# Patient Record
Sex: MALE | Race: WHITE | NOT HISPANIC OR LATINO | Employment: OTHER | URBAN - METROPOLITAN AREA
[De-identification: names, ages, dates, MRNs, and addresses within clinical notes are randomized per-mention and may not be internally consistent; named-entity substitution may affect disease eponyms.]

---

## 2017-05-10 DIAGNOSIS — J44.9 CHRONIC OBSTRUCTIVE PULMONARY DISEASE, UNSPECIFIED COPD TYPE (HCC): ICD-10-CM

## 2017-05-11 RX ORDER — TIOTROPIUM BROMIDE AND OLODATEROL 3.124; 2.736 UG/1; UG/1
SPRAY, METERED RESPIRATORY (INHALATION)
Qty: 1 INHALER | Refills: 5 | Status: SHIPPED | OUTPATIENT
Start: 2017-05-11 | End: 2017-10-09 | Stop reason: SDUPTHER

## 2017-05-11 NOTE — TELEPHONE ENCOUNTER
Pt is requesting an rx for Stiolto, last prescribed 7/28/16    Last OV:7/28/16  Next OV: return after studies are complete  Chronic Obstructive Lung Disease  Stiolto

## 2017-10-09 DIAGNOSIS — J44.9 CHRONIC OBSTRUCTIVE PULMONARY DISEASE, UNSPECIFIED COPD TYPE (HCC): ICD-10-CM

## 2017-10-09 RX ORDER — TIOTROPIUM BROMIDE AND OLODATEROL 3.124; 2.736 UG/1; UG/1
SPRAY, METERED RESPIRATORY (INHALATION)
Qty: 1 INHALER | Refills: 0 | Status: SHIPPED | OUTPATIENT
Start: 2017-10-09 | End: 2017-11-12 | Stop reason: SDUPTHER

## 2017-10-09 NOTE — TELEPHONE ENCOUNTER
Have we ever prescribed this med? Yes.  If yes, what date? 05/11/2017    Last OV: 7/28/2016 - Dr. Ashton    Next OV: none on file; was to complete testing    DX: COPD    Medications: Stiolto

## 2017-11-12 DIAGNOSIS — J44.9 CHRONIC OBSTRUCTIVE PULMONARY DISEASE, UNSPECIFIED COPD TYPE (HCC): ICD-10-CM

## 2017-11-13 RX ORDER — TIOTROPIUM BROMIDE AND OLODATEROL 3.124; 2.736 UG/1; UG/1
SPRAY, METERED RESPIRATORY (INHALATION)
Qty: 1 INHALER | Refills: 1 | Status: SHIPPED | OUTPATIENT
Start: 2017-11-13 | End: 2018-01-02 | Stop reason: SDUPTHER

## 2017-11-13 NOTE — TELEPHONE ENCOUNTER
Caller Name: Richard Vaughn                 Call Back Number: [phone] (home)         Patient approves a detailed voicemail message: N\A    Have we ever prescribed this med? Yes.  If yes, what date? 10/9/17    Last OV: 7/28/16 MD Poli     Next OV: none on file; was to complete testing    DX: COPD     Medications:  Current Outpatient Prescriptions   Medication Sig Dispense Refill   • STIOLTO RESPIMAT 2.5-2.5 MCG/ACT Aero Soln INHALE 2 PUFFS BY MOUTH DAILY 1 Inhaler 0   • albuterol (VENTOLIN HFA) 108 (90 BASE) MCG/ACT Aero Soln inhalation aerosol Inhale 2 Puffs by mouth every four hours as needed for Shortness of Breath (Wheezing). 1 Inhaler 6   • oxycodone-aspirin (PERCODAN) 4.5-0. MG TABS Take 1 Tab by mouth every 6 hours as needed for Mild Pain. 12 Each 0     Current Facility-Administered Medications   Medication Dose Route Frequency Provider Last Rate Last Dose   • albuterol inhaler 2 Puff  2 Puff Inhalation Q4HRS (RT) Taniya Begum M.D.

## 2017-11-22 ENCOUNTER — HOSPITAL ENCOUNTER (OUTPATIENT)
Dept: LAB | Facility: MEDICAL CENTER | Age: 62
End: 2017-11-22
Attending: FAMILY MEDICINE
Payer: COMMERCIAL

## 2017-11-22 LAB
25(OH)D3 SERPL-MCNC: 8 NG/ML (ref 30–100)
ALBUMIN SERPL BCP-MCNC: 4.2 G/DL (ref 3.2–4.9)
ALBUMIN/GLOB SERPL: 1.4 G/DL
ALP SERPL-CCNC: 45 U/L (ref 30–99)
ALT SERPL-CCNC: 13 U/L (ref 2–50)
ANION GAP SERPL CALC-SCNC: 6 MMOL/L (ref 0–11.9)
AST SERPL-CCNC: 18 U/L (ref 12–45)
BILIRUB SERPL-MCNC: 0.5 MG/DL (ref 0.1–1.5)
BUN SERPL-MCNC: 15 MG/DL (ref 8–22)
CALCIUM SERPL-MCNC: 9.4 MG/DL (ref 8.5–10.5)
CHLORIDE SERPL-SCNC: 103 MMOL/L (ref 96–112)
CHOLEST SERPL-MCNC: 199 MG/DL (ref 100–199)
CO2 SERPL-SCNC: 29 MMOL/L (ref 20–33)
CREAT SERPL-MCNC: 0.96 MG/DL (ref 0.5–1.4)
GFR SERPL CREATININE-BSD FRML MDRD: >60 ML/MIN/1.73 M 2
GLOBULIN SER CALC-MCNC: 2.9 G/DL (ref 1.9–3.5)
GLUCOSE SERPL-MCNC: 86 MG/DL (ref 65–99)
HDLC SERPL-MCNC: 55 MG/DL
LDLC SERPL CALC-MCNC: 129 MG/DL
POTASSIUM SERPL-SCNC: 4.2 MMOL/L (ref 3.6–5.5)
PROT SERPL-MCNC: 7.1 G/DL (ref 6–8.2)
PSA SERPL-MCNC: 0.77 NG/ML (ref 0–4)
SODIUM SERPL-SCNC: 138 MMOL/L (ref 135–145)
TRIGL SERPL-MCNC: 74 MG/DL (ref 0–149)
TSH SERPL DL<=0.005 MIU/L-ACNC: 1.3 UIU/ML (ref 0.3–3.7)

## 2017-11-22 PROCEDURE — 82306 VITAMIN D 25 HYDROXY: CPT

## 2017-11-22 PROCEDURE — 84153 ASSAY OF PSA TOTAL: CPT

## 2017-11-22 PROCEDURE — 84443 ASSAY THYROID STIM HORMONE: CPT

## 2017-11-22 PROCEDURE — 80061 LIPID PANEL: CPT

## 2017-11-22 PROCEDURE — 85025 COMPLETE CBC W/AUTO DIFF WBC: CPT

## 2017-11-22 PROCEDURE — 36415 COLL VENOUS BLD VENIPUNCTURE: CPT

## 2017-11-22 PROCEDURE — 80053 COMPREHEN METABOLIC PANEL: CPT

## 2017-11-24 LAB
BASOPHILS # BLD AUTO: 1.2 % (ref 0–1.8)
BASOPHILS # BLD: 0.09 K/UL (ref 0–0.12)
COMMENT 1642: NORMAL
EOSINOPHIL # BLD AUTO: 0.09 K/UL (ref 0–0.51)
EOSINOPHIL NFR BLD: 1.2 % (ref 0–6.9)
ERYTHROCYTE [DISTWIDTH] IN BLOOD BY AUTOMATED COUNT: 49.4 FL (ref 35.9–50)
HCT VFR BLD AUTO: 49.4 % (ref 42–52)
HGB BLD-MCNC: 16.7 G/DL (ref 14–18)
IMM GRANULOCYTES # BLD AUTO: 0.05 K/UL (ref 0–0.11)
IMM GRANULOCYTES NFR BLD AUTO: 0.6 % (ref 0–0.9)
LYMPHOCYTES # BLD AUTO: 1.58 K/UL (ref 1–4.8)
LYMPHOCYTES NFR BLD: 20.4 % (ref 22–41)
MCH RBC QN AUTO: 29.5 PG (ref 27–33)
MCHC RBC AUTO-ENTMCNC: 33.8 G/DL (ref 33.7–35.3)
MCV RBC AUTO: 87.1 FL (ref 81.4–97.8)
MONOCYTES # BLD AUTO: 0.6 K/UL (ref 0–0.85)
MONOCYTES NFR BLD AUTO: 7.7 % (ref 0–13.4)
MORPHOLOGY BLD-IMP: NORMAL
NEUTROPHILS # BLD AUTO: 5.35 K/UL (ref 1.82–7.42)
NEUTROPHILS NFR BLD: 68.9 % (ref 44–72)
NRBC # BLD AUTO: 0.02 K/UL
NRBC BLD AUTO-RTO: 0.3 /100 WBC
PLATELET # BLD AUTO: 252 K/UL (ref 164–446)
PLATELET BLD QL SMEAR: NORMAL
PMV BLD AUTO: 11.5 FL (ref 9–12.9)
RBC # BLD AUTO: 5.67 M/UL (ref 4.7–6.1)
RBC BLD AUTO: NORMAL
WBC # BLD AUTO: 7.8 K/UL (ref 4.8–10.8)

## 2018-01-01 DIAGNOSIS — J44.9 CHRONIC OBSTRUCTIVE PULMONARY DISEASE, UNSPECIFIED COPD TYPE (HCC): ICD-10-CM

## 2018-01-02 DIAGNOSIS — J44.9 CHRONIC OBSTRUCTIVE PULMONARY DISEASE, UNSPECIFIED COPD TYPE (HCC): ICD-10-CM

## 2018-01-02 RX ORDER — TIOTROPIUM BROMIDE AND OLODATEROL 3.124; 2.736 UG/1; UG/1
SPRAY, METERED RESPIRATORY (INHALATION)
Qty: 1 INHALER | Refills: 1 | OUTPATIENT
Start: 2018-01-02

## 2018-01-02 NOTE — TELEPHONE ENCOUNTER
Have we ever prescribed this med? Yes.  If yes, what date? 11/13/17    Last OV: 7/28/16    Next OV: No pending apt scheduled    DX: COPD    Medications: STIOLTO RESPIMAT 2.5-2.5 MCG/ACT Aero Soln    PLAN:   Continue Stiolto and prn albuterol. Will check an alpha-1 antitrypsin level and phenotype, low dose CT lung scan, and perform a home sleep study to screen for nocturnal hypoxemia and jessica (only sleeps supine 2/2 ortho issues). Will refer to pulmonary rehab. Will RTC after studies are completed.  Have encouraged him to get yearly flu shots, pna shot, and shingles vaccination

## 2018-01-02 NOTE — TELEPHONE ENCOUNTER
Caller Name: Richard Vaughn                 Call Back Number: [phone] (home)         Patient approves a detailed voicemail message: yes    Have we ever prescribed this med? Yes.  If yes, what date? 11/13/17    Last OV: 7/28/16    Next OV: no scheduled visit- no appt on file    DX: Chronic obstructive lung disease    Medications: Stiolto respimat

## 2018-02-22 DIAGNOSIS — J44.9 CHRONIC OBSTRUCTIVE PULMONARY DISEASE, UNSPECIFIED COPD TYPE (HCC): ICD-10-CM

## 2018-02-22 RX ORDER — TIOTROPIUM BROMIDE AND OLODATEROL 3.124; 2.736 UG/1; UG/1
SPRAY, METERED RESPIRATORY (INHALATION)
Qty: 1 INHALER | Refills: 0 | OUTPATIENT
Start: 2018-02-22

## 2018-02-22 NOTE — TELEPHONE ENCOUNTER
Caller Name: Richard Vaughn                 Call Back Number: [phone] (home)         Patient approves a detailed voicemail message: N\A    Have we ever prescribed this med? Yes.  If yes, what date? 1/2/18    Last OV: 7/28/16 MD Poli       PLAN:   Continue Stiolto and prn albuterol. Will check an alpha-1 antitrypsin level and phenotype, low dose CT lung scan, and perform a home sleep study to screen for nocturnal hypoxemia and jessica (only sleeps supine 2/2 ortho issues). Will refer to pulmonary rehab. Will RTC after studies are completed.  Have encouraged him to get yearly flu shots, pna shot, and shingles vaccination.          Next OV: NO PENDING APPT PLEASE REVIEW PREVIOUS ENCOUNTERS PATIENT WAS INFORMED TO BE SEEN        DX: COLD (chronic obstructive lung disease) (CMS-[facility]) [J44.9]        Medications:  Current Outpatient Prescriptions   Medication Sig Dispense Refill   • Tiotropium Bromide-Olodaterol (STIOLTO RESPIMAT) 2.5-2.5 MCG/ACT Aero Soln Inhale 2.5 mcg by mouth 2 times a day. 1 Inhaler 1   • albuterol (VENTOLIN HFA) 108 (90 BASE) MCG/ACT Aero Soln inhalation aerosol Inhale 2 Puffs by mouth every four hours as needed for Shortness of Breath (Wheezing). 1 Inhaler 6   • oxycodone-aspirin (PERCODAN) 4.5-0. MG TABS Take 1 Tab by mouth every 6 hours as needed for Mild Pain. 12 Each 0     Current Facility-Administered Medications   Medication Dose Route Frequency Provider Last Rate Last Dose   • albuterol inhaler 2 Puff  2 Puff Inhalation Q4HRS (RT) Taniya Begum M.D.

## 2018-03-22 NOTE — TELEPHONE ENCOUNTER
Have we ever prescribed this med? Yes.  If yes, what date? 06/08/16    Last OV: 07/28//16    Next OV: yearly    DX: COPD    Medications: albuterol (VENTOLIN HFA)

## 2018-03-23 NOTE — TELEPHONE ENCOUNTER
Have we ever prescribed this med? Yes.  If yes, what date? 6/8/16    Last OV: 7/28/16    Next OV: No pt scheduled    DX: Chronic obstructive pulmonary disease, unspecified copd, unspecified chronic bronchitis type (J44.9)    Medications: albuterol (VENTOLIN HFA) 108 (90 BASE) MCG/ACT Aero Soln inhalation aerosol     pt needs to OV for further refills

## 2018-03-26 RX ORDER — ALBUTEROL SULFATE 90 UG/1
AEROSOL, METERED RESPIRATORY (INHALATION)
Qty: 1 INHALER | Refills: 0 | OUTPATIENT
Start: 2018-03-26

## 2018-03-27 RX ORDER — ALBUTEROL SULFATE 90 UG/1
2 AEROSOL, METERED RESPIRATORY (INHALATION) EVERY 4 HOURS PRN
Qty: 1 INHALER | Refills: 0 | Status: SHIPPED | OUTPATIENT
Start: 2018-03-27 | End: 2019-03-11

## 2018-03-27 NOTE — TELEPHONE ENCOUNTER
Tried to call patient to let him know that his prescription was ready but he did not answer and his mail box was full so I could not leav a message.

## 2018-10-07 ENCOUNTER — HOSPITAL ENCOUNTER (OUTPATIENT)
Facility: MEDICAL CENTER | Age: 63
End: 2018-10-07
Attending: PHYSICIAN ASSISTANT
Payer: COMMERCIAL

## 2018-10-07 ENCOUNTER — OFFICE VISIT (OUTPATIENT)
Dept: URGENT CARE | Facility: CLINIC | Age: 63
End: 2018-10-07
Payer: COMMERCIAL

## 2018-10-07 VITALS
BODY MASS INDEX: 21.15 KG/M2 | RESPIRATION RATE: 20 BRPM | TEMPERATURE: 98.3 F | SYSTOLIC BLOOD PRESSURE: 130 MMHG | HEIGHT: 69 IN | DIASTOLIC BLOOD PRESSURE: 84 MMHG | HEART RATE: 95 BPM | OXYGEN SATURATION: 87 % | WEIGHT: 142.8 LBS

## 2018-10-07 DIAGNOSIS — L08.9 SKIN INFECTION: ICD-10-CM

## 2018-10-07 PROCEDURE — 87077 CULTURE AEROBIC IDENTIFY: CPT | Mod: 91

## 2018-10-07 PROCEDURE — 99214 OFFICE O/P EST MOD 30 MIN: CPT | Performed by: PHYSICIAN ASSISTANT

## 2018-10-07 PROCEDURE — 87186 SC STD MICRODIL/AGAR DIL: CPT

## 2018-10-07 PROCEDURE — 87070 CULTURE OTHR SPECIMN AEROBIC: CPT

## 2018-10-07 RX ORDER — CEFUROXIME AXETIL 500 MG/1
500 TABLET ORAL 2 TIMES DAILY
Qty: 20 TAB | Refills: 0 | Status: SHIPPED | OUTPATIENT
Start: 2018-10-07 | End: 2018-10-17

## 2018-10-07 RX ORDER — CEFTRIAXONE SODIUM 250 MG/1
500 INJECTION, POWDER, FOR SOLUTION INTRAMUSCULAR; INTRAVENOUS ONCE
Status: COMPLETED | OUTPATIENT
Start: 2018-10-07 | End: 2018-10-07

## 2018-10-07 RX ADMIN — CEFTRIAXONE SODIUM 500 MG: 250 INJECTION, POWDER, FOR SOLUTION INTRAMUSCULAR; INTRAVENOUS at 16:37

## 2018-10-07 ASSESSMENT — ENCOUNTER SYMPTOMS
SWOLLEN GLANDS: 0
NEUROLOGICAL NEGATIVE: 1
NUMBNESS: 0
FEVER: 0
CONSTITUTIONAL NEGATIVE: 1
MUSCULOSKELETAL NEGATIVE: 1
JOINT SWELLING: 0
WEAKNESS: 0

## 2018-10-07 NOTE — PROGRESS NOTES
Subjective:      Richard Vaughn is a 62 y.o. male who presents with Wound Infection (x4 days)            Wound Infection   This is a new (infected wound/cut) problem. The current episode started in the past 7 days. The problem occurs constantly. The problem has been gradually worsening. Pertinent negatives include no fever, joint swelling, numbness, swollen glands or weakness. Nothing aggravates the symptoms. He has tried nothing for the symptoms. The treatment provided no relief.       Review of Systems   Constitutional: Negative.  Negative for fever.   Musculoskeletal: Negative.  Negative for joint swelling.   Skin: Negative.    Neurological: Negative.  Negative for weakness and numbness.          Objective:     There were no vitals taken for this visit.     Physical Exam   Constitutional: He is oriented to person, place, and time. He appears well-developed and well-nourished. No distress.   Musculoskeletal: Normal range of motion. He exhibits tenderness (2nd finger cut wound, some pus seen; redn/sw finger w/redn to dorsal hand to wrist but no prox red streak seen). He exhibits no edema.   Neurological: He is alert and oriented to person, place, and time. No sensory deficit. He exhibits normal muscle tone. Coordination normal.   Skin: Skin is warm and dry. Capillary refill takes less than 2 seconds.   Psychiatric: He has a normal mood and affect. His behavior is normal.   Nursing note and vitals reviewed.    Active Ambulatory Problems     Diagnosis Date Noted   • Tobacco use disorder, mild, in sustained remission, abuse 07/28/2016   • COLD (chronic obstructive lung disease) ([facility]) 07/28/2016     Resolved Ambulatory Problems     Diagnosis Date Noted   • COLD (chronic obstructive lung disease) ([facility]) 06/06/2016     Past Medical History:   Diagnosis Date   • Anesthesia    • Cold    • Dental disorder    • Diverticulitis    • Osteoporosis    • Psychiatric problem      Current Outpatient Prescriptions on File Prior  to Visit   Medication Sig Dispense Refill   • albuterol (VENTOLIN HFA) 108 (90 Base) MCG/ACT Aero Soln inhalation aerosol Inhale 2 Puffs by mouth every four hours as needed for Shortness of Breath (Wheezing). 1 Inhaler 0   • Tiotropium Bromide-Olodaterol (STIOLTO RESPIMAT) 2.5-2.5 MCG/ACT Aero Soln Inhale 2 Puffs by mouth every day. 1 Inhaler 11   • oxycodone-aspirin (PERCODAN) 4.5-0. MG TABS Take 1 Tab by mouth every 6 hours as needed for Mild Pain. 12 Each 0     Current Facility-Administered Medications on File Prior to Visit   Medication Dose Route Frequency Provider Last Rate Last Dose   • albuterol inhaler 2 Puff  2 Puff Inhalation Q4HRS (RT) Taniya Begum M.D.         Social History     Social History   • Marital status:      Spouse name: N/A   • Number of children: N/A   • Years of education: N/A     Occupational History   • Not on file.     Social History Main Topics   • Smoking status: Former Smoker     Packs/day: 0.25     Types: Cigarettes     Quit date: 6/6/2009   • Smokeless tobacco: Never Used      Comment: 1 a day for 40 yrs   • Alcohol use No   • Drug use: No      Comment: NOT AT THIS TIME     • Sexual activity: Not on file     Other Topics Concern   • Not on file     Social History Narrative   • No narrative on file     Family History   Problem Relation Age of Onset   • Alzheimer's Disease Mother      Patient has no known allergies.            Assessment/Plan:     ·  lac/skin infection      · rx abx; local wound care  · Call back if not improving; will consider 2nd abx [but pt has GI hx/surg, intol to most meds];   · Go to ER if worsens or have fever, red streak up arm

## 2018-10-08 DIAGNOSIS — L08.9 SKIN INFECTION: ICD-10-CM

## 2018-10-10 ENCOUNTER — TELEPHONE (OUTPATIENT)
Dept: MEDICAL GROUP | Facility: CLINIC | Age: 63
End: 2018-10-10

## 2018-10-10 NOTE — TELEPHONE ENCOUNTER
Lab called with preliminary results. Wound cx positive for beta-hemolytic strep A and staph aureus.

## 2018-10-11 LAB
BACTERIA WND AEROBE CULT: ABNORMAL
SIGNIFICANT IND 70042: ABNORMAL
SITE SITE: ABNORMAL
SOURCE SOURCE: ABNORMAL

## 2018-11-09 ENCOUNTER — TELEPHONE (OUTPATIENT)
Dept: PULMONOLOGY | Facility: HOSPICE | Age: 63
End: 2018-11-09

## 2018-11-09 NOTE — TELEPHONE ENCOUNTER
Called pt in regards to his appt on 11/15/18 with Korin for a follow up however per  last note on 7/28/16 he stated he wanted pt to do a low dose CT LUNG SCAN and a  Home sleep study to screen for nocturnal hypoxemia  and jessica. Left VM to advise pt to call back to schedule appropriate testing before his appt. Also asked if he had maybe done testing somewhere else so that I could request those records.        PLAN:   Continue Stiolto and prn albuterol. Will check an alpha-1 antitrypsin level and phenotype, low dose CT lung scan, and perform a home sleep study to screen for nocturnal hypoxemia and jessica (only sleeps supine 2/2 ortho issues). Will refer to pulmonary rehab. Will RTC after studies are completed.  Have encouraged him to get yearly flu shots, pna shot, and shingles vaccination.

## 2018-11-13 NOTE — TELEPHONE ENCOUNTER
The patient called and he has not had testing done anywhere else.  He wants to keep his appt with Carley, though so that he can discuss the sleep study with her.  He has questions.

## 2018-11-15 ENCOUNTER — OFFICE VISIT (OUTPATIENT)
Dept: PULMONOLOGY | Facility: HOSPICE | Age: 63
End: 2018-11-15
Payer: COMMERCIAL

## 2018-11-15 VITALS
SYSTOLIC BLOOD PRESSURE: 136 MMHG | WEIGHT: 142 LBS | DIASTOLIC BLOOD PRESSURE: 60 MMHG | HEART RATE: 79 BPM | OXYGEN SATURATION: 91 % | HEIGHT: 69 IN | RESPIRATION RATE: 16 BRPM | BODY MASS INDEX: 21.03 KG/M2

## 2018-11-15 DIAGNOSIS — F17.201 TOBACCO ABUSE, IN REMISSION: ICD-10-CM

## 2018-11-15 DIAGNOSIS — M45.3 ANKYLOSING SPONDYLITIS OF CERVICOTHORACIC REGION (HCC): ICD-10-CM

## 2018-11-15 DIAGNOSIS — J44.9 CHRONIC OBSTRUCTIVE PULMONARY DISEASE, UNSPECIFIED COPD TYPE (HCC): ICD-10-CM

## 2018-11-15 PROCEDURE — 99213 OFFICE O/P EST LOW 20 MIN: CPT | Performed by: PHYSICIAN ASSISTANT

## 2018-11-15 NOTE — PATIENT INSTRUCTIONS
1-reviewed undone testing, hold off on alpha-1 for now  2-do lung cancer screening referral and overnight oximetry rather than home sleep study  3-needs new PFT  4-follow up appointment to go over results

## 2018-11-16 NOTE — PROGRESS NOTES
CC    HPI:  Richard Vaughn is a 63 y.o. year old male here today for follow-up on obstructive lung disease.  Patient has significant smoking history was error on prior documentation corrected pack years is 42.  Sustained remission since 2009, recommended continued abstinence.  Patient seen in July 2016 by Dr. Ashton at that time he was advised to complete alpha-1 antitrypsin level and phenotype, low-dose CT lung scan and home sleep study as well as pulmonary rehab.  Patient reports he understood that he would be contacted to schedule this workup.  Patient is not heavy utilizer of medical care based on available medical record. Reviewed the purpose of each of these recommendations.  Patient does not have biologic children and declines alpha-1 antitrypsin at this time.  Given his heavy smoking history and new understanding of the purpose of lung cancer screening patient willing to pursue that at this time.  Patient reluctant to perform any kind of sleep study because he states he sleeps always on his back due to ankylosing spondylitis with significant kyphosis and sleeps very poorly due to chronic back pain.  Did not feel a test performed when he was sedated would adequately reflect his normal circumstances.  Did agree to overnight oximetry to at least screen for nocturnal hypoxia.  Patient has not had PFTs since 7/28/2016.  Patient did express concern that exposure to something in [place] during [place] which led to chronic severe nasal sinus issues and 70% disability from the  might have actually caused his lung disease and would qualify him for 100% disability from the .  Difficult to assess with limited information, patient lung disease is consistent with his smoking history.  Reviewed vital signs in office today blood pressure 136/60, BMI of 21, room air oxygen saturation of 91%.  Follow-up appointment set at 3 months to allow patient time to complete recommended workup given upcoming  holidays.    ROS:   Constitutional, does report increased fatigue, denies fever, chills, night sweats, unintentional weight loss  Skin: Denies rashes, hair or nail changes, lumps, sores  Eyes: Denies glasses or any acute changes in his vision  ENT: Patient has upper and lower dentures, history of chronic sinus infections, ongoing nasal congestion or runny nose with postnasal drip, denies allergies, hoarseness, sore throat, earaches  GI: Denies heartburn or reflux at the current time ever since abdominal surgery, has no difficulty swallowing  Respiratory: Occasional cough with clear production, episodic wheezing, shortness of breath with activity, denies any history of pneumonia or bronchitis or TB, 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 family hx malignant hyperthermia   • Cold    • Dental disorder     dentures   • Diverticulitis    • Osteoporosis    • Psychiatric problem     CONSULT FOR DRUG WITHDRAWAL AFTER LAST SURGERY       Past Surgical History:   Procedure Laterality Date   • VENTRAL HERNIA REPAIR  10/23/2009    Performed by ALEJANDRA EASTON at SURGERY [facility] ORS   • VENTRAL HERNIA REPAIR  6/11/2009    Performed by ALEJANDRA EASTON at SURGERY [facility] ORS   • COLOSTOMY CLOSURE  4/2/2009    Performed by ALEJANDRA EASTON at SURGERY [facility] ORS   • SIGMOID COLECTOMY  12/6/2008    Performed by ALEJANDRA EASTON at SURGERY [facility] ORS   • COLOSTOMY  12/6/2008    Performed by ALEJANDRA EASTON at SURGERY [facility] ORS   • EXPLORATORY LAPAROTOMY  12/6/2008    Performed by ALEJANDRA EASTON at SURGERY [facility] ORS   • BOWEL RESECTION  12/6/2008    Performed by ALEJANDRA EASTON at SURGERY [facility] ORS   • UMBILICAL HERNIA REPAIR         Family  "History   Problem Relation Age of Onset   • Alzheimer's Disease Mother        Social History     Social History   • Marital status:      Spouse name: N/A   • Number of children: N/A   • Years of education: N/A     Occupational History   • Not on file.     Social History Main Topics   • Smoking status: Former Smoker     Packs/day: 1.00     Years: 42.00     Types: Cigarettes     Start date: 1/1/1967     Quit date: 6/6/2009   • Smokeless tobacco: Never Used      Comment: continued abstinance   • Alcohol use No   • Drug use: No      Comment: NOT AT THIS TIME     • Sexual activity: Not on file     Other Topics Concern   • Not on file     Social History Narrative   • No narrative on file       Allergies as of 11/15/2018   • (No Known Allergies)        @Vital signs for this encounter:  Vitals:    11/15/18 1322 11/15/18 1329   Height: 1.753 m (5' 9\")    Weight: 64.4 kg (142 lb)    Weight % change since last entry.: 0 %    BP: 136/60    Pulse: 79    BMI (Calculated): 20.97    Resp: 16    O2 sat % room air:  (!) 91 %       Current medications as of today   Current Outpatient Prescriptions   Medication Sig Dispense Refill   • albuterol (VENTOLIN HFA) 108 (90 Base) MCG/ACT Aero Soln inhalation aerosol Inhale 2 Puffs by mouth every four hours as needed for Shortness of Breath (Wheezing). 1 Inhaler 0   • Tiotropium Bromide-Olodaterol (STIOLTO RESPIMAT) 2.5-2.5 MCG/ACT Aero Soln Inhale 2 Puffs by mouth every day. 1 Inhaler 11   • oxycodone-aspirin (PERCODAN) 4.5-0. MG TABS Take 1 Tab by mouth every 6 hours as needed for Mild Pain. 12 Each 0     Current Facility-Administered Medications   Medication Dose Route Frequency Provider Last Rate Last Dose   • albuterol inhaler 2 Puff  2 Puff Inhalation Q4HRS (RT) Taniya Begum M.D.             Physical Exam:   Gen:           Alert and oriented, No apparent distress. Mood and affect appropriate, normal interaction   Eyes:          sclere white, conjunctive moist.  Hearing:   "   Grossly intact.  Dentition:    Wears dentures  Oropharynx:   Tongue normal, posterior pharynx with mild erythema no exudate.  Mallampati Classification: II-III  Neck:        Supple, trachea midline, no masses.  M/S:   Marked kyphosis, limited range of motion back and neck  Respiratory Effort: Did have some intercostal retractions and use of accessory muscles.   Lung Auscultation:      Decreased throughout; no rales, rhonchi or wheezing.  CV:            Regular rate and rhythm. No murmurs, rubs or gallops appreciated.  2-3+ pretibial edema  Digits, Nails, Ext: No clubbing, cyanosis, petechiae, or nodes.   Skin:        No rashes, lesions or ulcers noted on back or exposed skin surfaces                    Assessment:  1. Tobacco abuse, in remission  REFERRAL TO LUNG CANCER SCREENING PROGRAM    Overnight Oximetry    PULMONARY FUNCTION TESTS -Test requested: Complete Pulmonary Function Test   2. Chronic obstructive pulmonary disease, unspecified COPD type (HCC)         Immunizations:    Flu: Deferred  Pneumovax 23: Deferred  Prevnar 13: Deferred    Plan:  1-reviewed undone testing, hold off on alpha-1 for now  2-do lung cancer screening referral and overnight oximetry rather than home sleep study  3-needs new PFT  4-follow up appointment to go over results  5-would like to perform multi ox at this visit    This dictation was created using voice recognition software. The accuracy of the dictation is limited to the abilities of the software. I expect there may be some errors of grammar and possibly content.

## 2018-11-27 ENCOUNTER — TELEPHONE (OUTPATIENT)
Dept: HEMATOLOGY ONCOLOGY | Facility: MEDICAL CENTER | Age: 63
End: 2018-11-27

## 2018-11-27 DIAGNOSIS — J44.9 CHRONIC OBSTRUCTIVE PULMONARY DISEASE, UNSPECIFIED COPD TYPE (HCC): ICD-10-CM

## 2018-11-28 RX ORDER — TIOTROPIUM BROMIDE AND OLODATEROL 3.124; 2.736 UG/1; UG/1
2 SPRAY, METERED RESPIRATORY (INHALATION) DAILY
Qty: 4 INHALER | Refills: 11 | Status: SHIPPED | OUTPATIENT
Start: 2018-11-28 | End: 2018-11-29 | Stop reason: SDUPTHER

## 2018-11-28 NOTE — TELEPHONE ENCOUNTER
Have we ever prescribed this med? Yes.  If yes, what date? 2/22/18    Last OV: 11/15/18 JOSR PATEL    Next OV: 2/20/19 JOSR PATEL    DX: Chronic obstructive pulmonary disease, unspecified COPD type (HCC) (J44.9)    Medications: STIOLTO RESPIMAT 2.5-2.5 MCG/ACT Aero Soln

## 2018-11-28 NOTE — TELEPHONE ENCOUNTER
Let pt know that his rx was sent to     [facility]/pharmacy #[id number] - [place] - [address]  [address] [address]  Phone: [phone] Fax: [phone]

## 2018-11-28 NOTE — TELEPHONE ENCOUNTER
Received referral to lung cancer screening program.  Chart review to assess for lung cancer screening program eligibility.   1. Age 55-77 yrs of age? Yes 63 y.o.  2. 30 pack year hx of smoking, or greater? Yes 1 [id number]= [id number] hx  3. Current smoker or if quit, has pt quit within last 15 yrs?Yes  Quit 2009  4. Any signs or symptoms of lung cancer? None noted  5. Previous history of lung cancer? None noted  6. Chest CT within past 12 mos.? None noted  Patient does meet eligibility criteria. [facility] scheduling notified to schedule the shared decision making visit.

## 2018-11-29 DIAGNOSIS — J44.9 CHRONIC OBSTRUCTIVE PULMONARY DISEASE, UNSPECIFIED COPD TYPE (HCC): ICD-10-CM

## 2018-11-30 NOTE — TELEPHONE ENCOUNTER
Pt is requesting a sample of the rx Stiolto, he states that his insurance company will not approve the refill until 12/3/18 and he is almost out.    Have we ever prescribed this med? Yes.  If yes, what date? 11/29/18    Last OV: 11/15/18- [facility]    Next OV: 2/20/19    DX: COPD    Medications: sample of Stiolto    Order pending!

## 2018-11-30 NOTE — TELEPHONE ENCOUNTER
Pt notified on vm that sample of Stiolto was approved and at the [facility] for . BRET

## 2018-12-05 ENCOUNTER — TELEPHONE (OUTPATIENT)
Dept: HEMATOLOGY ONCOLOGY | Facility: MEDICAL CENTER | Age: 63
End: 2018-12-05

## 2018-12-05 NOTE — TELEPHONE ENCOUNTER
1st attempt to contact the patient.  LM on voicemail for patient requesting a call back to schedule a new patient appointment with medical oncology LCSW.  Ref: Lakshmi King  Dx: Former Tobacco abuse

## 2018-12-20 ENCOUNTER — OFFICE VISIT (OUTPATIENT)
Dept: HEMATOLOGY ONCOLOGY | Facility: MEDICAL CENTER | Age: 63
End: 2018-12-20
Payer: COMMERCIAL

## 2018-12-20 VITALS
RESPIRATION RATE: 18 BRPM | BODY MASS INDEX: 21.65 KG/M2 | HEART RATE: 81 BPM | WEIGHT: 146.16 LBS | OXYGEN SATURATION: 91 % | SYSTOLIC BLOOD PRESSURE: 140 MMHG | TEMPERATURE: 98.2 F | DIASTOLIC BLOOD PRESSURE: 90 MMHG | HEIGHT: 69 IN

## 2018-12-20 DIAGNOSIS — Z87.891 PERSONAL HISTORY OF TOBACCO USE, PRESENTING HAZARDS TO HEALTH: ICD-10-CM

## 2018-12-20 PROCEDURE — G0296 VISIT TO DETERM LDCT ELIG: HCPCS | Performed by: NURSE PRACTITIONER

## 2018-12-20 ASSESSMENT — ENCOUNTER SYMPTOMS
HEMOPTYSIS: 0
COUGH: 1
SHORTNESS OF BREATH: 1
SPUTUM PRODUCTION: 1
WEIGHT LOSS: 0
WHEEZING: 0

## 2018-12-20 ASSESSMENT — PAIN SCALES - GENERAL: PAINLEVEL: 4=SLIGHT-MODERATE PAIN

## 2018-12-20 NOTE — PROGRESS NOTES
Subjective:      Richard Vaughn is a 63 y.o. male who presents for Lung Cancer Screening Program Prescreen (Former Smoker/ [facility]/ Lakshmi King) for lung cancer screening shared decision making visit.       HPI   Patient seen today for initial lung cancer screening visit. Patient referred by Pulmonology, Lakshmi Kign PA-C.     The patient meets eligibility criteria including age, smoking history (30+ pack years), if former smoker, quit in the last 15 years, and absence of signs or symptoms of lung cancer.    - Age - 63  - Smoking history - Patient has smoked for 42 years at an average of 1 ppd = 42 pack year smoking history.  - Current smoking status - former smoker, quit 2009  - No symptoms of lung cancer and no previous history of lung cancer         No Known Allergies    Current Outpatient Prescriptions on File Prior to Visit   Medication Sig Dispense Refill   • Tiotropium Bromide-Olodaterol (STIOLTO RESPIMAT) 2.5-2.5 MCG/ACT Aero Soln Inhale 2 Puffs by mouth every day. 2 Inhaler 0   • albuterol (VENTOLIN HFA) 108 (90 Base) MCG/ACT Aero Soln inhalation aerosol Inhale 2 Puffs by mouth every four hours as needed for Shortness of Breath (Wheezing). 1 Inhaler 0   • oxycodone-aspirin (PERCODAN) 4.5-0. MG TABS Take 1 Tab by mouth every 6 hours as needed for Mild Pain. (Patient not taking: Reported on 12/20/2018) 12 Each 0     Current Facility-Administered Medications on File Prior to Visit   Medication Dose Route Frequency Provider Last Rate Last Dose   • albuterol inhaler 2 Puff  2 Puff Inhalation Q4HRS (RT) Taniya Begum M.D.           Review of Systems   Constitutional: Negative for malaise/fatigue and weight loss.   Respiratory: Positive for cough (recovering URI with chronic sinusitis), sputum production (clear, green stuff this week) and shortness of breath. Negative for hemoptysis and wheezing.           Objective:     /90 (BP Location: Right arm, Patient Position: Sitting, BP Cuff Size:  "Adult)   Pulse 81   Temp 36.8 °C (98.2 °F) (Temporal)   Resp 18   Ht 1.753 m (5' 9\")   Wt 66.3 kg (146 lb 2.6 oz)   SpO2 91%   BMI 21.58 kg/m²      Physical Exam   Constitutional: He is oriented to person, place, and time. He appears well-developed and well-nourished. No distress.   Cardiovascular: Normal rate, regular rhythm and normal heart sounds.  Exam reveals no gallop and no friction rub.    No murmur heard.  Pulmonary/Chest: Effort normal. No respiratory distress. He has no wheezes.   diminshed   Musculoskeletal: Normal range of motion.   Scoliosis and kyphosis changes, hx AS   Neurological: He is alert and oriented to person, place, and time.   Skin: Skin is warm and dry. He is not diaphoretic.   Vitals reviewed.         Assessment/Plan:     1. Personal history of tobacco use, presenting hazards to health  CT-LUNG CANCER-SCREENING       We conducted a shared decision-making process using a decision aid. We reviewed benefits and harms of screening, including false positives and potential need for additional diagnostic testing, the possibility of over diagnosis, and total radiation exposure.    We discussed the importance of adhering to annual LDCT screening. We also discussed the impact of comorbities on the patient's the ability or willingness to undergo diagnostic procedure(s) and treatment.    Counseling on the importance of maintaining cigarette smoking abstinence if former smoker; or the importance of smoking cessation if current smoker and, if appropriate, furnishing of information about tobacco cessation interventions.    Based on our discussion, we have decided to begin annual lung cancer screening starting now.        "

## 2019-01-21 ENCOUNTER — HOSPITAL ENCOUNTER (OUTPATIENT)
Dept: RADIOLOGY | Facility: MEDICAL CENTER | Age: 64
End: 2019-01-21
Attending: NURSE PRACTITIONER
Payer: MEDICARE

## 2019-01-21 DIAGNOSIS — Z87.891 PERSONAL HISTORY OF TOBACCO USE, PRESENTING HAZARDS TO HEALTH: ICD-10-CM

## 2019-01-21 PROCEDURE — G0297 LDCT FOR LUNG CA SCREEN: HCPCS

## 2019-01-24 ENCOUNTER — TELEPHONE (OUTPATIENT)
Dept: HEMATOLOGY ONCOLOGY | Facility: MEDICAL CENTER | Age: 64
End: 2019-01-24

## 2019-01-24 NOTE — TELEPHONE ENCOUNTER
RN called patient to discuss LDCT results. Patient did not answer and unable to leave a vm d/t space on voicemail.

## 2019-01-30 ENCOUNTER — TELEPHONE (OUTPATIENT)
Dept: HEMATOLOGY ONCOLOGY | Facility: MEDICAL CENTER | Age: 64
End: 2019-01-30

## 2019-02-11 ENCOUNTER — TELEPHONE (OUTPATIENT)
Dept: HEMATOLOGY ONCOLOGY | Facility: MEDICAL CENTER | Age: 64
End: 2019-02-11

## 2019-02-11 NOTE — TELEPHONE ENCOUNTER
2nd attempt to contact the patient regarding LDCT. Patient did not answer and voicemail left with request to call back

## 2019-02-22 ENCOUNTER — TELEPHONE (OUTPATIENT)
Dept: HEMATOLOGY ONCOLOGY | Facility: MEDICAL CENTER | Age: 64
End: 2019-02-22

## 2019-02-22 NOTE — TELEPHONE ENCOUNTER
After failed attempts to reach the patient, a letter has been sent to  Request the patient call our office to review the results of the LDCT performed on 1/21/2019

## 2019-02-25 ENCOUNTER — APPOINTMENT (OUTPATIENT)
Dept: RADIOLOGY | Facility: IMAGING CENTER | Age: 64
End: 2019-02-25
Attending: INTERNAL MEDICINE
Payer: MEDICARE

## 2019-02-25 ENCOUNTER — APPOINTMENT (OUTPATIENT)
Dept: PULMONOLOGY | Facility: HOSPICE | Age: 64
End: 2019-02-25
Payer: MEDICARE

## 2019-02-25 ENCOUNTER — OFFICE VISIT (OUTPATIENT)
Dept: PULMONOLOGY | Facility: HOSPICE | Age: 64
End: 2019-02-25
Payer: MEDICARE

## 2019-02-25 VITALS
SYSTOLIC BLOOD PRESSURE: 140 MMHG | HEIGHT: 69 IN | RESPIRATION RATE: 18 BRPM | OXYGEN SATURATION: 98 % | BODY MASS INDEX: 21.03 KG/M2 | HEART RATE: 82 BPM | TEMPERATURE: 98.1 F | WEIGHT: 142 LBS | DIASTOLIC BLOOD PRESSURE: 80 MMHG

## 2019-02-25 DIAGNOSIS — J44.1 COPD EXACERBATION (HCC): ICD-10-CM

## 2019-02-25 DIAGNOSIS — J44.9 CHRONIC OBSTRUCTIVE PULMONARY DISEASE, UNSPECIFIED COPD TYPE (HCC): ICD-10-CM

## 2019-02-25 DIAGNOSIS — R09.02 EXERCISE HYPOXEMIA: ICD-10-CM

## 2019-02-25 DIAGNOSIS — R60.9 PERIPHERAL EDEMA: ICD-10-CM

## 2019-02-25 PROCEDURE — 99214 OFFICE O/P EST MOD 30 MIN: CPT | Performed by: INTERNAL MEDICINE

## 2019-02-25 PROCEDURE — 71046 X-RAY EXAM CHEST 2 VIEWS: CPT | Mod: TC,FY | Performed by: INTERNAL MEDICINE

## 2019-02-25 PROCEDURE — 94761 N-INVAS EAR/PLS OXIMETRY MLT: CPT | Performed by: INTERNAL MEDICINE

## 2019-02-25 RX ORDER — PREDNISONE 10 MG/1
TABLET ORAL
Qty: 30 TAB | Refills: 0 | Status: SHIPPED | OUTPATIENT
Start: 2019-02-25 | End: 2019-03-11

## 2019-02-25 RX ORDER — FUROSEMIDE 20 MG/1
20 TABLET ORAL DAILY
Qty: 30 TAB | Refills: 0 | Status: ON HOLD | OUTPATIENT
Start: 2019-02-25 | End: 2019-03-13

## 2019-02-25 ASSESSMENT — PAIN SCALES - GENERAL: PAINLEVEL: NO PAIN

## 2019-02-26 DIAGNOSIS — J44.9 CHRONIC OBSTRUCTIVE PULMONARY DISEASE, UNSPECIFIED COPD TYPE (HCC): ICD-10-CM

## 2019-02-26 NOTE — PROGRESS NOTES
CC:  Chief Complaint   Patient presents with   • Follow-Up     Legs are swelling, severe shortness of breath     Severe shortness of breath and cough    HPI:   The patient is a 63 y.o. male.  With history of smoking came today for follow-up.  The patient was recently seen in our clinic.  Pulmonary function test was ordered unfortunately was not done yet.  The patient was prescribed Stiolto and he has been taking it.  For the last 2 weeks his symptoms of shortness of breath and cough has been much worse than baseline.  He cannot walk very short distance without extreme dyspnea.  His cough was also productive of green sputum more than usual.  And darker than usual.  However the patient thinks symptoms started to turn around for the last 2 days.    The patient is also complaining of lower extremity swelling.  He does have scoliosis and he cannot lay flat.  He spent a lot of time in the recliner chair.  He thinks swelling is because he does not lay flat enough time during the night.  Denies any palpitation or chest pain.  No known cardiac history.  Chest x-ray in our clinic today did not show any pneumonia.  No pulmonary edema or cardiomegaly.  ROS:   Constitutional: Denies fevers, chills, night sweats  Eyes: Denies vision loss, pain, drainage, double vision  Ears, Nose, Throat: Denies earache, difficulty hearing, tinnitus, nasal congestion, hoarseness  Cardiovascular: Denies chest pain, tightness, palpitations, orthopnea or edema  Respiratory: Please see HPI  GI: Denies heartburn, dysphagia, nausea, abdominal pain, diarrhea or constipation  : Denies frequent urination, hematuria, discharge or painful urination  Musculoskeletal: Denies back pain, painful joints, sore muscles  Neurological: Denies weakness or headaches  Skin: No rashes  All other ROS were negative except what mentioned in the HPI     Past Medical History:  Past Medical History:   Diagnosis Date   • Anesthesia     poss. family hx malignant hyperthermia  "  • Cold    • Dental disorder     dentures   • Diverticulitis    • Osteoporosis    • Psychiatric problem     CONSULT FOR DRUG WITHDRAWAL AFTER LAST SURGERY               Social History:  Social History     Social History   • Marital status:      Spouse name: N/A   • Number of children: N/A   • Years of education: N/A     Occupational History   • Not on file.     Social History Main Topics   • Smoking status: Former Smoker     Packs/day: 1.00     Years: 42.00     Types: Cigarettes     Start date: 1/1/1967     Quit date: 6/6/2009   • Smokeless tobacco: Never Used      Comment: continued abstinance   • Alcohol use No   • Drug use: No      Comment: NOT AT THIS TIME     • Sexual activity: Not on file     Other Topics Concern   • Not on file     Social History Narrative   • No narrative on file             Family History:  Family History   Problem Relation Age of Onset   • Alzheimer's Disease Mother        Current Outpatient Prescriptions on File Prior to Visit   Medication Sig Dispense Refill   • Tiotropium Bromide-Olodaterol (STIOLTO RESPIMAT) 2.5-2.5 MCG/ACT Aero Soln Inhale 2 Puffs by mouth every day. 2 Inhaler 0   • albuterol (VENTOLIN HFA) 108 (90 Base) MCG/ACT Aero Soln inhalation aerosol Inhale 2 Puffs by mouth every four hours as needed for Shortness of Breath (Wheezing). 1 Inhaler 0   • oxycodone-aspirin (PERCODAN) 4.5-0. MG TABS Take 1 Tab by mouth every 6 hours as needed for Mild Pain. (Patient not taking: Reported on 12/20/2018) 12 Each 0     Current Facility-Administered Medications on File Prior to Visit   Medication Dose Route Frequency Provider Last Rate Last Dose   • albuterol inhaler 2 Puff  2 Puff Inhalation Q4HRS (RT) Taniya Begum M.D.           Allergies:   Patient has no known allergies.        Vitals:    02/25/19 1508 02/25/19 1510   Height: 1.753 m (5' 9\")    Weight: 64.4 kg (142 lb)    Weight % change since last entry.: 0 %    BP: 140/80    Pulse: 82    BMI (Calculated): 20.97  "   Resp: 18    Temp: 36.7 °C (98.1 °F)    TempSrc: Oral    O2 sat % room air:  (!) 82 %           Physical Exam:  Appearance:  in no acute distress  HEENT: Normocephalic, atraumatic, white sclera, PERRLA, oropharynx clear  Neck: No adenopathy or masses  Respiratory: no intercostal retractions or accessory muscle use  Lungs auscultation: Clear to auscultation bilaterally  Cardiovascular: Regular rate rhythm. No murmurs, rubs or gallops.++ LE edema  Abdomen: soft, nondistended  Gait: Normal  Digits: No clubbing, cyanosis  Motor: No focal deficits  Orientation: Oriented to time, person and place      DATA:    Labs:  Lab Results   Component Value Date/Time    WBC 7.8 11/22/2017 01:57 PM    RBC 5.67 11/22/2017 01:57 PM    HEMOGLOBIN 16.7 11/22/2017 01:57 PM    HEMATOCRIT 49.4 11/22/2017 01:57 PM    MCV 87.1 11/22/2017 01:57 PM    MCH 29.5 11/22/2017 01:57 PM    MCHC 33.8 11/22/2017 01:57 PM    MPV 11.5 11/22/2017 01:57 PM    NEUTSPOLYS 68.90 11/22/2017 01:57 PM    LYMPHOCYTES 20.40 (L) 11/22/2017 01:57 PM    MONOCYTES 7.70 11/22/2017 01:57 PM    EOSINOPHILS 1.20 11/22/2017 01:57 PM    BASOPHILS 1.20 11/22/2017 01:57 PM    HYPOCHROMIA 1+ 06/10/2009 02:37 PM      Lab Results   Component Value Date/Time    SODIUM 138 11/22/2017 01:57 PM    POTASSIUM 4.2 11/22/2017 01:57 PM    CHLORIDE 103 11/22/2017 01:57 PM    CO2 29 11/22/2017 01:57 PM    GLUCOSE 86 11/22/2017 01:57 PM    BUN 15 11/22/2017 01:57 PM    CREATININE 0.96 11/22/2017 01:57 PM    CREATININE 1.2 04/03/2009 04:00 AM                  Diagnosis:  1. Chronic obstructive pulmonary disease, unspecified COPD type (HCC)  DX-CHEST-2 VIEWS    PULMONARY FUNCTION TESTS -Test requested: Complete Pulmonary Function Test    EC-ECHOCARDIOGRAM COMPLETE W/O CONT   2. COPD exacerbation (HCC)  predniSONE (DELTASONE) 10 MG Tab    Multiple Oximetry    Multiple Oximetry   3. Peripheral edema  furosemide (LASIX) 20 MG Tab    EC-ECHOCARDIOGRAM COMPLETE W/O CONT   4. Exercise hypoxemia   DME O2 New Set Up    EC-ECHOCARDIOGRAM COMPLETE W/O CONT        Assessment and Plan   The patient probably has exacerbation of underlying severe COPD.  I offered to the patient to go to the hospital for acute care management but he declined.  He said his symptoms started to turn around for the last 2 days.    -We will start the patient on prednisone taper.  I do not think he needs antibiotics at this point.  -We will switch him from Stiolto to trelegy   -He was very hypoxemic with O2 saturation of 82% at room air today.  We ordered oxygen supplementation.    Lower extremity swelling  This is new problem as the patient states.  He thinks the swelling is related to sitting in the recliner for long and not able to lay flat because of scoliosis.  Give the patient prescription for Lasix 20 mg to use on as-needed basis and to stop when the swelling is better.  I am going to check echocardiogram to evaluate for pulmonary hypertension and right heart function.  Again I told the patient he should go to the emergency room if symptoms get worse or do not improve at this point he thinks he is getting better.  The patient will do PFTs prior to next appointment.                    Return in about 4 weeks (around 3/25/2019) for With PFT.        This note was created using voice recognition software. I apologize for any overlooked obvious grammar or  vocabulary mistake

## 2019-02-26 NOTE — PROCEDURES
Multi-Ox Readings  Multi Ox #1 Room air   O2 sat % at rest 82   O2 sat % on exertion     O2 sat average on exertion     Multi Ox #2 3 LPM   O2 sat % at rest 96   O2 sat % on exertion 92   O2 sat average on exertion 94     Oxygen Use     Oxygen Frequency     Duration of need     Is the patient mobile within the home?     CPAP Use?     BIPAP Use?     Servo Titration

## 2019-02-26 NOTE — TELEPHONE ENCOUNTER
Have we ever prescribed this med? No.  If yes, what date? New Rx    Last OV: 2/25/2019    Next OV: 3/27/2019    DX: COPD    Medications: Trelegy Ellipta          The patient called and stated that Dr. Hong was supposed to send this Rx to his pharmacy.

## 2019-03-11 ENCOUNTER — APPOINTMENT (OUTPATIENT)
Dept: RADIOLOGY | Facility: MEDICAL CENTER | Age: 64
DRG: 190 | End: 2019-03-11
Attending: EMERGENCY MEDICINE
Payer: MEDICARE

## 2019-03-11 ENCOUNTER — TELEPHONE (OUTPATIENT)
Dept: PULMONOLOGY | Facility: HOSPICE | Age: 64
End: 2019-03-11

## 2019-03-11 ENCOUNTER — APPOINTMENT (OUTPATIENT)
Dept: RADIOLOGY | Facility: MEDICAL CENTER | Age: 64
DRG: 190 | End: 2019-03-11
Attending: INTERNAL MEDICINE
Payer: MEDICARE

## 2019-03-11 ENCOUNTER — HOSPITAL ENCOUNTER (INPATIENT)
Facility: MEDICAL CENTER | Age: 64
LOS: 1 days | DRG: 190 | End: 2019-03-13
Attending: EMERGENCY MEDICINE | Admitting: INTERNAL MEDICINE
Payer: MEDICARE

## 2019-03-11 ENCOUNTER — APPOINTMENT (OUTPATIENT)
Dept: RADIOLOGY | Facility: MEDICAL CENTER | Age: 64
DRG: 190 | End: 2019-03-11
Payer: MEDICARE

## 2019-03-11 DIAGNOSIS — R09.02 HYPOXIA: ICD-10-CM

## 2019-03-11 DIAGNOSIS — I50.9 ACUTE CONGESTIVE HEART FAILURE, UNSPECIFIED HEART FAILURE TYPE (HCC): ICD-10-CM

## 2019-03-11 DIAGNOSIS — J44.1 CHRONIC OBSTRUCTIVE PULMONARY DISEASE WITH ACUTE EXACERBATION (HCC): ICD-10-CM

## 2019-03-11 LAB
ALBUMIN SERPL BCP-MCNC: 3.8 G/DL (ref 3.2–4.9)
ALBUMIN/GLOB SERPL: 1.1 G/DL
ALP SERPL-CCNC: 96 U/L (ref 30–99)
ALT SERPL-CCNC: 32 U/L (ref 2–50)
ANION GAP SERPL CALC-SCNC: 7 MMOL/L (ref 0–11.9)
APPEARANCE UR: CLEAR
AST SERPL-CCNC: 33 U/L (ref 12–45)
BASOPHILS # BLD AUTO: 1 % (ref 0–1.8)
BASOPHILS # BLD: 0.07 K/UL (ref 0–0.12)
BILIRUB SERPL-MCNC: 0.9 MG/DL (ref 0.1–1.5)
BILIRUB UR QL STRIP.AUTO: NEGATIVE
BNP SERPL-MCNC: 473 PG/ML (ref 0–100)
BUN SERPL-MCNC: 16 MG/DL (ref 8–22)
CALCIUM SERPL-MCNC: 9.3 MG/DL (ref 8.5–10.5)
CHLORIDE SERPL-SCNC: 99 MMOL/L (ref 96–112)
CO2 SERPL-SCNC: 30 MMOL/L (ref 20–33)
COLOR UR: YELLOW
CREAT SERPL-MCNC: 1.02 MG/DL (ref 0.5–1.4)
EOSINOPHIL # BLD AUTO: 0.07 K/UL (ref 0–0.51)
EOSINOPHIL NFR BLD: 1 % (ref 0–6.9)
ERYTHROCYTE [DISTWIDTH] IN BLOOD BY AUTOMATED COUNT: 54.2 FL (ref 35.9–50)
GLOBULIN SER CALC-MCNC: 3.6 G/DL (ref 1.9–3.5)
GLUCOSE SERPL-MCNC: 96 MG/DL (ref 65–99)
GLUCOSE UR STRIP.AUTO-MCNC: NEGATIVE MG/DL
HCT VFR BLD AUTO: 57.9 % (ref 42–52)
HGB BLD-MCNC: 18.8 G/DL (ref 14–18)
IMM GRANULOCYTES # BLD AUTO: 0.03 K/UL (ref 0–0.11)
IMM GRANULOCYTES NFR BLD AUTO: 0.4 % (ref 0–0.9)
KETONES UR STRIP.AUTO-MCNC: NEGATIVE MG/DL
LACTATE BLD-SCNC: 1.6 MMOL/L (ref 0.5–2)
LEUKOCYTE ESTERASE UR QL STRIP.AUTO: NEGATIVE
LYMPHOCYTES # BLD AUTO: 0.9 K/UL (ref 1–4.8)
LYMPHOCYTES NFR BLD: 12.6 % (ref 22–41)
MCH RBC QN AUTO: 29.9 PG (ref 27–33)
MCHC RBC AUTO-ENTMCNC: 32.5 G/DL (ref 33.7–35.3)
MCV RBC AUTO: 92.1 FL (ref 81.4–97.8)
MICRO URNS: NORMAL
MONOCYTES # BLD AUTO: 0.64 K/UL (ref 0–0.85)
MONOCYTES NFR BLD AUTO: 9 % (ref 0–13.4)
NEUTROPHILS # BLD AUTO: 5.41 K/UL (ref 1.82–7.42)
NEUTROPHILS NFR BLD: 76 % (ref 44–72)
NITRITE UR QL STRIP.AUTO: NEGATIVE
NRBC # BLD AUTO: 0 K/UL
NRBC BLD-RTO: 0 /100 WBC
PH UR STRIP.AUTO: 7 [PH]
PLATELET # BLD AUTO: 178 K/UL (ref 164–446)
PMV BLD AUTO: 10.5 FL (ref 9–12.9)
POTASSIUM SERPL-SCNC: 4.8 MMOL/L (ref 3.6–5.5)
PROT SERPL-MCNC: 7.4 G/DL (ref 6–8.2)
PROT UR QL STRIP: NEGATIVE MG/DL
RBC # BLD AUTO: 6.29 M/UL (ref 4.7–6.1)
RBC UR QL AUTO: NEGATIVE
SODIUM SERPL-SCNC: 136 MMOL/L (ref 135–145)
SP GR UR STRIP.AUTO: 1.01
TROPONIN I SERPL-MCNC: 0.02 NG/ML (ref 0–0.04)
UROBILINOGEN UR STRIP.AUTO-MCNC: 0.2 MG/DL
WBC # BLD AUTO: 7.1 K/UL (ref 4.8–10.8)

## 2019-03-11 PROCEDURE — 93005 ELECTROCARDIOGRAM TRACING: CPT | Performed by: EMERGENCY MEDICINE

## 2019-03-11 PROCEDURE — 85025 COMPLETE CBC W/AUTO DIFF WBC: CPT

## 2019-03-11 PROCEDURE — 87040 BLOOD CULTURE FOR BACTERIA: CPT

## 2019-03-11 PROCEDURE — G0378 HOSPITAL OBSERVATION PER HR: HCPCS

## 2019-03-11 PROCEDURE — 83605 ASSAY OF LACTIC ACID: CPT

## 2019-03-11 PROCEDURE — 71045 X-RAY EXAM CHEST 1 VIEW: CPT

## 2019-03-11 PROCEDURE — 93970 EXTREMITY STUDY: CPT

## 2019-03-11 PROCEDURE — 80053 COMPREHEN METABOLIC PANEL: CPT

## 2019-03-11 PROCEDURE — 99220 PR INITIAL OBSERVATION CARE,LEVL III: CPT | Performed by: INTERNAL MEDICINE

## 2019-03-11 PROCEDURE — 93970 EXTREMITY STUDY: CPT | Mod: 26 | Performed by: SURGERY

## 2019-03-11 PROCEDURE — 700111 HCHG RX REV CODE 636 W/ 250 OVERRIDE (IP): Performed by: EMERGENCY MEDICINE

## 2019-03-11 PROCEDURE — 96374 THER/PROPH/DIAG INJ IV PUSH: CPT

## 2019-03-11 PROCEDURE — 81003 URINALYSIS AUTO W/O SCOPE: CPT

## 2019-03-11 PROCEDURE — 87086 URINE CULTURE/COLONY COUNT: CPT

## 2019-03-11 PROCEDURE — 99285 EMERGENCY DEPT VISIT HI MDM: CPT

## 2019-03-11 PROCEDURE — 700101 HCHG RX REV CODE 250: Performed by: INTERNAL MEDICINE

## 2019-03-11 PROCEDURE — 36415 COLL VENOUS BLD VENIPUNCTURE: CPT

## 2019-03-11 PROCEDURE — 96375 TX/PRO/DX INJ NEW DRUG ADDON: CPT

## 2019-03-11 PROCEDURE — 96376 TX/PRO/DX INJ SAME DRUG ADON: CPT

## 2019-03-11 PROCEDURE — 304561 HCHG STAT O2

## 2019-03-11 PROCEDURE — 94640 AIRWAY INHALATION TREATMENT: CPT

## 2019-03-11 PROCEDURE — 93005 ELECTROCARDIOGRAM TRACING: CPT

## 2019-03-11 PROCEDURE — 94760 N-INVAS EAR/PLS OXIMETRY 1: CPT

## 2019-03-11 PROCEDURE — 83880 ASSAY OF NATRIURETIC PEPTIDE: CPT

## 2019-03-11 PROCEDURE — 84484 ASSAY OF TROPONIN QUANT: CPT

## 2019-03-11 PROCEDURE — 700111 HCHG RX REV CODE 636 W/ 250 OVERRIDE (IP)

## 2019-03-11 RX ORDER — MORPHINE SULFATE 4 MG/ML
2 INJECTION, SOLUTION INTRAMUSCULAR; INTRAVENOUS ONCE
Status: COMPLETED | OUTPATIENT
Start: 2019-03-11 | End: 2019-03-11

## 2019-03-11 RX ORDER — FUROSEMIDE 10 MG/ML
40 INJECTION INTRAMUSCULAR; INTRAVENOUS
Status: DISCONTINUED | OUTPATIENT
Start: 2019-03-12 | End: 2019-03-13 | Stop reason: HOSPADM

## 2019-03-11 RX ORDER — D-METHORPHAN/PE/ACETAMINOPHEN 10-5-325MG
1 CAPSULE ORAL DAILY
COMMUNITY
End: 2019-11-20

## 2019-03-11 RX ORDER — ACETAMINOPHEN 325 MG/1
650 TABLET ORAL EVERY 6 HOURS PRN
Status: DISCONTINUED | OUTPATIENT
Start: 2019-03-11 | End: 2019-03-13 | Stop reason: HOSPADM

## 2019-03-11 RX ORDER — BISACODYL 10 MG
10 SUPPOSITORY, RECTAL RECTAL
Status: DISCONTINUED | OUTPATIENT
Start: 2019-03-11 | End: 2019-03-13 | Stop reason: HOSPADM

## 2019-03-11 RX ORDER — IPRATROPIUM BROMIDE AND ALBUTEROL SULFATE 2.5; .5 MG/3ML; MG/3ML
3 SOLUTION RESPIRATORY (INHALATION)
Status: DISCONTINUED | OUTPATIENT
Start: 2019-03-11 | End: 2019-03-13 | Stop reason: HOSPADM

## 2019-03-11 RX ORDER — POLYETHYLENE GLYCOL 3350 17 G/17G
1 POWDER, FOR SOLUTION ORAL
Status: DISCONTINUED | OUTPATIENT
Start: 2019-03-11 | End: 2019-03-13 | Stop reason: HOSPADM

## 2019-03-11 RX ORDER — TIOTROPIUM BROMIDE AND OLODATEROL 3.124; 2.736 UG/1; UG/1
1 SPRAY, METERED RESPIRATORY (INHALATION) 2 TIMES DAILY
COMMUNITY
End: 2019-09-19

## 2019-03-11 RX ORDER — ONDANSETRON 4 MG/1
4 TABLET, ORALLY DISINTEGRATING ORAL EVERY 4 HOURS PRN
Status: DISCONTINUED | OUTPATIENT
Start: 2019-03-11 | End: 2019-03-13 | Stop reason: HOSPADM

## 2019-03-11 RX ORDER — MORPHINE SULFATE 4 MG/ML
INJECTION, SOLUTION INTRAMUSCULAR; INTRAVENOUS
Status: COMPLETED
Start: 2019-03-11 | End: 2019-03-11

## 2019-03-11 RX ORDER — AMOXICILLIN 250 MG
2 CAPSULE ORAL 2 TIMES DAILY
Status: DISCONTINUED | OUTPATIENT
Start: 2019-03-11 | End: 2019-03-13 | Stop reason: HOSPADM

## 2019-03-11 RX ORDER — MORPHINE SULFATE 4 MG/ML
2 INJECTION, SOLUTION INTRAMUSCULAR; INTRAVENOUS
Status: DISCONTINUED | OUTPATIENT
Start: 2019-03-11 | End: 2019-03-13 | Stop reason: HOSPADM

## 2019-03-11 RX ORDER — ACETAMINOPHEN 325 MG/1
650 TABLET ORAL ONCE
Status: DISCONTINUED | OUTPATIENT
Start: 2019-03-11 | End: 2019-03-11

## 2019-03-11 RX ORDER — FUROSEMIDE 10 MG/ML
40 INJECTION INTRAMUSCULAR; INTRAVENOUS ONCE
Status: COMPLETED | OUTPATIENT
Start: 2019-03-11 | End: 2019-03-11

## 2019-03-11 RX ORDER — ONDANSETRON 2 MG/ML
4 INJECTION INTRAMUSCULAR; INTRAVENOUS EVERY 4 HOURS PRN
Status: DISCONTINUED | OUTPATIENT
Start: 2019-03-11 | End: 2019-03-13 | Stop reason: HOSPADM

## 2019-03-11 RX ADMIN — MORPHINE SULFATE 2 MG: 4 INJECTION INTRAVENOUS at 21:42

## 2019-03-11 RX ADMIN — FUROSEMIDE 40 MG: 10 INJECTION, SOLUTION INTRAMUSCULAR; INTRAVENOUS at 18:24

## 2019-03-11 RX ADMIN — MORPHINE SULFATE 2 MG: 4 INJECTION INTRAVENOUS at 20:22

## 2019-03-11 RX ADMIN — IPRATROPIUM BROMIDE AND ALBUTEROL SULFATE 3 ML: .5; 3 SOLUTION RESPIRATORY (INHALATION) at 23:15

## 2019-03-11 ASSESSMENT — COPD QUESTIONNAIRES
DO YOU EVER COUGH UP ANY MUCUS OR PHLEGM?: YES, A FEW DAYS A WEEK OR MONTH
DURING THE PAST 4 WEEKS HOW MUCH DID YOU FEEL SHORT OF BREATH: SOME OF THE TIME
COPD SCREENING SCORE: 7
HAVE YOU SMOKED AT LEAST 100 CIGARETTES IN YOUR ENTIRE LIFE: YES

## 2019-03-11 ASSESSMENT — PATIENT HEALTH QUESTIONNAIRE - PHQ9
8. MOVING OR SPEAKING SO SLOWLY THAT OTHER PEOPLE COULD HAVE NOTICED. OR THE OPPOSITE, BEING SO FIGETY OR RESTLESS THAT YOU HAVE BEEN MOVING AROUND A LOT MORE THAN USUAL: NOT AT ALL
9. THOUGHTS THAT YOU WOULD BE BETTER OFF DEAD, OR OF HURTING YOURSELF: NOT AT ALL
6. FEELING BAD ABOUT YOURSELF - OR THAT YOU ARE A FAILURE OR HAVE LET YOURSELF OR YOUR FAMILY DOWN: NEARLY EVERY DAY
SUM OF ALL RESPONSES TO PHQ9 QUESTIONS 1 AND 2: 3
7. TROUBLE CONCENTRATING ON THINGS, SUCH AS READING THE NEWSPAPER OR WATCHING TELEVISION: MORE THAN HALF THE DAYS
3. TROUBLE FALLING OR STAYING ASLEEP OR SLEEPING TOO MUCH: NEARLY EVERY DAY
4. FEELING TIRED OR HAVING LITTLE ENERGY: NEARLY EVERY DAY
SUM OF ALL RESPONSES TO PHQ QUESTIONS 1-9: 14
5. POOR APPETITE OR OVEREATING: NOT AT ALL
1. LITTLE INTEREST OR PLEASURE IN DOING THINGS: NOT AT ALL
2. FEELING DOWN, DEPRESSED, IRRITABLE, OR HOPELESS: NEARLY EVERY DAY

## 2019-03-11 ASSESSMENT — LIFESTYLE VARIABLES
EVER_SMOKED: YES
DO YOU DRINK ALCOHOL: NO

## 2019-03-12 ENCOUNTER — APPOINTMENT (OUTPATIENT)
Dept: CARDIOLOGY | Facility: MEDICAL CENTER | Age: 64
DRG: 190 | End: 2019-03-12
Attending: INTERNAL MEDICINE
Payer: MEDICARE

## 2019-03-12 PROBLEM — J44.1 CHRONIC OBSTRUCTIVE PULMONARY DISEASE WITH ACUTE EXACERBATION (HCC): Status: ACTIVE | Noted: 2019-03-12

## 2019-03-12 PROBLEM — L03.90 CELLULITIS: Status: ACTIVE | Noted: 2019-03-12

## 2019-03-12 PROBLEM — R60.0 BILATERAL LOWER EXTREMITY EDEMA: Status: ACTIVE | Noted: 2019-03-12

## 2019-03-12 PROBLEM — J44.9 COPD (CHRONIC OBSTRUCTIVE PULMONARY DISEASE) (HCC): Status: ACTIVE | Noted: 2019-03-12

## 2019-03-12 PROBLEM — J96.21 ACUTE ON CHRONIC RESPIRATORY FAILURE WITH HYPOXIA (HCC): Status: ACTIVE | Noted: 2019-03-12

## 2019-03-12 LAB
ANION GAP SERPL CALC-SCNC: 3 MMOL/L (ref 0–11.9)
BASOPHILS # BLD AUTO: 0.7 % (ref 0–1.8)
BASOPHILS # BLD: 0.05 K/UL (ref 0–0.12)
BNP SERPL-MCNC: 349 PG/ML (ref 0–100)
BUN SERPL-MCNC: 16 MG/DL (ref 8–22)
CALCIUM SERPL-MCNC: 8.5 MG/DL (ref 8.5–10.5)
CHLORIDE SERPL-SCNC: 101 MMOL/L (ref 96–112)
CO2 SERPL-SCNC: 33 MMOL/L (ref 20–33)
CREAT SERPL-MCNC: 1.06 MG/DL (ref 0.5–1.4)
EOSINOPHIL # BLD AUTO: 0.11 K/UL (ref 0–0.51)
EOSINOPHIL NFR BLD: 1.6 % (ref 0–6.9)
ERYTHROCYTE [DISTWIDTH] IN BLOOD BY AUTOMATED COUNT: 50.8 FL (ref 35.9–50)
GLUCOSE SERPL-MCNC: 105 MG/DL (ref 65–99)
HCT VFR BLD AUTO: 50.9 % (ref 42–52)
HGB BLD-MCNC: 16.5 G/DL (ref 14–18)
IMM GRANULOCYTES # BLD AUTO: 0.05 K/UL (ref 0–0.11)
IMM GRANULOCYTES NFR BLD AUTO: 0.7 % (ref 0–0.9)
LV EJECT FRACT  99904: 55
LYMPHOCYTES # BLD AUTO: 1.03 K/UL (ref 1–4.8)
LYMPHOCYTES NFR BLD: 15.1 % (ref 22–41)
MCH RBC QN AUTO: 29.4 PG (ref 27–33)
MCHC RBC AUTO-ENTMCNC: 32.4 G/DL (ref 33.7–35.3)
MCV RBC AUTO: 90.7 FL (ref 81.4–97.8)
MONOCYTES # BLD AUTO: 0.77 K/UL (ref 0–0.85)
MONOCYTES NFR BLD AUTO: 11.3 % (ref 0–13.4)
NEUTROPHILS # BLD AUTO: 4.81 K/UL (ref 1.82–7.42)
NEUTROPHILS NFR BLD: 70.6 % (ref 44–72)
NRBC # BLD AUTO: 0 K/UL
NRBC BLD-RTO: 0 /100 WBC
PLATELET # BLD AUTO: 158 K/UL (ref 164–446)
PMV BLD AUTO: 9.8 FL (ref 9–12.9)
POTASSIUM SERPL-SCNC: 4.1 MMOL/L (ref 3.6–5.5)
PROCALCITONIN SERPL-MCNC: <0.05 NG/ML
RBC # BLD AUTO: 5.61 M/UL (ref 4.7–6.1)
SODIUM SERPL-SCNC: 137 MMOL/L (ref 135–145)
TROPONIN I SERPL-MCNC: 0.02 NG/ML (ref 0–0.04)
WBC # BLD AUTO: 6.8 K/UL (ref 4.8–10.8)

## 2019-03-12 PROCEDURE — 97535 SELF CARE MNGMENT TRAINING: CPT

## 2019-03-12 PROCEDURE — 770020 HCHG ROOM/CARE - TELE (206)

## 2019-03-12 PROCEDURE — 93306 TTE W/DOPPLER COMPLETE: CPT

## 2019-03-12 PROCEDURE — 700102 HCHG RX REV CODE 250 W/ 637 OVERRIDE(OP): Performed by: INTERNAL MEDICINE

## 2019-03-12 PROCEDURE — 96376 TX/PRO/DX INJ SAME DRUG ADON: CPT

## 2019-03-12 PROCEDURE — G0378 HOSPITAL OBSERVATION PER HR: HCPCS

## 2019-03-12 PROCEDURE — 85025 COMPLETE CBC W/AUTO DIFF WBC: CPT

## 2019-03-12 PROCEDURE — 84484 ASSAY OF TROPONIN QUANT: CPT

## 2019-03-12 PROCEDURE — 94760 N-INVAS EAR/PLS OXIMETRY 1: CPT

## 2019-03-12 PROCEDURE — 700101 HCHG RX REV CODE 250: Performed by: INTERNAL MEDICINE

## 2019-03-12 PROCEDURE — 99232 SBSQ HOSP IP/OBS MODERATE 35: CPT | Performed by: FAMILY MEDICINE

## 2019-03-12 PROCEDURE — 96375 TX/PRO/DX INJ NEW DRUG ADDON: CPT

## 2019-03-12 PROCEDURE — 93306 TTE W/DOPPLER COMPLETE: CPT | Mod: 26 | Performed by: INTERNAL MEDICINE

## 2019-03-12 PROCEDURE — 84145 PROCALCITONIN (PCT): CPT

## 2019-03-12 PROCEDURE — 96372 THER/PROPH/DIAG INJ SC/IM: CPT

## 2019-03-12 PROCEDURE — 36415 COLL VENOUS BLD VENIPUNCTURE: CPT

## 2019-03-12 PROCEDURE — A9270 NON-COVERED ITEM OR SERVICE: HCPCS | Performed by: INTERNAL MEDICINE

## 2019-03-12 PROCEDURE — 97161 PT EVAL LOW COMPLEX 20 MIN: CPT

## 2019-03-12 PROCEDURE — 94664 DEMO&/EVAL PT USE INHALER: CPT

## 2019-03-12 PROCEDURE — 80048 BASIC METABOLIC PNL TOTAL CA: CPT

## 2019-03-12 PROCEDURE — 94640 AIRWAY INHALATION TREATMENT: CPT

## 2019-03-12 PROCEDURE — 83880 ASSAY OF NATRIURETIC PEPTIDE: CPT

## 2019-03-12 PROCEDURE — 700111 HCHG RX REV CODE 636 W/ 250 OVERRIDE (IP): Performed by: INTERNAL MEDICINE

## 2019-03-12 PROCEDURE — 700111 HCHG RX REV CODE 636 W/ 250 OVERRIDE (IP): Performed by: HOSPITALIST

## 2019-03-12 RX ORDER — DOXYCYCLINE 100 MG/1
100 TABLET ORAL EVERY 12 HOURS
Status: DISCONTINUED | OUTPATIENT
Start: 2019-03-12 | End: 2019-03-12

## 2019-03-12 RX ORDER — PREDNISONE 20 MG/1
40 TABLET ORAL DAILY
Status: DISCONTINUED | OUTPATIENT
Start: 2019-03-12 | End: 2019-03-13 | Stop reason: HOSPADM

## 2019-03-12 RX ORDER — METHYLPREDNISOLONE SODIUM SUCCINATE 125 MG/2ML
62.5 INJECTION, POWDER, LYOPHILIZED, FOR SOLUTION INTRAMUSCULAR; INTRAVENOUS ONCE
Status: COMPLETED | OUTPATIENT
Start: 2019-03-12 | End: 2019-03-12

## 2019-03-12 RX ADMIN — FUROSEMIDE 40 MG: 10 INJECTION, SOLUTION INTRAMUSCULAR; INTRAVENOUS at 06:02

## 2019-03-12 RX ADMIN — ENOXAPARIN SODIUM 40 MG: 100 INJECTION SUBCUTANEOUS at 06:01

## 2019-03-12 RX ADMIN — MORPHINE SULFATE 2 MG: 4 INJECTION INTRAVENOUS at 07:41

## 2019-03-12 RX ADMIN — UMECLIDINIUM BROMIDE AND VILANTEROL TRIFENATATE 1 PUFF: 62.5; 25 POWDER RESPIRATORY (INHALATION) at 06:05

## 2019-03-12 RX ADMIN — MORPHINE SULFATE 2 MG: 4 INJECTION INTRAVENOUS at 00:53

## 2019-03-12 RX ADMIN — IPRATROPIUM BROMIDE AND ALBUTEROL SULFATE 3 ML: .5; 3 SOLUTION RESPIRATORY (INHALATION) at 14:22

## 2019-03-12 RX ADMIN — MORPHINE SULFATE 2 MG: 4 INJECTION INTRAVENOUS at 15:28

## 2019-03-12 RX ADMIN — IPRATROPIUM BROMIDE AND ALBUTEROL SULFATE 3 ML: .5; 3 SOLUTION RESPIRATORY (INHALATION) at 11:22

## 2019-03-12 RX ADMIN — MORPHINE SULFATE 2 MG: 4 INJECTION INTRAVENOUS at 11:05

## 2019-03-12 RX ADMIN — MORPHINE SULFATE 2 MG: 4 INJECTION INTRAVENOUS at 03:54

## 2019-03-12 RX ADMIN — FUROSEMIDE 40 MG: 10 INJECTION, SOLUTION INTRAMUSCULAR; INTRAVENOUS at 15:29

## 2019-03-12 RX ADMIN — METHYLPREDNISOLONE SODIUM SUCCINATE 62.5 MG: 125 INJECTION, POWDER, FOR SOLUTION INTRAMUSCULAR; INTRAVENOUS at 03:54

## 2019-03-12 RX ADMIN — DOXYCYCLINE 100 MG: 100 TABLET, FILM COATED ORAL at 03:55

## 2019-03-12 RX ADMIN — MORPHINE SULFATE 2 MG: 4 INJECTION INTRAVENOUS at 23:05

## 2019-03-12 RX ADMIN — IPRATROPIUM BROMIDE AND ALBUTEROL SULFATE 3 ML: .5; 3 SOLUTION RESPIRATORY (INHALATION) at 06:20

## 2019-03-12 RX ADMIN — IPRATROPIUM BROMIDE AND ALBUTEROL SULFATE 3 ML: .5; 3 SOLUTION RESPIRATORY (INHALATION) at 22:42

## 2019-03-12 ASSESSMENT — ENCOUNTER SYMPTOMS
DIARRHEA: 0
DIAPHORESIS: 0
BACK PAIN: 0
DOUBLE VISION: 0
NAUSEA: 0
TINGLING: 0
COUGH: 1
WHEEZING: 1
NECK PAIN: 0
PALPITATIONS: 0
FEVER: 0
FLANK PAIN: 0
DIZZINESS: 0
HEADACHES: 0
SORE THROAT: 0
FOCAL WEAKNESS: 0
MYALGIAS: 0
ORTHOPNEA: 0
SHORTNESS OF BREATH: 1
HEMOPTYSIS: 0
SPUTUM PRODUCTION: 0
ABDOMINAL PAIN: 0
PHOTOPHOBIA: 0
VOMITING: 0
CHILLS: 0
SEIZURES: 0
BLOOD IN STOOL: 0
BLURRED VISION: 0
BRUISES/BLEEDS EASILY: 0

## 2019-03-12 ASSESSMENT — COGNITIVE AND FUNCTIONAL STATUS - GENERAL
MOVING TO AND FROM BED TO CHAIR: A LITTLE
TURNING FROM BACK TO SIDE WHILE IN FLAT BAD: A LITTLE
MOBILITY SCORE: 22
SUGGESTED CMS G CODE MODIFIER MOBILITY: CJ

## 2019-03-12 ASSESSMENT — GAIT ASSESSMENTS
GAIT LEVEL OF ASSIST: SUPERVISED
DISTANCE (FEET): 500

## 2019-03-12 ASSESSMENT — ACTIVITIES OF DAILY LIVING (ADL): TOILETING: INDEPENDENT

## 2019-03-12 NOTE — ED NOTES
Report from Fabian MORALEZ, pt updated on poc, patient c/o hunger at this time, educated on ed process and admission order. Pt verbalizes understanding, vss,

## 2019-03-12 NOTE — PROGRESS NOTES
"Patient requesting bed to be elevated in slight reverse trendelenberg, which raises the bed about 6 inches above where it would be in the lowest position. This RN and charge, Richelle MORALEZ, educated patient on fall risk and why it is important to keep bed in lowest position. Patient understands importance and states \"I think my comfort is more important\". Patient agrees to call for assistance before getting out of bed. Both bed and strip alarm in place. Call light within reach. Will continue to monitor.  "

## 2019-03-12 NOTE — THERAPY
Occupational Therapy Contact Note    OT order received and attempted. Pt declined need for formal OT evaluation however open to education. Has been up with staff without difficulty. Discussed compensatory strategies, energy conservation techs, and appropriate AE/DME to maximize endurance for self care. Pt was interested in obtaining a shower chair; given info on where to access. He denies any further acute OT needs at this time and has excellent family support.    Flavia Amezcua, OTR/L

## 2019-03-12 NOTE — ED NOTES
"Pt refusing tylenol at this time, states \"I dont take drugs and tylenol makes me feel funny\" pt asked what he would like for pain control, asking for morphine. ERP informed,   "

## 2019-03-12 NOTE — PROGRESS NOTES
Bedside report received from ER RN. Patient transferred from ER Red 9 to T711-1. Patient awake, alert and oriented x 4. Resting in bed. On 6L oxygen using oxymask. No distress noted at this time, patient complaining of lower back pain. Fall precautions in place, bed alarm on. Call light within reach. Will continue to monitor.

## 2019-03-12 NOTE — CARE PLAN
"Problem: Communication  Goal: The ability to communicate needs accurately and effectively will improve  Outcome: PROGRESSING AS EXPECTED  Patient able to make needs known, calls appropriately. Call light within reach.    Problem: Safety  Goal: Will remain free from falls  Outcome: PROGRESSING AS EXPECTED  Proper fall precautions in place except for bed height. Patient requests bed be in slight reverse trendelenburg to alleviate pain in his neck and back. This bed positioning elevates the bed about 6 inches higher than it would be in the lowest position. Patient educated on importance of keeping bed in lowest position to help prevent falls but patient states \"I think my comfort is more important\". Bed locked, bed and strip alarm on, upper siderails raised, treaded socks on, falls band on wrist, appropriate signage outside room, call light within reach.     Problem: Pain Management  Goal: Pain level will decrease to patient's comfort goal  Outcome: PROGRESSING AS EXPECTED  Pain managed with prn pain medication      "

## 2019-03-12 NOTE — PROGRESS NOTES
Hospital Medicine Daily Progress Note    Date of Service  3/12/2019    Chief Complaint  63 y.o. male admitted 3/11/2019 with  shortness of breath and bilateral lower extremity edema that is been progressively worsening over the past 6 weeks.  Patient was evaluated by his pulmonologist 2 weeks ago and was started on Lasix and supplemental oxygen however the patient notes progressively worsening symptoms..  He reports a chronic cough.  He denies any fevers, chills, chest pain, diaphoresis, nausea or vomiting.  He reports chronic back pain.  He also reports redness of his bilateral lower extremities that has been worsening over the past week.    Hospital Course  Getting lasix iv  Interval Problem Update  Echo is pending    Consultants/Specialty  none    Code Status      Disposition  pending    Review of Systems  Review of Systems   Constitutional: Negative for chills, diaphoresis and fever.   HENT: Negative for ear discharge, ear pain and tinnitus.    Eyes: Negative for blurred vision, double vision and photophobia.   Respiratory: Positive for shortness of breath. Negative for hemoptysis.    Cardiovascular: Negative for chest pain, palpitations and orthopnea.   Gastrointestinal: Negative for abdominal pain, nausea and vomiting.   Genitourinary: Negative for dysuria, frequency and urgency.   Musculoskeletal: Negative for back pain and neck pain.   Skin: Positive for rash (both legs/chronic).   Neurological: Negative for dizziness, tingling and headaches.        Physical Exam  Temp:  [36.6 °C (97.9 °F)-37.3 °C (99.2 °F)] 36.7 °C (98.1 °F)  Pulse:  [50-91] 89  Resp:  [17-20] 17  BP: ()/(60-84) 98/62  SpO2:  [70 %-99 %] 91 %    Physical Exam   Constitutional: He is oriented to person, place, and time. No distress.   HENT:   Head: Normocephalic and atraumatic.   Eyes: Pupils are equal, round, and reactive to light. Conjunctivae are normal.   Neck: Normal range of motion. Neck supple.   Cardiovascular: Normal rate and  regular rhythm.    Pulmonary/Chest: No respiratory distress. He exhibits no tenderness.   Abdominal: Soft. Bowel sounds are normal.   Musculoskeletal: He exhibits edema.   Neurological: He is alert and oriented to person, place, and time.   Skin: Rash (in both legs) noted. He is not diaphoretic.       Fluids    Intake/Output Summary (Last 24 hours) at 03/12/19 1114  Last data filed at 03/12/19 0800   Gross per 24 hour   Intake              720 ml   Output             1225 ml   Net             -505 ml       Laboratory  Recent Labs      03/11/19   1736  03/12/19   0254   WBC  7.1  6.8   RBC  6.29*  5.61   HEMOGLOBIN  18.8*  16.5   HEMATOCRIT  57.9*  50.9   MCV  92.1  90.7   MCH  29.9  29.4   MCHC  32.5*  32.4*   RDW  54.2*  50.8*   PLATELETCT  178  158*   MPV  10.5  9.8     Recent Labs      03/11/19   1736  03/12/19   0254   SODIUM  136  137   POTASSIUM  4.8  4.1   CHLORIDE  99  101   CO2  30  33   GLUCOSE  96  105*   BUN  16  16   CREATININE  1.02  1.06   CALCIUM  9.3  8.5         Recent Labs      03/11/19   1830  03/12/19   0254   BNPBTYPENAT  473*  349*           Imaging      Assessment/Plan  Chronic obstructive pulmonary disease with acute exacerbation (HCC)- (present on admission)   Assessment & Plan    As per above         Acute on chronic respiratory failure with hypoxia (HCC)- (present on admission)   Assessment & Plan    Secondary to acute COPD exacerbation  resp protocol  Prednisone  o2     Bilateral lower extremity edema- (present on admission)   Assessment & Plan    Lasix iv bid  Cardiac echo is pending     Cellulitis- (present on admission)   Assessment & Plan    Cellulitis is ruled out  Venous stasis dermatitis  dced doxycycline          VTE prophylaxis: lovenox

## 2019-03-12 NOTE — CARE PLAN
Problem: Oxygenation:  Goal: Maintain adequate oxygenation dependent on patient condition  Outcome: PROGRESSING AS EXPECTED  Pt on oxymask 4 lpm  He didn't want nc     Problem: Bronchoconstriction:  Goal: Improve in air movement and diminished wheezing  Outcome: PROGRESSING AS EXPECTED  ellipta  1 puff q day duoneb q4

## 2019-03-12 NOTE — RESPIRATORY CARE
"COPD EDUCATION by COPD CLINICAL EDUCATOR  (Phone: [phone])  3/12/2019 at 1:06 PM by Amanda Stiles     Patient seen by Respiratory Education team to complete Block 1 of a 2 part series. Reference material about the program was given to patient along with our contact information.  Part #1 includes: What is COPD (how the lungs work), common treatments for COPD and the difference between \"rescue\" medications and \"daily\" medications. Pt has Stiolto ordered and has been taking incorrectly -1 breath every 12 -or so hours (and ineffectively). He has an Albuterol MDI which he has been declining to use. We reviewed the use of a spacer device and the purpose of increasing the deposition of the medication into his small airways.  He states he wasn't explained how to use this prior. Encouraged him to explore when the best time of his day would be to take his daily control medications.  We rviewed bronchial hygiene and had brief  explanation of the COPD Action Plan. We discussed appropriate medication technique along with a demonstration, and the correct way to care for and clean equipment. He see's [facility].  He was ordered Home Oxygen  Nasal cannula but is reluctanct to use it as it is combersome and uncomfortable.  He currently is requiring an Oxymask at 6 lpm.  He desaturates to 84-85% at rest when it is removed.  Question and answer session followed. He has a [facility] appointment 3/27 @1330 PFT and office 1430.  A [facility] referral was initiated. Request for a nebulizer with medication for home was D/W bedside RN.     "

## 2019-03-12 NOTE — ED NOTES
Pt called to inform RN that he is in pain, specifically back pain. Informed RN that he is not used to laying in bed for hours, and usually gets up to walk around. Also requesting pain medication at this time/ ERP informed, orders for tylenol received.

## 2019-03-12 NOTE — PROGRESS NOTES
2 RN skin check with Alex RN.     Bilateral ears red, slow to abiel. Oxymask in place with gray foam.   Mid Back, has redness rafia to abiel on spine.   Left buttock has nickel size non blanching spot.   Bilateral lower extrem. Red, tight and shiny.     Waffle mattress in place.  Applied mepilex to spine and buttock.   Grey foam placed on oxymask.

## 2019-03-12 NOTE — THERAPY
"Physical Therapy Evaluation completed.   Bed Mobility:  Supine to Sit: Supervised (HOB elevated per patient request)  Transfers: Sit to Stand: Supervised  Gait: Level Of Assist: Supervised with No Equipment Needed       Plan of Care: Patient with no further skilled PT needs in the acute care setting at this time  Discharge Recommendations: Equipment: No Equipment Needed. Post-acute therapy: Currently anticipate no further skilled therapy needs once patient is discharged from the inpatient setting.    See \"Rehab Therapy-Acute\" Patient Summary Report for complete documentation.    Patient is a 64 YO male that was admitted 3/11 with complaints of BLE edema x5 weeks and SOB. Patient with PMHx significant for CHF, anasarca, COPD, chronic back pain, osteoporosis. Patient ambulated approximately 500ft without AD with supervision and performed bed mobility and transfers at supervision level. Patient reported severe back pain at baseline and difficulty with mobility due to pain, no pain during session as patient premedicated. Patient appears to be near baseline functional mobility. No further acute PT needs.  "

## 2019-03-12 NOTE — ED NOTES
Pt ambulated to , changed into gown and connected to monitor. Agree with triage note.  Pt has had BLE edema and redness x6 weeks. Saw his COPD specialist 2 weeks ago and put on lasix and home O2. Swelling in BLE hasn't decreased, he continues to become SOB with ambulation, and isn't using his home O2 as rx'd.   Pt aware of POC. PIV initiated, blood drawn and sent to lab. Pt reports other labs and blood cultures were collected while he was in the lobby.   Pt medicated per MAR. Connected to monitor.   Provided urinal. Call light in reach, denies further needs.

## 2019-03-12 NOTE — PROGRESS NOTES
"During breakfast patient refusing to wear O2, SPO2 70% on RMA, lips cyanotic.  Educated patient that he needs oxygen and needs to be wearing oxygen and since he prefers the mask we can just switch him to the nasal cannula for meals.   Patient states \"I have been living my whole life without oxygen, who are you to tell me that I need oxygen.\"  This RN again educated patient on need of oxygen and without proper oxygen he is depriving his head and heart of oxygen which will place him at risk of his heart stopping.\" \"Patient again states he will be fine and that this RN is making things up and he will place oxygen back on when he is done.\"  "

## 2019-03-12 NOTE — PROGRESS NOTES
Assumed care of patient. Assessment complete. Patient Just medicated per MAR for pain, otherwise resting in bed. No complaints at this time.  Educated to use call light for assistance. Patient is a MODERATE FALL RISK according to Fatm[facility] Reid Fall Risk Assessment. Fall precautions in place.. Tele box in place. Will continue to monitor with hourly rounding.

## 2019-03-12 NOTE — ED PROVIDER NOTES
ED Provider Note    CHIEF COMPLAINT  Chief Complaint   Patient presents with   • Leg Swelling     pt reports symptoms that have been increasing for 4-5 wks. pt reports 20mg Lasix daily not working   • Shortness of Breath   • Leg Pain       HPI  Richard Vaughn is a 63 y.o. male who presents complaining of leg swelling and shortness of breath.  The patient began with some leg swelling about 5 weeks ago.  He saw his pulmonologist 2 weeks ago he was started on Lasix and oxygen at that time.  He is been taking the Lasix and is noted no change.  He has had redness of his legs has noted that that actually looks better than what it has.  His wife disagrees but the patient is quite firm and that it is improved over what it has been previously.  He has associated shortness of breath.  He does not describe orthopnea or PND, but he sleeps poorly at nighttime because of ongoing back discomfort.  He denies any fever or chills.  He did have a cold about a month and a half ago which really precipitated a lot of this.  He denies any chest pain.  There is no belly pain.  No change in bowel or bladder.  Aside from those legs no rash, and as commented above, he thinks that this is improved.  There is no previous history of heart problems at all.  His wife points out he typically wears a size 30 but is unable to buckle a size 34. He has no other complaint.    PAST MEDICAL HISTORY  Past Medical History:   Diagnosis Date   • Anesthesia     poss. family hx malignant hyperthermia   • Cold    • Dental disorder     dentures   • Diverticulitis    • Osteoporosis    • Psychiatric problem     CONSULT FOR DRUG WITHDRAWAL AFTER LAST SURGERY       FAMILY HISTORY  Family History   Problem Relation Age of Onset   • Alzheimer's Disease Mother        SOCIAL HISTORY  Social History   Substance Use Topics   • Smoking status: Former Smoker     Packs/day: 1.00     Years: 42.00     Types: Cigarettes     Start date: 1/1/1967     Quit date: 6/6/2009   •  "Smokeless tobacco: Never Used      Comment: continued abstinance   • Alcohol use No         SURGICAL HISTORY  Past Surgical History:   Procedure Laterality Date   • VENTRAL HERNIA REPAIR  10/23/2009    Performed by ALEJANDRA EASTON at SURGERY SAME DAY [facility] ORS   • VENTRAL HERNIA REPAIR  6/11/2009    Performed by ALEJANDRA EASTON at SURGERY [facility] ORS   • COLOSTOMY CLOSURE  4/2/2009    Performed by ALEJANDRA EASTON at SURGERY [facility] ORS   • SIGMOID COLECTOMY  12/6/2008    Performed by ALEJANDRA EASTON at SURGERY [facility] ORS   • COLOSTOMY  12/6/2008    Performed by ALEJANDRA EASTON at SURGERY [facility] ORS   • EXPLORATORY LAPAROTOMY  12/6/2008    Performed by ALEJANDRA EASTON at SURGERY [facility] ORS   • BOWEL RESECTION  12/6/2008    Performed by ALEJANDRA EASTON at SURGERY [facility] ORS   • UMBILICAL HERNIA REPAIR         CURRENT MEDICATIONS    I have reviewed the nurses notes and/or the list brought with the patient.    ALLERGIES  No Known Allergies    REVIEW OF SYSTEMS  See HPI for further details. Review of systems as above, otherwise all other systems are negative.     PHYSICAL EXAM  VITAL SIGNS: /80   Pulse 89   Temp 36.6 °C (97.9 °F) (Temporal)   Resp 18   Ht 1.753 m (5' 9\")   Wt 76 kg (167 lb 8.8 oz)   SpO2 96%   BMI 24.74 kg/m²     Constitutional: Frail.  He appears quite dyspneic.  HENT: Mucus membranes moist.  Oropharynx is clear.  Eyes: Pupils equally round.  No scleral icterus.   Neck: Full nontender range of motion.  Lymphatic: No cervical lymphadenopathy noted.   Cardiovascular: Regular heart rate and rhythm.  JVD about 7-8 cm.  Thorax & Lungs: Chest is nontender.  Lungs are notable for rales at the bases.  Abdomen: Soft, with no tenderness, rebound nor guarding.  No mass, pulsatile mass, nor hepatosplenomegaly appreciated.  Skin: No purpura nor petechia noted.  Legs as below.  Extremities/Musculoskeletal: No sign of trauma.  Calves are notable for 3+ brawny edema extending " "up to his back.  He has almost a violaceous redness over the distal legs.  No Santos's sign.  Pulses are intact all around.   Neurologic: Alert & oriented.  Strength and sensation is intact all around.  Gait is slow but steady.  Psychiatric: Normal affect appropriate for the clinical situation.    EKG  I interpreted this EKG myself.  This is a 12-lead study.  The rhythm is sinus with a rate of 84.  There are septal ST-T wave abnormalities.  Interpretation: No ST segment elevation myocardial infarction.    LABS  Labs Reviewed   CBC WITH DIFFERENTIAL - Abnormal; Notable for the following:        Result Value    RBC 6.29 (*)     Hemoglobin 18.8 (*)     Hematocrit 57.9 (*)     MCHC 32.5 (*)     RDW 54.2 (*)     Neutrophils-Polys 76.00 (*)     Lymphocytes 12.60 (*)     Lymphs (Absolute) 0.90 (*)     All other components within normal limits   COMP METABOLIC PANEL - Abnormal; Notable for the following:     Globulin 3.6 (*)     All other components within normal limits   BTYPE NATRIURETIC PEPTIDE - Abnormal; Notable for the following:     B Natriuretic Peptide 473 (*)     All other components within normal limits   LACTIC ACID   URINALYSIS    Narrative:     Indication for culture:->Emergency Room Patient   URINE CULTURE(NEW)    Narrative:     Indication for culture:->Emergency Room Patient   BLOOD CULTURE    Narrative:     Per Hospital Policy: Only change Specimen Src: to \"Line\" if  specified by physician order.   BLOOD CULTURE    Narrative:     Per Hospital Policy: Only change Specimen Src: to \"Line\" if  specified by physician order.   TROPONIN   ESTIMATED GFR   CBC WITH DIFFERENTIAL   BASIC METABOLIC PANEL   BTYPE NATRIURETIC PEPTIDE         RADIOLOGY/PROCEDURES  I have reviewed the patient's film interpretations myself, and they are read out by the radiologist as:   DX-CHEST-PORTABLE (1 VIEW)   Final Result      Increased density in medial right upper lobe could indicate pneumonia or atelectasis.      Emphysema.    "   EC-ECHOCARDIOGRAM COMPLETE W/O CONT    (Results Pending)   US-EXTREMITY VENOUS LOWER BILAT    (Results Pending)     .    MEDICAL RECORD  I have reviewed patient's medical record and pertinent results are listed above.    COURSE & MEDICAL DECISION MAKING  I have reviewed any medical record information, laboratory studies and radiographic results as noted above.  This patient presents with shortness of breath and peripheral edema.  Clinically he has evidence of congestive heart failure, and anasarca.  Sepsis protocol was initiated upon arrival to triage, however clinically this is not infectious process.  His legs are quite red, the patient is adamant that this is actually how they look if not better than usual.  He has no lactic acidosis.  He has no leukocytosis.  He has no bandemia.  As we initiate the workup, I will treat him with Lasix for that edema.  He was also complaining of some back discomfort from the gurney.  Typically he states he gets up and walks around to help with this but this makes him short of breath.  He would like something for pain.  We opted for morphine which they would also help with his congestive failure.  Case was discussed with the [facility] hospitalist, Dr. Rodgers, who was seen and admitted him.      FINAL IMPRESSION  1. Acute congestive heart failure, unspecified heart failure type (HCC)    2. Hypoxia    3.  Anasarca       This dictation was created using voice recognition software.    Electronically signed by: Adolfo Watkins, 3/11/2019 6:12 PM

## 2019-03-12 NOTE — H&P
Hospital Medicine History & Physical Note    Date of Service  3/11/2019    Primary Care Physician  Mary Samano M.D.    Consultants  None    Code Status  Full code    Chief Complaint  Shortness of breath and lower extremity edema    History of Presenting Illness  63 y.o. male with a past medical history of COPD, chronic back pain who presented 3/11/2019 with shortness of breath and bilateral lower extremity edema that is been progressively worsening over the past 6 weeks.  Patient was evaluated by his pulmonologist 2 weeks ago and was started on Lasix and supplemental oxygen however the patient notes progressively worsening symptoms..  He reports a chronic cough.  He denies any fevers, chills, chest pain, diaphoresis, nausea or vomiting.  He reports chronic back pain.  He also reports redness of his bilateral lower extremities that has been worsening over the past week.    EKG interpreted by me reveals sinus rhythm with ST depression and T wave inversions in anterior leads  Chest x-ray interpreted by me reveals medial right upper lobe atelectasis and evidence of emphysema    Review of Systems  Review of Systems   Constitutional: Negative for chills, diaphoresis and fever.   HENT: Negative for hearing loss and sore throat.    Eyes: Negative for blurred vision.   Respiratory: Positive for cough, shortness of breath and wheezing. Negative for sputum production.    Cardiovascular: Positive for leg swelling. Negative for chest pain and palpitations.   Gastrointestinal: Negative for abdominal pain, blood in stool, diarrhea, nausea and vomiting.   Genitourinary: Negative for dysuria, flank pain and urgency.   Musculoskeletal: Negative for back pain, joint pain, myalgias and neck pain.   Skin: Negative for rash.   Neurological: Negative for dizziness, focal weakness, seizures and headaches.   Endo/Heme/Allergies: Does not bruise/bleed easily.   Psychiatric/Behavioral: Negative for suicidal ideas.   All other systems  reviewed and are negative.      Past Medical History   has a past medical history of Anesthesia; Cold; Dental disorder; Diverticulitis; Osteoporosis; and Psychiatric problem.    Surgical History   has a past surgical history that includes sigmoid colectomy (12/6/2008); colostomy (12/6/2008); colostomy closure (4/2/2009); ventral hernia repair (6/11/2009); umbilical hernia repair; exploratory laparotomy (12/6/2008); bowel resection (12/6/2008); and ventral hernia repair (10/23/2009).     Family History  family history includes Alzheimer's Disease in his mother.     Social History   reports that he quit smoking about 9 years ago. His smoking use included Cigarettes. He started smoking about 52 years ago. He has a 42.00 pack-year smoking history. He has never used smokeless tobacco. He reports that he does not drink alcohol or use drugs.    Allergies  No Known Allergies    Medications  Prior to Admission Medications   Prescriptions Last Dose Informant Patient Reported? Taking?   Pediatric Multiple Vit-C-FA (FLINSTONES GUMMIES OMEGA-3 DHA) Chew Tab 3/11/2019 at 0700 Patient Yes Yes   Sig: Take 1 Tab by mouth every day.   Tiotropium Bromide-Olodaterol (STIOLTO RESPIMAT) 2.5-2.5 MCG/ACT Aero Soln 3/11/2019 at 0700 Patient Yes Yes   Sig: Inhale 1 Puff by mouth 2 Times a Day.   furosemide (LASIX) 20 MG Tab 3/11/2019 at 0700 Patient No No   Sig: Take 1 Tab by mouth every day.      Facility-Administered Medications Last Administration Doses Remaining   albuterol inhaler 2 Puff None recorded           Physical Exam  Temp:  [36.6 °C (97.9 °F)] 36.6 °C (97.9 °F)  Pulse:  [78-91] 89  Resp:  [18-20] 20  BP: (116)/(80) 116/80  SpO2:  [75 %-98 %] 96 %    Physical Exam   Constitutional: He is oriented to person, place, and time. He appears well-developed and well-nourished. No distress.   HENT:   Head: Normocephalic and atraumatic.   Mouth/Throat: Oropharynx is clear and moist.   Eyes: Pupils are equal, round, and reactive to light.  Conjunctivae are normal. No scleral icterus.   Neck: Normal range of motion. Neck supple.   Cardiovascular: Normal rate, regular rhythm and normal heart sounds.    Pulmonary/Chest: He has no rales.   Increased effort  Diminished breath sounds with end expiratory wheezing diffusely   Abdominal: Soft. Bowel sounds are normal. He exhibits no distension. There is no tenderness. There is no rebound.   Musculoskeletal: Normal range of motion. He exhibits edema (2+ pitting edema bilaterally up to knees). He exhibits no tenderness.   Lymphadenopathy:     He has no cervical adenopathy.   Neurological: He is alert and oriented to person, place, and time. No cranial nerve deficit. Coordination normal.   Skin: No rash noted.   Diffuse rash on bilateral lower extremities   Psychiatric: He has a normal mood and affect. His behavior is normal.   Nursing note and vitals reviewed.      Laboratory:  Recent Labs      03/11/19   1736   WBC  7.1   RBC  6.29*   HEMOGLOBIN  18.8*   HEMATOCRIT  57.9*   MCV  92.1   MCH  29.9   MCHC  32.5*   RDW  54.2*   PLATELETCT  178   MPV  10.5     Recent Labs      03/11/19   1736   SODIUM  136   POTASSIUM  4.8   CHLORIDE  99   CO2  30   GLUCOSE  96   BUN  16   CREATININE  1.02   CALCIUM  9.3     Recent Labs      03/11/19   1736   ALTSGPT  32   ASTSGOT  33   ALKPHOSPHAT  96   TBILIRUBIN  0.9   GLUCOSE  96         Recent Labs      03/11/19   1830   BNPBTYPENAT  473*         Recent Labs      03/11/19   1830   TROPONINI  0.02       Urinalysis:    Recent Labs      03/11/19   1855   SPECGRAVITY  1.007   GLUCOSEUR  Negative   KETONES  Negative   NITRITE  Negative   LEUKESTERAS  Negative        Imaging:  US-EXTREMITY VENOUS LOWER BILAT   Final Result      DX-CHEST-PORTABLE (1 VIEW)   Final Result      Increased density in medial right upper lobe could indicate pneumonia or atelectasis.      Emphysema.      EC-ECHOCARDIOGRAM COMPLETE W/O CONT    (Results Pending)         Assessment/Plan:  I anticipate this  patient is appropriate for observation status at this time.    Chronic obstructive pulmonary disease with acute exacerbation (HCC)- (present on admission)   Assessment & Plan    Patient has been started on steroids, scheduled DuoNeb nebulized breathing treatment, doxycycline, RT protocol and supplemental oxygen  No pneumonia on imaging         Acute on chronic respiratory failure with hypoxia (HCC)- (present on admission)   Assessment & Plan    Secondary to acute COPD exacerbation  Started on supplemental oxygen with RT protocol     Bilateral lower extremity edema- (present on admission)   Assessment & Plan    I have started the patient on IV Lasix  Check 2D echo  Fluid and salt restriction     Cellulitis- (present on admission)   Assessment & Plan    Cellulitis versus stasis dermatitis  Started on doxycycline  Elevate extremities  Assess clinical response         VTE prophylaxis: Lovenox

## 2019-03-13 ENCOUNTER — PATIENT OUTREACH (OUTPATIENT)
Dept: HEALTH INFORMATION MANAGEMENT | Facility: OTHER | Age: 64
End: 2019-03-13

## 2019-03-13 VITALS
WEIGHT: 159.17 LBS | HEIGHT: 69 IN | OXYGEN SATURATION: 92 % | BODY MASS INDEX: 23.58 KG/M2 | SYSTOLIC BLOOD PRESSURE: 103 MMHG | HEART RATE: 88 BPM | TEMPERATURE: 98.9 F | RESPIRATION RATE: 20 BRPM | DIASTOLIC BLOOD PRESSURE: 65 MMHG

## 2019-03-13 LAB
ALBUMIN SERPL BCP-MCNC: 3.3 G/DL (ref 3.2–4.9)
ALBUMIN/GLOB SERPL: 1.1 G/DL
ALP SERPL-CCNC: 72 U/L (ref 30–99)
ALT SERPL-CCNC: 21 U/L (ref 2–50)
ANION GAP SERPL CALC-SCNC: 4 MMOL/L (ref 0–11.9)
AST SERPL-CCNC: 19 U/L (ref 12–45)
BACTERIA UR CULT: NORMAL
BASOPHILS # BLD AUTO: 0.7 % (ref 0–1.8)
BASOPHILS # BLD: 0.05 K/UL (ref 0–0.12)
BILIRUB SERPL-MCNC: 0.7 MG/DL (ref 0.1–1.5)
BUN SERPL-MCNC: 21 MG/DL (ref 8–22)
CALCIUM SERPL-MCNC: 8.4 MG/DL (ref 8.5–10.5)
CHLORIDE SERPL-SCNC: 96 MMOL/L (ref 96–112)
CO2 SERPL-SCNC: 34 MMOL/L (ref 20–33)
CREAT SERPL-MCNC: 1.01 MG/DL (ref 0.5–1.4)
EOSINOPHIL # BLD AUTO: 0.04 K/UL (ref 0–0.51)
EOSINOPHIL NFR BLD: 0.5 % (ref 0–6.9)
ERYTHROCYTE [DISTWIDTH] IN BLOOD BY AUTOMATED COUNT: 51.8 FL (ref 35.9–50)
GLOBULIN SER CALC-MCNC: 2.9 G/DL (ref 1.9–3.5)
GLUCOSE SERPL-MCNC: 125 MG/DL (ref 65–99)
HCT VFR BLD AUTO: 49.2 % (ref 42–52)
HGB BLD-MCNC: 15.4 G/DL (ref 14–18)
IMM GRANULOCYTES # BLD AUTO: 0.03 K/UL (ref 0–0.11)
IMM GRANULOCYTES NFR BLD AUTO: 0.4 % (ref 0–0.9)
LYMPHOCYTES # BLD AUTO: 1.02 K/UL (ref 1–4.8)
LYMPHOCYTES NFR BLD: 13.4 % (ref 22–41)
MCH RBC QN AUTO: 28.8 PG (ref 27–33)
MCHC RBC AUTO-ENTMCNC: 31.3 G/DL (ref 33.7–35.3)
MCV RBC AUTO: 92 FL (ref 81.4–97.8)
MONOCYTES # BLD AUTO: 0.83 K/UL (ref 0–0.85)
MONOCYTES NFR BLD AUTO: 10.9 % (ref 0–13.4)
NEUTROPHILS # BLD AUTO: 5.62 K/UL (ref 1.82–7.42)
NEUTROPHILS NFR BLD: 74.1 % (ref 44–72)
NRBC # BLD AUTO: 0 K/UL
NRBC BLD-RTO: 0 /100 WBC
PLATELET # BLD AUTO: 159 K/UL (ref 164–446)
PMV BLD AUTO: 9.5 FL (ref 9–12.9)
POTASSIUM SERPL-SCNC: 4.4 MMOL/L (ref 3.6–5.5)
PROT SERPL-MCNC: 6.2 G/DL (ref 6–8.2)
RBC # BLD AUTO: 5.35 M/UL (ref 4.7–6.1)
SIGNIFICANT IND 70042: NORMAL
SITE SITE: NORMAL
SODIUM SERPL-SCNC: 134 MMOL/L (ref 135–145)
SOURCE SOURCE: NORMAL
WBC # BLD AUTO: 7.6 K/UL (ref 4.8–10.8)

## 2019-03-13 PROCEDURE — 700111 HCHG RX REV CODE 636 W/ 250 OVERRIDE (IP): Performed by: HOSPITALIST

## 2019-03-13 PROCEDURE — 96376 TX/PRO/DX INJ SAME DRUG ADON: CPT

## 2019-03-13 PROCEDURE — 96372 THER/PROPH/DIAG INJ SC/IM: CPT

## 2019-03-13 PROCEDURE — 90471 IMMUNIZATION ADMIN: CPT

## 2019-03-13 PROCEDURE — 700111 HCHG RX REV CODE 636 W/ 250 OVERRIDE (IP): Performed by: FAMILY MEDICINE

## 2019-03-13 PROCEDURE — 3E02340 INTRODUCTION OF INFLUENZA VACCINE INTO MUSCLE, PERCUTANEOUS APPROACH: ICD-10-PCS | Performed by: FAMILY MEDICINE

## 2019-03-13 PROCEDURE — 36415 COLL VENOUS BLD VENIPUNCTURE: CPT

## 2019-03-13 PROCEDURE — 700111 HCHG RX REV CODE 636 W/ 250 OVERRIDE (IP): Performed by: INTERNAL MEDICINE

## 2019-03-13 PROCEDURE — 90732 PPSV23 VACC 2 YRS+ SUBQ/IM: CPT | Performed by: FAMILY MEDICINE

## 2019-03-13 PROCEDURE — 80053 COMPREHEN METABOLIC PANEL: CPT

## 2019-03-13 PROCEDURE — 90662 IIV NO PRSV INCREASED AG IM: CPT | Performed by: FAMILY MEDICINE

## 2019-03-13 PROCEDURE — 700101 HCHG RX REV CODE 250: Performed by: INTERNAL MEDICINE

## 2019-03-13 PROCEDURE — 3E0234Z INTRODUCTION OF SERUM, TOXOID AND VACCINE INTO MUSCLE, PERCUTANEOUS APPROACH: ICD-10-PCS | Performed by: FAMILY MEDICINE

## 2019-03-13 PROCEDURE — 94760 N-INVAS EAR/PLS OXIMETRY 1: CPT

## 2019-03-13 PROCEDURE — 94640 AIRWAY INHALATION TREATMENT: CPT

## 2019-03-13 PROCEDURE — 99239 HOSP IP/OBS DSCHRG MGMT >30: CPT | Performed by: FAMILY MEDICINE

## 2019-03-13 PROCEDURE — 85025 COMPLETE CBC W/AUTO DIFF WBC: CPT

## 2019-03-13 PROCEDURE — 94664 DEMO&/EVAL PT USE INHALER: CPT

## 2019-03-13 RX ORDER — PREDNISONE 20 MG/1
TABLET ORAL
Qty: 6 TAB | Refills: 0 | Status: SHIPPED | OUTPATIENT
Start: 2019-03-14 | End: 2019-05-29

## 2019-03-13 RX ORDER — ALBUTEROL SULFATE 2.5 MG/3ML
2.5 SOLUTION RESPIRATORY (INHALATION) EVERY 4 HOURS PRN
Qty: 30 BULLET | Refills: 0 | Status: SHIPPED | OUTPATIENT
Start: 2019-03-13 | End: 2019-05-29

## 2019-03-13 RX ORDER — FUROSEMIDE 40 MG/1
40 TABLET ORAL DAILY
Qty: 30 TAB | Refills: 0 | Status: SHIPPED | OUTPATIENT
Start: 2019-03-13 | End: 2019-05-29

## 2019-03-13 RX ADMIN — PNEUMOCOCCAL VACCINE POLYVALENT 25 MCG
25; 25; 25; 25; 25; 25; 25; 25; 25; 25; 25; 25; 25; 25; 25; 25; 25; 25; 25; 25; 25; 25; 25 INJECTION, SOLUTION INTRAMUSCULAR; SUBCUTANEOUS at 08:35

## 2019-03-13 RX ADMIN — FUROSEMIDE 40 MG: 10 INJECTION, SOLUTION INTRAMUSCULAR; INTRAVENOUS at 05:34

## 2019-03-13 RX ADMIN — IPRATROPIUM BROMIDE AND ALBUTEROL SULFATE 3 ML: .5; 3 SOLUTION RESPIRATORY (INHALATION) at 03:30

## 2019-03-13 RX ADMIN — MORPHINE SULFATE 2 MG: 4 INJECTION INTRAVENOUS at 11:35

## 2019-03-13 RX ADMIN — UMECLIDINIUM BROMIDE AND VILANTEROL TRIFENATATE 1 PUFF: 62.5; 25 POWDER RESPIRATORY (INHALATION) at 05:40

## 2019-03-13 RX ADMIN — MORPHINE SULFATE 2 MG: 4 INJECTION INTRAVENOUS at 02:15

## 2019-03-13 RX ADMIN — MORPHINE SULFATE 2 MG: 4 INJECTION INTRAVENOUS at 14:34

## 2019-03-13 RX ADMIN — ENOXAPARIN SODIUM 40 MG: 100 INJECTION SUBCUTANEOUS at 05:37

## 2019-03-13 RX ADMIN — MORPHINE SULFATE 2 MG: 4 INJECTION INTRAVENOUS at 08:35

## 2019-03-13 RX ADMIN — MORPHINE SULFATE 2 MG: 4 INJECTION INTRAVENOUS at 05:34

## 2019-03-13 RX ADMIN — IPRATROPIUM BROMIDE AND ALBUTEROL SULFATE 3 ML: .5; 3 SOLUTION RESPIRATORY (INHALATION) at 14:40

## 2019-03-13 RX ADMIN — INFLUENZA A VIRUS A/MICHIGAN/45/2015 X-275 (H1N1) ANTIGEN (FORMALDEHYDE INACTIVATED), INFLUENZA A VIRUS A/SINGAPORE/INFIMH-16-0019/2016 IVR-186 (H3N2) ANTIGEN (FORMALDEHYDE INACTIVATED), AND INFLUENZA B VIRUS B/MARYLAND/15/2016 BX-69A (A B/COLORADO/6/2017-LIKE VIRUS) ANTIGEN (FORMALDEHYDE INACTIVATED) 0.5 ML: 60; 60; 60 INJECTION, SUSPENSION INTRAMUSCULAR at 08:36

## 2019-03-13 ASSESSMENT — COGNITIVE AND FUNCTIONAL STATUS - GENERAL
DAILY ACTIVITIY SCORE: 18
MOVING TO AND FROM BED TO CHAIR: A LITTLE
EATING MEALS: A LITTLE
MOBILITY SCORE: 22
TURNING FROM BACK TO SIDE WHILE IN FLAT BAD: A LITTLE
HELP NEEDED FOR BATHING: A LITTLE
PERSONAL GROOMING: A LITTLE
SUGGESTED CMS G CODE MODIFIER DAILY ACTIVITY: CK
DRESSING REGULAR UPPER BODY CLOTHING: A LITTLE
DRESSING REGULAR LOWER BODY CLOTHING: A LITTLE
SUGGESTED CMS G CODE MODIFIER MOBILITY: CJ
TOILETING: A LITTLE

## 2019-03-13 NOTE — PROGRESS NOTES
Bedside report received from day shift RN. Patient awake, alert and oriented x 4. Resting in bed. On 3L oxymask. No complaints or distress noted at this time. Fall precautions in place, bed alarm on. Patient refusing to have bed in lowest position. Call light within reach. Will continue to monitor.

## 2019-03-13 NOTE — DISCHARGE PLANNING
Agency/Facility Name: [facility]  Spoke To: Manuela  Outcome: Accepted and will deliver to home today, [facility] is contacting patient to let them know.

## 2019-03-13 NOTE — DISCHARGE INSTRUCTIONS
Discharge Instructions    Discharged to home by car with self. Discharged via wheelchair, hospital escort: Yes.  Special equipment needed: Oxygen    Be sure to schedule a follow-up appointment with your primary care doctor or any specialists as instructed.     Discharge Plan:   Diet Plan: Discussed  Activity Level: Discussed  Confirmed Follow up Appointment: Appointment Scheduled  Confirmed Symptoms Management: Discussed  Medication Reconciliation Updated: Yes  Pneumococcal Vaccine Administered/Refused: Given (See MAR)  Influenza Vaccine Indication: Indicated: 65 years and older  Influenza Vaccine Given - only chart on this line when given: Influenza Vaccine Given (See MAR)    I understand that a diet low in cholesterol, fat, and sodium is recommended for good health. Unless I have been given specific instructions below for another diet, I accept this instruction as my diet prescription.   Other diet: cardiac, heart healthy    Special Instructions: None    · Is patient discharged on Warfarin / Coumadin?   No         Chronic Obstructive Pulmonary Disease Exacerbation  Chronic obstructive pulmonary disease (COPD) is a common lung problem. In COPD, the flow of air from the lungs is limited. COPD exacerbations are times that breathing gets worse and you need extra treatment. Without treatment they can be life threatening. If they happen often, your lungs can become more damaged. If your COPD gets worse, your doctor may treat you with:  · Medicines.  · Oxygen.  · Different ways to clear your airway, such as using a mask.  Follow these instructions at home:  · Do not smoke.  · Avoid tobacco smoke and other things that bother your lungs.  · If given, take your antibiotic medicine as told. Finish the medicine even if you start to feel better.  · Only take medicines as told by your doctor.  · Drink enough fluids to keep your pee (urine) clear or pale yellow (unless your doctor has told you not to).  · Use a cool mist machine  (vaporizer).  · If you use oxygen or a machine that turns liquid medicine into a mist (nebulizer), continue to use them as told.  · Keep up with shots (vaccinations) as told by your doctor.  · Exercise regularly.  · Eat healthy foods.  · Keep all doctor visits as told.  Get help right away if:  · You are very short of breath and it gets worse.  · You have trouble talking.  · You have bad chest pain.  · You have blood in your spit (sputum).  · You have a fever.  · You keep throwing up (vomiting).  · You feel weak, or you pass out (faint).  · You feel confused.  · You keep getting worse.  This information is not intended to replace advice given to you by your health care provider. Make sure you discuss any questions you have with your health care provider.  Document Released: 12/06/2012 Document Revised: 05/25/2017 Document Reviewed: 08/22/2014  [employer] Interactive Patient Education © 2017 Elsevier Inc.      Albuterol inhalation aerosol  What is this medicine?  ALBUTEROL (al BYOO ter ole) is a bronchodilator. It helps open up the airways in your lungs to make it easier to breathe. This medicine is used to treat and to prevent bronchospasm.  This medicine may be used for other purposes; ask your health care provider or pharmacist if you have questions.  COMMON BRAND NAME(S): Proair HFA, Proventil, Proventil HFA, Respirol, Ventolin, Ventolin HFA  What should I tell my health care provider before I take this medicine?  They need to know if you have any of the following conditions:  -diabetes  -heart disease or irregular heartbeat  -high blood pressure  -pheochromocytoma  -seizures  -thyroid disease  -an unusual or allergic reaction to albuterol, levalbuterol, sulfites, other medicines, foods, dyes, or preservatives  -pregnant or trying to get pregnant  -breast-feeding  How should I use this medicine?  This medicine is for inhalation through the mouth. Follow the directions on your prescription label. Take your medicine  at regular intervals. Do not use more often than directed. Make sure that you are using your inhaler correctly. Ask you doctor or health care provider if you have any questions.  Talk to your pediatrician regarding the use of this medicine in children. Special care may be needed.  Overdosage: If you think you have taken too much of this medicine contact a poison control center or emergency room at once.  NOTE: This medicine is only for you. Do not share this medicine with others.  What if I miss a dose?  If you miss a dose, use it as soon as you can. If it is almost time for your next dose, use only that dose. Do not use double or extra doses.  What may interact with this medicine?  -anti-infectives like chloroquine and pentamidine  -caffeine  -cisapride  -diuretics  -medicines for colds  -medicines for depression or for emotional or psychotic conditions  -medicines for weight loss including some herbal products  -methadone  -some antibiotics like clarithromycin, erythromycin, levofloxacin, and linezolid  -some heart medicines  -steroid hormones like dexamethasone, cortisone, hydrocortisone  -theophylline  -thyroid hormones  This list may not describe all possible interactions. Give your health care provider a list of all the medicines, herbs, non-prescription drugs, or dietary supplements you use. Also tell them if you smoke, drink alcohol, or use illegal drugs. Some items may interact with your medicine.  What should I watch for while using this medicine?  Tell your doctor or health care professional if your symptoms do not improve. Do not use extra albuterol. If your asthma or bronchitis gets worse while you are using this medicine, call your doctor right away.  If your mouth gets dry try chewing sugarless gum or sucking hard candy. Drink water as directed.  What side effects may I notice from receiving this medicine?  Side effects that you should report to your doctor or health care professional as soon as  possible:  -allergic reactions like skin rash, itching or hives, swelling of the face, lips, or tongue  -breathing problems  -chest pain  -feeling faint or lightheaded, falls  -high blood pressure  -irregular heartbeat  -fever  -muscle cramps or weakness  -pain, tingling, numbness in the hands or feet  -vomiting  Side effects that usually do not require medical attention (report to your doctor or health care professional if they continue or are bothersome):  -cough  -difficulty sleeping  -headache  -nervousness or trembling  -stomach upset  -stuffy or runny nose  -throat irritation  -unusual taste  This list may not describe all possible side effects. Call your doctor for medical advice about side effects. You may report side effects to FDA at 6-909-FDA-3420.  Where should I keep my medicine?  Keep out of the reach of children.  Store at room temperature between 15 and 30 degrees C (59 and 86 degrees F). The contents are under pressure and may burst when exposed to heat or flame. Do not freeze. This medicine does not work as well if it is too cold. Throw away any unused medicine after the expiration date. Inhalers need to be thrown away after the labeled number of puffs have been used or by the expiration date; whichever comes first. Ventolin HFA should be thrown away 12 months after removing from foil pouch. Check the instructions that come with your medicine.  NOTE: This sheet is a summary. It may not cover all possible information. If you have questions about this medicine, talk to your doctor, pharmacist, or health care provider.  © 2018 Elsevier/Gold Standard (2014-06-05 10:57:17)    Prednisone tablets  What is this medicine?  PREDNISONE (PRED ni sone) is a corticosteroid. It is commonly used to treat inflammation of the skin, joints, lungs, and other organs. Common conditions treated include asthma, allergies, and arthritis. It is also used for other conditions, such as blood disorders and diseases of the  adrenal glands.  This medicine may be used for other purposes; ask your health care provider or pharmacist if you have questions.  COMMON BRAND NAME(S): Deltasone, Predone, Sterapred, Sterapred DS  What should I tell my health care provider before I take this medicine?  They need to know if you have any of these conditions:  -Cushing's syndrome  -diabetes  -glaucoma  -heart disease  -high blood pressure  -infection (especially a virus infection such as chickenpox, cold sores, or herpes)  -kidney disease  -liver disease  -mental illness  -myasthenia gravis  -osteoporosis  -seizures  -stomach or intestine problems  -thyroid disease  -an unusual or allergic reaction to lactose, prednisone, other medicines, foods, dyes, or preservatives  -pregnant or trying to get pregnant  -breast-feeding  How should I use this medicine?  Take this medicine by mouth with a glass of water. Follow the directions on the prescription label. Take this medicine with food. If you are taking this medicine once a day, take it in the morning. Do not take more medicine than you are told to take. Do not suddenly stop taking your medicine because you may develop a severe reaction. Your doctor will tell you how much medicine to take. If your doctor wants you to stop the medicine, the dose may be slowly lowered over time to avoid any side effects.  Talk to your pediatrician regarding the use of this medicine in children. Special care may be needed.  Overdosage: If you think you have taken too much of this medicine contact a poison control center or emergency room at once.  NOTE: This medicine is only for you. Do not share this medicine with others.  What if I miss a dose?  If you miss a dose, take it as soon as you can. If it is almost time for your next dose, talk to your doctor or health care professional. You may need to miss a dose or take an extra dose. Do not take double or extra doses without advice.  What may interact with this medicine?  Do  not take this medicine with any of the following medications:  -metyrapone  -mifepristone  This medicine may also interact with the following medications:  -aminoglutethimide  -amphotericin B  -aspirin and aspirin-like medicines  -barbiturates  -certain medicines for diabetes, like glipizide or glyburide  -cholestyramine  -cholinesterase inhibitors  -cyclosporine  -digoxin  -diuretics  -ephedrine  -female hormones, like estrogens and birth control pills  -isoniazid  -ketoconazole  -NSAIDS, medicines for pain and inflammation, like ibuprofen or naproxen  -phenytoin  -rifampin  -toxoids  -vaccines  -warfarin  This list may not describe all possible interactions. Give your health care provider a list of all the medicines, herbs, non-prescription drugs, or dietary supplements you use. Also tell them if you smoke, drink alcohol, or use illegal drugs. Some items may interact with your medicine.  What should I watch for while using this medicine?  Visit your doctor or health care professional for regular checks on your progress. If you are taking this medicine over a prolonged period, carry an identification card with your name and address, the type and dose of your medicine, and your doctor's name and address.  This medicine may increase your risk of getting an infection. Tell your doctor or health care professional if you are around anyone with measles or chickenpox, or if you develop sores or blisters that do not heal properly.  If you are going to have surgery, tell your doctor or health care professional that you have taken this medicine within the last twelve months.  Ask your doctor or health care professional about your diet. You may need to lower the amount of salt you eat.  This medicine may affect blood sugar levels. If you have diabetes, check with your doctor or health care professional before you change your diet or the dose of your diabetic medicine.  What side effects may I notice from receiving this  medicine?  Side effects that you should report to your doctor or health care professional as soon as possible:  -allergic reactions like skin rash, itching or hives, swelling of the face, lips, or tongue  -changes in emotions or moods  -changes in vision  -depressed mood  -eye pain  -fever or chills, cough, sore throat, pain or difficulty passing urine  -increased thirst  -swelling of ankles, feet  Side effects that usually do not require medical attention (report to your doctor or health care professional if they continue or are bothersome):  -confusion, excitement, restlessness  -headache  -nausea, vomiting  -skin problems, acne, thin and shiny skin  -trouble sleeping  -weight gain  This list may not describe all possible side effects. Call your doctor for medical advice about side effects. You may report side effects to FDA at 2-071-FDA-7574.  Where should I keep my medicine?  Keep out of the reach of children.  Store at room temperature between 15 and 30 degrees C (59 and 86 degrees F). Protect from light. Keep container tightly closed. Throw away any unused medicine after the expiration date.  NOTE: This sheet is a summary. It may not cover all possible information. If you have questions about this medicine, talk to your doctor, pharmacist, or health care provider.  © 2018 Elsevier/Gold Standard (2012-08-02 10:57:14)    Depression / Suicide Risk    As you are discharged from this [facility] facility, it is important to learn how to keep safe from harming yourself.    Recognize the warning signs:  · Abrupt changes in personality, positive or negative- including increase in energy   · Giving away possessions  · Change in eating patterns- significant weight changes-  positive or negative  · Change in sleeping patterns- unable to sleep or sleeping all the time   · Unwillingness or inability to communicate  · Depression  · Unusual sadness, discouragement and loneliness  · Talk of wanting to die  · Neglect of  personal appearance   · Rebelliousness- reckless behavior  · Withdrawal from people/activities they love  · Confusion- inability to concentrate     If you or a loved one observes any of these behaviors or has concerns about self-harm, here's what you can do:  · Talk about it- your feelings and reasons for harming yourself  · Remove any means that you might use to hurt yourself (examples: pills, rope, extension cords, firearm)  · Get professional help from the community (Mental Health, Substance Abuse, psychological counseling)  · Do not be alone:Call your Safe Contact- someone whom you trust who will be there for you.  · Call your local CRISIS HOTLINE 532-0331 or 093-076-5325  · Call your local Children's Mobile Crisis Response Team [place] (353) 509-9306 or www.[employer]  · Call the toll free National Suicide Prevention Hotlines   · National Suicide Prevention Lifeline 040-771-HJPT (8427)  · National Hope Line Network 800-SUICIDE (870-2088)

## 2019-03-13 NOTE — PROGRESS NOTES
Discharge orders received. All lines and monitors discontinued. Reviewed discharge paperwork with Nikolas. Discussed diet, activity, medications, follow up care and worsening symptoms. No questions at this time. Pt to be discharged to home via car/wife.  Pt is alert and oriented upon discharge.

## 2019-03-13 NOTE — RESPIRATORY CARE
"COPD EDUCATION by COPD CLINICAL EDUCATOR  (Phone: [phone])  3/13/2019 at 10:21 AM by Amanda Stiles    Patient seen by Respiratory Education team to complete the final block of education.  This session discussed signs and symptoms of an exacerbation (flare-up), triggers that can create flare-ups, reiteration of the \"Action Plan\" to refer to daily which will help categorize their symptoms in order to utilize the appropriate therapy, breathing techniques used to treat acute symptoms, and oxygen safety. Currently requiring an oxymask to maintain O2 SpO2 >90% He declines using a nasal cannula. We discussed this at length. This was discussed with Dr Greer ([facility]) as well as getting a nebulizer and medications for home. Question and answer session followed.   "

## 2019-03-13 NOTE — DISCHARGE PLANNING
Received Choice form at 1000  Agency/Facility Name: [facility]  Referral sent per Choice form @ 1002  Sent via [employer].

## 2019-03-13 NOTE — WOUND TEAM
Wound consult placed on 3/12/2019 Regarding pts mid back, Left lower buttock and bilateral ears. Chart reviewed. This RN up to see patient in T711/02, pts bedside RN and physician at bedside. Pt is being discharged home today with home O2. Pt still would like RN to evaluate areas. Bilateral ears red but intact. Pt wears O2 and has gray foam pads in use over oxy mask. This RN removed sacral mepilex from mid back, back intact, there is a small red area that is blanching. No pressure injury identified, photo obtained. Sacral mepilex reapplied. Pts sacrococcygeal area assessed, no pressure injury identified. Pt states he does have pain to the area at times tho, this RN discussed sacral mepilex. Pt agreeable and sacral mepilex applied. Pts left I.T. Area then assessed, pt has a raised indurated area, per pt this is a boil and he has had 2 others near his knee in the past month. No advanced wound care needs indicated. No pressure injuries identified. No further follow up unless consulted.

## 2019-03-13 NOTE — DISCHARGE PLANNING
JAMES observed order for nebulizer. Choice form completed and sent to [facility].   Plan: Monitor for arrival of nebulizer.

## 2019-03-13 NOTE — FACE TO FACE
Face to Face Note  -  Durable Medical Equipment    Mohini Greer M.D. - NPI: [id number]  I certify that this patient is under my care and that they had a durable medical equipment(DME)face to face encounter by myself that meets the physician DME face-to-face encounter requirements with this patient on:    Date of encounter:   Patient:                    MRN:                       YOB: 2021  Richard Woodruff [facility]  [id number]  1955     The encounter with the patient was in whole, or in part, for the following medical condition, which is the primary reason for durable medical equipment:  COPD    I certify that, based on my findings, the following durable medical equipment is medically necessary:  Nebulizer.    HOME O2 Saturation Measurements:(Values must be present for Home Oxygen orders)         ,     ,         My Clinical findings support the need for the above equipment due to:  Hypoxia    Supporting Symptoms: copd

## 2019-03-13 NOTE — DISCHARGE SUMMARY
Discharge Summary    CHIEF COMPLAINT ON ADMISSION  Chief Complaint   Patient presents with   • Leg Swelling     pt reports symptoms that have been increasing for 4-5 wks. pt reports 20mg Lasix daily not working   • Shortness of Breath   • Leg Pain       Reason for Admission  Edema      Admission Date  3/11/2019    CODE STATUS  Full Code    HPI & HOSPITAL COURSE  This is a 63 y.o. male here with   history of COPD, chronic back pain who presented 3/11/2019 with shortness of breath and bilateral lower extremity edema that is been progressively worsening over the past 6 weeks.  Patient was evaluated by his pulmonologist 2 weeks ago and was started on Lasix and supplemental oxygen however the patient notes progressively worsening symptoms..  He reports a chronic cough.  He denies any fevers, chills, chest pain, diaphoresis, nausea or vomiting.  He reports chronic back pain.  He also reports redness of his bilateral lower extremities that has been worsening over the past week.  He was admitted and was started on prednisone and resp protocol.,he was noted to have erythema of the legs which the pt stated has had for a long time.cellulitis was ruled out and pt has     Pt has chronic lower ext edema 2nd to pulmonary htn and right sided heart failure.he stated that he takes 20 mg of lasix per day and I will increase that to 40 mg daily.he was placed on iv lasix and lower ext edema has improved.he was also noted to have acute on chronic copd exacerbation and was started on prednisone and he is on O2 at home all the time..  His echo showed:Normal left ventricular size, wall thickness, and systolic function.  Left ventricular ejection fraction is visually estimated to be 55%.  Moderately dilated right ventricle.  Mildly reduced right ventricular systolic function.  Enlarged right atrium.  Mildly dilated left atrium.  Right atrial pressure is estimated to be 8 mmHg, mildly elevated.  Estimated right ventricular systolic pressure   is 43 mmHg consistent   with mild pulmonary hypertension.     No prior study is available for comparison.  We will continue with prednisone for 3 more days.    Therefore, he is discharged in fair and stable condition to home with close outpatient follow-up.    The patient met 2-midnight criteria for an inpatient stay at the time of discharge.    Discharge Date  3/13/19    FOLLOW UP ITEMS POST DISCHARGE  pcp next week  Bmp on 3/15/19    DISCHARGE DIAGNOSES  Active Problems:    Chronic obstructive pulmonary disease with acute exacerbation (HCC) POA: Yes    Cellulitis POA: Yes    Bilateral lower extremity edema POA: Yes    Acute on chronic respiratory failure with hypoxia (HCC) POA: Yes  Resolved Problems:    * No resolved hospital problems. *      FOLLOW UP  Future Appointments  Date Time Provider Department Center   3/18/2019 3:15 PM V EXAM 12 ECHO [facility]   3/27/2019 1:30 PM PFT-RM3 PULM None   3/27/2019 2:30 PM Lakshmi King P.A.-C. PULM None     No follow-up provider specified.    MEDICATIONS ON DISCHARGE     Medication List      START taking these medications      Instructions   predniSONE 20 MG Tabs  Start taking on:  3/14/2019  Commonly known as:  DELTASONE   40mgpo daily for 3 days        CHANGE how you take these medications      Instructions   furosemide 40 MG Tabs  What changed:  · medication strength  · how much to take  Commonly known as:  LASIX   Take 1 Tab by mouth every day.  Dose:  40 mg        CONTINUE taking these medications      Instructions   FLINSTONES GUMMIES OMEGA-3 DHA Chew   Take 1 Tab by mouth every day.  Dose:  1 Tab     STIOLTO RESPIMAT 2.5-2.5 MCG/ACT Aers  Generic drug:  Tiotropium Bromide-Olodaterol   Inhale 1 Puff by mouth 2 Times a Day.  Dose:  1 Puff            Allergies  No Known Allergies    DIET  Orders Placed This Encounter   Procedures   • Diet Order Cardiac     Standing Status:   Standing     Number of Occurrences:   1     Order Specific Question:   Diet:      Answer:   Cardiac [6]       ACTIVITY  As tolerated.  Weight bearing as tolerated    CONSULTATIONS  none    PROCEDURES  none    LABORATORY  Lab Results   Component Value Date    SODIUM 134 (L) 03/13/2019    POTASSIUM 4.4 03/13/2019    CHLORIDE 96 03/13/2019    CO2 34 (H) 03/13/2019    GLUCOSE 125 (H) 03/13/2019    BUN 21 03/13/2019    CREATININE 1.01 03/13/2019    CREATININE 1.2 04/03/2009        Lab Results   Component Value Date    WBC 7.6 03/13/2019    HEMOGLOBIN 15.4 03/13/2019    HEMATOCRIT 49.2 03/13/2019    PLATELETCT 159 (L) 03/13/2019        Total time of the discharge process exceeds 35 minutes.

## 2019-03-13 NOTE — CARE PLAN
Problem: Communication  Goal: The ability to communicate needs accurately and effectively will improve  Outcome: PROGRESSING AS EXPECTED  Patient able to make needs known, calls appropriately. Call light within reach.      Problem: Safety  Goal: Will remain free from falls  Outcome: PROGRESSING AS EXPECTED  Proper fall precautions in place except bed is not in lowest position per patient request. Bed locked, bed alarm on, upper siderails raised, treaded socks on, falls band on wrist, appropriate signage outside room, call light within reach.

## 2019-03-14 ENCOUNTER — PATIENT OUTREACH (OUTPATIENT)
Dept: HEALTH INFORMATION MANAGEMENT | Facility: OTHER | Age: 64
End: 2019-03-14

## 2019-03-14 NOTE — PROGRESS NOTES
SCP post discharge med rec completed. No clinically significant medication issues noted. Unable to reach patient for f/u. Left vm for patient to call [phone].      Patient may benefit from step up in COPD tx by adding ICS.

## 2019-03-15 ENCOUNTER — TELEPHONE (OUTPATIENT)
Dept: PULMONOLOGY | Facility: HOSPICE | Age: 64
End: 2019-03-15

## 2019-03-15 DIAGNOSIS — J96.11 CHRONIC RESPIRATORY FAILURE WITH HYPOXIA (HCC): ICD-10-CM

## 2019-03-15 NOTE — TELEPHONE ENCOUNTER
It depends on the mask he is using. Is it a closed face mask? Is there a bag hanging off of it?  We can order an Oxi mask which he can use with 3LPM.  Order signed.

## 2019-03-15 NOTE — TELEPHONE ENCOUNTER
I spoke to patient he has a hospital follow up appointment on 3/27/19 with Lakshmi BOSE. Patient was worried he was not getting enough oxygen with his nasal cannula. Oxi-mask order provided and sent to [facility]. Patient aware and in agreement.

## 2019-03-15 NOTE — TELEPHONE ENCOUNTER
Pt called and said that he wanted to ask Dr. Feliz know that how many Liters should he be on because the last time he was seen Dr. Feliz said that he should be on 3L of O2.  The DME supplier took him a face mask per request of the pt because he can not use the nose canula. Per pt said the the Dme Supplier told him that he cannot use the mask on 3L because he will suficte, that he should be using 5L. He just needs a clarification on how many Liters he should be on with the face mask.

## 2019-03-16 LAB
BACTERIA BLD CULT: NORMAL
BACTERIA BLD CULT: NORMAL
SIGNIFICANT IND 70042: NORMAL
SIGNIFICANT IND 70042: NORMAL
SITE SITE: NORMAL
SITE SITE: NORMAL
SOURCE SOURCE: NORMAL
SOURCE SOURCE: NORMAL

## 2019-03-18 ENCOUNTER — APPOINTMENT (OUTPATIENT)
Dept: CARDIOLOGY | Facility: MEDICAL CENTER | Age: 64
End: 2019-03-18
Attending: INTERNAL MEDICINE
Payer: MEDICARE

## 2019-03-24 DIAGNOSIS — R60.9 PERIPHERAL EDEMA: ICD-10-CM

## 2019-03-25 RX ORDER — FUROSEMIDE 20 MG/1
TABLET ORAL
Qty: 30 TAB | Refills: 0 | OUTPATIENT
Start: 2019-03-25

## 2019-03-25 NOTE — TELEPHONE ENCOUNTER
Please defer to JUICE Abdullahi for further evaluation. Will be seen in our pulmonary clinic in 2 days.

## 2019-03-25 NOTE — TELEPHONE ENCOUNTER
Have we ever prescribed this med? Yes.  If yes, what date? 3/13/19    Last OV: 2/25/2019    Next OV: 3/27/2019    DX:  Peripheral edema     Medications: furosemide (LASIX) 40 MG Tab

## 2019-03-27 ENCOUNTER — OFFICE VISIT (OUTPATIENT)
Dept: PULMONOLOGY | Facility: HOSPICE | Age: 64
End: 2019-03-27
Payer: MEDICARE

## 2019-03-27 ENCOUNTER — NON-PROVIDER VISIT (OUTPATIENT)
Dept: PULMONOLOGY | Facility: HOSPICE | Age: 64
End: 2019-03-27
Attending: INTERNAL MEDICINE
Payer: MEDICARE

## 2019-03-27 VITALS
RESPIRATION RATE: 16 BRPM | WEIGHT: 146 LBS | HEART RATE: 82 BPM | SYSTOLIC BLOOD PRESSURE: 124 MMHG | DIASTOLIC BLOOD PRESSURE: 60 MMHG | OXYGEN SATURATION: 91 % | BODY MASS INDEX: 21.62 KG/M2 | HEIGHT: 69 IN

## 2019-03-27 VITALS — BODY MASS INDEX: 21.56 KG/M2 | WEIGHT: 146 LBS

## 2019-03-27 DIAGNOSIS — J44.9 CHRONIC OBSTRUCTIVE PULMONARY DISEASE, UNSPECIFIED COPD TYPE (HCC): ICD-10-CM

## 2019-03-27 DIAGNOSIS — R09.02 HYPOXIA: ICD-10-CM

## 2019-03-27 DIAGNOSIS — M45.3 ANKYLOSING SPONDYLITIS OF CERVICOTHORACIC REGION (HCC): ICD-10-CM

## 2019-03-27 PROCEDURE — 94060 EVALUATION OF WHEEZING: CPT | Performed by: INTERNAL MEDICINE

## 2019-03-27 PROCEDURE — 94729 DIFFUSING CAPACITY: CPT | Performed by: INTERNAL MEDICINE

## 2019-03-27 PROCEDURE — 94726 PLETHYSMOGRAPHY LUNG VOLUMES: CPT | Performed by: INTERNAL MEDICINE

## 2019-03-27 PROCEDURE — 99214 OFFICE O/P EST MOD 30 MIN: CPT | Performed by: PHYSICIAN ASSISTANT

## 2019-03-27 RX ORDER — LEVALBUTEROL TARTRATE 45 UG/1
2 AEROSOL, METERED ORAL EVERY 4 HOURS PRN
Qty: 1 INHALER | Refills: 11 | Status: SHIPPED | OUTPATIENT
Start: 2019-03-27 | End: 2019-05-29

## 2019-03-27 ASSESSMENT — PULMONARY FUNCTION TESTS
FVC: 1.85
FEV1/FVC_PERCENT_CHANGE: 34
FVC: 2.39
FEV1/FVC_PERCENT_PREDICTED: 33
FEV1_PERCENT_PREDICTED: 17
FEV1/FVC_PREDICTED: 75
FVC_PREDICTED: 4.25
FEV1/FVC: 25
FEV1/FVC_PERCENT_PREDICTED: 39
FEV1/FVC: 25.1
FVC_PERCENT_PREDICTED: 43
FEV1: .55
FEV1/FVC: 30
FEV1: .6
FVC_LLN: 3.55
FEV1/FVC_PERCENT_PREDICTED: 75
FEV1/FVC_PERCENT_CHANGE: -14
FEV1/FVC: 30
FEV1/FVC_PERCENT_PREDICTED: 32
FEV1/FVC_PERCENT_LLN: 63
FEV1_PERCENT_PREDICTED: 18
FEV1_LLN: 2.65
FEV1_PERCENT_CHANGE: 29
FEV1_PREDICTED: 3.17
FEV1/FVC_PERCENT_PREDICTED: 40
FVC_PERCENT_PREDICTED: 56
FEV1_PERCENT_CHANGE: 10

## 2019-03-27 NOTE — PROGRESS NOTES
CC: Needs portable oxygen concentrator not able to manage E cylinder    HPI:  Richard Vaughn is a 63 y.o. year old male here today for follow-up on COPD, pulmonary function test results and hypoxia.  Patient was last seen in clinic on 2/25/2019.  He is a former smoker who quit in 2009 with a 42-pack-year history.  Continued abstinence was advised.    Reviewed in clinic vitals including blood pressure 124/60, heart rate of 82, O2 sat of 91% on O2 at 2 L at rest.  Patient did come into clinic without oxygen due to his inability to manage lifting his E cylinder and his sat was 83 on room air.  His BMI was 21.56 kg/m².  Patient does have weight restrictions due to his back disease.    Reviewed pulmonary function test results compared to previous test result in 2016. FVC of 1.85 L or 43% predicted compared to 82% previously, FEV1 of 0.55 L or 17% predicted compared to 31% previously, FEV1/FVC ratio of 39% predicted, FEF 25-75 percentage was 8% predicted as compared to 10% previously, residual volume was 275% predicted as compared to 222% previously, total lung capacity was 132% predicted as compared to 129% previously, DLCO was 34% predicted as compared to 54% previously.  Patient did appear to have significant bronchodilator response.  Pulmonologist interpretation pending.     Did have an echo on 3/11/2019, LVEF is 55% predicted, moderately dilated right ventricle and mildly reduced right ventricular systolic function, enlarged right atrium, mildly dilated left atrium, estimated RVSP is 43 mm Hg consistent with mild pulmonary hypertension.    Did have a chest x-ray 3/11/2019 demonstrating increased density in the right medial upper lobe which could indicate pneumonia or atelectasis, hyperinflation consistent with underlying COPD, heart size within normal limits.  Patient was hospitalized at this time for COPD exacerbation, acute on chronic respiratory failure with hypoxia, bilateral lower extremity edema and  cellulitis.    Did have a CT scan for lung cancer screening on 1/21/2019 which demonstrated mild to moderate emphysematous changes, mild bronchial wall thickening consistent with chronic bronchitis, no suspicious pulmonary nodules or masses, exaggerated thoracic kyphosis without acute fracture or destructive lesion.    Reviewed home medications, patient started on Lasix in the hospital without potassium supplement, last potassium level was 4.4.  Patient will follow up with PCP for ongoing monitoring.  Switched patient from albuterol to Xopenex due to marked increase in heart rate and jitters post albuterol administration.  Patient has been hesitant to use his rescue inhaler due to heart rate increase.  Tolerated Xopenex in clinic without difficulty.    ROS:   Constitutional: Increased fatigue, denies fevers, chills, night sweats or unintentional weight loss  Skin: No rashes, hair or nail changes, lumps or sores   Eyes: Does not wear glasses, denies new or sudden vision changes  Ears, Nose, Throat: Denies history of allergies, reports permanent sinus infections, denies persistent hoarseness or sore throat, no earaches, always has nasal congestion/runny nose   GI: Denies heartburn/reflux, no difficulty swallowing,  Respiratory: Denies cough, always wheezing, no shortness of breath at rest, moderate+ shortness of breath with activity, no history of pneumonia, bronchitis, TB, 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 family hx malignant hyperthermia   • Cold    • Dental disorder     dentures   • Diverticulitis    • Osteoporosis    • Psychiatric problem     CONSULT FOR DRUG WITHDRAWAL AFTER LAST SURGERY       Past Surgical History:   Procedure Laterality  "Date   • VENTRAL HERNIA REPAIR  10/23/2009    Performed by ALEJANDRA EASTON at SURGERY SAME DAY [facility] ORS   • VENTRAL HERNIA REPAIR  6/11/2009    Performed by ALEJANDRA EASTON at SURGERY [facility] ORS   • COLOSTOMY CLOSURE  4/2/2009    Performed by ALEJANDRA EASTON at SURGERY [facility] ORS   • SIGMOID COLECTOMY  12/6/2008    Performed by ALEJANDRA EASTON at SURGERY [facility] ORS   • COLOSTOMY  12/6/2008    Performed by ALEJANDRA EASTON at SURGERY [facility] ORS   • EXPLORATORY LAPAROTOMY  12/6/2008    Performed by ALEJANDRA EASTON at SURGERY [facility] ORS   • BOWEL RESECTION  12/6/2008    Performed by ALEJANDRA EASTON at SURGERY [facility] ORS   • UMBILICAL HERNIA REPAIR         Family History   Problem Relation Age of Onset   • Alzheimer's Disease Mother        Social History     Social History   • Marital status:      Spouse name: N/A   • Number of children: N/A   • Years of education: N/A     Occupational History   • Not on file.     Social History Main Topics   • Smoking status: Former Smoker     Packs/day: 1.00     Years: 42.00     Types: Cigarettes     Start date: 1/1/1967     Quit date: 6/6/2009   • Smokeless tobacco: Never Used      Comment: continued abstinance   • Alcohol use No   • Drug use: No      Comment: NOT AT THIS TIME     • Sexual activity: Not on file     Other Topics Concern   • Not on file     Social History Narrative   • No narrative on file       Allergies as of 03/27/2019   • (No Known Allergies)        @Vital signs for this encounter:  Vitals:    03/27/19 1426 03/27/19 1429   Height: 1.753 m (5' 9\")    Weight: 66.2 kg (146 lb)    Weight % change since last entry.: 0 %    BP: 124/60    Pulse: 82    BMI (Calculated): 21.56    Resp: 16    O2 sat % on O2:  (!) 91 %       Current medications as of today   Current Outpatient Prescriptions   Medication Sig Dispense Refill   • furosemide (LASIX) 40 MG Tab Take 1 Tab by mouth every day. 30 Tab 0   • Tiotropium Bromide-Olodaterol (STIOLTO " RESPIMAT) 2.5-2.5 MCG/ACT Aero Soln Inhale 1 Puff by mouth 2 Times a Day.     • predniSONE (DELTASONE) 20 MG Tab 40mgpo daily for 3 days (Patient not taking: Reported on 3/27/2019) 6 Tab 0   • albuterol (PROVENTIL) 2.5mg/3ml Nebu Soln solution for nebulization 3 mL by Nebulization route every four hours as needed for Shortness of Breath. 30 Bullet 0   • Pediatric Multiple Vit-C-FA (FLINSTONES GUMMIES OMEGA-3 DHA) Chew Tab Take 1 Tab by mouth every day.       Current Facility-Administered Medications   Medication Dose Route Frequency Provider Last Rate Last Dose   • albuterol inhaler 2 Puff  2 Puff Inhalation Q4HRS (RT) Taniya Begum M.D.             Physical Exam:   Gen:           Alert and oriented, No apparent distress. Mood and affect appropriate, normal interaction with provider.  Eyes:          sclere white, conjunctive moist.  Hearing:     Grossly intact.  Dentition:    Both upper and lower dentures  Oropharynx:   Tongue normal, posterior pharynx with mild erythema or exudate.  Neck:        Supple, trachea midline, no masses.  Respiratory Effort: No intercostal retractions or use of accessory muscles.   Lung Auscultation:      Decreased upper lobes fine expiratory wheezes throughout   CV:            Regular rate and rhythm. No murmurs, rubs or gallops.  Digits, Nails, Ext: No clubbing, cyanosis, petechiae, or nodes.   Skin:        No rashes, lesions or ulcers noted on back or exposed skin surfaces.    Musculoskeletal:         Marked thoracic kyphoscoliosis                   Assessment:  1. Chronic obstructive pulmonary disease, unspecified COPD type (HCC)   switch to Xopenex for rescue inhaler   2. Hypoxia  DME Other   3. Ankylosing spondylitis of cervicothoracic region (HCC)   unable to manage E cylinders request M6 or portable oxygen concentrator       Immunizations:    Flu: 3/13/2019  Pneumovax 23: 3/13/2019  Prevnar 13: Deferred    Plan:  1-not able to use e cylinder for oxygen out side of home will need  M6 or portable oxygen concentrator, restricted weight due to ankylosing spondilitheses  2-hypoxic on room air 83%  3-switch to xopenex due to high heart rate and jitters with albuterol  4-reviewed PFT results, significant decrease from 2016  5-remains on 40 mg Lasix since hospitalization, no potassium supplement, follow up with PCP for ongoing monitoring  6-follow up in 3 months    This dictation was created using voice recognition software. The accuracy of the dictation is limited to the abilities of the software. I expect there may be some errors of grammar and possibly content.

## 2019-03-27 NOTE — PATIENT INSTRUCTIONS
1-not able to use e cylinder for oxygen out side of home will need M6 or portable oxygen concentrator  2-hypoxic on room air 83%  3-switch to xopenex due to high heart rate and jitters with albuterol  4-reviewed PFT results, significant decrease from 2016  5-remains on 40 mg Lasix, no potassium supplement, follow up with PCP for ongoing monitoring  6-follow up in 3 months

## 2019-03-27 NOTE — PROCEDURES
Technician: LALITO Cruz    Technician Comment:  Good patient effort & cooperation.  The results of this test meet the ATS/ERS standards for acceptability & reproducibility.  Test was performed on the [employer] Body Plethysmograph-Elite DX system.  Predicted values were [facility]-3 for spirometry, [facility] for DLCO, ITS for Lung Volumes.  The DLCO was uncorrected for Hgb.  A bronchodilator of Xopenex HFA -2puffs via spacer administered.  DLCO performed during dilation period.    1. Baseline spirometry demonstrates a severe reduction in FEV1 and FVC. FEV1/FVC ratio is reduced at 30%.  FEV1 is 0.55 L which is 17% of predicted.    2. After administration of an inhaled bronchodilator there is 10% improvement in FEV1.    3. Lung volumes demonstrate hyperinflation.    4. Gas exchange as estimated by DLCO is severely reduced at 34% of predicted.    5. Airway resistance is severely increased.      Impression:    This study demonstrates the presence of severe obstructive lung disease.  Partial reversibility is noted on the study.  The patient's height was not included in the database.  However, these pulmonary function tests are in line with his previous testing.

## 2019-04-17 ENCOUNTER — PATIENT OUTREACH (OUTPATIENT)
Dept: HEALTH INFORMATION MANAGEMENT | Facility: OTHER | Age: 64
End: 2019-04-17

## 2019-04-17 NOTE — PROGRESS NOTES
Richard Vaughn was admitted to [facility] on 3/11/19 for leg swelling and shortness of breath. Patient discharged home on 3/13/19. [facility] patient advocate was not able to reach patient post-discharge or throughout the entire case, despite multiple attempts to reach patient and his ER contact. Per discharge orders, patient was instructed to see his PCP. Patient was scheduled to see his PCP on 3/28/19 but ended up re-scheduling to 4/18/19. Patient saw his pulmonary doctor on 3/27/19. Unable to conduct PPS screening.

## 2019-05-08 ENCOUNTER — OFFICE VISIT (OUTPATIENT)
Dept: PULMONOLOGY | Facility: HOSPICE | Age: 64
End: 2019-05-08
Payer: MEDICARE

## 2019-05-08 VITALS
HEART RATE: 82 BPM | BODY MASS INDEX: 20.88 KG/M2 | OXYGEN SATURATION: 92 % | HEIGHT: 69 IN | DIASTOLIC BLOOD PRESSURE: 60 MMHG | RESPIRATION RATE: 16 BRPM | WEIGHT: 141 LBS | SYSTOLIC BLOOD PRESSURE: 118 MMHG

## 2019-05-08 DIAGNOSIS — J44.9 COPD, SEVERE (HCC): ICD-10-CM

## 2019-05-08 DIAGNOSIS — J96.21 ACUTE ON CHRONIC RESPIRATORY FAILURE WITH HYPOXIA (HCC): ICD-10-CM

## 2019-05-08 DIAGNOSIS — R60.9 PERIPHERAL EDEMA: ICD-10-CM

## 2019-05-08 PROCEDURE — 99214 OFFICE O/P EST MOD 30 MIN: CPT | Performed by: PHYSICIAN ASSISTANT

## 2019-05-08 RX ORDER — LEVALBUTEROL INHALATION SOLUTION 1.25 MG/3ML
1.25 SOLUTION RESPIRATORY (INHALATION) EVERY 4 HOURS PRN
Qty: 75 BULLET | Refills: 1 | Status: SHIPPED | OUTPATIENT
Start: 2019-05-08 | End: 2019-05-29

## 2019-05-08 RX ORDER — FUROSEMIDE 20 MG/1
20 TABLET ORAL DAILY
Qty: 30 TAB | Refills: 0 | Status: SHIPPED | OUTPATIENT
Start: 2019-05-08 | End: 2019-06-17

## 2019-05-08 ASSESSMENT — ENCOUNTER SYMPTOMS
FEVER: 0
SHORTNESS OF BREATH: 1
ORTHOPNEA: 0
WEIGHT LOSS: 0
EYES NEGATIVE: 1
HEARTBURN: 0
SPUTUM PRODUCTION: 1
DIAPHORESIS: 0
COUGH: 1
SINUS PAIN: 0
CHILLS: 0
SORE THROAT: 0
PALPITATIONS: 0
WHEEZING: 1
ROS GI COMMENTS: NO DIFFICULTY SWALLOWING

## 2019-05-08 NOTE — PATIENT INSTRUCTIONS
1-doing well at current time  2-taking water pill daily, did refill for 20 mg per day  3-requesting levabulterol liquid for nebulizer  4-currently taking stiolto  5-discussed Trelegy, consider changing  6-follow up in 3 months

## 2019-05-09 NOTE — PROGRESS NOTES
CC: Out of oxygen    HPI:  Richard Vaughn is a 63 y.o. year old male here today for follow-up on COPD and echo results.  Patient last seen in clinic 3/27/2019.  He is a former smoker who quit in 2009 with 42-pack-year reported history.  Continued abstinence was advised.    Reviewed in clinic vitals including blood pressure 118/60, heart rate of 80, O2 sat of 92% and BMI of 20.82 kg/m².  Patient reports supplementing his diet to maintain his weight including triple scoop ice cream and the fixing's quite frequently in the evening.    Reviewed home medication regimen, which patient has a tendency to use differently.  He is currently taking Stiolto but we did discuss trelegy as he has 2 boxes of it given to him by his primary.  Given his severe reduction in FEV1 and FVC, recommended he continue Stiolto at this time. He is on Lasix at 40 mg/day although he was instructed to use 20 mg/day on last refill.  He has mild lower extremity edema at the current time.  He is not supplementing with potassium other than what might be in his [place] gummy vitamins. Patient did have a CMP drawn on 3/12/2019 with a sodium of 134 and potassium of 4.4.     Patient is on O2 of 2 L at rest, however he was placed on 3 L in clinic due to low O2 sats on room air on arrival.  Patient holds cannula close to nose and mouth but prefers not to wear.  He reports being completely out of oxygen due to malfunctioning oxygen concentrator and using up his available tanks with exception of tank in his car.  He is insistent that he needs a POC as small tanks are not manageable for him due to his ankylosing spondylitis and osteoporosis.  This order was previously sent March 2019.  Recommended follow-up with DME although we did reach out to them again as well.  Oxygen tanks were delivered to his home and oxygen concentrator checked or replaced, confirmed by DME phone call to patient.    Reviewed most recent imaging during ED visit and demonstrated  increased density in the medial right upper lobe which could indicate pneumonia or atelectasis, hyperinflation consistent with his underlying COPD/emphysema.  He did have a CT scan for lung cancer screening in January 2019 which demonstrated no suspicious pulmonary nodules or masses, evidence of emphysema and chronic bronchitis.    Reviewed echo obtained 3/12/2019 which demonstrated normal left ventricular size thickness and systolic function.  LVEF estimated at 55%, moderately dilated right ventricle, mildly reduced right ventricular systolic function, enlarged right atrium and mildly dilated left atrium, estimated RVSP of 43 mmHg consistent with mild pulmonary hypertension.      Review of Systems   Constitutional: Negative for chills, diaphoresis, fever, malaise/fatigue and weight loss.   HENT: Positive for congestion. Negative for ear pain, hearing loss, sinus pain and sore throat.         History of sinus infections, reports constant nasal congestion and usually runny nose   Eyes: Negative.         Does not wear glasses, denies new or sudden vision changes   Respiratory: Positive for cough, sputum production, shortness of breath and wheezing.         Small amount clear to slightly yellow secretions, shortness of breath with activity, history of bronchitis   Cardiovascular: Positive for leg swelling. Negative for chest pain, palpitations and orthopnea.   Gastrointestinal: Negative for heartburn.        No difficulty swallowing   Skin: Negative for itching and rash.        Two bandaged areas on right ankle, one on left ankle healed.       Past Medical History:   Diagnosis Date   • Anesthesia     poss. family hx malignant hyperthermia   • Cold    • Dental disorder     dentures   • Diverticulitis    • Osteoporosis    • Psychiatric problem     CONSULT FOR DRUG WITHDRAWAL AFTER LAST SURGERY       Past Surgical History:   Procedure Laterality Date   • VENTRAL HERNIA REPAIR  10/23/2009    Performed by ALEJANDRA EASTON  "SURGERY SAME DAY [facility] ORS   • VENTRAL HERNIA REPAIR  6/11/2009    Performed by ALEJANDRA EASTON at SURGERY [facility] ORS   • COLOSTOMY CLOSURE  4/2/2009    Performed by ALEJANDRA EASTON at SURGERY [facility] ORS   • SIGMOID COLECTOMY  12/6/2008    Performed by ALEJANDRA EASTON at SURGERY [facility] ORS   • COLOSTOMY  12/6/2008    Performed by ALEJANDRA EASTON at SURGERY [facility] ORS   • EXPLORATORY LAPAROTOMY  12/6/2008    Performed by ALEJANDRA EASTON at SURGERY [facility] ORS   • BOWEL RESECTION  12/6/2008    Performed by ALEJANDRA EASTON at SURGERY [facility] ORS   • UMBILICAL HERNIA REPAIR         Family History   Problem Relation Age of Onset   • Alzheimer's Disease Mother        Social History     Social History   • Marital status:      Spouse name: N/A   • Number of children: N/A   • Years of education: N/A     Occupational History   • Not on file.     Social History Main Topics   • Smoking status: Former Smoker     Packs/day: 1.00     Years: 42.00     Types: Cigarettes     Start date: 1/1/1967     Quit date: 6/6/2009   • Smokeless tobacco: Never Used      Comment: continued abstinance   • Alcohol use No   • Drug use: No      Comment: NOT AT THIS TIME     • Sexual activity: Not on file     Other Topics Concern   • Not on file     Social History Narrative   • No narrative on file       Allergies as of 05/08/2019   • (No Known Allergies)        @Vital signs for this encounter:  Vitals:    05/08/19 1533 05/08/19 1535   Height: 1.753 m (5' 9\")    Weight: 64 kg (141 lb)    Weight % change since last entry.: 0 %    BP: 118/60    Pulse: 82    BMI (Calculated): 20.82    Resp: 16    O2 sat % room air:  92 %       Current medications as of today   Current Outpatient Prescriptions   Medication Sig Dispense Refill   • levalbuterol (XOPENEX) 1.25 MG/3ML Nebu Soln 3 mL by Nebulization route every four hours as needed for Shortness of Breath (Cough, Wheezing.). 75 Bullet 1   • furosemide (LASIX) 20 MG Tab Take " 1 Tab by mouth every day. 30 Tab 0   • furosemide (LASIX) 40 MG Tab Take 1 Tab by mouth every day. 30 Tab 0   • Tiotropium Bromide-Olodaterol (STIOLTO RESPIMAT) 2.5-2.5 MCG/ACT Aero Soln Inhale 1 Puff by mouth 2 Times a Day.     • Pediatric Multiple Vit-C-FA (FLINSTONES GUMMIES OMEGA-3 DHA) Chew Tab Take 1 Tab by mouth every day.     • levalbuterol (XOPENEX HFA) 45 MCG/ACT inhaler Inhale 2 Puffs by mouth every four hours as needed for Shortness of Breath (As needed for shortness of breath, cough, wheezing.). 1 Inhaler 11   • predniSONE (DELTASONE) 20 MG Tab 40mgpo daily for 3 days (Patient not taking: Reported on 3/27/2019) 6 Tab 0   • albuterol (PROVENTIL) 2.5mg/3ml Nebu Soln solution for nebulization 3 mL by Nebulization route every four hours as needed for Shortness of Breath. (Patient not taking: Reported on 5/8/2019) 30 Bullet 0     Current Facility-Administered Medications   Medication Dose Route Frequency Provider Last Rate Last Dose   • albuterol inhaler 2 Puff  2 Puff Inhalation Q4HRS (RT) Taniya Begum M.D.             Physical Exam:   Gen:           Alert and oriented, No apparent distress. Mood and affect     appropriate, normal interaction with provider.  Eyes:          sclere white, conjunctive moist.  Hearing:     Grossly intact.  Dentition:    Upper and lower dentures  Oropharynx:   Tongue normal, posterior pharynx without erythema or exudate.  Neck:        Supple, trachea midline, no masses.  Respiratory Effort: Has intercostal retractions and use of accessory muscles.   Lung Auscultation:      Scattered crackles   CV:            Regular rate and rhythm.  Trace pretibial edema. No murmurs, rubs or gallops.  Digits, Nails, Ext: Has clubbing, no cyanosis, petechiae, or nodes.   Skin:        No rashes, lesions or ulcers noted on back or exposed skin    surfaces.                     Assessment:  1. COPD, severe (HCC)   levoalbuterol for nebulizer   2. Peripheral edema  furosemide (LASIX) 20 MG Tab    3. Acute on chronic respiratory failure with hypoxia (HCC)   encourage consistent O2 use, working on POC to allow to use with exertion       Immunizations:    Flu: 3/13/2019  Pneumovax 23: 3/13/2019   Prevnar 13: Deferred    Plan:  1-doing well at current time  2-taking water pill daily, did refill for 20 mg per day with goal to wean off  3-requesting levabulterol liquid for nebulizer, provided  4-currently taking stiolto  5-discussed Trelegy, consider changing  6-follow up in 3 months    This dictation was created using voice recognition software. The accuracy of the dictation is limited to the abilities of the software. I expect there may be some errors of grammar and possibly content.

## 2019-05-29 ENCOUNTER — OFFICE VISIT (OUTPATIENT)
Dept: CARDIOLOGY | Facility: MEDICAL CENTER | Age: 64
End: 2019-05-29
Payer: MEDICARE

## 2019-05-29 VITALS
HEART RATE: 80 BPM | BODY MASS INDEX: 21.4 KG/M2 | SYSTOLIC BLOOD PRESSURE: 108 MMHG | DIASTOLIC BLOOD PRESSURE: 78 MMHG | HEIGHT: 69 IN | OXYGEN SATURATION: 80 % | WEIGHT: 144.51 LBS

## 2019-05-29 DIAGNOSIS — Z91.89 OTHER SPECIFIED PERSONAL RISK FACTORS, NOT ELSEWHERE CLASSIFIED: ICD-10-CM

## 2019-05-29 DIAGNOSIS — I25.10 CORONARY ARTERY CALCIFICATION SEEN ON CAT SCAN: ICD-10-CM

## 2019-05-29 DIAGNOSIS — I27.81 COR PULMONALE (CHRONIC) (HCC): Primary | ICD-10-CM

## 2019-05-29 DIAGNOSIS — I27.20 PULMONARY HYPERTENSION (HCC): ICD-10-CM

## 2019-05-29 PROCEDURE — 99204 OFFICE O/P NEW MOD 45 MIN: CPT | Performed by: INTERNAL MEDICINE

## 2019-05-29 RX ORDER — FUROSEMIDE 40 MG/1
40 TABLET ORAL DAILY
Qty: 30 TAB | Refills: 3 | Status: SHIPPED | OUTPATIENT
Start: 2019-05-29 | End: 2019-06-24 | Stop reason: SDUPTHER

## 2019-05-29 ASSESSMENT — ENCOUNTER SYMPTOMS
NECK PAIN: 1
BACK PAIN: 1
CONSTIPATION: 1
ABDOMINAL PAIN: 1
WHEEZING: 1
SPUTUM PRODUCTION: 1
NERVOUS/ANXIOUS: 1
COUGH: 1

## 2019-05-29 NOTE — LETTER
Name:          Richard Vaughn   YOB: 1955  Date:     05/29/2019      Mary Samano M.D.  [address]  [place] [address]     Tee Morelos MD  [address], [address]  [place] [address]  Phone: [phone]  Back Line: [phone]  Fax: [phone]  E-mail: [email or web address]   Dear Dr. Samano,    We had the pleasure of seeing your patient, Richard Vaughn, in Cardiology Clinic at [facility] today.    As you know, he is a 63-year-old man referred for evaluation of lower extremity edema consistent with cor pulmonale.    He continues to be a bit volume overloaded on physical examination today and I instructed him in fluid restriction, and increase his Lasix to 40 mg p.o. every Monday, Wednesday, and Friday with 20 mg dosing the other days of the week.    He does have some coronary calcifications on his CT chest, and I ordered a calcium score to clarify with the lipid panel to be done with his upcoming labs (nonfasting by the new guidelines).    He seems to be a bit inconsistent about oxygen supplementation at this point and I will defer that to pulmonology in the primary care setting.    Return in about 6 months (around 11/29/2019).    Thank you for the referral and please do not hesitate to contact me at any time. My contact information is listed above.    This note was dictated using Dragon speech recognition software.     A full note including my physical examination and a full list of rectified medications is available in our medical record, and can be faxed as well.    Tee Morelos MD  Cardiologist  [facility]    cc: Sourav Cobb MD

## 2019-05-29 NOTE — PROGRESS NOTES
Chief Complaint   Patient presents with   • New Patient     edema       Subjective:   Richard Vaughn is a 63 -year-old man referred for evaluation of lower extremity edema consistent with cor pulmonale.    He tells me today that he has been drinking about 5 to 6 cups of coffee (8 ounces each), and 2 x 12 ounce bottles of water per day.  He has been under the impression that he should be hydrating more adequately.  He had previously been on Lasix at 40 mg p.o. daily and is now on 20 mg p.o. daily with his dose cut down apparently by pulmonology.  He tells me that he is referred as there was some disagreement between his pulmonologist and primary care as to who should be managing his diuretics.    Apart from that, he has no cardiovascular complaints.  He does have dyspnea on exertion with walking about 200 feet on level ground in conjunction with his lung disease and scoliosis.    Past Medical History:   Diagnosis Date   • Anesthesia     poss. family hx malignant hyperthermia   • Cold    • Dental disorder     dentures   • Diverticulitis    • Osteoporosis    • Psychiatric problem     CONSULT FOR DRUG WITHDRAWAL AFTER LAST SURGERY     Past Surgical History:   Procedure Laterality Date   • VENTRAL HERNIA REPAIR  10/23/2009    Performed by ALEJANDRA EASTON at SURGERY SAME DAY [facility] ORS   • VENTRAL HERNIA REPAIR  6/11/2009    Performed by ALEJANDRA EASTON at SURGERY [facility] ORS   • COLOSTOMY CLOSURE  4/2/2009    Performed by ALEJANDRA EASTON at SURGERY [facility] ORS   • SIGMOID COLECTOMY  12/6/2008    Performed by ALEJANDRA EASTON at SURGERY [facility] ORS   • COLOSTOMY  12/6/2008    Performed by ALEJANDRA EASTON at SURGERY [facility] ORS   • EXPLORATORY LAPAROTOMY  12/6/2008    Performed by ALEJANDRA EASTON at SURGERY [facility] ORS   • BOWEL RESECTION  12/6/2008    Performed by ALEJANDRA EASTON at SURGERY [facility] ORS   • UMBILICAL HERNIA REPAIR       Family History   Problem Relation Age of Onset   •  Alzheimer's Disease Mother      Social History     Social History   • Marital status:      Spouse name: N/A   • Number of children: N/A   • Years of education: N/A     Occupational History   • Not on file.     Social History Main Topics   • Smoking status: Former Smoker     Packs/day: 1.00     Years: 42.00     Types: Cigarettes     Start date: 1/1/1967     Quit date: 6/6/2009   • Smokeless tobacco: Never Used      Comment: continued abstinance   • Alcohol use No   • Drug use: No      Comment: NOT AT THIS TIME     • Sexual activity: Not on file     Other Topics Concern   • Not on file     Social History Narrative   • No narrative on file     No Known Allergies  Outpatient Encounter Prescriptions as of 5/29/2019   Medication Sig Dispense Refill   • furosemide (LASIX) 40 MG Tab Take 1 Tab by mouth every day. 30 Tab 3   • furosemide (LASIX) 20 MG Tab Take 1 Tab by mouth every day. 30 Tab 0   • Tiotropium Bromide-Olodaterol (STIOLTO RESPIMAT) 2.5-2.5 MCG/ACT Aero Soln Inhale 1 Puff by mouth 2 Times a Day.     • Pediatric Multiple Vit-C-FA (FLINSTONES GUMMIES OMEGA-3 DHA) Chew Tab Take 1 Tab by mouth every day.     • [DISCONTINUED] levalbuterol (XOPENEX) 1.25 MG/3ML Nebu Soln 3 mL by Nebulization route every four hours as needed for Shortness of Breath (Cough, Wheezing.). (Patient not taking: Reported on 5/29/2019) 75 Bullet 1   • [DISCONTINUED] levalbuterol (XOPENEX HFA) 45 MCG/ACT inhaler Inhale 2 Puffs by mouth every four hours as needed for Shortness of Breath (As needed for shortness of breath, cough, wheezing.). (Patient not taking: Reported on 5/29/2019) 1 Inhaler 11   • [DISCONTINUED] predniSONE (DELTASONE) 20 MG Tab 40mgpo daily for 3 days (Patient not taking: Reported on 3/27/2019) 6 Tab 0   • [DISCONTINUED] furosemide (LASIX) 40 MG Tab Take 1 Tab by mouth every day. (Patient not taking: Reported on 5/29/2019) 30 Tab 0   • [DISCONTINUED] albuterol (PROVENTIL) 2.5mg/3ml Nebu Soln solution for nebulization  "3 mL by Nebulization route every four hours as needed for Shortness of Breath. (Patient not taking: Reported on 5/8/2019) 30 Bullet 0   • [DISCONTINUED] albuterol inhaler 2 Puff        No facility-administered encounter medications on file as of 5/29/2019.      Review of Systems   HENT: Positive for congestion.    Respiratory: Positive for cough, sputum production and wheezing.    Cardiovascular: Positive for leg swelling.   Gastrointestinal: Positive for abdominal pain and constipation.   Musculoskeletal: Positive for back pain and neck pain.   Psychiatric/Behavioral: The patient is nervous/anxious.    All other systems reviewed and are negative.       Objective:   /78 (BP Location: Right arm, Patient Position: Sitting, BP Cuff Size: Adult)   Pulse 80   Ht 1.753 m (5' 9\")   Wt 65.5 kg (144 lb 8.2 oz)   SpO2 (!) 80%   BMI 21.34 kg/m²     Physical Exam   Constitutional: He is oriented to person, place, and time. He appears well-developed and well-nourished. No distress.   Pleasant, in no distress   Eyes: Pupils are equal, round, and reactive to light. EOM are normal.   Neck: No JVD present.   Cardiovascular: Normal rate and regular rhythm.  Exam reveals no gallop and no friction rub.    No murmur heard.  No carotid bruits   Pulmonary/Chest: Effort normal. No respiratory distress. He has wheezes (Very mild end expiratory wheezes). He has no rales.   Scoliosis noted, barrel chested.  No respiratory distress or accessory on muscle use breathing with oxygen via nasal cannula during my examination   Abdominal: Soft. Bowel sounds are normal. He exhibits no distension.   Musculoskeletal: He exhibits edema (1-2+ bilateral lower extremity edema).   Neurological: He is alert and oriented to person, place, and time.   Skin: Skin is warm and dry. Rash noted. He is not diaphoretic. No erythema. No pallor.   Venous stasis changes noted of the legs bilaterally   Psychiatric: He has a normal mood and affect. Judgment and " "thought content normal.   Vitals reviewed.    Lab Results   Component Value Date/Time    WBC 7.6 03/13/2019 02:09 AM    RBC 5.35 03/13/2019 02:09 AM    HEMOGLOBIN 15.4 03/13/2019 02:09 AM    HEMATOCRIT 49.2 03/13/2019 02:09 AM    MCV 92.0 03/13/2019 02:09 AM    MCH 28.8 03/13/2019 02:09 AM    MCHC 31.3 (L) 03/13/2019 02:09 AM    MPV 9.5 03/13/2019 02:09 AM        Lab Results   Component Value Date/Time    SODIUM 134 (L) 03/13/2019 02:09 AM    POTASSIUM 4.4 03/13/2019 02:09 AM    CHLORIDE 96 03/13/2019 02:09 AM    CO2 34 (H) 03/13/2019 02:09 AM    GLUCOSE 125 (H) 03/13/2019 02:09 AM    BUN 21 03/13/2019 02:09 AM    CREATININE 1.01 03/13/2019 02:09 AM    CREATININE 1.2 04/03/2009 04:00 AM        Lab Results   Component Value Date/Time    ASTSGOT 19 03/13/2019 02:09 AM    ALTSGPT 21 03/13/2019 02:09 AM        Lab Results   Component Value Date/Time    CHOLSTRLTOT 199 11/22/2017 01:57 PM     (H) 11/22/2017 01:57 PM    HDL 55 11/22/2017 01:57 PM    TRIGLYCERIDE 74 11/22/2017 01:57 PM         No results found for this or any previous visit.    CT chest, 1/21/2019, images and report reviewed, my interpretation: Notable for my perspective for moderate to heavy atherosclerosis of the proximal and mid LAD and moderate calcification of the RCA    Echocardiogram, 3/12/2019:  \"Normal left ventricular size, wall thickness, and systolic function.  Left ventricular ejection fraction is visually estimated to be 55%.  Moderately dilated right ventricle.  Mildly reduced right ventricular systolic function.  Enlarged right atrium.  Mildly dilated left atrium.  Right atrial pressure is estimated to be 8 mmHg, mildly elevated.  Estimated right ventricular systolic pressure  is 43 mmHg consistent   with mild pulmonary hypertension.\"    EKG, 3/11/2019, tracings and report reviewed, my interpretation: Demonstrates anterior ST depression and T wave inversion with inferior T wave flattening.  There is right axis deviation and a tall R " wave in V1 consistent with right ventricular hypertrophy.  Those findings are all new compared to his previous EKG from 2009    Assessment:     1. Cor pulmonale (chronic) (HCC)     2. Other specified personal risk factors, not elsewhere classified  CT-CARDIAC SCORING   3. Pulmonary hypertension (HCC)  furosemide (LASIX) 40 MG Tab    Basic Metabolic Panel    Lipid Profile   4. Coronary artery calcification seen on CAT scan  CT-CARDIAC SCORING       Medical Decision Making:  Today's Assessment / Status / Plan:     He continues to be a bit volume overloaded on physical examination today and I instructed him in fluid restriction, and increase his Lasix to 40 mg p.o. every Monday, Wednesday, and Friday with 20 mg dosing the other days of the week.    He does have some coronary calcifications on his CT chest, and I ordered a calcium score to clarify with the lipid panel to be done with his upcoming labs (nonfasting by the new guidelines).    Tee Morelos MD  Cardiologist, [facility] [place]     Return in about 6 months (around 11/29/2019).

## 2019-06-17 ENCOUNTER — HOSPITAL ENCOUNTER (INPATIENT)
Facility: MEDICAL CENTER | Age: 64
LOS: 4 days | DRG: 871 | End: 2019-06-21
Attending: EMERGENCY MEDICINE | Admitting: HOSPITALIST
Payer: MEDICARE

## 2019-06-17 ENCOUNTER — APPOINTMENT (OUTPATIENT)
Dept: RADIOLOGY | Facility: IMAGING CENTER | Age: 64
End: 2019-06-17
Attending: PHYSICIAN ASSISTANT
Payer: MEDICARE

## 2019-06-17 ENCOUNTER — APPOINTMENT (OUTPATIENT)
Dept: RADIOLOGY | Facility: MEDICAL CENTER | Age: 64
DRG: 871 | End: 2019-06-17
Attending: EMERGENCY MEDICINE
Payer: MEDICARE

## 2019-06-17 ENCOUNTER — OFFICE VISIT (OUTPATIENT)
Dept: PULMONOLOGY | Facility: HOSPICE | Age: 64
End: 2019-06-17
Payer: MEDICARE

## 2019-06-17 VITALS
HEART RATE: 97 BPM | HEIGHT: 69 IN | SYSTOLIC BLOOD PRESSURE: 138 MMHG | OXYGEN SATURATION: 86 % | BODY MASS INDEX: 20.73 KG/M2 | DIASTOLIC BLOOD PRESSURE: 74 MMHG | RESPIRATION RATE: 16 BRPM | WEIGHT: 140 LBS

## 2019-06-17 DIAGNOSIS — R06.03 RESPIRATORY DISTRESS: ICD-10-CM

## 2019-06-17 DIAGNOSIS — J44.1 COPD EXACERBATION (HCC): ICD-10-CM

## 2019-06-17 DIAGNOSIS — J18.9 PNEUMONIA OF RIGHT LUNG DUE TO INFECTIOUS ORGANISM, UNSPECIFIED PART OF LUNG: ICD-10-CM

## 2019-06-17 DIAGNOSIS — J44.1 ACUTE EXACERBATION OF CHRONIC OBSTRUCTIVE PULMONARY DISEASE (COPD) (HCC): ICD-10-CM

## 2019-06-17 DIAGNOSIS — J18.9 PNEUMONIA OF RIGHT LOWER LOBE DUE TO INFECTIOUS ORGANISM: ICD-10-CM

## 2019-06-17 PROBLEM — A41.9 SEPSIS (HCC): Status: ACTIVE | Noted: 2019-06-17

## 2019-06-17 PROBLEM — E87.1 HYPONATREMIA: Status: ACTIVE | Noted: 2019-06-17

## 2019-06-17 LAB
ALBUMIN SERPL BCP-MCNC: 4.3 G/DL (ref 3.2–4.9)
ALBUMIN/GLOB SERPL: 1.2 G/DL
ALP SERPL-CCNC: 58 U/L (ref 30–99)
ALT SERPL-CCNC: 11 U/L (ref 2–50)
ANION GAP SERPL CALC-SCNC: 9 MMOL/L (ref 0–11.9)
AST SERPL-CCNC: 27 U/L (ref 12–45)
BASOPHILS # BLD AUTO: 0.2 % (ref 0–1.8)
BASOPHILS # BLD: 0.04 K/UL (ref 0–0.12)
BILIRUB SERPL-MCNC: 1.4 MG/DL (ref 0.1–1.5)
BUN SERPL-MCNC: 13 MG/DL (ref 8–22)
CALCIUM SERPL-MCNC: 9.9 MG/DL (ref 8.5–10.5)
CHLORIDE SERPL-SCNC: 97 MMOL/L (ref 96–112)
CO2 SERPL-SCNC: 28 MMOL/L (ref 20–33)
CREAT SERPL-MCNC: 0.86 MG/DL (ref 0.5–1.4)
EKG IMPRESSION: NORMAL
EKG IMPRESSION: NORMAL
EOSINOPHIL # BLD AUTO: 0 K/UL (ref 0–0.51)
EOSINOPHIL NFR BLD: 0 % (ref 0–6.9)
ERYTHROCYTE [DISTWIDTH] IN BLOOD BY AUTOMATED COUNT: 48.4 FL (ref 35.9–50)
FLUAV RNA SPEC QL NAA+PROBE: NEGATIVE
FLUBV RNA SPEC QL NAA+PROBE: NEGATIVE
GLOBULIN SER CALC-MCNC: 3.5 G/DL (ref 1.9–3.5)
GLUCOSE SERPL-MCNC: 97 MG/DL (ref 65–99)
HCT VFR BLD AUTO: 51.4 % (ref 42–52)
HGB BLD-MCNC: 16.7 G/DL (ref 14–18)
IMM GRANULOCYTES # BLD AUTO: 0.13 K/UL (ref 0–0.11)
IMM GRANULOCYTES NFR BLD AUTO: 0.7 % (ref 0–0.9)
LACTATE BLD-SCNC: 2 MMOL/L (ref 0.5–2)
LYMPHOCYTES # BLD AUTO: 0.8 K/UL (ref 1–4.8)
LYMPHOCYTES NFR BLD: 4.3 % (ref 22–41)
MCH RBC QN AUTO: 29.4 PG (ref 27–33)
MCHC RBC AUTO-ENTMCNC: 32.5 G/DL (ref 33.7–35.3)
MCV RBC AUTO: 90.5 FL (ref 81.4–97.8)
MONOCYTES # BLD AUTO: 1.15 K/UL (ref 0–0.85)
MONOCYTES NFR BLD AUTO: 6.2 % (ref 0–13.4)
NEUTROPHILS # BLD AUTO: 16.29 K/UL (ref 1.82–7.42)
NEUTROPHILS NFR BLD: 88.6 % (ref 44–72)
NRBC # BLD AUTO: 0 K/UL
NRBC BLD-RTO: 0 /100 WBC
PLATELET # BLD AUTO: 138 K/UL (ref 164–446)
PMV BLD AUTO: 10.8 FL (ref 9–12.9)
POTASSIUM SERPL-SCNC: 4.4 MMOL/L (ref 3.6–5.5)
PROT SERPL-MCNC: 7.8 G/DL (ref 6–8.2)
RBC # BLD AUTO: 5.68 M/UL (ref 4.7–6.1)
SODIUM SERPL-SCNC: 134 MMOL/L (ref 135–145)
WBC # BLD AUTO: 18.4 K/UL (ref 4.8–10.8)

## 2019-06-17 PROCEDURE — 87502 INFLUENZA DNA AMP PROBE: CPT

## 2019-06-17 PROCEDURE — 99285 EMERGENCY DEPT VISIT HI MDM: CPT

## 2019-06-17 PROCEDURE — 83605 ASSAY OF LACTIC ACID: CPT

## 2019-06-17 PROCEDURE — 96366 THER/PROPH/DIAG IV INF ADDON: CPT

## 2019-06-17 PROCEDURE — 71046 X-RAY EXAM CHEST 2 VIEWS: CPT | Mod: TC | Performed by: PHYSICIAN ASSISTANT

## 2019-06-17 PROCEDURE — 700105 HCHG RX REV CODE 258: Performed by: EMERGENCY MEDICINE

## 2019-06-17 PROCEDURE — 96368 THER/DIAG CONCURRENT INF: CPT

## 2019-06-17 PROCEDURE — 93005 ELECTROCARDIOGRAM TRACING: CPT | Performed by: EMERGENCY MEDICINE

## 2019-06-17 PROCEDURE — 770006 HCHG ROOM/CARE - MED/SURG/GYN SEMI*

## 2019-06-17 PROCEDURE — 71045 X-RAY EXAM CHEST 1 VIEW: CPT

## 2019-06-17 PROCEDURE — 96365 THER/PROPH/DIAG IV INF INIT: CPT

## 2019-06-17 PROCEDURE — 87040 BLOOD CULTURE FOR BACTERIA: CPT | Mod: 91

## 2019-06-17 PROCEDURE — 304561 HCHG STAT O2

## 2019-06-17 PROCEDURE — 99223 1ST HOSP IP/OBS HIGH 75: CPT | Performed by: HOSPITALIST

## 2019-06-17 PROCEDURE — 84145 PROCALCITONIN (PCT): CPT

## 2019-06-17 PROCEDURE — 700111 HCHG RX REV CODE 636 W/ 250 OVERRIDE (IP): Performed by: EMERGENCY MEDICINE

## 2019-06-17 PROCEDURE — 94640 AIRWAY INHALATION TREATMENT: CPT

## 2019-06-17 PROCEDURE — 700101 HCHG RX REV CODE 250: Performed by: EMERGENCY MEDICINE

## 2019-06-17 PROCEDURE — 80053 COMPREHEN METABOLIC PANEL: CPT

## 2019-06-17 PROCEDURE — 85025 COMPLETE CBC W/AUTO DIFF WBC: CPT

## 2019-06-17 PROCEDURE — 96375 TX/PRO/DX INJ NEW DRUG ADDON: CPT

## 2019-06-17 PROCEDURE — 84484 ASSAY OF TROPONIN QUANT: CPT

## 2019-06-17 PROCEDURE — 99213 OFFICE O/P EST LOW 20 MIN: CPT | Performed by: PHYSICIAN ASSISTANT

## 2019-06-17 RX ORDER — AZITHROMYCIN 250 MG/1
500 TABLET, FILM COATED ORAL ONCE
Status: COMPLETED | OUTPATIENT
Start: 2019-06-18 | End: 2019-06-18

## 2019-06-17 RX ORDER — POLYETHYLENE GLYCOL 3350 17 G/17G
1 POWDER, FOR SOLUTION ORAL
Status: DISCONTINUED | OUTPATIENT
Start: 2019-06-17 | End: 2019-06-21 | Stop reason: HOSPADM

## 2019-06-17 RX ORDER — METHYLPREDNISOLONE SODIUM SUCCINATE 40 MG/ML
40 INJECTION, POWDER, LYOPHILIZED, FOR SOLUTION INTRAMUSCULAR; INTRAVENOUS EVERY 6 HOURS
Status: DISCONTINUED | OUTPATIENT
Start: 2019-06-18 | End: 2019-06-19

## 2019-06-17 RX ORDER — PREDNISONE 10 MG/1
TABLET ORAL
Qty: 30 TAB | Refills: 0 | Status: SHIPPED | OUTPATIENT
Start: 2019-06-17 | End: 2019-06-17

## 2019-06-17 RX ORDER — MAGNESIUM SULFATE HEPTAHYDRATE 40 MG/ML
2 INJECTION, SOLUTION INTRAVENOUS ONCE
Status: COMPLETED | OUTPATIENT
Start: 2019-06-17 | End: 2019-06-18

## 2019-06-17 RX ORDER — AZITHROMYCIN 250 MG/1
TABLET, FILM COATED ORAL
Qty: 6 TAB | Refills: 0 | Status: SHIPPED | OUTPATIENT
Start: 2019-06-17 | End: 2019-06-17

## 2019-06-17 RX ORDER — ONDANSETRON 2 MG/ML
4 INJECTION INTRAMUSCULAR; INTRAVENOUS EVERY 4 HOURS PRN
Status: DISCONTINUED | OUTPATIENT
Start: 2019-06-17 | End: 2019-06-21 | Stop reason: HOSPADM

## 2019-06-17 RX ORDER — MORPHINE SULFATE 4 MG/ML
4 INJECTION, SOLUTION INTRAMUSCULAR; INTRAVENOUS ONCE
Status: COMPLETED | OUTPATIENT
Start: 2019-06-17 | End: 2019-06-17

## 2019-06-17 RX ORDER — AMOXICILLIN 250 MG
2 CAPSULE ORAL 2 TIMES DAILY
Status: DISCONTINUED | OUTPATIENT
Start: 2019-06-18 | End: 2019-06-21 | Stop reason: HOSPADM

## 2019-06-17 RX ORDER — BISACODYL 10 MG
10 SUPPOSITORY, RECTAL RECTAL
Status: DISCONTINUED | OUTPATIENT
Start: 2019-06-17 | End: 2019-06-21 | Stop reason: HOSPADM

## 2019-06-17 RX ORDER — FUROSEMIDE 20 MG/1
20 TABLET ORAL
COMMUNITY
End: 2019-06-24 | Stop reason: SDUPTHER

## 2019-06-17 RX ORDER — ONDANSETRON 4 MG/1
4 TABLET, ORALLY DISINTEGRATING ORAL EVERY 4 HOURS PRN
Status: DISCONTINUED | OUTPATIENT
Start: 2019-06-17 | End: 2019-06-21 | Stop reason: HOSPADM

## 2019-06-17 RX ORDER — SODIUM CHLORIDE 9 MG/ML
INJECTION, SOLUTION INTRAVENOUS CONTINUOUS
Status: DISCONTINUED | OUTPATIENT
Start: 2019-06-18 | End: 2019-06-18

## 2019-06-17 RX ORDER — AZITHROMYCIN 250 MG/1
250 TABLET, FILM COATED ORAL EVERY 24 HOURS
Status: DISCONTINUED | OUTPATIENT
Start: 2019-06-18 | End: 2019-06-18

## 2019-06-17 RX ORDER — HEPARIN SODIUM 5000 [USP'U]/ML
5000 INJECTION, SOLUTION INTRAVENOUS; SUBCUTANEOUS EVERY 8 HOURS
Status: DISCONTINUED | OUTPATIENT
Start: 2019-06-18 | End: 2019-06-19

## 2019-06-17 RX ORDER — IPRATROPIUM BROMIDE AND ALBUTEROL SULFATE 2.5; .5 MG/3ML; MG/3ML
3 SOLUTION RESPIRATORY (INHALATION) CONTINUOUS
Status: DISCONTINUED | OUTPATIENT
Start: 2019-06-17 | End: 2019-06-18 | Stop reason: CLARIF

## 2019-06-17 RX ORDER — AZITHROMYCIN 500 MG/1
500 INJECTION, POWDER, LYOPHILIZED, FOR SOLUTION INTRAVENOUS ONCE
Status: COMPLETED | OUTPATIENT
Start: 2019-06-17 | End: 2019-06-17

## 2019-06-17 RX ORDER — ACETAMINOPHEN 325 MG/1
650 TABLET ORAL EVERY 6 HOURS PRN
Status: DISCONTINUED | OUTPATIENT
Start: 2019-06-17 | End: 2019-06-18

## 2019-06-17 RX ORDER — METHYLPREDNISOLONE SODIUM SUCCINATE 125 MG/2ML
125 INJECTION, POWDER, LYOPHILIZED, FOR SOLUTION INTRAMUSCULAR; INTRAVENOUS ONCE
Status: COMPLETED | OUTPATIENT
Start: 2019-06-17 | End: 2019-06-17

## 2019-06-17 RX ORDER — SODIUM CHLORIDE 9 MG/ML
500 INJECTION, SOLUTION INTRAVENOUS
Status: DISCONTINUED | OUTPATIENT
Start: 2019-06-17 | End: 2019-06-21 | Stop reason: HOSPADM

## 2019-06-17 RX ORDER — IPRATROPIUM BROMIDE AND ALBUTEROL SULFATE 2.5; .5 MG/3ML; MG/3ML
3 SOLUTION RESPIRATORY (INHALATION)
Status: DISCONTINUED | OUTPATIENT
Start: 2019-06-18 | End: 2019-06-18 | Stop reason: SINTOL

## 2019-06-17 RX ADMIN — CEFTRIAXONE SODIUM 2 G: 2 INJECTION, POWDER, FOR SOLUTION INTRAMUSCULAR; INTRAVENOUS at 22:12

## 2019-06-17 RX ADMIN — AZITHROMYCIN MONOHYDRATE 500 MG: 500 INJECTION, POWDER, LYOPHILIZED, FOR SOLUTION INTRAVENOUS at 22:44

## 2019-06-17 RX ADMIN — IPRATROPIUM BROMIDE AND ALBUTEROL SULFATE 3 ML: .5; 3 SOLUTION RESPIRATORY (INHALATION) at 22:49

## 2019-06-17 RX ADMIN — METHYLPREDNISOLONE SODIUM SUCCINATE 125 MG: 125 INJECTION, POWDER, FOR SOLUTION INTRAMUSCULAR; INTRAVENOUS at 22:12

## 2019-06-17 RX ADMIN — MORPHINE SULFATE 4 MG: 4 INJECTION INTRAVENOUS at 22:12

## 2019-06-17 RX ADMIN — MAGNESIUM SULFATE IN WATER 2 G: 40 INJECTION, SOLUTION INTRAVENOUS at 22:12

## 2019-06-17 ASSESSMENT — ENCOUNTER SYMPTOMS
SPUTUM PRODUCTION: 1
CHILLS: 1
SINUS PAIN: 0
COUGH: 1
DEPRESSION: 0
WHEEZING: 1
FEVER: 0
HEADACHES: 0
WEIGHT LOSS: 0
INSOMNIA: 0
PALPITATIONS: 0
DIZZINESS: 1
FEVER: 1
BLURRED VISION: 0
DIZZINESS: 0
COUGH: 1
SORE THROAT: 1
VOMITING: 0
SHORTNESS OF BREATH: 1
NERVOUS/ANXIOUS: 0
SHORTNESS OF BREATH: 1
BACK PAIN: 0
NERVOUS/ANXIOUS: 1
HEMOPTYSIS: 1
ORTHOPNEA: 1
CHILLS: 1
PALPITATIONS: 0
DOUBLE VISION: 0
SORE THROAT: 0
MYALGIAS: 0
HEADACHES: 0
EYES NEGATIVE: 1
WEAKNESS: 0
HEARTBURN: 0
NAUSEA: 0
WHEEZING: 1
DIAPHORESIS: 0

## 2019-06-17 ASSESSMENT — COPD QUESTIONNAIRES
DO YOU EVER COUGH UP ANY MUCUS OR PHLEGM?: YES, A FEW DAYS A WEEK OR MONTH
COPD SCREENING SCORE: 6
DURING THE PAST 4 WEEKS HOW MUCH DID YOU FEEL SHORT OF BREATH: SOME OF THE TIME
HAVE YOU SMOKED AT LEAST 100 CIGARETTES IN YOUR ENTIRE LIFE: YES

## 2019-06-17 ASSESSMENT — LIFESTYLE VARIABLES: EVER_SMOKED: YES

## 2019-06-17 NOTE — PROGRESS NOTES
CC: Trouble breathing    HPI:  Richard Vaughn is a 63 y.o. year old male here today for sick visit.  Patient appeared distressed in clinic, moderately labored respirations, marginal sats on O2 at 6 L pulsed as low as 83% with ambulation to room.  Patient is a former smoker with a reported 42-pack-year history and quit date in 6 of 2009.  Continued abstinence advised.    Last seen in clinic on 5/8/2019.  He does have a history of severe COPD, O2 dependent.  Patient frequently observed during previous visits using cannula in mouth reporting that works better for him.  He was complaining of being quite chilled and very nauseous.  He did wrap up in a blanket.  He did have an observed productive cough with gray-green production.    Reviewed in clinic vitals including blood pressure 138/74.  Heart rate of 97, O2 sat of 86% on 6 L pulsed after ambulation to room.  BMI 20.67 kg/m² which patient reports is up from recent history.  Patient reports he uses 3 L to 6 L either continuous or pulsed.  At last appointment he reported being on 3 L to 4 L with activity.    Reviewed home medication regimen including Lasix, Stiolto.  Due to labored respiration, recommended transfer to emergency room.  Patient declined.  Did obtain stat chest x-ray which demonstrated right lower lobe pneumonia.  Case reviewed with Dr. Begum.  Who also advised patient he needed to be treated for his right lower lobe pneumonia.  Concern with development of sepsis.  Patient assured staff he would follow-up in the ED as soon as he was able to have his wife drive him in.  They are primary caregivers for 2 grandchildren.  He did decline antibiotics and steroids as he was going in to the ER.    Patient was seen recently 5/29/2019 for cor pulmonale by Dr. Morelos in cardiology.  Patient reports he increased his Lasix to 40 mg every other day and 20 mg on the off days.      Review of Systems   Constitutional: Positive for chills and malaise/fatigue.  Negative for diaphoresis, fever and weight loss.   HENT: Positive for congestion (chronic sinusitis) and sore throat (dry primarily). Negative for hearing loss, sinus pain and tinnitus.    Eyes: Negative.    Respiratory: Positive for cough, sputum production (yellow to green), shortness of breath and wheezing.    Cardiovascular: Positive for chest pain and orthopnea. Negative for palpitations and leg swelling (improved).   Gastrointestinal: Negative for heartburn.   Skin: Negative.    Neurological: Positive for dizziness (on standing). Negative for weakness and headaches.   Psychiatric/Behavioral: The patient is nervous/anxious. The patient does not have insomnia.        Past Medical History:   Diagnosis Date   • Anesthesia     poss. family hx malignant hyperthermia   • Cold    • Dental disorder     dentures   • Diverticulitis    • Osteoporosis    • Psychiatric problem     CONSULT FOR DRUG WITHDRAWAL AFTER LAST SURGERY       Past Surgical History:   Procedure Laterality Date   • VENTRAL HERNIA REPAIR  10/23/2009    Performed by ALEJANDRA EASTON at SURGERY SAME DAY [facility] ORS   • VENTRAL HERNIA REPAIR  6/11/2009    Performed by ALEJANDRA EASTON at SURGERY [facility] ORS   • COLOSTOMY CLOSURE  4/2/2009    Performed by ALEJANDRA EASTON at SURGERY [facility] ORS   • SIGMOID COLECTOMY  12/6/2008    Performed by ALEJANDRA EASTON at SURGERY [facility] ORS   • COLOSTOMY  12/6/2008    Performed by ALEJANDRA EASTON at SURGERY [facility] ORS   • EXPLORATORY LAPAROTOMY  12/6/2008    Performed by ALEJANDRA EASTON at SURGERY [facility] ORS   • BOWEL RESECTION  12/6/2008    Performed by ALEJANDRA EASTON at SURGERY [facility] ORS   • UMBILICAL HERNIA REPAIR         Family History   Problem Relation Age of Onset   • Alzheimer's Disease Mother        Social History     Social History   • Marital status:      Spouse name: N/A   • Number of children: N/A   • Years of education: N/A     Occupational History   • Not on file.  "    Social History Main Topics   • Smoking status: Former Smoker     Packs/day: 1.00     Years: 42.00     Types: Cigarettes     Start date: 1/1/1967     Quit date: 6/6/2009   • Smokeless tobacco: Never Used      Comment: continued abstinance   • Alcohol use No   • Drug use: No      Comment: NOT AT THIS TIME     • Sexual activity: Not on file     Other Topics Concern   • Not on file     Social History Narrative   • No narrative on file       Allergies as of 06/17/2019   • (No Known Allergies)        @Vital signs for this encounter:  Vitals:    06/17/19 1418 06/17/19 1422   Height: 1.753 m (5' 9\")    Weight: 63.5 kg (140 lb)    Weight % change since last entry.: 0 %    BP: 138/74    Pulse: 97    BMI (Calculated): 20.67    Resp: 16    O2 sat % on O2:  (!) 86 %   O2 Flow Rate (L/min):  6       Current medications as of today   Current Outpatient Prescriptions   Medication Sig Dispense Refill   • furosemide (LASIX) 40 MG Tab Take 1 Tab by mouth every day. 30 Tab 3   • Tiotropium Bromide-Olodaterol (STIOLTO RESPIMAT) 2.5-2.5 MCG/ACT Aero Soln Inhale 1 Puff by mouth 2 Times a Day.     • furosemide (LASIX) 20 MG Tab Take 1 Tab by mouth every day. (Patient not taking: Reported on 6/17/2019) 30 Tab 0   • Pediatric Multiple Vit-C-FA (FLINSTONES GUMMIES OMEGA-3 DHA) Chew Tab Take 1 Tab by mouth every day.       No current facility-administered medications for this visit.          Physical Exam:   Gen:           Alert and oriented, patient appears anxious, complaining of nausea and chills   eyes:          sclere white, conjunctive moist.  Hearing:     Grossly intact.  Oropharynx:   Tongue normal, posterior pharynx without erythema or exudate.  Neck:        Supple, trachea midline, no masses.  Respiratory Effort: No intercostal retractions or use of accessory muscles.   Lung Auscultation:      Decreased t/o > right middle lobe and right lower lobe, otherwise clear  CV:            Regular rate and rhythm. No edema. No murmurs, " rubs or gallops.  Digits, Nails, Ext: No clubbing, cyanosis, petechiae, or nodes.   Skin:        No rashes, lesions or ulcers noted on back or exposed skin surfaces.                     Assessment:  1. COPD exacerbation (HCC)  DX-CHEST-2 VIEWS, increased O2 requirements   2. Pneumonia of right lower lobe due to infectious organism (HCC)   declined transport to ER ,refuses prescription for antibiotics+steroids d/t plan to proceed to ER        Immunizations:    Flu: 3/13/2019  Pneumovax 23: 3/13/2019  Prevnar 13: Deferred    Plan:  1-increased shortness of breath and cough, concerned may have flu  2-low oxygen saturations if not consciously focused on breathing  3-recommendation for ER evaluation  4-patient declined at present   5-patient acknowledges he needs to present to ER but wishes to have family drive him  6-declined nebulizer treatment in clinic   8-reports seen by cardiology and increased dosage lasix which he started today  9-expressed anxiety with family situation  10-referred to primary to follow up on anxiety   11-stat cxr obtained, pneumonia Right lower lobe  12-patient reports will proceed to ER    This dictation was created using voice recognition software. The accuracy of the dictation is limited to the abilities of the software. I expect there may be some errors of grammar and possibly content.

## 2019-06-17 NOTE — PATIENT INSTRUCTIONS
1-increased shortness of breath and cough, concerned may have flu  2-low oxygen saturations if not consciously focused on breathing  3-recommendation for ER evaluation  4-patient declined at present   5-patient acknowledges he needs to present to ER but wishes to have family drive him  6-declined nebulizer treatment in clinic   8-reports seen by cardiology and increased dosage lasix which he started today  9-expressed anxiety with family situation  10-referred to primary to follow up on anxiety   11-stat cxr obtained, pneumonia Right lower lobe  12-patient reports will proceed to ER

## 2019-06-18 LAB
LACTATE BLD-SCNC: 1.5 MMOL/L (ref 0.5–2)
LACTATE BLD-SCNC: 2.3 MMOL/L (ref 0.5–2)
PROCALCITONIN SERPL-MCNC: 0.14 NG/ML
TROPONIN I SERPL-MCNC: <0.01 NG/ML (ref 0–0.04)

## 2019-06-18 PROCEDURE — 96376 TX/PRO/DX INJ SAME DRUG ADON: CPT

## 2019-06-18 PROCEDURE — 96372 THER/PROPH/DIAG INJ SC/IM: CPT

## 2019-06-18 PROCEDURE — 96367 TX/PROPH/DG ADDL SEQ IV INF: CPT

## 2019-06-18 PROCEDURE — 94640 AIRWAY INHALATION TREATMENT: CPT

## 2019-06-18 PROCEDURE — A9270 NON-COVERED ITEM OR SERVICE: HCPCS | Performed by: HOSPITALIST

## 2019-06-18 PROCEDURE — 700102 HCHG RX REV CODE 250 W/ 637 OVERRIDE(OP): Performed by: HOSPITALIST

## 2019-06-18 PROCEDURE — 700111 HCHG RX REV CODE 636 W/ 250 OVERRIDE (IP): Performed by: HOSPITALIST

## 2019-06-18 PROCEDURE — 770006 HCHG ROOM/CARE - MED/SURG/GYN SEMI*

## 2019-06-18 PROCEDURE — 700101 HCHG RX REV CODE 250: Performed by: INTERNAL MEDICINE

## 2019-06-18 PROCEDURE — 36415 COLL VENOUS BLD VENIPUNCTURE: CPT

## 2019-06-18 PROCEDURE — 94760 N-INVAS EAR/PLS OXIMETRY 1: CPT

## 2019-06-18 PROCEDURE — 700105 HCHG RX REV CODE 258: Performed by: HOSPITALIST

## 2019-06-18 PROCEDURE — 700101 HCHG RX REV CODE 250: Performed by: HOSPITALIST

## 2019-06-18 PROCEDURE — 99233 SBSQ HOSP IP/OBS HIGH 50: CPT | Performed by: INTERNAL MEDICINE

## 2019-06-18 PROCEDURE — 83605 ASSAY OF LACTIC ACID: CPT | Mod: 91

## 2019-06-18 RX ORDER — CYCLOBENZAPRINE HCL 10 MG
10 TABLET ORAL 3 TIMES DAILY PRN
Status: DISCONTINUED | OUTPATIENT
Start: 2019-06-18 | End: 2019-06-21 | Stop reason: HOSPADM

## 2019-06-18 RX ORDER — FUROSEMIDE 20 MG/1
20 TABLET ORAL
Status: DISCONTINUED | OUTPATIENT
Start: 2019-06-20 | End: 2019-06-21 | Stop reason: HOSPADM

## 2019-06-18 RX ORDER — LEVALBUTEROL INHALATION SOLUTION 1.25 MG/3ML
1.25 SOLUTION RESPIRATORY (INHALATION)
Status: DISCONTINUED | OUTPATIENT
Start: 2019-06-18 | End: 2019-06-18 | Stop reason: ALTCHOICE

## 2019-06-18 RX ORDER — MORPHINE SULFATE 4 MG/ML
2 INJECTION, SOLUTION INTRAMUSCULAR; INTRAVENOUS EVERY 4 HOURS PRN
Status: DISCONTINUED | OUTPATIENT
Start: 2019-06-18 | End: 2019-06-21 | Stop reason: HOSPADM

## 2019-06-18 RX ORDER — TRAMADOL HYDROCHLORIDE 50 MG/1
50 TABLET ORAL EVERY 4 HOURS PRN
Status: DISCONTINUED | OUTPATIENT
Start: 2019-06-18 | End: 2019-06-21 | Stop reason: HOSPADM

## 2019-06-18 RX ORDER — LEVALBUTEROL INHALATION SOLUTION 1.25 MG/3ML
1.25 SOLUTION RESPIRATORY (INHALATION)
Status: DISCONTINUED | OUTPATIENT
Start: 2019-06-18 | End: 2019-06-18

## 2019-06-18 RX ORDER — AZITHROMYCIN 250 MG/1
250 TABLET, FILM COATED ORAL EVERY 24 HOURS
Status: DISCONTINUED | OUTPATIENT
Start: 2019-06-19 | End: 2019-06-21 | Stop reason: HOSPADM

## 2019-06-18 RX ORDER — ACETAMINOPHEN 325 MG/1
650 TABLET ORAL EVERY 6 HOURS PRN
Status: DISCONTINUED | OUTPATIENT
Start: 2019-06-18 | End: 2019-06-21 | Stop reason: HOSPADM

## 2019-06-18 RX ORDER — LEVALBUTEROL INHALATION SOLUTION 1.25 MG/3ML
1.25 SOLUTION RESPIRATORY (INHALATION)
Status: DISCONTINUED | OUTPATIENT
Start: 2019-06-18 | End: 2019-06-19

## 2019-06-18 RX ORDER — TRAMADOL HYDROCHLORIDE 50 MG/1
50-100 TABLET ORAL EVERY 4 HOURS PRN
Status: DISCONTINUED | OUTPATIENT
Start: 2019-06-18 | End: 2019-06-18

## 2019-06-18 RX ORDER — IPRATROPIUM BROMIDE AND ALBUTEROL SULFATE 2.5; .5 MG/3ML; MG/3ML
3 SOLUTION RESPIRATORY (INHALATION)
Status: DISCONTINUED | OUTPATIENT
Start: 2019-06-18 | End: 2019-06-18

## 2019-06-18 RX ADMIN — METHYLPREDNISOLONE SODIUM SUCCINATE 40 MG: 40 INJECTION, POWDER, FOR SOLUTION INTRAMUSCULAR; INTRAVENOUS at 05:07

## 2019-06-18 RX ADMIN — HEPARIN SODIUM 5000 UNITS: 5000 INJECTION, SOLUTION INTRAVENOUS; SUBCUTANEOUS at 21:08

## 2019-06-18 RX ADMIN — AMPICILLIN SODIUM AND SULBACTAM SODIUM 3 G: 2; 1 INJECTION, POWDER, FOR SOLUTION INTRAMUSCULAR; INTRAVENOUS at 00:28

## 2019-06-18 RX ADMIN — LEVALBUTEROL 1.25 MG: 1.25 SOLUTION RESPIRATORY (INHALATION) at 11:15

## 2019-06-18 RX ADMIN — METHYLPREDNISOLONE SODIUM SUCCINATE 40 MG: 40 INJECTION, POWDER, FOR SOLUTION INTRAMUSCULAR; INTRAVENOUS at 17:58

## 2019-06-18 RX ADMIN — HEPARIN SODIUM 5000 UNITS: 5000 INJECTION, SOLUTION INTRAVENOUS; SUBCUTANEOUS at 08:58

## 2019-06-18 RX ADMIN — HEPARIN SODIUM 5000 UNITS: 5000 INJECTION, SOLUTION INTRAVENOUS; SUBCUTANEOUS at 15:51

## 2019-06-18 RX ADMIN — MORPHINE SULFATE 2 MG: 4 INJECTION INTRAVENOUS at 21:08

## 2019-06-18 RX ADMIN — METHYLPREDNISOLONE SODIUM SUCCINATE 40 MG: 40 INJECTION, POWDER, FOR SOLUTION INTRAMUSCULAR; INTRAVENOUS at 13:28

## 2019-06-18 RX ADMIN — AMPICILLIN SODIUM AND SULBACTAM SODIUM 3 G: 2; 1 INJECTION, POWDER, FOR SOLUTION INTRAMUSCULAR; INTRAVENOUS at 06:05

## 2019-06-18 RX ADMIN — MORPHINE SULFATE 2 MG: 4 INJECTION INTRAVENOUS at 03:32

## 2019-06-18 RX ADMIN — SENNOSIDES, DOCUSATE SODIUM 2 TABLET: 50; 8.6 TABLET, FILM COATED ORAL at 05:07

## 2019-06-18 RX ADMIN — SENNOSIDES, DOCUSATE SODIUM 2 TABLET: 50; 8.6 TABLET, FILM COATED ORAL at 17:58

## 2019-06-18 RX ADMIN — AZITHROMYCIN 500 MG: 250 TABLET, FILM COATED ORAL at 05:07

## 2019-06-18 RX ADMIN — HEPARIN SODIUM 5000 UNITS: 5000 INJECTION, SOLUTION INTRAVENOUS; SUBCUTANEOUS at 00:28

## 2019-06-18 RX ADMIN — IPRATROPIUM BROMIDE AND ALBUTEROL SULFATE 3 ML: .5; 3 SOLUTION RESPIRATORY (INHALATION) at 19:34

## 2019-06-18 RX ADMIN — MORPHINE SULFATE 2 MG: 4 INJECTION INTRAVENOUS at 08:05

## 2019-06-18 RX ADMIN — MORPHINE SULFATE 2 MG: 4 INJECTION INTRAVENOUS at 13:29

## 2019-06-18 RX ADMIN — SODIUM CHLORIDE: 9 INJECTION, SOLUTION INTRAVENOUS at 00:32

## 2019-06-18 RX ADMIN — AMPICILLIN SODIUM AND SULBACTAM SODIUM 3 G: 2; 1 INJECTION, POWDER, FOR SOLUTION INTRAMUSCULAR; INTRAVENOUS at 13:28

## 2019-06-18 RX ADMIN — AMPICILLIN SODIUM AND SULBACTAM SODIUM 3 G: 2; 1 INJECTION, POWDER, FOR SOLUTION INTRAMUSCULAR; INTRAVENOUS at 18:00

## 2019-06-18 ASSESSMENT — COGNITIVE AND FUNCTIONAL STATUS - GENERAL
MOVING FROM LYING ON BACK TO SITTING ON SIDE OF FLAT BED: A LITTLE
MOBILITY SCORE: 18
TOILETING: A LITTLE
WALKING IN HOSPITAL ROOM: A LITTLE
STANDING UP FROM CHAIR USING ARMS: A LITTLE
DRESSING REGULAR UPPER BODY CLOTHING: A LITTLE
DAILY ACTIVITIY SCORE: 19
CLIMB 3 TO 5 STEPS WITH RAILING: A LITTLE
PERSONAL GROOMING: A LITTLE
SUGGESTED CMS G CODE MODIFIER DAILY ACTIVITY: CK
MOVING TO AND FROM BED TO CHAIR: A LITTLE
HELP NEEDED FOR BATHING: A LITTLE
TURNING FROM BACK TO SIDE WHILE IN FLAT BAD: A LITTLE
SUGGESTED CMS G CODE MODIFIER MOBILITY: CK
DRESSING REGULAR LOWER BODY CLOTHING: A LITTLE

## 2019-06-18 ASSESSMENT — ENCOUNTER SYMPTOMS
FEVER: 1
ABDOMINAL PAIN: 0
MYALGIAS: 1
CHILLS: 1
SPUTUM PRODUCTION: 1
SHORTNESS OF BREATH: 1
COUGH: 1
HEADACHES: 0
BACK PAIN: 1

## 2019-06-18 ASSESSMENT — LIFESTYLE VARIABLES: EVER_SMOKED: YES

## 2019-06-18 ASSESSMENT — PATIENT HEALTH QUESTIONNAIRE - PHQ9
SUM OF ALL RESPONSES TO PHQ9 QUESTIONS 1 AND 2: 0
1. LITTLE INTEREST OR PLEASURE IN DOING THINGS: NOT AT ALL
2. FEELING DOWN, DEPRESSED, IRRITABLE, OR HOPELESS: NOT AT ALL

## 2019-06-18 NOTE — ED TRIAGE NOTES
Ambulatory to triage with report of  Chief Complaint   Patient presents with   • Sent by MD     for respiratiory complications.  reorts pneumonia, COPD.  reports coughing pink tinged sputum   speaking full clear sentences in triage.

## 2019-06-18 NOTE — ED NOTES
Med rec updated  And complete. Pt denies oral antibiotic use   In last 14 days at home. Pt alternates between furosemide  20 mg and a 40 mg  Daily .

## 2019-06-18 NOTE — PROGRESS NOTES
Patient refused waffle mattres, educated regarding importance of intervention. Second RN, Hoda, at bedside for refusal.

## 2019-06-18 NOTE — PROGRESS NOTES
Hospital Medicine Daily Progress Note    Date of Service  6/18/2019    Chief Complaint  63 y.o. male admitted 6/17/2019 with SOB.    Hospital Course    PMH COPD on home O2 who presented with SOB and recently seen by his pulmnologist and had CXR that showed right side PNA.  Influenza screen was negative.  BNP was mildly elevated.  Echocardiogram showed pulmonary hypertension, EF 55%.       Interval Problem Update  He continues to have some shortness of breath and cough.  He says he is under a lot of stress.  He has chronic pain we discussed trialing muscle relaxants however he is hesitant    Consultants/Specialty  None    Code Status  Full    Disposition  Lives in [place]    Review of Systems  Review of Systems   Constitutional: Positive for chills, fever and malaise/fatigue.   HENT: Negative for congestion.    Respiratory: Positive for cough, sputum production and shortness of breath.    Cardiovascular: Negative for chest pain.   Gastrointestinal: Negative for abdominal pain.   Musculoskeletal: Positive for back pain and myalgias.   Skin: Negative for itching.   Neurological: Negative for headaches.        Physical Exam  Temp:  [36.2 °C (97.2 °F)-37.3 °C (99.2 °F)] 36.2 °C (97.2 °F)  Pulse:  [] 78  Resp:  [13-20] 18  BP: (115-158)/(67-95) 115/67  SpO2:  [84 %-98 %] 98 %    Physical Exam   Constitutional: He is oriented to person, place, and time. He appears well-developed and well-nourished.   HENT:   Head: Normocephalic.   Mouth/Throat: Oropharynx is clear and moist.   Eyes: Conjunctivae are normal.   Cardiovascular: Normal rate and regular rhythm.    Pulmonary/Chest: Effort normal. He has no wheezes. He has rales (Right field).   Abdominal: Soft. There is no tenderness. There is no rebound and no guarding.   Musculoskeletal: He exhibits no edema.   Neurological: He is alert and oriented to person, place, and time.   Skin: Skin is warm and dry.   Nursing note and vitals reviewed.      Fluids    Intake/Output  Summary (Last 24 hours) at 06/18/19 1731  Last data filed at 06/18/19 0900   Gross per 24 hour   Intake              994 ml   Output              275 ml   Net              719 ml       Laboratory  Recent Labs      06/17/19   2154   WBC  18.4*   RBC  5.68   HEMOGLOBIN  16.7   HEMATOCRIT  51.4   MCV  90.5   MCH  29.4   MCHC  32.5*   RDW  48.4   PLATELETCT  138*   MPV  10.8     Recent Labs      06/17/19   2154   SODIUM  134*   POTASSIUM  4.4   CHLORIDE  97   CO2  28   GLUCOSE  97   BUN  13   CREATININE  0.86   CALCIUM  9.9                   Imaging  DX-CHEST-PORTABLE (1 VIEW)   Final Result         1.  Hazy right pulmonary infiltrates.   2.  Atherosclerosis           Assessment/Plan  Community acquired pneumonia of right lower lobe of lung (HCC)- (present on admission)   Assessment & Plan    Continue IV Unasyn and azithromycin       Chronic obstructive pulmonary disease with acute exacerbation (HCC)- (present on admission)   Assessment & Plan    COPD protocol  RT protocol  IV steroids  Breathing treatments  Antibiotics  Continue supplemental oxygen and wean as tolerated     Sepsis (HCC)- (present on admission)   Assessment & Plan    This is sepsis (without associated acute organ dysfunction).   Present at the time of admission  Sepsis protocol  Follow-up with cultures  Empiric IV antibiotics  Resolving     Hyponatremia- (present on admission)   Assessment & Plan    Mild     Pulmonary hypertension (HCC)- (present on admission)   Assessment & Plan    Chronic     Acute on chronic respiratory failure with hypoxia (HCC)- (present on admission)   Assessment & Plan    Continue with supplemental oxygen with a goal of pulse ox at 92%  Wean oxygen as tolerated  On 3 L supplemental oxygen at all times at home     Ankylosing spondylitis of cervicothoracic region (HCC)- (present on admission)   Assessment & Plan    Chronic  Causing restrictive lung disease and chronic pain          VTE prophylaxis: heparin

## 2019-06-18 NOTE — PROGRESS NOTES
Assumed care at 0355, bedside report received from ED RN. Pt. Is medical on the monitor. Initial assessment completed, orders reviewed, call light within reach, bed alarm is in use, and hourly rounding in place. POC addressed with patient, no additional questions at this time.

## 2019-06-18 NOTE — PROGRESS NOTES
SBAR received from NAOMY Max. Pt sleeping quietly, no s/s of distress noted. A&O. 3L O2 per oximask-baseline. Reg Diet. VTE:Heparin subcutaneous. Up to toilet. Up with standby. Skin:ears red, blanching. POC: RT treatment q 4; IV abx.

## 2019-06-18 NOTE — ED PROVIDER NOTES
ED Provider Note    CHIEF COMPLAINT  Chief Complaint   Patient presents with   • Sent by MD     for respiratiory complications.  reorts pneumonia, COPD.  reports coughing pink tinged sputum       HPI  Richard Vaughn is a 63 y.o. male who presents complaining of increasing respiratory distress from COPD and pneumonia.  The patient has had COPD for years and is on oxygen at home.  He states that he has been doing very well up into the last week when he started having a lot of increased shortness of breath.  He states that his inhalers at home were not working.  He went to his pulmonologist today and they did an x-ray in the office which showed a right sided lung pneumonia.  They wanted him to come to the hospital by ambulance but he refused and drove his car home instead.  He has been waiting in the waiting room and is still short of breath.  He denies any chest pains.  He does have multiple bone conditions including osteoporosis ankylosing spondylitis and scoliosis.  He has chronic pain from this.  He complains of shortness of breath.  He is has a history of leg swelling and is on diuretics but states that his legs are less swollen than they normally are.  He denies any productive cough.  He does have low-grade fevers at home.  No one else is sick at home.  He has not been on steroids or antibiotics recently.      REVIEW OF SYSTEMS  HEENT:  No ear pain, congestion or sore throat   EYES: no discharge redness or vision changes  CARDIAC: See history of present illness  PULMONARY: See history of present illness  GI: no vomiting diarrhea or abdominal pain   : no dysuria, back pain or hematuria   Neuro: generalized weakness, numbness aphasia or headache  Musculoskeletal: no swelling deformity or pain no joint swelling  Endocrine: Positive fevers, no sweating, weight loss   SKIN: no rash, erythema or contusions     See history of present illness all other systems are negative  .   PAST MEDICAL HISTORY  Past Medical  History:   Diagnosis Date   • Anesthesia     poss. family hx malignant hyperthermia   • Ankylosing spondylitis (HCC)    • Chronic obstructive pulmonary disease (HCC)    • Cold    • Dental disorder     dentures   • Diverticulitis    • Osteoporosis    • Psychiatric problem     CONSULT FOR DRUG WITHDRAWAL AFTER LAST SURGERY   • Scoliosis        FAMILY HISTORY  Family History   Problem Relation Age of Onset   • Alzheimer's Disease Mother        SOCIAL HISTORY  Social History     Social History   • Marital status:      Spouse name: N/A   • Number of children: N/A   • Years of education: N/A     Social History Main Topics   • Smoking status: Former Smoker     Packs/day: 1.00     Years: 42.00     Types: Cigarettes     Start date: 1/1/1967     Quit date: 6/6/2009   • Smokeless tobacco: Never Used      Comment: continued abstinance   • Alcohol use No   • Drug use: No      Comment: NOT AT THIS TIME     • Sexual activity: Not on file     Other Topics Concern   • Not on file     Social History Narrative   • No narrative on file       SURGICAL HISTORY  Past Surgical History:   Procedure Laterality Date   • VENTRAL HERNIA REPAIR  10/23/2009    Performed by ALEJANDRA EASTON at SURGERY SAME DAY [facility] ORS   • VENTRAL HERNIA REPAIR  6/11/2009    Performed by ALEJANDRA EASTON at SURGERY [facility] ORS   • COLOSTOMY CLOSURE  4/2/2009    Performed by ALEJANDRA EASTON at SURGERY [facility] ORS   • SIGMOID COLECTOMY  12/6/2008    Performed by ALEJANDRA EASTON at SURGERY [facility] ORS   • COLOSTOMY  12/6/2008    Performed by ALEJANDRA EASTON at SURGERY [facility] ORS   • EXPLORATORY LAPAROTOMY  12/6/2008    Performed by ALEJANDRA EASTON at SURGERY [facility] ORS   • BOWEL RESECTION  12/6/2008    Performed by ALEJANDRA EASTON at SURGERY [facility] ORS   • UMBILICAL HERNIA REPAIR         CURRENT MEDICATIONS  Home Medications     Reviewed by Allison Coley (Pharmacy Tech) on 06/17/19 at 2248  Med List Status: Complete  "  Medication Last Dose Status   furosemide (LASIX) 20 MG Tab 6/16/2019 Active   furosemide (LASIX) 40 MG Tab 6/17/2019 Active   Pediatric Multiple Vit-C-FA (FLINSTONES GUMMIES OMEGA-3 DHA) Chew Tab 6/16/2019 Active   Tiotropium Bromide-Olodaterol (STIOLTO RESPIMAT) 2.5-2.5 MCG/ACT Aero Soln 6/17/2019 Active                ALLERGIES  No Known Allergies    PHYSICAL EXAM  VITAL SIGNS: /89   Pulse (!) 111   Temp 37.3 °C (99.1 °F) (Oral)   Resp 20   Ht 1.753 m (5' 9\")   Wt 63 kg (138 lb 14.2 oz)   SpO2 92%   BMI 20.51 kg/m²   Constitutional: Thin chronically ill-appearing tachypneic with moderate respiratory distress speaking 4-5 word sentences between breaths.   HEENT: Normocephalic, Atraumatic,  external ears normal, pharynx pink,  Mucous  Membranes moist, No rhinorrhea or mucosal edema  Eyes: PERRL, EOMI, Conjunctiva normal, No discharge.   Neck: Normal range of motion, No tenderness, Supple, No stridor.   Lymphatic: No lymphadenopathy    Cardiovascular: Tachycardic no murmurs rubs or gallops  Thorax & Lungs: Creased breath sounds with scattered wheezing in all lung fields and decreased air movement.  Positive accessory muscle use for assistance with breathing.   Abdomen: Bowel sounds normal, Soft, non tender, non distended,  No pulsatile masses., no rebound guarding or peritoneal signs.   Skin: Warm, Dry, No erythema, No rash,   Back:  No CVA tenderness,  No spinal tenderness, bony crepitance step offs or instability.   Extremities: Equal, intact distal pulses, No cyanosis, clubbing or edema,  No tenderness.   Musculoskeletal: Good range of motion in all major joints. No tenderness to palpation or major deformities noted.   Neurologic: Alert & oriented x 3, Cranial nerves II-XII intact, Equal strength and sensation upper and lower extremities bilaterally,  No focal deficits noted.   Psychiatric: Affect normal, Judgment normal, Mood normal.       RADIOLOGY/PROCEDURES  DX-CHEST-PORTABLE (1 VIEW)   Final " Result         1.  Hazy right pulmonary infiltrates.   2.  Atherosclerosis            COURSE & MEDICAL DECISION MAKING  Pertinent Labs & Imaging studies reviewed. (See chart for details)  Differential diagnosis: COPD exacerbation, pneumonia, sepsis, cardiac ischemia    Results for orders placed or performed during the hospital encounter of 06/17/19   CBC WITH DIFFERENTIAL   Result Value Ref Range    WBC 18.4 (H) 4.8 - 10.8 K/uL    RBC 5.68 4.70 - 6.10 M/uL    Hemoglobin 16.7 14.0 - 18.0 g/dL    Hematocrit 51.4 42.0 - 52.0 %    MCV 90.5 81.4 - 97.8 fL    MCH 29.4 27.0 - 33.0 pg    MCHC 32.5 (L) 33.7 - 35.3 g/dL    RDW 48.4 35.9 - 50.0 fL    Platelet Count 138 (L) 164 - 446 K/uL    MPV 10.8 9.0 - 12.9 fL    Neutrophils-Polys 88.60 (H) 44.00 - 72.00 %    Lymphocytes 4.30 (L) 22.00 - 41.00 %    Monocytes 6.20 0.00 - 13.40 %    Eosinophils 0.00 0.00 - 6.90 %    Basophils 0.20 0.00 - 1.80 %    Immature Granulocytes 0.70 0.00 - 0.90 %    Nucleated RBC 0.00 /100 WBC    Neutrophils (Absolute) 16.29 (H) 1.82 - 7.42 K/uL    Lymphs (Absolute) 0.80 (L) 1.00 - 4.80 K/uL    Monos (Absolute) 1.15 (H) 0.00 - 0.85 K/uL    Eos (Absolute) 0.00 0.00 - 0.51 K/uL    Baso (Absolute) 0.04 0.00 - 0.12 K/uL    Immature Granulocytes (abs) 0.13 (H) 0.00 - 0.11 K/uL    NRBC (Absolute) 0.00 K/uL   COMP METABOLIC PANEL   Result Value Ref Range    Sodium 134 (L) 135 - 145 mmol/L    Potassium 4.4 3.6 - 5.5 mmol/L    Chloride 97 96 - 112 mmol/L    Co2 28 20 - 33 mmol/L    Anion Gap 9.0 0.0 - 11.9    Glucose 97 65 - 99 mg/dL    Bun 13 8 - 22 mg/dL    Creatinine 0.86 0.50 - 1.40 mg/dL    Calcium 9.9 8.5 - 10.5 mg/dL    AST(SGOT) 27 12 - 45 U/L    ALT(SGPT) 11 2 - 50 U/L    Alkaline Phosphatase 58 30 - 99 U/L    Total Bilirubin 1.4 0.1 - 1.5 mg/dL    Albumin 4.3 3.2 - 4.9 g/dL    Total Protein 7.8 6.0 - 8.2 g/dL    Globulin 3.5 1.9 - 3.5 g/dL    A-G Ratio 1.2 g/dL   ESTIMATED GFR   Result Value Ref Range    GFR If African American >60 >60 mL/min/1.73 m  2    GFR If Non African American >60 >60 mL/min/1.73 m 2   EKG   Result Value Ref Range    Report       [facility] Emergency Dept.    Test Date:  2019  Pt Name:    RUPINDER KATHLEEN               Department: ER  MRN:        [id number]                      Room:  Gender:     Male                         Technician: [id number]  :        1955                   Requested By:ER TRIAGE PROTOCOL  Order #:    [id number]                    Reading MD: IOANA AHUMADA MD    Measurements  Intervals                                Axis  Rate:       104                          P:          79  NM:         136                          QRS:        118  QRSD:       102                          T:          6  QT:         352  QTc:        463    Interpretive Statements  SINUS TACHYCARDIA  CONSIDER RIGHT ATRIAL ABNORMALITY  PROBABLE RIGHT VENTRICULAR HYPERTROPHY  Compared to ECG 2019 17:02:41  Sinus rhythm no longer present  Right-axis deviation no longer present    Electronically Signed On 2019 22:30:06 PDT by IOANA AHUMADA MD          10:10 PM an IV was started and labs were drawn.  I gave the patient Solu-Medrol, Rocephin, azithromycin, magnesium and a DuoNeb treatment.  His EKG is concerning for new ischemia and troponin is currently pending.        10:51 PM  The patient will be admitted to the hospital service in guarded condition.  I spoke with Dr. Vidal who accepts the patient for admission.  For IV antibiotics and pulmonary toilet.      Critical Care  Due to the real possibility of a deterioration of this patient's condition required the highest level of my preparedness for sudden emergent intervention. I provided critical care services which included medication orders, frequent reevaluations of the patient's condition and response to treatment, ordering and reviewing test results and discussing the case with various consultants. The critical care time associated with the care of the patient was   40 minutes. Review chart for interventions. This time is exclusive of any other billable procedures.      FINAL IMPRESSION  1. Pneumonia of right lung due to infectious organism, unspecified part of lung    2. Acute exacerbation of chronic obstructive pulmonary disease (COPD) (HCC)    3. Respiratory distress           PLAN/DISPOSITION  Admitted           Electronically signed by: Yoli Jason, 6/17/2019 10:07 PM

## 2019-06-18 NOTE — ASSESSMENT & PLAN NOTE
COPD protocol  RT protocol  Prednisone  Breathing treatments  Antibiotics  Continue supplemental oxygen and wean as tolerated

## 2019-06-18 NOTE — PROGRESS NOTES
Pt states the IV morphine is not working long enough to relieve his pain. Refusing acetaminophen, hot/cold therapy. MD Castro paged to notify and request change of analgesic.

## 2019-06-18 NOTE — RESPIRATORY CARE
COPD EDUCATION by COPD CLINICAL EDUCATOR  6/18/2019 at 1:03 PM by Amanda Stiles     Patient seen for COPD readmission education. Patient completed our program upon last admission (March 2019), but needed a refresher on certain topics. Discussion included: determination of the factors that led to their current hospital admission, reiteration of the Action Plan and steps they can take to avoid an exacerbation, proper medication technique, smoking cessation (if applicable), and importance of obtaining a doctors appointment when they start feeling ill. Question and answer session followed. He has a nebulizer with xopenex he did not utilize at home we reviewed this at length. He states he's under a lot of stress at home due to living situation with grandchildren. He has an oximeter that he utilizes. We reviewed cleaning of his equipment as well.

## 2019-06-18 NOTE — H&P
Hospital Medicine History & Physical Note    Date of Service  6/17/2019    Primary Care Physician  Mary Samano M.D.    Consultants  none    Code Status  full    Chief Complaint  Sob/cough    History of Presenting Illness  63 y.o. male who presented 6/17/2019 with coughing with shortness of breath and fevers and chills.  Patient reports that he always has a cough but it has been worse over the past couple of days.  He is also describing being increasingly short of breath over the past 3 days.  He says he is on 3 L of supplemental oxygen at all times at home but over the past few days he has been requiring much more oxygen.  He also describes having a productive cough but today had streaks of blood.  He went to see his COPD nurse practitioner was advised to come to the emergency department.  He also complains of subjective fevers and chills at home.    Review of Systems  Review of Systems   Constitutional: Positive for chills and fever.   HENT: Negative for hearing loss and sore throat.    Eyes: Negative for blurred vision and double vision.   Respiratory: Positive for cough, hemoptysis (Small amount of bright red blood today), shortness of breath and wheezing.    Cardiovascular: Negative for chest pain and palpitations.   Gastrointestinal: Negative for nausea and vomiting.   Genitourinary: Negative for dysuria and frequency.   Musculoskeletal: Negative for back pain and myalgias.   Skin: Negative for itching and rash.   Neurological: Negative for dizziness and headaches.   Psychiatric/Behavioral: Negative for depression. The patient is not nervous/anxious.        Past Medical History   has a past medical history of Anesthesia; Ankylosing spondylitis (HCC); Chronic obstructive pulmonary disease (HCC); Cold; Dental disorder; Diverticulitis; Osteoporosis; Psychiatric problem; and Scoliosis.    Surgical History   has a past surgical history that includes sigmoid colectomy (12/6/2008); colostomy (12/6/2008); colostomy  closure (4/2/2009); ventral hernia repair (6/11/2009); umbilical hernia repair; exploratory laparotomy (12/6/2008); bowel resection (12/6/2008); and ventral hernia repair (10/23/2009).     Family History  family history includes Alzheimer's Disease in his mother.     Social History   reports that he quit smoking about 10 years ago. His smoking use included Cigarettes. He started smoking about 52 years ago. He has a 42.00 pack-year smoking history. He has never used smokeless tobacco. He reports that he does not drink alcohol or use drugs.    Allergies  No Known Allergies    Medications  Prior to Admission Medications   Prescriptions Last Dose Informant Patient Reported? Taking?   Pediatric Multiple Vit-C-FA (FLINSTONES GUMMIES OMEGA-3 DHA) Chew Tab 6/16/2019 at 0800 Patient Yes No   Sig: Take 1 Tab by mouth every day.   Tiotropium Bromide-Olodaterol (STIOLTO RESPIMAT) 2.5-2.5 MCG/ACT Aero Soln 6/17/2019 at  Patient Yes No   Sig: Inhale 1 Puff by mouth 2 Times a Day.   furosemide (LASIX) 20 MG Tab 6/16/2019 at 0800 Patient Yes Yes   Sig: Take 20 mg by mouth in the morning every Tue, Thurs, Sat, and Sun.   furosemide (LASIX) 40 MG Tab 6/17/2019 at 0800 Patient No No   Sig: Take 1 Tab by mouth every day.      Facility-Administered Medications: None       Physical Exam  Temp:  [37.3 °C (99.1 °F)-37.3 °C (99.2 °F)] 37.3 °C (99.1 °F)  Pulse:  [] 102  Resp:  [17-20] 17  BP: (140-158)/(89-95) 158/89  SpO2:  [84 %-96 %] 95 %    Physical Exam   Constitutional: He is oriented to person, place, and time. He appears well-developed and well-nourished.   HENT:   Head: Normocephalic and atraumatic.   Eyes: Pupils are equal, round, and reactive to light. Conjunctivae and EOM are normal.   Neck: Normal range of motion. Neck supple.   Cardiovascular: Normal rate and regular rhythm.    Pulmonary/Chest: Effort normal. No respiratory distress. He has decreased breath sounds in the right middle field and the right lower field.  He has wheezes (Scattered expiratory wheezes). He has rhonchi in the right lower field.   Abdominal: Soft. Bowel sounds are normal. He exhibits no distension.   Musculoskeletal: He exhibits no edema or tenderness.   Neurological: He is alert and oriented to person, place, and time.   Skin: Skin is warm and dry.   Psychiatric: He has a normal mood and affect. His behavior is normal.   Nursing note and vitals reviewed.      Laboratory:  Recent Labs      06/17/19 2154   WBC  18.4*   RBC  5.68   HEMOGLOBIN  16.7   HEMATOCRIT  51.4   MCV  90.5   MCH  29.4   MCHC  32.5*   RDW  48.4   PLATELETCT  138*   MPV  10.8     Recent Labs      06/17/19 2154   SODIUM  134*   POTASSIUM  4.4   CHLORIDE  97   CO2  28   GLUCOSE  97   BUN  13   CREATININE  0.86   CALCIUM  9.9     Recent Labs      06/17/19 2154   ALTSGPT  11   ASTSGOT  27   ALKPHOSPHAT  58   TBILIRUBIN  1.4   GLUCOSE  97                 No results for input(s): TROPONINI in the last 72 hours.    Urinalysis:    No results found     Imaging:  DX-CHEST-PORTABLE (1 VIEW)   Final Result         1.  Hazy right pulmonary infiltrates.   2.  Atherosclerosis            Assessment/Plan:  I anticipate this patient will require at least two midnights for appropriate medical management, necessitating inpatient admission.    Community acquired pneumonia of right lower lobe of lung (HCC)- (present on admission)   Assessment & Plan    Continue IV Unasyn and azithromycin  Procalcitonin levels pending       Chronic obstructive pulmonary disease with acute exacerbation (HCC)- (present on admission)   Assessment & Plan    COPD protocol  RT protocol  IV steroids  Breathing treatments  Antibiotics  Continue supplemental oxygen and wean as tolerated     Sepsis (HCC)- (present on admission)   Assessment & Plan    This is sepsis (without associated acute organ dysfunction).   Present at the time of admission  Sepsis protocol  Follow-up with cultures  Empiric IV antibiotics  IV fluids will be  gentle due to his history of cor pulmonale     Hyponatremia- (present on admission)   Assessment & Plan    Mild  Normal saline  Recheck in the morning     Pulmonary hypertension (HCC)- (present on admission)   Assessment & Plan    Chronic     Acute on chronic respiratory failure with hypoxia (HCC)- (present on admission)   Assessment & Plan    Continue with supplemental oxygen with a goal of pulse ox at 92%  Wean oxygen as tolerated  On 3 L supplemental oxygen at all times at home     Ankylosing spondylitis of cervicothoracic region (HCC)- (present on admission)   Assessment & Plan    Chronic  Causing restrictive lung disease         VTE prophylaxis: heparin

## 2019-06-18 NOTE — ASSESSMENT & PLAN NOTE
Continue with supplemental oxygen with a goal of pulse ox at 92%  Wean oxygen as tolerated  On 3 L supplemental oxygen at all times at home

## 2019-06-18 NOTE — ASSESSMENT & PLAN NOTE
This is sepsis (without associated acute organ dysfunction).   Present at the time of admission  Sepsis protocol  Follow-up with cultures  Empiric IV antibiotics  Resolving

## 2019-06-18 NOTE — PROGRESS NOTES
2 RN skin check complete with NAOMY Love    · Devices in place oxymask  · Skin assessed under devices yes  · Confirmed wounds found on none  · New potential wounds noted on BL ears  · Wound consult: yes  · Wound reported and pictures uploaded: yes    Ears red and slow to nonblanching  BLLE dry, dusky, flaky  BL heels dry and boggy    · The following interventions in place: ears offloaded, pt refused mepilex on sacrum

## 2019-06-19 PROBLEM — E87.1 HYPONATREMIA: Status: RESOLVED | Noted: 2019-06-17 | Resolved: 2019-06-19

## 2019-06-19 LAB
ANION GAP SERPL CALC-SCNC: 7 MMOL/L (ref 0–11.9)
BASOPHILS # BLD AUTO: 0.1 % (ref 0–1.8)
BASOPHILS # BLD: 0.02 K/UL (ref 0–0.12)
BUN SERPL-MCNC: 25 MG/DL (ref 8–22)
CALCIUM SERPL-MCNC: 9.2 MG/DL (ref 8.5–10.5)
CHLORIDE SERPL-SCNC: 99 MMOL/L (ref 96–112)
CO2 SERPL-SCNC: 29 MMOL/L (ref 20–33)
CREAT SERPL-MCNC: 0.74 MG/DL (ref 0.5–1.4)
EOSINOPHIL # BLD AUTO: 0 K/UL (ref 0–0.51)
EOSINOPHIL NFR BLD: 0 % (ref 0–6.9)
ERYTHROCYTE [DISTWIDTH] IN BLOOD BY AUTOMATED COUNT: 46.7 FL (ref 35.9–50)
GLUCOSE SERPL-MCNC: 123 MG/DL (ref 65–99)
HCT VFR BLD AUTO: 43.9 % (ref 42–52)
HGB BLD-MCNC: 14.5 G/DL (ref 14–18)
IMM GRANULOCYTES # BLD AUTO: 0.13 K/UL (ref 0–0.11)
IMM GRANULOCYTES NFR BLD AUTO: 0.7 % (ref 0–0.9)
LYMPHOCYTES # BLD AUTO: 0.75 K/UL (ref 1–4.8)
LYMPHOCYTES NFR BLD: 3.9 % (ref 22–41)
MCH RBC QN AUTO: 28.7 PG (ref 27–33)
MCHC RBC AUTO-ENTMCNC: 33 G/DL (ref 33.7–35.3)
MCV RBC AUTO: 86.8 FL (ref 81.4–97.8)
MONOCYTES # BLD AUTO: 0.85 K/UL (ref 0–0.85)
MONOCYTES NFR BLD AUTO: 4.4 % (ref 0–13.4)
NEUTROPHILS # BLD AUTO: 17.43 K/UL (ref 1.82–7.42)
NEUTROPHILS NFR BLD: 90.9 % (ref 44–72)
NRBC # BLD AUTO: 0 K/UL
NRBC BLD-RTO: 0 /100 WBC
PLATELET # BLD AUTO: 150 K/UL (ref 164–446)
PMV BLD AUTO: 11.4 FL (ref 9–12.9)
POTASSIUM SERPL-SCNC: 4 MMOL/L (ref 3.6–5.5)
RBC # BLD AUTO: 5.06 M/UL (ref 4.7–6.1)
SODIUM SERPL-SCNC: 135 MMOL/L (ref 135–145)
WBC # BLD AUTO: 19.2 K/UL (ref 4.8–10.8)

## 2019-06-19 PROCEDURE — 700101 HCHG RX REV CODE 250: Performed by: INTERNAL MEDICINE

## 2019-06-19 PROCEDURE — 94640 AIRWAY INHALATION TREATMENT: CPT

## 2019-06-19 PROCEDURE — 36415 COLL VENOUS BLD VENIPUNCTURE: CPT

## 2019-06-19 PROCEDURE — 700111 HCHG RX REV CODE 636 W/ 250 OVERRIDE (IP): Performed by: HOSPITALIST

## 2019-06-19 PROCEDURE — 700102 HCHG RX REV CODE 250 W/ 637 OVERRIDE(OP): Performed by: INTERNAL MEDICINE

## 2019-06-19 PROCEDURE — 700105 HCHG RX REV CODE 258: Performed by: HOSPITALIST

## 2019-06-19 PROCEDURE — 700102 HCHG RX REV CODE 250 W/ 637 OVERRIDE(OP): Performed by: HOSPITALIST

## 2019-06-19 PROCEDURE — 85025 COMPLETE CBC W/AUTO DIFF WBC: CPT

## 2019-06-19 PROCEDURE — 80048 BASIC METABOLIC PNL TOTAL CA: CPT

## 2019-06-19 PROCEDURE — A9270 NON-COVERED ITEM OR SERVICE: HCPCS | Performed by: INTERNAL MEDICINE

## 2019-06-19 PROCEDURE — 94760 N-INVAS EAR/PLS OXIMETRY 1: CPT

## 2019-06-19 PROCEDURE — 99233 SBSQ HOSP IP/OBS HIGH 50: CPT | Performed by: INTERNAL MEDICINE

## 2019-06-19 PROCEDURE — 770006 HCHG ROOM/CARE - MED/SURG/GYN SEMI*

## 2019-06-19 PROCEDURE — A9270 NON-COVERED ITEM OR SERVICE: HCPCS | Performed by: HOSPITALIST

## 2019-06-19 RX ORDER — FUROSEMIDE 40 MG/1
40 TABLET ORAL ONCE
Status: COMPLETED | OUTPATIENT
Start: 2019-06-19 | End: 2019-06-19

## 2019-06-19 RX ORDER — LEVALBUTEROL INHALATION SOLUTION 1.25 MG/3ML
1.25 SOLUTION RESPIRATORY (INHALATION) 4 TIMES DAILY
Status: DISCONTINUED | OUTPATIENT
Start: 2019-06-19 | End: 2019-06-20

## 2019-06-19 RX ORDER — PREDNISONE 20 MG/1
40 TABLET ORAL DAILY
Status: DISCONTINUED | OUTPATIENT
Start: 2019-06-20 | End: 2019-06-21 | Stop reason: HOSPADM

## 2019-06-19 RX ADMIN — MORPHINE SULFATE 2 MG: 4 INJECTION INTRAVENOUS at 23:11

## 2019-06-19 RX ADMIN — AZITHROMYCIN 250 MG: 250 TABLET, FILM COATED ORAL at 05:46

## 2019-06-19 RX ADMIN — MORPHINE SULFATE 2 MG: 4 INJECTION INTRAVENOUS at 05:46

## 2019-06-19 RX ADMIN — METHYLPREDNISOLONE SODIUM SUCCINATE 40 MG: 40 INJECTION, POWDER, FOR SOLUTION INTRAMUSCULAR; INTRAVENOUS at 11:16

## 2019-06-19 RX ADMIN — MORPHINE SULFATE 2 MG: 4 INJECTION INTRAVENOUS at 14:31

## 2019-06-19 RX ADMIN — MORPHINE SULFATE 2 MG: 4 INJECTION INTRAVENOUS at 10:15

## 2019-06-19 RX ADMIN — METHYLPREDNISOLONE SODIUM SUCCINATE 40 MG: 40 INJECTION, POWDER, FOR SOLUTION INTRAMUSCULAR; INTRAVENOUS at 05:46

## 2019-06-19 RX ADMIN — FUROSEMIDE 40 MG: 40 TABLET ORAL at 11:16

## 2019-06-19 RX ADMIN — SENNOSIDES, DOCUSATE SODIUM 2 TABLET: 50; 8.6 TABLET, FILM COATED ORAL at 17:18

## 2019-06-19 RX ADMIN — AMPICILLIN SODIUM AND SULBACTAM SODIUM 3 G: 2; 1 INJECTION, POWDER, FOR SOLUTION INTRAMUSCULAR; INTRAVENOUS at 12:00

## 2019-06-19 RX ADMIN — AMPICILLIN SODIUM AND SULBACTAM SODIUM 3 G: 2; 1 INJECTION, POWDER, FOR SOLUTION INTRAMUSCULAR; INTRAVENOUS at 23:11

## 2019-06-19 RX ADMIN — AMPICILLIN SODIUM AND SULBACTAM SODIUM 3 G: 2; 1 INJECTION, POWDER, FOR SOLUTION INTRAMUSCULAR; INTRAVENOUS at 05:46

## 2019-06-19 RX ADMIN — AMPICILLIN SODIUM AND SULBACTAM SODIUM 3 G: 2; 1 INJECTION, POWDER, FOR SOLUTION INTRAMUSCULAR; INTRAVENOUS at 01:23

## 2019-06-19 RX ADMIN — HEPARIN SODIUM 5000 UNITS: 5000 INJECTION, SOLUTION INTRAVENOUS; SUBCUTANEOUS at 05:46

## 2019-06-19 RX ADMIN — HEPARIN SODIUM 5000 UNITS: 5000 INJECTION, SOLUTION INTRAVENOUS; SUBCUTANEOUS at 13:40

## 2019-06-19 RX ADMIN — LEVALBUTEROL 1.25 MG: 1.25 SOLUTION RESPIRATORY (INHALATION) at 11:33

## 2019-06-19 RX ADMIN — LEVALBUTEROL 1.25 MG: 1.25 SOLUTION RESPIRATORY (INHALATION) at 02:45

## 2019-06-19 RX ADMIN — AMPICILLIN SODIUM AND SULBACTAM SODIUM 3 G: 2; 1 INJECTION, POWDER, FOR SOLUTION INTRAMUSCULAR; INTRAVENOUS at 17:17

## 2019-06-19 RX ADMIN — MORPHINE SULFATE 2 MG: 4 INJECTION INTRAVENOUS at 01:23

## 2019-06-19 RX ADMIN — MORPHINE SULFATE 2 MG: 4 INJECTION INTRAVENOUS at 18:58

## 2019-06-19 RX ADMIN — METHYLPREDNISOLONE SODIUM SUCCINATE 40 MG: 40 INJECTION, POWDER, FOR SOLUTION INTRAMUSCULAR; INTRAVENOUS at 01:20

## 2019-06-19 ASSESSMENT — ENCOUNTER SYMPTOMS
COUGH: 1
FEVER: 1
MYALGIAS: 1
HEMOPTYSIS: 1
HEADACHES: 0
ABDOMINAL PAIN: 0
SPUTUM PRODUCTION: 1
CHILLS: 1
SHORTNESS OF BREATH: 1
BACK PAIN: 1

## 2019-06-19 NOTE — CARE PLAN
Problem: Safety  Goal: Will remain free from injury    Intervention: Provide assistance with mobility  Bed locked and in lowest position. Bed alarm on. Treaded socks. Call light and belongings within reach. Patient educated to call for assistance. Pt verbalized understanding. Hourly rounding in place.       Problem: Knowledge Deficit  Goal: Knowledge of disease process/condition, treatment plan, diagnostic tests, and medications will improve  Discussed POC and medications with patient, pt. verbalized understanding.

## 2019-06-19 NOTE — PROGRESS NOTES
Bedside report received. No overnight events overnight per night RN. Patient A&O x 4. currently requiring 3L via Oxy mask which is baseline maintaining O2 sats in the  low 90's. Complains of 6/10 chronic generalized pain, pt requesting morphine. Patient also demanding lasix which he states he takes at home and is not receiving here. No obvious signs of fluid volume excess. POC discussed with patient. Pt verbalizes understanding. Call light and belongings with in reach. Bed locked and in lowest position, alarm and fall precautions in place.

## 2019-06-19 NOTE — PROGRESS NOTES
"Pt given Pharmacy handout on Tramadol to read and educate. Pt refusing to use tramadol, stating the contraindications state it's bad for people with lung issues. Offer to have Pharmacy come speak with Pt. Pt states that he knows his body and \"the medication is not right for him.\"   "

## 2019-06-19 NOTE — PROGRESS NOTES
Patient is currently refusing the bed alarm and has been scored as a moderate fall risk. Patient  Educated on the importance of calling for assistance prior to getting up due to fall risk, however continues to remove supplemental oxygen and get out of bed.

## 2019-06-19 NOTE — PROGRESS NOTES
[facility] Medicine Daily Progress Note    Date of Service  6/19/2019    Chief Complaint  63 y.o. male admitted 6/17/2019 with SOB.    Hospital Course    PMH COPD on home O2, ankylosing spondylitis who presented with SOB and recently seen by his pulmnologist and had CXR that showed right side PNA.  Influenza screen was negative.  BNP was mildly elevated.  Echocardiogram showed pulmonary hypertension, EF 55%.       Interval Problem Update  stable on home 3 L O2.  He continues to have shortness of breath, cough, wheezing.  He has hemoptysis, will stop heparin  He continues to complain of pain all over.  However he continues to refuse any oral pain medications and wants to continue IV morphine only.  He says he does not use any pain medicines at home.  He says his pain is not worse.  He says he ambulates at home for his pain which I encouraged him to do here.  He says he feels dehydrated but he would like to restart his home Lasix.    Consultants/Specialty  None    Code Status  Full    Disposition  Lives in [place]    Review of Systems  Review of Systems   Constitutional: Positive for chills, fever and malaise/fatigue.   HENT: Negative for congestion.    Respiratory: Positive for cough, hemoptysis, sputum production and shortness of breath.    Cardiovascular: Positive for leg swelling. Negative for chest pain.   Gastrointestinal: Negative for abdominal pain.   Musculoskeletal: Positive for back pain and myalgias.   Skin: Negative for itching.   Neurological: Negative for headaches.        Physical Exam  Temp:  [36.2 °C (97.2 °F)-36.4 °C (97.5 °F)] 36.3 °C (97.3 °F)  Pulse:  [] 85  Resp:  [16-18] 18  BP: (108-140)/(58-94) 114/62  SpO2:  [88 %-98 %] 96 %    Physical Exam   Constitutional: He is oriented to person, place, and time. He appears well-developed and well-nourished.   HENT:   Head: Normocephalic.   Mouth/Throat: Oropharynx is clear and moist.   Eyes: Conjunctivae are normal.   Cardiovascular: Normal rate and  regular rhythm.    Pulmonary/Chest: Effort normal. He has wheezes. He has rales (Right field).   Abdominal: Soft. There is no tenderness.   Musculoskeletal: He exhibits no edema.   Neurological: He is alert and oriented to person, place, and time.   Skin: Skin is warm and dry.   Nursing note and vitals reviewed.      Fluids    Intake/Output Summary (Last 24 hours) at 06/19/19 1702  Last data filed at 06/18/19 1800   Gross per 24 hour   Intake              240 ml   Output                0 ml   Net              240 ml       Laboratory  Recent Labs      06/17/19 2154 06/19/19   0200   WBC  18.4*  19.2*   RBC  5.68  5.06   HEMOGLOBIN  16.7  14.5   HEMATOCRIT  51.4  43.9   MCV  90.5  86.8   MCH  29.4  28.7   MCHC  32.5*  33.0*   RDW  48.4  46.7   PLATELETCT  138*  150*   MPV  10.8  11.4     Recent Labs      06/17/19 2154 06/19/19   0200   SODIUM  134*  135   POTASSIUM  4.4  4.0   CHLORIDE  97  99   CO2  28  29   GLUCOSE  97  123*   BUN  13  25*   CREATININE  0.86  0.74   CALCIUM  9.9  9.2                   Imaging  DX-CHEST-PORTABLE (1 VIEW)   Final Result         1.  Hazy right pulmonary infiltrates.   2.  Atherosclerosis           Assessment/Plan  Community acquired pneumonia of right lower lobe of lung (HCC)- (present on admission)   Assessment & Plan    Continue IV Unasyn and azithromycin       Chronic obstructive pulmonary disease with acute exacerbation (HCC)- (present on admission)   Assessment & Plan    COPD protocol  RT protocol  IV steroids  Breathing treatments  Antibiotics  Continue supplemental oxygen and wean as tolerated     Sepsis (HCC)- (present on admission)   Assessment & Plan    This is sepsis (without associated acute organ dysfunction).   Present at the time of admission  Sepsis protocol  Follow-up with cultures  Empiric IV antibiotics  Resolving     Pulmonary hypertension (HCC)- (present on admission)   Assessment & Plan    Chronic     Acute on chronic respiratory failure with hypoxia (HCC)-  (present on admission)   Assessment & Plan    Continue with supplemental oxygen with a goal of pulse ox at 92%  Wean oxygen as tolerated  On 3 L supplemental oxygen at all times at home     Ankylosing spondylitis of cervicothoracic region (HCC)- (present on admission)   Assessment & Plan    Chronic  Causing restrictive lung disease and chronic pain          VTE prophylaxis: scds

## 2019-06-19 NOTE — PROGRESS NOTES
Assumed care at 1900, bedside report received from day shift RN. Pt. Is medical on the monitor. Initial assessment completed, orders reviewed, call light within reach, bed alarm is in use, and hourly rounding in place. POC addressed with patient, no additional questions at this time.

## 2019-06-19 NOTE — PROGRESS NOTES
Pt developing sores-looking like tears- on BLE. Pt and Wife state that without lasix, this worsens quickly to become big sores. Pt BLE 2+ edema. Lasix ordered by MD, Pt informed.

## 2019-06-20 LAB
BASOPHILS # BLD AUTO: 0.1 % (ref 0–1.8)
BASOPHILS # BLD: 0.01 K/UL (ref 0–0.12)
EOSINOPHIL # BLD AUTO: 0 K/UL (ref 0–0.51)
EOSINOPHIL NFR BLD: 0 % (ref 0–6.9)
ERYTHROCYTE [DISTWIDTH] IN BLOOD BY AUTOMATED COUNT: 48.6 FL (ref 35.9–50)
HCT VFR BLD AUTO: 41.8 % (ref 42–52)
HGB BLD-MCNC: 13.6 G/DL (ref 14–18)
IMM GRANULOCYTES # BLD AUTO: 0.09 K/UL (ref 0–0.11)
IMM GRANULOCYTES NFR BLD AUTO: 0.6 % (ref 0–0.9)
LYMPHOCYTES # BLD AUTO: 0.76 K/UL (ref 1–4.8)
LYMPHOCYTES NFR BLD: 5.1 % (ref 22–41)
MCH RBC QN AUTO: 29.2 PG (ref 27–33)
MCHC RBC AUTO-ENTMCNC: 32.5 G/DL (ref 33.7–35.3)
MCV RBC AUTO: 89.9 FL (ref 81.4–97.8)
MONOCYTES # BLD AUTO: 0.94 K/UL (ref 0–0.85)
MONOCYTES NFR BLD AUTO: 6.3 % (ref 0–13.4)
NEUTROPHILS # BLD AUTO: 13.09 K/UL (ref 1.82–7.42)
NEUTROPHILS NFR BLD: 87.9 % (ref 44–72)
NRBC # BLD AUTO: 0 K/UL
NRBC BLD-RTO: 0 /100 WBC
PLATELET # BLD AUTO: 148 K/UL (ref 164–446)
PMV BLD AUTO: 11 FL (ref 9–12.9)
RBC # BLD AUTO: 4.65 M/UL (ref 4.7–6.1)
WBC # BLD AUTO: 14.9 K/UL (ref 4.8–10.8)

## 2019-06-20 PROCEDURE — 700111 HCHG RX REV CODE 636 W/ 250 OVERRIDE (IP): Performed by: INTERNAL MEDICINE

## 2019-06-20 PROCEDURE — 700105 HCHG RX REV CODE 258: Performed by: HOSPITALIST

## 2019-06-20 PROCEDURE — 700102 HCHG RX REV CODE 250 W/ 637 OVERRIDE(OP): Performed by: HOSPITALIST

## 2019-06-20 PROCEDURE — 700102 HCHG RX REV CODE 250 W/ 637 OVERRIDE(OP): Performed by: INTERNAL MEDICINE

## 2019-06-20 PROCEDURE — A9270 NON-COVERED ITEM OR SERVICE: HCPCS | Performed by: INTERNAL MEDICINE

## 2019-06-20 PROCEDURE — 85025 COMPLETE CBC W/AUTO DIFF WBC: CPT

## 2019-06-20 PROCEDURE — 36415 COLL VENOUS BLD VENIPUNCTURE: CPT

## 2019-06-20 PROCEDURE — 770006 HCHG ROOM/CARE - MED/SURG/GYN SEMI*

## 2019-06-20 PROCEDURE — 99232 SBSQ HOSP IP/OBS MODERATE 35: CPT | Performed by: INTERNAL MEDICINE

## 2019-06-20 PROCEDURE — A9270 NON-COVERED ITEM OR SERVICE: HCPCS | Performed by: HOSPITALIST

## 2019-06-20 PROCEDURE — 700111 HCHG RX REV CODE 636 W/ 250 OVERRIDE (IP): Performed by: HOSPITALIST

## 2019-06-20 RX ORDER — FUROSEMIDE 40 MG/1
40 TABLET ORAL ONCE
Status: COMPLETED | OUTPATIENT
Start: 2019-06-21 | End: 2019-06-21

## 2019-06-20 RX ORDER — AMOXICILLIN AND CLAVULANATE POTASSIUM 875; 125 MG/1; MG/1
1 TABLET, FILM COATED ORAL EVERY 12 HOURS
Status: DISCONTINUED | OUTPATIENT
Start: 2019-06-20 | End: 2019-06-21 | Stop reason: HOSPADM

## 2019-06-20 RX ADMIN — MORPHINE SULFATE 2 MG: 4 INJECTION INTRAVENOUS at 23:57

## 2019-06-20 RX ADMIN — MORPHINE SULFATE 2 MG: 4 INJECTION INTRAVENOUS at 11:08

## 2019-06-20 RX ADMIN — MORPHINE SULFATE 2 MG: 4 INJECTION INTRAVENOUS at 03:12

## 2019-06-20 RX ADMIN — MORPHINE SULFATE 2 MG: 4 INJECTION INTRAVENOUS at 15:22

## 2019-06-20 RX ADMIN — AMOXICILLIN AND CLAVULANATE POTASSIUM 1 TABLET: 875; 125 TABLET, FILM COATED ORAL at 18:00

## 2019-06-20 RX ADMIN — PREDNISONE 40 MG: 20 TABLET ORAL at 05:27

## 2019-06-20 RX ADMIN — AMPICILLIN SODIUM AND SULBACTAM SODIUM 3 G: 2; 1 INJECTION, POWDER, FOR SOLUTION INTRAMUSCULAR; INTRAVENOUS at 05:27

## 2019-06-20 RX ADMIN — FUROSEMIDE 20 MG: 20 TABLET ORAL at 08:24

## 2019-06-20 RX ADMIN — AMPICILLIN SODIUM AND SULBACTAM SODIUM 3 G: 2; 1 INJECTION, POWDER, FOR SOLUTION INTRAMUSCULAR; INTRAVENOUS at 11:08

## 2019-06-20 RX ADMIN — MORPHINE SULFATE 2 MG: 4 INJECTION INTRAVENOUS at 06:56

## 2019-06-20 RX ADMIN — AZITHROMYCIN 250 MG: 250 TABLET, FILM COATED ORAL at 05:27

## 2019-06-20 RX ADMIN — MORPHINE SULFATE 2 MG: 4 INJECTION INTRAVENOUS at 19:52

## 2019-06-20 ASSESSMENT — ENCOUNTER SYMPTOMS
COUGH: 1
SHORTNESS OF BREATH: 1
FEVER: 0
HEMOPTYSIS: 0
HEADACHES: 0
CHILLS: 0
MYALGIAS: 1
ABDOMINAL PAIN: 0
SPUTUM PRODUCTION: 1
BACK PAIN: 1

## 2019-06-20 NOTE — DOCUMENTATION QUERY
formerly [facility]                                                                       Query Response Note      PATIENT:               RUPINDER KATHLEEN  ACCT #:                  [id number]  MRN:                     [id number]  :                      1955  ADMIT DATE:       2019 9:25 PM  DISCH DATE:          RESPONDING  PROVIDER #:        [id number]           QUERY TEXT:    Please clarify in documentation the relationship, if any, between Sepsis and Acute respiratory failure    The patient's Clinical Indicators include:   MD Note: Sepsis, Pneumonia, Acute respiratory failure  Treatment: RT protocol, Unasyn, Azithromycin  Risk Factors: COPD, Acute on Chronic respiratory failure, Pneumonia, Sepsis    Query created by: Kraig Cooper on 2019 3:43 PM    RESPONSE TEXT:    Acute respiratory failure is due to or associated with Sepsis          Electronically signed by:  NOÉ FRAGOSO MD 2019 6:07 PM

## 2019-06-20 NOTE — PROGRESS NOTES
Hospital Medicine Daily Progress Note    Date of Service  6/20/2019    Chief Complaint  63 y.o. male admitted 6/17/2019 with SOB.    Hospital Course    PMH COPD on home O2, ankylosing spondylitis who presented with SOB and recently seen by his pulmnologist and had CXR that showed right side PNA.  Influenza screen was negative.  BNP was mildly elevated.  Echocardiogram showed pulmonary hypertension, EF 55%.  He was admitted for sepsis due to pneumonia and treated with antibiotics and treated for COPD flare with prednisone and nebulizers.  He slowly improved and was able to ambulate on his home oxygen.  He had mild hemoptysis that resolved with cessation of heparin DVT prophylaxis.      Interval Problem Update  On 2L O2  Leukocytosis decreased  His wheezing is improved.  He says his shortness of breath is improved.  Hemoptysis has resolved.    Consultants/Specialty  None    Code Status  Full    Disposition  Lives in [facility] tomorrow    Review of Systems  Review of Systems   Constitutional: Positive for malaise/fatigue (Improved). Negative for chills and fever.   HENT: Negative for congestion.    Respiratory: Positive for cough, sputum production and shortness of breath. Negative for hemoptysis.    Cardiovascular: Negative for chest pain and leg swelling.   Gastrointestinal: Negative for abdominal pain.   Musculoskeletal: Positive for back pain and myalgias.   Skin: Negative for itching.   Neurological: Negative for headaches.        Physical Exam  Temp:  [36.4 °C (97.6 °F)-36.8 °C (98.3 °F)] 36.8 °C (98.3 °F)  Pulse:  [77-88] 77  Resp:  [18] 18  BP: (113-126)/(64-65) 113/64  SpO2:  [93 %-96 %] 93 %    Physical Exam   Constitutional: He is oriented to person, place, and time. He appears well-developed and well-nourished.   HENT:   Head: Normocephalic.   Mouth/Throat: Oropharynx is clear and moist.   Eyes: Conjunctivae are normal.   Cardiovascular: Normal rate and regular rhythm.    Pulmonary/Chest: Effort normal. He  has wheezes (Improved). He has rales (right field).   Abdominal: Soft. He exhibits no distension. There is no tenderness.   Musculoskeletal: He exhibits no edema.   Neurological: He is alert and oriented to person, place, and time.   Skin: Skin is warm and dry.   1 cm superficial ulcer to left shin without erythema, drainage, bleeding, tenderness   Nursing note and vitals reviewed.      Fluids  No intake or output data in the 24 hours ending 06/20/19 1622    Laboratory  Recent Labs      06/17/19 2154 06/19/19   0200  06/20/19   0333   WBC  18.4*  19.2*  14.9*   RBC  5.68  5.06  4.65*   HEMOGLOBIN  16.7  14.5  13.6*   HEMATOCRIT  51.4  43.9  41.8*   MCV  90.5  86.8  89.9   MCH  29.4  28.7  29.2   MCHC  32.5*  33.0*  32.5*   RDW  48.4  46.7  48.6   PLATELETCT  138*  150*  148*   MPV  10.8  11.4  11.0     Recent Labs      06/17/19 2154 06/19/19   0200   SODIUM  134*  135   POTASSIUM  4.4  4.0   CHLORIDE  97  99   CO2  28  29   GLUCOSE  97  123*   BUN  13  25*   CREATININE  0.86  0.74   CALCIUM  9.9  9.2                   Imaging  DX-CHEST-PORTABLE (1 VIEW)   Final Result         1.  Hazy right pulmonary infiltrates.   2.  Atherosclerosis           Assessment/Plan  Community acquired pneumonia of right lower lobe of lung (HCC)- (present on admission)   Assessment & Plan    Improving, complete course of antibiotics       Chronic obstructive pulmonary disease with acute exacerbation (HCC)- (present on admission)   Assessment & Plan    COPD protocol  RT protocol  Prednisone  Breathing treatments  Antibiotics  Continue supplemental oxygen and wean as tolerated     Sepsis (HCC)- (present on admission)   Assessment & Plan    This is sepsis (without associated acute organ dysfunction).   Present at the time of admission  Sepsis protocol  Follow-up with cultures  Empiric IV antibiotics  Resolving     Pulmonary hypertension (HCC)- (present on admission)   Assessment & Plan    Chronic     Acute on chronic respiratory  failure with hypoxia (HCC)- (present on admission)   Assessment & Plan    Continue with supplemental oxygen with a goal of pulse ox at 92%  Wean oxygen as tolerated  On 3 L supplemental oxygen at all times at home     Ankylosing spondylitis of cervicothoracic region (HCC)- (present on admission)   Assessment & Plan    Chronic  Causing restrictive lung disease and chronic pain          VTE prophylaxis: scds

## 2019-06-20 NOTE — CARE PLAN
Bedside report received. Pt A&Ox4, VSS. POC discussed and verbalized understanding. Bed in lowest position, fall precautions in place, call light at bedside, hourly rounding.           Problem: Safety  Goal: Will remain free from injury  Outcome: PROGRESSING AS EXPECTED      Problem: Respiratory:  Goal: Respiratory status will improve  Outcome: PROGRESSING AS EXPECTED      Monitor summary: Medical pt

## 2019-06-20 NOTE — DISCHARGE PLANNING
"Care Transition Team Assessment     RN JAMES met with patient at bedside. Patient lives with his spouse in [place]. Patient able to attend appointments and obtain prescriptions from [facility] pharmacy on [address]. Patient uses 3 L of home oxygen from [facility]. Patient stating, \"I don't care if they pull a Bass out of me, I'm leaving tomorrow!\" Patient spouse will be ride home upon discharge and has one of his oxygen tanks in car for whenever patient is ready to discharge home. No further needs at this time.    Information Source  Orientation : Oriented x 4  Information Given By: Patient  Informant's Name: Richard    Readmission Evaluation  Is this a readmission?: No    Elopement Risk  Legal Hold: No  Ambulatory or Self Mobile in Wheelchair: Yes  Disoriented: No  Psychiatric Symptoms: None  History of Wandering: No  Elopement this Admit: No  Vocalizing Wanting to Leave: No  Displays Behaviors, Body Language Wanting to Leave: No-Not at Risk for Elopement  Elopement Risk: Not at Risk for Elopement    Interdisciplinary Discharge Planning  Does Admitting Nurse Feel This Could be a Complex Discharge?: No  Primary Care Physician: Mary Samano  Lives with - Patient's Self Care Capacity: Spouse, Adult Children, Child Less than 18 Years of Age  Patient or legal guardian wants to designate a caregiver (see row info): No  Support Systems: Children, Family Member(s), Friends / Neighbors, Spouse / Significant Other  Housing / Facility: [address]  Do You Take your Prescribed Medications Regularly: Yes  Able to Return to Previous ADL's: Yes  Mobility Issues: Yes  Prior Services: Home-Independent  Patient Expects to be Discharged to:: Home  Assistance Needed: No  Durable Medical Equipment: Home Oxygen  DME Provider / Phone: [facility]    Discharge Preparedness  What is your plan after discharge?:  (Home)  What are your discharge supports?: Spouse         Finances  Financial Barriers to Discharge: No  Prescription Coverage: Yes     "     Values / Beliefs / Concerns  Values / Beliefs Concerns : No         Domestic Abuse  Have you ever been the victim of abuse or violence?: No  Physical Abuse or Sexual Abuse: No  Verbal Abuse or Emotional Abuse: No  Possible Abuse Reported to:: Not Applicable              Anticipated Discharge Information  Anticipated discharge disposition: Home  Discharge Address: [address] [place], [place] [address] [address]  Discharge Contact Phone Number: [phone]

## 2019-06-20 NOTE — CARE PLAN
Problem: Communication  Goal: The ability to communicate needs accurately and effectively will improve  Outcome: PROGRESSING AS EXPECTED  Pt communicates needs effectively and demonstrates ability to call for assistance appropriately.    Problem: Knowledge Deficit  Goal: Knowledge of the prescribed therapeutic regimen will improve  Outcome: PROGRESSING AS EXPECTED  Discussed prescribed medications with pt. Pt verbalizes understanding and has no questions at this time.

## 2019-06-21 ENCOUNTER — PATIENT OUTREACH (OUTPATIENT)
Dept: HEALTH INFORMATION MANAGEMENT | Facility: OTHER | Age: 64
End: 2019-06-21

## 2019-06-21 VITALS
SYSTOLIC BLOOD PRESSURE: 145 MMHG | TEMPERATURE: 98 F | HEART RATE: 77 BPM | BODY MASS INDEX: 19.92 KG/M2 | WEIGHT: 134.48 LBS | RESPIRATION RATE: 18 BRPM | OXYGEN SATURATION: 96 % | HEIGHT: 69 IN | DIASTOLIC BLOOD PRESSURE: 81 MMHG

## 2019-06-21 PROBLEM — A41.9 SEPSIS (HCC): Status: RESOLVED | Noted: 2019-06-17 | Resolved: 2019-06-21

## 2019-06-21 PROBLEM — J96.21 ACUTE ON CHRONIC RESPIRATORY FAILURE WITH HYPOXIA (HCC): Status: RESOLVED | Noted: 2019-03-12 | Resolved: 2019-06-21

## 2019-06-21 LAB
BASOPHILS # BLD AUTO: 0.2 % (ref 0–1.8)
BASOPHILS # BLD: 0.02 K/UL (ref 0–0.12)
EOSINOPHIL # BLD AUTO: 0.01 K/UL (ref 0–0.51)
EOSINOPHIL NFR BLD: 0.1 % (ref 0–6.9)
ERYTHROCYTE [DISTWIDTH] IN BLOOD BY AUTOMATED COUNT: 49.1 FL (ref 35.9–50)
HCT VFR BLD AUTO: 48.3 % (ref 42–52)
HGB BLD-MCNC: 14.9 G/DL (ref 14–18)
IMM GRANULOCYTES # BLD AUTO: 0.12 K/UL (ref 0–0.11)
IMM GRANULOCYTES NFR BLD AUTO: 1.1 % (ref 0–0.9)
LYMPHOCYTES # BLD AUTO: 1.5 K/UL (ref 1–4.8)
LYMPHOCYTES NFR BLD: 13.4 % (ref 22–41)
MCH RBC QN AUTO: 28.2 PG (ref 27–33)
MCHC RBC AUTO-ENTMCNC: 30.8 G/DL (ref 33.7–35.3)
MCV RBC AUTO: 91.3 FL (ref 81.4–97.8)
MONOCYTES # BLD AUTO: 1.02 K/UL (ref 0–0.85)
MONOCYTES NFR BLD AUTO: 9.1 % (ref 0–13.4)
NEUTROPHILS # BLD AUTO: 8.53 K/UL (ref 1.82–7.42)
NEUTROPHILS NFR BLD: 76.1 % (ref 44–72)
NRBC # BLD AUTO: 0 K/UL
NRBC BLD-RTO: 0 /100 WBC
PLATELET # BLD AUTO: 171 K/UL (ref 164–446)
PMV BLD AUTO: 10.4 FL (ref 9–12.9)
RBC # BLD AUTO: 5.29 M/UL (ref 4.7–6.1)
WBC # BLD AUTO: 11.2 K/UL (ref 4.8–10.8)

## 2019-06-21 PROCEDURE — 99239 HOSP IP/OBS DSCHRG MGMT >30: CPT | Performed by: INTERNAL MEDICINE

## 2019-06-21 PROCEDURE — A9270 NON-COVERED ITEM OR SERVICE: HCPCS | Performed by: INTERNAL MEDICINE

## 2019-06-21 PROCEDURE — 700102 HCHG RX REV CODE 250 W/ 637 OVERRIDE(OP): Performed by: HOSPITALIST

## 2019-06-21 PROCEDURE — A9270 NON-COVERED ITEM OR SERVICE: HCPCS | Performed by: HOSPITALIST

## 2019-06-21 PROCEDURE — 85025 COMPLETE CBC W/AUTO DIFF WBC: CPT

## 2019-06-21 PROCEDURE — 36415 COLL VENOUS BLD VENIPUNCTURE: CPT

## 2019-06-21 PROCEDURE — 700102 HCHG RX REV CODE 250 W/ 637 OVERRIDE(OP): Performed by: INTERNAL MEDICINE

## 2019-06-21 PROCEDURE — 700111 HCHG RX REV CODE 636 W/ 250 OVERRIDE (IP): Performed by: INTERNAL MEDICINE

## 2019-06-21 PROCEDURE — 700111 HCHG RX REV CODE 636 W/ 250 OVERRIDE (IP): Performed by: HOSPITALIST

## 2019-06-21 PROCEDURE — 302135 SEQUENTIAL COMPRESSION MACHINE: Performed by: INTERNAL MEDICINE

## 2019-06-21 RX ORDER — PREDNISONE 20 MG/1
TABLET ORAL
Qty: 2 TAB | Refills: 0 | Status: SHIPPED | OUTPATIENT
Start: 2019-06-22 | End: 2019-11-20

## 2019-06-21 RX ORDER — AMOXICILLIN AND CLAVULANATE POTASSIUM 875; 125 MG/1; MG/1
1 TABLET, FILM COATED ORAL EVERY 12 HOURS
Qty: 2 TAB | Refills: 0 | Status: SHIPPED | OUTPATIENT
Start: 2019-06-21 | End: 2019-06-22

## 2019-06-21 RX ORDER — AZITHROMYCIN 250 MG/1
TABLET, FILM COATED ORAL
Qty: 1 TAB | Refills: 0 | Status: SHIPPED | OUTPATIENT
Start: 2019-06-22 | End: 2019-11-20

## 2019-06-21 RX ADMIN — PREDNISONE 40 MG: 20 TABLET ORAL at 05:32

## 2019-06-21 RX ADMIN — MORPHINE SULFATE 2 MG: 4 INJECTION INTRAVENOUS at 13:57

## 2019-06-21 RX ADMIN — AZITHROMYCIN 250 MG: 250 TABLET, FILM COATED ORAL at 09:05

## 2019-06-21 RX ADMIN — FUROSEMIDE 40 MG: 40 TABLET ORAL at 05:31

## 2019-06-21 RX ADMIN — MORPHINE SULFATE 2 MG: 4 INJECTION INTRAVENOUS at 04:12

## 2019-06-21 RX ADMIN — MORPHINE SULFATE 2 MG: 4 INJECTION INTRAVENOUS at 08:58

## 2019-06-21 RX ADMIN — AMOXICILLIN AND CLAVULANATE POTASSIUM 1 TABLET: 875; 125 TABLET, FILM COATED ORAL at 09:04

## 2019-06-21 ASSESSMENT — LIFESTYLE VARIABLES: DO YOU DRINK ALCOHOL: NO

## 2019-06-21 NOTE — DISCHARGE INSTRUCTIONS
Discharge Instructions    Discharged to home by car with relative. Discharged via wheelchair, hospital escort: Yes.  Special equipment needed: Oxygen    Be sure to schedule a follow-up appointment with your primary care doctor or any specialists as instructed.     Discharge Plan:   Diet Plan: Discussed  Activity Level: Discussed  Confirmed Follow up Appointment: Patient to Call and Schedule Appointment  Confirmed Symptoms Management: Discussed  Medication Reconciliation Updated: Yes  Influenza Vaccine Indication: Indicated: Not available from distributor/    I understand that a diet low in cholesterol, fat, and sodium is recommended for good health. Unless I have been given specific instructions below for another diet, I accept this instruction as my diet prescription.   Other diet: Regular     Special Instructions: Your doctor has recommended robitussin if you need cough syrup. It can be purchased over the counter. Please make sure to finish all of your antibiotics.       Sepsis, Adult  Sepsis is a serious infection of your blood or tissues that affects your whole body. The infection that causes sepsis may be bacterial, viral, fungal, or parasitic. Sepsis may be life threatening. Sepsis can cause your blood pressure to drop. This may result in shock. Shock causes your central nervous system and your organs to stop working correctly.  What increases the risk?  Sepsis can happen in anyone, but it is more likely to happen in people who have weakened immune systems.  What are the signs or symptoms?  Symptoms of sepsis can include:  · Fever or low body temperature (hypothermia).  · Rapid breathing (hyperventilation).  · Chills.  · Rapid heartbeat (tachycardia).  · Confusion or light-headedness.  · Trouble breathing.  · Urinating much less than usual.  · Cool, clammy skin or red, flushed skin.  · Other problems with the heart, kidneys, or brain.  How is this diagnosed?  Your health care provider will likely  do tests to look for an infection, to see if the infection has spread to your blood, and to see how serious your condition is. Tests can include:  · Blood tests, including cultures of your blood.  · Cultures of other fluids from your body, such as:  ¨ Urine.  ¨ Pus from wounds.  ¨ Mucus coughed up from your lungs.  · Urine tests other than cultures.  · X-ray exams or other imaging tests.  How is this treated?  Treatment will begin with elimination of the source of infection. If your sepsis is likely caused by a bacterial or fungal infection, you will be given antibiotic or antifungal medicines.  You may also receive:  · Oxygen.  · Fluids through an IV tube.  · Medicines to increase your blood pressure.  · A machine to clean your blood (dialysis) if your kidneys fail.  · A machine to help you breathe if your lungs fail.  Get help right away if:  You get an infection or develop any of the signs and symptoms of sepsis after surgery or a hospitalization.  This information is not intended to replace advice given to you by your health care provider. Make sure you discuss any questions you have with your health care provider.  Document Released: 09/15/2004 Document Revised: 05/25/2017 Document Reviewed: 08/25/2014  [employer] Interactive Patient Education © 2017 [employer]      · Is patient discharged on Warfarin / Coumadin?   No       Community-Acquired Pneumonia, Adult  Introduction  Pneumonia is an infection of the lungs. One type of pneumonia can happen while a person is in a hospital. A different type can happen when a person is not in a hospital (community-acquired pneumonia). It is easy for this kind to spread from person to person. It can spread to you if you breathe near an infected person who coughs or sneezes. Some symptoms include:  · A dry cough.  · A wet (productive) cough.  · Fever.  · Sweating.  · Chest pain.  Follow these instructions at home:  · Take over-the-counter and prescription medicines only as  told by your doctor.  ¨ Only take cough medicine if you are losing sleep.  ¨ If you were prescribed an antibiotic medicine, take it as told by your doctor. Do not stop taking the antibiotic even if you start to feel better.  · Sleep with your head and neck raised (elevated). You can do this by putting a few pillows under your head, or you can sleep in a recliner.  · Do not use tobacco products. These include cigarettes, chewing tobacco, and e-cigarettes. If you need help quitting, ask your doctor.  · Drink enough water to keep your pee (urine) clear or pale yellow.  A shot (vaccine) can help prevent pneumonia. Shots are often suggested for:  · People older than 65 years of age.  · People older than 19 years of age:  ¨ Who are having cancer treatment.  ¨ Who have long-term (chronic) lung disease.  ¨ Who have problems with their body's defense system (immune system).  You may also prevent pneumonia if you take these actions:  · Get the flu (influenza) shot every year.  · Go to the dentist as often as told.  · Wash your hands often. If soap and water are not available, use hand .  Contact a doctor if:  · You have a fever.  · You lose sleep because your cough medicine does not help.  Get help right away if:  · You are short of breath and it gets worse.  · You have more chest pain.  · Your sickness gets worse. This is very serious if:  ¨ You are an older adult.  ¨ Your body's defense system is weak.  · You cough up blood.  This information is not intended to replace advice given to you by your health care provider. Make sure you discuss any questions you have with your health care provider.  Document Released: 06/05/2009 Document Revised: 05/25/2017 Document Reviewed: 04/13/2016  © 2017 Elsevier      Depression / Suicide Risk    As you are discharged from this [facility] facility, it is important to learn how to keep safe from harming yourself.    Recognize the warning signs:  · Abrupt changes in personality,  positive or negative- including increase in energy   · Giving away possessions  · Change in eating patterns- significant weight changes-  positive or negative  · Change in sleeping patterns- unable to sleep or sleeping all the time   · Unwillingness or inability to communicate  · Depression  · Unusual sadness, discouragement and loneliness  · Talk of wanting to die  · Neglect of personal appearance   · Rebelliousness- reckless behavior  · Withdrawal from people/activities they love  · Confusion- inability to concentrate     If you or a loved one observes any of these behaviors or has concerns about self-harm, here's what you can do:  · Talk about it- your feelings and reasons for harming yourself  · Remove any means that you might use to hurt yourself (examples: pills, rope, extension cords, firearm)  · Get professional help from the community (Mental Health, Substance Abuse, psychological counseling)  · Do not be alone:Call your Safe Contact- someone whom you trust who will be there for you.  · Call your local CRISIS HOTLINE 023-3647 or 932-377-8942  · Call your local Children's Mobile Crisis Response Team [place] (273) 264-7085 or www.YourEncore  · Call the toll free National Suicide Prevention Hotlines   · National Suicide Prevention Lifeline 479-385-TOGK (5992)  · National Hope Line Network 800-SUICIDE (257-7050)

## 2019-06-21 NOTE — PROGRESS NOTES
Bedside report received. POC discussed with pt; all questions answered at this time. Pt reports his wife will pick him up around 1600.

## 2019-06-21 NOTE — PROGRESS NOTES
Report received. Care assumed. Patient A&Ox4. Pt reports feeling 6/10 chronic back pain-medication given see MAR. No SOB at this time. On 3L Mask, which is baseline for pt.  Pt denies any  needs.Discussed POC for the night with Pt. Call light within reach, encouraged pt to call for assistance.

## 2019-06-21 NOTE — CARE PLAN
Problem: Safety  Goal: Will remain free from falls    Intervention: Assess risk factors for falls   06/21/19 0040   OTHER   Fall Risk High Risk to Fall - 2 or more points    Mobility Status Assessment 1-1 Healthcare Provider Required for Assistance with Ambulation & Transfer   History of fall 0   Pt Calls for Assistance Yes     Intervention: Implement fall precautions   06/21/19 0040   OTHER   Environmental Precautions Treaded Slipper Socks on Patient;Report Given to Other Health Care Providers Regarding Fall Risk;Personal Belongings, Wastebasket, Call Bell etc. in Easy Reach;Bed in Low Position;Transferred to Stronger Side;Communication Sign for Patients & Families;Mobility Assessed & Appropriate Sign Placed   Bedrails Bedrails Closest to Bathroom Down   Bed Alarm Patient Educated Regarding Fall Risk and Need for Bed Alarm, Understands and Continues to Refuse         Problem: Pain Management  Goal: Pain level will decrease to patient's comfort goal  Outcome: PROGRESSING SLOWER THAN EXPECTED   06/21/19 0000   OTHER   Pain Rating Scale (NPRS) 7   Comfort Goal Comfort at Rest;Comfort with Movement;Sleep Comfortably;5

## 2019-06-22 NOTE — DOCUMENTATION QUERY
[facility]                                                                       Query Response Note      PATIENT:               RUPINDER KATHLEEN  ACCT #:                  [id number]  MRN:                     [id number]  :                      1955  ADMIT DATE:       2019 9:25 PM  DISCH DATE:        2019 5:11 PM  RESPONDING  PROVIDER #:        [id number]           QUERY TEXT:    Please clarify the relationship, if any, between hemoptysis and heparin  NOTE:  If an appropriate response is not listed below, please respond with a new note.        The patient's Clinical Indicators include:   MD PN: Hemoptysis, will stop heparin  Treatment: Hold/DC Heparin  Risk Factors: VTE prophylaxis, Pneumonia, COPD, Sepsis, Pulmonary htn    Query created by: Kraig Cooper on 2019 9:54 AM    RESPONSE TEXT:    Hemoptysis is due to/associated with heparin          Electronically signed by:  NOÉ FRAGOSO MD 2019 8:34 PM

## 2019-06-22 NOTE — PROGRESS NOTES
Pt dc'd home. IV and monitor removed; monitor room notified. Pt left unit via wheelchair with Maritza AGUILAR. Personal belongings with pt when leaving unit. Pt given discharge instructions prior to leaving unit including prescription and when to visit with physician; verbalizes understanding. Copy of discharge instructions with pt and in the chart.

## 2019-06-22 NOTE — DISCHARGE SUMMARY
Discharge Summary    CHIEF COMPLAINT ON ADMISSION  Chief Complaint   Patient presents with   • Sent by MD     for respiratiory complications.  reorts pneumonia, COPD.  reports coughing pink tinged sputum       Reason for Admission  Sent by MD/SOB      Admission Date  6/17/2019    CODE STATUS  Prior    HPI & HOSPITAL COURSE  This is a 63 y.o. male here with COPD on home O2, ankylosing spondylitis who presented with SOB and recently seen by his pulmnologist and had CXR that showed right side PNA.  Influenza screen was negative.  BNP was mildly elevated.  Echocardiogram showed pulmonary hypertension, EF 55%.  He was admitted for sepsis due to pneumonia and treated with antibiotics and treated for COPD flare with prednisone and nebulizers.  He slowly improved and was able to ambulate on his home oxygen without desaturation.  He had mild hemoptysis that resolved with cessation of heparin DVT prophylaxis.     Therefore, he is discharged in good and stable condition to home with close outpatient follow-up.    The patient met 2-midnight criteria for an inpatient stay at the time of discharge.    Discharge Date  6/21/2019    FOLLOW UP ITEMS POST DISCHARGE  Repeat CXR in several weeks to ensure resolution of PNA    DISCHARGE DIAGNOSES  Active Problems:    Chronic obstructive pulmonary disease with acute exacerbation (HCC) POA: Yes    Community acquired pneumonia of right lower lobe of lung (HCC) POA: Yes    Ankylosing spondylitis of cervicothoracic region (HCC) POA: Yes    Pulmonary hypertension (HCC) POA: Yes  Resolved Problems:    Acute on chronic respiratory failure with hypoxia (HCC) POA: Yes    Hyponatremia POA: Yes    Sepsis (HCC) POA: Yes      FOLLOW UP  Future Appointments  Date Time Provider Department Center   8/7/2019 3:20 PM AMARI Barber.P.N. [facility] None   8/8/2019 1:45 PM Lakshmi King P.A.-C. PULM None     Mary Samano M.D.  [address] [facility] [place] [address]  [place] [address]  [phone]    Schedule an  appointment as soon as possible for a visit in 1 week  Hospital follow-up appointment with PCP      MEDICATIONS ON DISCHARGE     Medication List      START taking these medications      Instructions   amoxicillin-clavulanate 875-125 MG Tabs  Commonly known as:  AUGMENTIN   Take 1 Tab by mouth every 12 hours for 1 day.  Dose:  1 Tab     azithromycin 250 MG Tabs  Start taking on:  6/22/2019  Commonly known as:  ZITHROMAX   Take 1 tablet on 6/22/19     guaiFENesin 100 MG/5ML syrup  Commonly known as:  ROBITUSSIN   Take 10 mL by mouth 3 times a day as needed for Cough.  Dose:  10 mL     predniSONE 20 MG Tabs  Start taking on:  6/22/2019  Commonly known as:  DELTASONE   Take 40mg once on 6/22/19        CONTINUE taking these medications      Instructions   FLINSTONES GUMMIES OMEGA-3 DHA Chew   Take 1 Tab by mouth every day.  Dose:  1 Tab     * furosemide 20 MG Tabs  Commonly known as:  LASIX   Take 20 mg by mouth in the morning every Tue, Thurs, Sat, and Sun.  Dose:  20 mg     * furosemide 40 MG Tabs  Commonly known as:  LASIX   Doctor's comments:  Every Monday, Wednesday and Friday  Take 1 Tab by mouth every day.  Dose:  40 mg     STIOLTO RESPIMAT 2.5-2.5 MCG/ACT Aers  Generic drug:  Tiotropium Bromide-Olodaterol   Inhale 1 Puff by mouth 2 Times a Day.  Dose:  1 Puff        * This list has 2 medication(s) that are the same as other medications prescribed for you. Read the directions carefully, and ask your doctor or other care provider to review them with you.                Allergies  No Known Allergies    DIET  No orders of the defined types were placed in this encounter.      ACTIVITY  As tolerated.  Weight bearing as tolerated    CONSULTATIONS  None    PROCEDURES  DX-CHEST-PORTABLE (1 VIEW)   Final Result         1.  Hazy right pulmonary infiltrates.   2.  Atherosclerosis            LABORATORY  Lab Results   Component Value Date    SODIUM 135 06/19/2019    POTASSIUM 4.0 06/19/2019    CHLORIDE 99 06/19/2019    CO2 29  06/19/2019    GLUCOSE 123 (H) 06/19/2019    BUN 25 (H) 06/19/2019    CREATININE 0.74 06/19/2019    CREATININE 1.2 04/03/2009        Lab Results   Component Value Date    WBC 11.2 (H) 06/21/2019    HEMOGLOBIN 14.9 06/21/2019    HEMATOCRIT 48.3 06/21/2019    PLATELETCT 171 06/21/2019        Total time of the discharge process exceeds 32 minutes.

## 2019-06-24 ENCOUNTER — PATIENT OUTREACH (OUTPATIENT)
Dept: HEALTH INFORMATION MANAGEMENT | Facility: OTHER | Age: 64
End: 2019-06-24

## 2019-06-24 DIAGNOSIS — I27.20 PULMONARY HYPERTENSION (HCC): ICD-10-CM

## 2019-06-25 DIAGNOSIS — I27.20 PULMONARY HYPERTENSION (HCC): ICD-10-CM

## 2019-06-25 RX ORDER — FUROSEMIDE 40 MG/1
40 TABLET ORAL
Qty: 50 TAB | Refills: 3 | Status: SHIPPED | OUTPATIENT
Start: 2019-06-25 | End: 2020-07-16 | Stop reason: SDUPTHER

## 2019-06-25 RX ORDER — FUROSEMIDE 20 MG/1
20 TABLET ORAL
Qty: 50 TAB | Refills: 3 | Status: SHIPPED | OUTPATIENT
Start: 2019-06-25 | End: 2019-06-25 | Stop reason: SDUPTHER

## 2019-06-25 RX ORDER — FUROSEMIDE 20 MG/1
20 TABLET ORAL
Qty: 50 TAB | Refills: 3 | Status: SHIPPED | OUTPATIENT
Start: 2019-06-25 | End: 2020-03-12

## 2019-06-25 RX ORDER — FUROSEMIDE 40 MG/1
40 TABLET ORAL
Qty: 50 TAB | Refills: 3 | Status: SHIPPED | OUTPATIENT
Start: 2019-06-25 | End: 2019-06-25 | Stop reason: SDUPTHER

## 2019-07-01 ENCOUNTER — TELEPHONE (OUTPATIENT)
Dept: PULMONOLOGY | Facility: HOSPICE | Age: 64
End: 2019-07-01

## 2019-07-01 NOTE — TELEPHONE ENCOUNTER
Patient came into the office.  He dropped off [facility] paperwork. Patient sees Lakshmisa King. Paperwork can be faxed back to [facility]. It must be faxed back no later than 7/10/19.  Call patient after comleted and faxed at either number in chart in the afternoon. It is located in the MA Mailbox at the from desk.

## 2019-07-03 ENCOUNTER — TELEPHONE (OUTPATIENT)
Dept: PULMONOLOGY | Facility: HOSPICE | Age: 64
End: 2019-07-03

## 2019-07-03 NOTE — TELEPHONE ENCOUNTER
Pt called and asked if his wife's [place] paperwork was completed.  Notified him it was completed and was in the front office ready to be .  He asked for us to fax [place] paperwork for him.  I told pt I would.      Faxed [facility] paperwork

## 2019-07-10 ENCOUNTER — TELEPHONE (OUTPATIENT)
Dept: PULMONOLOGY | Facility: HOSPICE | Age: 64
End: 2019-07-10

## 2019-07-10 NOTE — TELEPHONE ENCOUNTER
"Fax was received from \" [facility]\" re: [employer] paperwork.  Lakshmi King PA-C filled out paperwork.    Faxed back to them.     Called pt and spoke with Kimi. Notified her of this.       "

## 2019-07-10 NOTE — TELEPHONE ENCOUNTER
1. Caller Name: Richard                      Call Back Number: [phone] (home)       2. Message: Pt called regarding the [facility] paperwork that was filled out by Lakshmi King PA-C.  His wife's work kicked it back to them because she only put his wife only needs 3 hours to take care of him.  He said they need more to qualify.  He gave the phone to his wife to further explain.  Kimi, pt's wife said the hours are not for daily but it's when she takes him to the hospital or/and when pt gets a virus.  She said they are going to be re-faxing us the paperwork.     3. Patient approves office to leave a detailed voicemail/MyChart message: N\A

## 2019-07-22 ENCOUNTER — TELEPHONE (OUTPATIENT)
Dept: PULMONOLOGY | Facility: HOSPICE | Age: 64
End: 2019-07-22

## 2019-07-22 ENCOUNTER — PATIENT OUTREACH (OUTPATIENT)
Dept: HEALTH INFORMATION MANAGEMENT | Facility: OTHER | Age: 64
End: 2019-07-22

## 2019-07-22 NOTE — TELEPHONE ENCOUNTER
"1. Caller Name: Richard                      Call Back Number: [phone] (home)       2. Message: Pt called and said re: his wife's FMLA paperwork.  \"They sent pt a letter saying insufficent information, needing additional information.  They are going to be contacting our office regarding this.\"  Notified pt they have not called us.  He said if they do call, we have permission from him to talk to them.     3. Patient approves office to leave a detailed voicemail/MyChart message: N\A    "

## 2019-07-29 NOTE — PROGRESS NOTES
A 63-year-old male was an emergent admission to [facility] from 6/17/2019 to 6/21/2019 to treat shortness of breath. [facility] visited the patient bedside. The patient was discharged home. The patient's medical condition included: sepsis from Infection. The patient was not under clinical case management.     The Patient was discharged with the following medications: Prednisone (Deltasone), Guaifenesin (Robitussin), Azithromycin (Zithromax), and Amoxicillin (Augmentin XR). The patient successfully filled all medications.     The patient was ordered to follow-up with his PCP within a week. Patient did not follow-up with his PCP but is scheduled to see his pulmonary doctor on 8/7/19.     [facility] patient advocate was not able to reach patient nor his wife at any of their listed numbers. [facility] continued to make outreach attempts to patient and listed emergency contacts without success. All out-reach attempts were forwarded to [facility].

## 2019-09-19 DIAGNOSIS — J44.9 CHRONIC OBSTRUCTIVE PULMONARY DISEASE, UNSPECIFIED COPD TYPE (HCC): ICD-10-CM

## 2019-09-19 RX ORDER — TIOTROPIUM BROMIDE AND OLODATEROL 3.124; 2.736 UG/1; UG/1
2 SPRAY, METERED RESPIRATORY (INHALATION) DAILY
Qty: 1 INHALER | Refills: 3 | Status: SHIPPED | OUTPATIENT
Start: 2019-09-19 | End: 2020-12-30 | Stop reason: SDUPTHER

## 2019-09-19 NOTE — TELEPHONE ENCOUNTER
Have we ever prescribed this med? Yes.  If yes, what date? 11/28/18([place])    Last OV: 06/17/19 with Lakshmi King PA-C      Next OV: No Pending appt.    DX: Chronic obstructive pulmonary disease, unspecified COPD type (HCC) (J44.9)    Medications:   Requested Prescriptions     Pending Prescriptions Disp Refills   • STIOLTO RESPIMAT 2.5-2.5 MCG/ACT Aero Soln [Pharmacy Med Name: STIOLTO RESPIMAT INHAL SPRAY]  11     Sig: INHALE 2 PUFFS BY MOUTH EVERY DAY.

## 2019-10-16 ENCOUNTER — TELEPHONE (OUTPATIENT)
Dept: PULMONOLOGY | Facility: HOSPICE | Age: 64
End: 2019-10-16

## 2019-10-16 NOTE — TELEPHONE ENCOUNTER
1. Caller Name: Richard                      Call Back Number: [phone](cell)       2. Message: Kimi, pt's spouse called and said they need her [facility] paperwork updated.  She said she had to call into work this evening because pt is having diarrhea and vomiting since 5am.  She said on her FMLA paperwork she can have 3hrs off.  But she needs it more, she works an 8 hr shift and she works at night.     Notified her pt should go to the ER to get evaluated if pt has been vomiting/diarrhea since 5am.  She said she might go that route but needs her [facility] paperwork updated.    Please advise.     3. Patient approves office to leave a detailed voicemail/MyChart message: yes per Kimi

## 2019-10-17 NOTE — TELEPHONE ENCOUNTER
Lakshmi King P.A.-C.  You 15 hours ago (4:49 PM)     Please let patient/wife know I feel comfortable with the paperwork as I filled it out, if more is needed please have them follow up with primary as additional time reflects overall health and not just lung health.

## 2019-10-23 ENCOUNTER — IMMUNIZATION (OUTPATIENT)
Dept: SOCIAL WORK | Facility: CLINIC | Age: 64
End: 2019-10-23
Payer: MEDICARE

## 2019-10-23 DIAGNOSIS — Z23 NEED FOR VACCINATION: ICD-10-CM

## 2019-10-23 PROCEDURE — 90662 IIV NO PRSV INCREASED AG IM: CPT | Performed by: REGISTERED NURSE

## 2019-10-23 PROCEDURE — G0008 ADMIN INFLUENZA VIRUS VAC: HCPCS | Performed by: REGISTERED NURSE

## 2019-11-20 ENCOUNTER — OFFICE VISIT (OUTPATIENT)
Dept: CARDIOLOGY | Facility: MEDICAL CENTER | Age: 64
End: 2019-11-20
Payer: MEDICARE

## 2019-11-20 VITALS
OXYGEN SATURATION: 91 % | BODY MASS INDEX: 20.26 KG/M2 | HEART RATE: 80 BPM | HEIGHT: 69 IN | DIASTOLIC BLOOD PRESSURE: 68 MMHG | SYSTOLIC BLOOD PRESSURE: 112 MMHG | WEIGHT: 136.8 LBS

## 2019-11-20 DIAGNOSIS — J44.1 CHRONIC OBSTRUCTIVE PULMONARY DISEASE WITH ACUTE EXACERBATION (HCC): ICD-10-CM

## 2019-11-20 DIAGNOSIS — I27.81 COR PULMONALE (CHRONIC) (HCC): ICD-10-CM

## 2019-11-20 DIAGNOSIS — I27.20 PULMONARY HYPERTENSION (HCC): ICD-10-CM

## 2019-11-20 PROCEDURE — 99214 OFFICE O/P EST MOD 30 MIN: CPT | Performed by: INTERNAL MEDICINE

## 2019-11-21 NOTE — PROGRESS NOTES
"CARDIOLOGY OUTPATIENT FOLLOWUP    PCP: Mary Samano M.D.    1. Cor pulmonale (chronic) (HCC)  Stable.  -Continue the diuretic regimen alternating 20 mg and 40 mg of Lasix daily  - Basic metabolic panel ordered    2. Pulmonary hypertension (HCC)  Euvolemic.  Limited by COPD    3. Chronic obstructive pulmonary disease with acute exacerbation (HCC)  Very advanced, oxygen dependent    4.  Health maintenance  - Lipid profile ordered.  The patient also requests a PSA check which was arranged    5.  Precordial pain.  Related to musculoskeletal compression from kyphosis.  Reassurance provided      Follow up with Juan C Arboleda M.D. in 1 year    Chief Complaint   Patient presents with   • Edema       History: Richard Vaughn is a 64 y.o. male with a past medical history of Ankylosing spondylitis, kyphoscoliosis and severe COPD with cor pulmonale presenting for follow up of cor pulmonale.  He has been using an alternating diuretic regimen taken between 20 and 40 mg of Lasix daily.  He has not had any further leg edema since utilizing this regimen.  He continues to be severely limited by oxygen dependent COPD and kyphoscoliosis.  He has mostly sedentary but when being physically active does not experience angina.  He also is not experiencing abdominal bloating, lower extremity edema or orthopnea.  He reports being dropped by his primary care provider is there apparently moving to a [facility] practice, he requests health maintenance services.    He also gets intermittent chest discomfort when leaning forward too long which promptly resolves with sitting upright.  He is worried that he is crushing his heart.    ROS:  All other systems reviewed and negative except as per the HPI    PE:  /68 (BP Location: Right arm, Patient Position: Sitting, BP Cuff Size: Adult)   Pulse 80   Ht 1.753 m (5' 9\")   Wt 62.1 kg (136 lb 12.7 oz)   SpO2 91%   BMI 20.20 kg/m²   Gen: Chronically ill-appearing with severe " kyphosis.  HEENT: Symmetric face. Anicteric sclerae. Moist mucus membranes  NECK: No JVD. No lymphadenopathy  CARDIAC: Almost an audible cardiac sounds, PMI is normal  VASCULATURE: Normal carotid amplitude without bruit.   RESP: Clear to auscultation bilaterally  ABD: Soft, non-tender, non-distended  EXT: No edema, no clubbing or cyanosis  SKIN: Warm and dry  NEURO: No gross deficits  PSYCH: Appropriate affect, participates in conversation    Past Medical History:   Diagnosis Date   • Anesthesia     poss. family hx malignant hyperthermia   • Ankylosing spondylitis (HCC)    • Chronic obstructive pulmonary disease (HCC)    • Cold    • Dental disorder     dentures   • Diverticulitis    • Osteoporosis    • Psychiatric problem     CONSULT FOR DRUG WITHDRAWAL AFTER LAST SURGERY   • Scoliosis      No Known Allergies  Outpatient Encounter Medications as of 11/20/2019   Medication Sig Dispense Refill   • STIOLTO RESPIMAT 2.5-2.5 MCG/ACT Aero Soln INHALE 2 PUFFS BY MOUTH EVERY DAY. 1 Inhaler 3   • furosemide (LASIX) 20 MG Tab Take 1 Tab by mouth in the morning every Tue, Thurs, Sat, and Sun. 50 Tab 3   • furosemide (LASIX) 40 MG Tab Take 1 Tab by mouth every 48 hours. As directed (Every Monday, Wednesday and Friday) 50 Tab 3   • [DISCONTINUED] predniSONE (DELTASONE) 20 MG Tab Take 40mg once on 6/22/19 (Patient not taking: Reported on 11/20/2019) 2 Tab 0   • [DISCONTINUED] azithromycin (ZITHROMAX) 250 MG Tab Take 1 tablet on 6/22/19 (Patient not taking: Reported on 11/20/2019) 1 Tab 0   • [DISCONTINUED] guaiFENesin (ROBITUSSIN) 100 MG/5ML syrup Take 10 mL by mouth 3 times a day as needed for Cough. (Patient not taking: Reported on 11/20/2019) 1 Bottle 0   • [DISCONTINUED] Pediatric Multiple Vit-C-FA (FLINSTONES GUMMIES OMEGA-3 DHA) Chew Tab Take 1 Tab by mouth every day.       No facility-administered encounter medications on file as of 11/20/2019.      Social History     Socioeconomic History   • Marital status:       Spouse name: Not on file   • Number of children: Not on file   • Years of education: Not on file   • Highest education level: Not on file   Occupational History   • Not on file   Social Needs   • Financial resource strain: Not on file   • Food insecurity:     Worry: Not on file     Inability: Not on file   • Transportation needs:     Medical: Not on file     Non-medical: Not on file   Tobacco Use   • Smoking status: Former Smoker     Packs/day: 1.00     Years: 42.00     Pack years: 42.00     Types: Cigarettes     Start date: 1/1/1967     Last attempt to quit: 6/6/2009     Years since quitting: 10.4   • Smokeless tobacco: Never Used   • Tobacco comment: continued abstinance   Substance and Sexual Activity   • Alcohol use: No   • Drug use: No     Comment: NOT AT THIS TIME     • Sexual activity: Not on file   Lifestyle   • Physical activity:     Days per week: Not on file     Minutes per session: Not on file   • Stress: Not on file   Relationships   • Social connections:     Talks on phone: Not on file     Gets together: Not on file     Attends Zoroastrian service: Not on file     Active member of club or organization: Not on file     Attends meetings of clubs or organizations: Not on file     Relationship status: Not on file   • Intimate partner violence:     Fear of current or ex partner: Not on file     Emotionally abused: Not on file     Physically abused: Not on file     Forced sexual activity: Not on file   Other Topics Concern   • Not on file   Social History Narrative   • Not on file       Studies  Lab Results   Component Value Date/Time    CHOLSTRLTOT 199 11/22/2017 01:57 PM     (H) 11/22/2017 01:57 PM    HDL 55 11/22/2017 01:57 PM    TRIGLYCERIDE 74 11/22/2017 01:57 PM       Lab Results   Component Value Date/Time    SODIUM 135 06/19/2019 02:00 AM    POTASSIUM 4.0 06/19/2019 02:00 AM    CHLORIDE 99 06/19/2019 02:00 AM    CO2 29 06/19/2019 02:00 AM    GLUCOSE 123 (H) 06/19/2019 02:00 AM    BUN 25 (H)  06/19/2019 02:00 AM    CREATININE 0.74 06/19/2019 02:00 AM    CREATININE 1.2 04/03/2009 04:00 AM     Lab Results   Component Value Date/Time    ALKPHOSPHAT 58 06/17/2019 09:54 PM    ASTSGOT 27 06/17/2019 09:54 PM    ALTSGPT 11 06/17/2019 09:54 PM    TBILIRUBIN 1.4 06/17/2019 09:54 PM        For this encounter I directly reviewed ECG tracings and medical records I agree with the interpretations in the electronic health record

## 2019-12-17 ENCOUNTER — OFFICE VISIT (OUTPATIENT)
Dept: PULMONOLOGY | Facility: HOSPICE | Age: 64
End: 2019-12-17
Payer: MEDICARE

## 2019-12-17 VITALS
OXYGEN SATURATION: 93 % | WEIGHT: 141 LBS | SYSTOLIC BLOOD PRESSURE: 126 MMHG | HEIGHT: 69 IN | DIASTOLIC BLOOD PRESSURE: 56 MMHG | HEART RATE: 76 BPM | BODY MASS INDEX: 20.88 KG/M2

## 2019-12-17 DIAGNOSIS — J96.10 CHRONIC RESPIRATORY FAILURE, UNSPECIFIED WHETHER WITH HYPOXIA OR HYPERCAPNIA (HCC): ICD-10-CM

## 2019-12-17 DIAGNOSIS — J44.9 CHRONIC OBSTRUCTIVE PULMONARY DISEASE, UNSPECIFIED COPD TYPE (HCC): ICD-10-CM

## 2019-12-17 PROCEDURE — 99213 OFFICE O/P EST LOW 20 MIN: CPT | Performed by: PHYSICIAN ASSISTANT

## 2019-12-17 RX ORDER — LEVALBUTEROL INHALATION SOLUTION 1.25 MG/3ML
1.25 SOLUTION RESPIRATORY (INHALATION) EVERY 4 HOURS PRN
Qty: 75 ML | Refills: 11 | Status: SHIPPED | OUTPATIENT
Start: 2019-12-17 | End: 2022-05-27

## 2019-12-17 ASSESSMENT — ENCOUNTER SYMPTOMS
ROS GI COMMENTS: DENTURES, NO DIFFICULTY SWALLOWING
SPUTUM PRODUCTION: 1
WEIGHT LOSS: 0
HEARTBURN: 0
PALPITATIONS: 0
SINUS PAIN: 1
TREMORS: 0
COUGH: 1
DIZZINESS: 0
FEVER: 0
CHILLS: 0
CLAUDICATION: 0
ORTHOPNEA: 0
DIAPHORESIS: 0
EYES NEGATIVE: 1
HEADACHES: 0
WHEEZING: 0
SHORTNESS OF BREATH: 1
INSOMNIA: 0
SORE THROAT: 1

## 2019-12-17 NOTE — PROGRESS NOTES
CC: Anxious    HPI:  Richard Vaughn is a 64 y.o. year old male here today for follow-up on COPD.  He was hospitalized after last visit on 6/17/2019 as recommended for sepsis due to right lower lobe pneumonia and spent 4 days.  Patient is a former smoker with reported quit date in 2009 and 42-pack-year history.    Past medical history includes severe oxygen dependent COPD, ankylosing spondylitis, kyphoscoliosis, cor pulmonale.  He is seen by cardiology for cor pulmonale.  Has been placed on an alternating diuretic regimen 20 mg and 40 mg Lasix daily.    Reviewed in clinic vitals including blood pressure 126/56, heart rate of 76, O2 sat of 93% on his baseline O2 requirement of 3 L and BMI of 20.82 kg/m².  He is on 3 L continuous oxygen.    Reviewed home medication regimen including Stiolto, levalbuterol and Lasix.    Reviewed most recent imaging including chest x-ray obtained 6/17/2019 in the clinic demonstrating degenerative changes of the thoracic spine with accentuated kyphosis hyperinflation with flattening of hemidiaphragms consistent with COPD, superimposed right lower lobe pneumonia.    CT scan obtained 1/21/2019 demonstrated mild to moderate emphysematous changes, mild bronchial wall thickening consistent with chronic bronchitis, no suspicious nodules or masses, no lymphadenopathy. Recommendation to continue annual screening.    Echocardiogram obtained 3/11/2019 demonstrated normal left ventricular size, wall thickness and systolic function.  LVEF estimated at 55%, moderately dilated right ventricle, mildly reduced right ventricular systolic function, enlarged right atrium, mildly dilated left atrium, estimated RVSP of 43 mmHg consistent with mild pulmonary hypertension.    Last pulmonary function test obtained 3/27/2019 demonstrated FEV1 of 0.55 L or 17% predicted, FVC of, FEV1/FVC ratio 30, 10% increase in FEV1 post bronchodilator, residual volume of 275% predicted, TLC of 132% predicted, DLCO of 34%  predicted.  Airway resistance severely increased.  Per pulmonologist interpretation severe obstructive lung disease with partial reversibility noted on study.    Review of Systems   Constitutional: Negative for chills, diaphoresis, fever, malaise/fatigue and weight loss.   HENT: Positive for congestion, sinus pain (chronic) and sore throat (intermittent ). Negative for hearing loss, nosebleeds and tinnitus.    Eyes: Negative.    Respiratory: Positive for cough, sputum production (clear) and shortness of breath (with exertion ). Negative for wheezing.    Cardiovascular: Positive for chest pain (saw cardiology, possible pinched nerve). Negative for palpitations, orthopnea, claudication and leg swelling.   Gastrointestinal: Negative for heartburn.        Dentures, no difficulty swallowing    Skin: Negative.    Neurological: Negative for dizziness, tremors and headaches.   Psychiatric/Behavioral: The patient does not have insomnia.        Past Medical History:   Diagnosis Date   • Anesthesia     poss. family hx malignant hyperthermia   • Ankylosing spondylitis (HCC)    • Chronic obstructive pulmonary disease (HCC)    • Cold    • Dental disorder     dentures   • Diverticulitis    • Osteoporosis    • Psychiatric problem     CONSULT FOR DRUG WITHDRAWAL AFTER LAST SURGERY   • Scoliosis        Past Surgical History:   Procedure Laterality Date   • VENTRAL HERNIA REPAIR  10/23/2009    Performed by ALEJANDRA EASTON at SURGERY SAME DAY [facility] ORS   • VENTRAL HERNIA REPAIR  6/11/2009    Performed by ALEJANDRA EASTON at SURGERY [facility] ORS   • COLOSTOMY CLOSURE  4/2/2009    Performed by ALEJANDRA EASTON at SURGERY [facility] ORS   • SIGMOID COLECTOMY  12/6/2008    Performed by ALEJANDRA EASTON at SURGERY [facility] ORS   • COLOSTOMY  12/6/2008    Performed by ALEJANDRA EASTON at SURGERY [facility] ORS   • EXPLORATORY LAPAROTOMY  12/6/2008    Performed by ALEJANDRA EASTON at SURGERY [facility] ORS   • BOWEL RESECTION   "12/6/2008    Performed by ALEJANDRA EASTON at SURGERY [facility] ORS   • UMBILICAL HERNIA REPAIR         Family History   Problem Relation Age of Onset   • Alzheimer's Disease Mother        Social History     Socioeconomic History   • Marital status:      Spouse name: Not on file   • Number of children: Not on file   • Years of education: Not on file   • Highest education level: Not on file   Occupational History   • Not on file   Social Needs   • Financial resource strain: Not on file   • Food insecurity:     Worry: Not on file     Inability: Not on file   • Transportation needs:     Medical: Not on file     Non-medical: Not on file   Tobacco Use   • Smoking status: Former Smoker     Packs/day: 1.00     Years: 42.00     Pack years: 42.00     Types: Cigarettes     Start date: 1/1/1967     Last attempt to quit: 6/6/2009     Years since quitting: 10.5   • Smokeless tobacco: Never Used   • Tobacco comment: continued abstinance   Substance and Sexual Activity   • Alcohol use: No   • Drug use: No     Comment: NOT AT THIS TIME     • Sexual activity: Not on file   Lifestyle   • Physical activity:     Days per week: Not on file     Minutes per session: Not on file   • Stress: Not on file   Relationships   • Social connections:     Talks on phone: Not on file     Gets together: Not on file     Attends Evangelical service: Not on file     Active member of club or organization: Not on file     Attends meetings of clubs or organizations: Not on file     Relationship status: Not on file   • Intimate partner violence:     Fear of current or ex partner: Not on file     Emotionally abused: Not on file     Physically abused: Not on file     Forced sexual activity: Not on file   Other Topics Concern   • Not on file   Social History Narrative   • Not on file       Allergies as of 12/17/2019   • (No Known Allergies)        @Vital signs for this encounter:  Vitals:    12/17/19 1455   Height: 1.753 m (5' 9\")   Weight: 64 kg (141 lb) "   Weight % change since last entry.: 0 %   BP: 126/56   Pulse: 76   BMI (Calculated): 20.82   O2 sat % on O2: 93 %   O2 Flow Rate (L/min): 3       Current medications as of today   Current Outpatient Medications   Medication Sig Dispense Refill   • STIOLTO RESPIMAT 2.5-2.5 MCG/ACT Aero Soln INHALE 2 PUFFS BY MOUTH EVERY DAY. 1 Inhaler 3   • furosemide (LASIX) 20 MG Tab Take 1 Tab by mouth in the morning every Tue, Thurs, Sat, and Sun. 50 Tab 3   • furosemide (LASIX) 40 MG Tab Take 1 Tab by mouth every 48 hours. As directed (Every Monday, Wednesday and Friday) 50 Tab 3     No current facility-administered medications for this visit.          Physical Exam:   Gen:           Alert and oriented, No apparent distress. Mood and affect     appropriate, normal interaction with provider.  Eyes:          sclere white, conjunctive moist.  Hearing:     Grossly intact.  Dentition:    Good dentition.  Oropharynx:   Tongue normal, posterior pharynx without erythema or exudate.  Neck:        Supple, trachea midline, no masses.  Respiratory Effort: No intercostal retractions or use of accessory muscles.   Lung Auscultation:      Diminished throughout; no rales, rhonchi or wheezing.  CV:            Regular rate and rhythm. No edema. No murmurs, rubs or gallops.  Digits, Nails, Ext: No clubbing, cyanosis, petechiae, or nodes.   Skin:        No rashes, lesions or ulcers noted on back or exposed skin    surfaces.                     Assessment:  1. Chronic obstructive pulmonary disease, unspecified COPD type (HCC)  levalbuterol (XOPENEX) 1.25 MG/3ML Nebu Soln    Tiotropium Bromide-Olodaterol (STIOLTO RESPIMAT) 2.5-2.5 MCG/ACT Aero Soln       Immunizations:    Flu: 10/23/2019  Pneumovax 23: 3/13/2019  Prevnar 13: Deferred    Plan:  1-Continue stiolto  2-has not required rescue inhaler   Indications increased shortness of breath, increased cough or wheezing  3-has used nebulizer  4-provide script for levalbuterol per nebulizer  5-follow  up in 6 months, sooner if needed     Patient does continue to express desire that [facility] paperwork for his wife be reworded so that she can stay home with him and care for him when he is having 1 of his bad days.  His wife okay    This dictation was created using voice recognition software. The accuracy of the dictation is limited to the abilities of the software. I expect there may be some errors of grammar and possibly content.

## 2019-12-17 NOTE — PATIENT INSTRUCTIONS
1-Continue stiolto  2-has not required rescue inhaler   Indications increased shortness of breath, increased cough or wheezing  3-has used nebulizer  4-provide script for levalbuterol inhaler  5-follow up in 6 months, sooner if needed

## 2019-12-23 DIAGNOSIS — J44.9 CHRONIC OBSTRUCTIVE PULMONARY DISEASE, UNSPECIFIED COPD TYPE (HCC): ICD-10-CM

## 2019-12-23 NOTE — TELEPHONE ENCOUNTER
Have we ever prescribed this med? Yes.  If yes, what date? 6/18/19    Last OV: 12/17/19 Christine     Next OV: 6/17/20 Christine     DX: Chronic obstructive pulmonary disease, unspecified COPD type     Medications: Tiotropium Bromide-Olodaterol 2.5-2.5 MCG/ACT AERS 1 Puff

## 2020-02-06 ENCOUNTER — OFFICE VISIT (OUTPATIENT)
Dept: MEDICAL GROUP | Facility: MEDICAL CENTER | Age: 65
End: 2020-02-06
Payer: MEDICARE

## 2020-02-06 VITALS
BODY MASS INDEX: 19.85 KG/M2 | HEIGHT: 69 IN | SYSTOLIC BLOOD PRESSURE: 112 MMHG | HEART RATE: 86 BPM | OXYGEN SATURATION: 93 % | DIASTOLIC BLOOD PRESSURE: 70 MMHG | WEIGHT: 134 LBS | TEMPERATURE: 98.7 F

## 2020-02-06 DIAGNOSIS — I27.20 PULMONARY HYPERTENSION (HCC): ICD-10-CM

## 2020-02-06 DIAGNOSIS — I27.81 COR PULMONALE (CHRONIC) (HCC): ICD-10-CM

## 2020-02-06 DIAGNOSIS — M45.3 ANKYLOSING SPONDYLITIS OF CERVICOTHORACIC REGION (HCC): ICD-10-CM

## 2020-02-06 DIAGNOSIS — Z00.00 HEALTHCARE MAINTENANCE: ICD-10-CM

## 2020-02-06 DIAGNOSIS — J96.11 CHRONIC RESPIRATORY FAILURE WITH HYPOXIA (HCC): ICD-10-CM

## 2020-02-06 DIAGNOSIS — Z12.11 COLON CANCER SCREENING: ICD-10-CM

## 2020-02-06 DIAGNOSIS — J44.9 CHRONIC OBSTRUCTIVE PULMONARY DISEASE, UNSPECIFIED COPD TYPE (HCC): ICD-10-CM

## 2020-02-06 PROCEDURE — 99204 OFFICE O/P NEW MOD 45 MIN: CPT | Performed by: FAMILY MEDICINE

## 2020-02-06 ASSESSMENT — PATIENT HEALTH QUESTIONNAIRE - PHQ9
CLINICAL INTERPRETATION OF PHQ2 SCORE: 3
SUM OF ALL RESPONSES TO PHQ QUESTIONS 1-9: 9
5. POOR APPETITE OR OVEREATING: 0 - NOT AT ALL

## 2020-02-06 NOTE — PROGRESS NOTES
Annual Health Assessment Questions:    1.  Are you currently engaging in any exercise or physical activity? No    2.  How would you describe your mood or emotional well-being today? fair    3.  Have you had any falls in the last year? No    4.  Have you noticed any problems with your balance or had difficulty walking? Yes    5.  In the last six months have you experienced any leakage of urine? No    6. DPA/Advanced Directive: Patient does not have an Advanced Directive.  A packet and workshop information was given on Advanced Directives.

## 2020-02-07 NOTE — PROGRESS NOTES
CC: COPD, chronic respiratory failure with hypoxia, cor pulmonale, pulmonary hypertension    HPI:  Richard presents today to establish new PCP.    Patient has been active and independent with all ADLs.  Has the following chronic medical issues:    Chronic obstructive pulmonary disease, unspecified COPD type (HCC)/Chronic respiratory failure with hypoxia (HCC)  Patient is mostly symptomatic with exertion.  However he is currently denies any cough, his oxygen saturation is normal on 3 L/min.  He is currently on Xopenex as needed, he stated that he rarely using it.  He also has been on Stiolto Respimat.  Patient follow-up with pulmonology in a regular basis.    Cor pulmonale (chronic) (HCC)/ Pulmonary hypertension (HCC)  Last echocardiogram was done in March 2019 showed:  Normal left ventricular size, wall thickness, and systolic function.  Left ventricular ejection fraction is visually estimated to be 55%.  Moderately dilated right ventricle.  Mildly reduced right ventricular systolic function.  Enlarged right atrium.  Mildly dilated left atrium.  Right atrial pressure is estimated to be 8 mmHg, mildly elevated.  Estimated right ventricular systolic pressure  is 43 mmHg consistent   with mild pulmonary hypertension.     Currently denies any leg swelling, however has shortness of breath with exertion.  He is currently on Lasix 40 mg daily Friday to Monday, and 20 mg in the weekends.    Ankylosing spondylitis of cervicothoracic region (HCC)  Has been a chronic problem.  Mostly does not affect his quality of life.  No restrictive lung disease    Pneumonia and flu vaccines up-to-date  Due for colonoscopy, however he is not able to do it which is ankylosing spondylitis, would prefer to do the fit test.            Patient Active Problem List    Diagnosis Date Noted   • Community acquired pneumonia of right lower lobe of lung (HCC) 06/17/2019     Priority: High   • Chronic obstructive pulmonary disease with acute exacerbation  ([facility]) 03/12/2019     Priority: High   • Cor pulmonale (chronic) ([facility]) 05/29/2019   • Pulmonary hypertension ([facility]) 05/29/2019   • Cellulitis 03/12/2019   • Bilateral lower extremity edema 03/12/2019   • Ankylosing spondylitis of cervicothoracic region ([facility]) 11/15/2018   • Tobacco use disorder, mild, in sustained remission, abuse 07/28/2016   • COLD (chronic obstructive lung disease) ([facility]) 07/28/2016       Current Outpatient Medications   Medication Sig Dispense Refill   • Home Care Oxygen Inhale 3 L/min by mouth Continuous.     • levalbuterol (XOPENEX) 1.25 MG/3ML Nebu Soln 3 mL by Nebulization route every four hours as needed for Shortness of Breath. 75 mL 11   • STIOLTO RESPIMAT 2.5-2.5 MCG/ACT Aero Soln INHALE 2 PUFFS BY MOUTH EVERY DAY. 1 Inhaler 3   • furosemide (LASIX) 20 MG Tab Take 1 Tab by mouth in the morning every Tue, Thurs, Sat, and Sun. 50 Tab 3   • furosemide (LASIX) 40 MG Tab Take 1 Tab by mouth every 48 hours. As directed (Every Monday, Wednesday and Friday) 50 Tab 3   • Tiotropium Bromide-Olodaterol 2.5-2.5 MCG/ACT Aero Soln INHALE 2 PUFFS BY MOUTH EVERY DAY. (Patient not taking: Reported on 2/6/2020) 1 Inhaler 5     No current facility-administered medications for this visit.          Allergies as of 02/06/2020   • (No Known Allergies)        Social History     Socioeconomic History   • Marital status:      Spouse name: Not on file   • Number of children: Not on file   • Years of education: Not on file   • Highest education level: Not on file   Occupational History   • Not on file   Social Needs   • Financial resource strain: Not on file   • Food insecurity:     Worry: Not on file     Inability: Not on file   • Transportation needs:     Medical: Not on file     Non-medical: Not on file   Tobacco Use   • Smoking status: Former Smoker     Packs/day: 1.00     Years: 42.00     Pack years: 42.00     Types: Cigarettes     Start date: 1/1/1967     Last attempt to quit: 6/6/2009     Years since  quitting: 10.6   • Smokeless tobacco: Never Used   • Tobacco comment: continued abstinance   Substance and Sexual Activity   • Alcohol use: No   • Drug use: No     Comment: NOT AT THIS TIME     • Sexual activity: Not Currently   Lifestyle   • Physical activity:     Days per week: Not on file     Minutes per session: Not on file   • Stress: Not on file   Relationships   • Social connections:     Talks on phone: Not on file     Gets together: Not on file     Attends Druze service: Not on file     Active member of club or organization: Not on file     Attends meetings of clubs or organizations: Not on file     Relationship status: Not on file   • Intimate partner violence:     Fear of current or ex partner: Not on file     Emotionally abused: Not on file     Physically abused: Not on file     Forced sexual activity: Not on file   Other Topics Concern   • Not on file   Social History Narrative   • Not on file       Family History   Problem Relation Age of Onset   • Alzheimer's Disease Mother        Past Surgical History:   Procedure Laterality Date   • VENTRAL HERNIA REPAIR  10/23/2009    Performed by ALEJANDRA EASTON at SURGERY SAME DAY [facility] ORS   • VENTRAL HERNIA REPAIR  6/11/2009    Performed by ALEJANDRA EASTON at SURGERY [facility] ORS   • COLOSTOMY CLOSURE  4/2/2009    Performed by ALEJANDRA EASTON at SURGERY [facility] ORS   • SIGMOID COLECTOMY  12/6/2008    Performed by ALEJANDRA EASTON at SURGERY [facility] ORS   • COLOSTOMY  12/6/2008    Performed by ALEJANDRA EASTON at SURGERY [facility] ORS   • EXPLORATORY LAPAROTOMY  12/6/2008    Performed by ALEJANDRA EASTON at SURGERY [facility] ORS   • BOWEL RESECTION  12/6/2008    Performed by ALEJANDRA EASTON at SURGERY [facility] ORS   • UMBILICAL HERNIA REPAIR         ROS:  Denies any Headache, Blurred Vision, Confusion Chest pain,  Shortness of breath,  Abdominal pain, Changes of bowel or bladder, Lower ext edema, Fevers, Nights sweats, Weight Changes, Focal  "weakness or numbness.  All other systems are negative.    /70 (BP Location: Right arm, Patient Position: Sitting, BP Cuff Size: Adult)   Pulse 86   Temp 37.1 °C (98.7 °F) (Temporal)   Ht 1.753 m (5' 9\")   Wt 60.8 kg (134 lb)   SpO2 93% Comment: on 3L/min  BMI 19.79 kg/m²     Physical Exam:  Gen:         Alert and oriented, No apparent distress.  HEENT:   Perrla, TM clear,  Oralpharynx no erythema or exudates.  Neck:       No Jugular venous distension, Lymphadenopathy, Thyromegaly, Bruits.  Lungs:     Clear to auscultation bilaterally  CV:          Regular rate and rhythm. No murmurs, rubs or gallops.  Abd:         Soft non tender, non distended. Normal active bowel sounds. No                                        Hepatosplenomegaly, No pulsatile masses.  Ext:          No clubbing, cyanosis, edema.      Assessment and Plan.   64 y.o. male     1. Chronic obstructive pulmonary disease, unspecified COPD type (HCC)  Stable.  Continue on Xopenex inhaler as needed  Continue on Stiolto Respimat.  Continue follow-up with pulmonology as needed.  Continue home oxygen at 3 L/min.  2. Cor pulmonale (chronic) (HCC)  Mild.  Last echocardiogram showed mild pulmonary hypertension,  Will decrease Lasix from 40 mg daily to 20 mg daily.    3. Pulmonary hypertension (HCC)  Patient has mild, hypertension, right ventricle systolic pressure was 40 mmHg.  Currently no leg swelling.  Decrease Lasix to 20 mg daily    4. Ankylosing spondylitis of cervicothoracic region (HCC)  Chronic.  We will continue monitor.    5. Healthcare maintenance  Pneumonia and flu vaccines up-to-date  Due for colonoscopy, however he is not able to do it which is ankylosing spondylitis, would prefer to do the fit test.    6. Chronic respiratory failure with hypoxia (HCC)  Has been on oxygen at 3 L/min, 24/7..    7. Colon cancer screening    - OCCULT BLOOD FECES IMMUNOASSAY; Future  "

## 2020-02-12 ENCOUNTER — TELEPHONE (OUTPATIENT)
Dept: HEALTH INFORMATION MANAGEMENT | Facility: OTHER | Age: 65
End: 2020-02-12

## 2020-02-12 DIAGNOSIS — Z12.2 ENCOUNTER FOR SCREENING FOR MALIGNANT NEOPLASM OF RESPIRATORY ORGANS: ICD-10-CM

## 2020-02-12 NOTE — TELEPHONE ENCOUNTER
2. SPECIFIC Action To Be Taken: Orders pending, please sign.    Richard is due for his annual lung cancer screening CT.  His last screening date was 1/21/19   [id number]

## 2020-03-12 DIAGNOSIS — I27.20 PULMONARY HYPERTENSION (HCC): ICD-10-CM

## 2020-03-12 RX ORDER — FUROSEMIDE 20 MG/1
TABLET ORAL
Qty: 50 TAB | Refills: 3 | Status: SHIPPED | OUTPATIENT
Start: 2020-03-12 | End: 2021-02-24

## 2020-05-18 DIAGNOSIS — J44.9 CHRONIC OBSTRUCTIVE PULMONARY DISEASE, UNSPECIFIED COPD TYPE (HCC): ICD-10-CM

## 2020-05-19 RX ORDER — TIOTROPIUM BROMIDE AND OLODATEROL 3.124; 2.736 UG/1; UG/1
SPRAY, METERED RESPIRATORY (INHALATION)
Qty: 1 INHALER | Refills: 5 | Status: SHIPPED | OUTPATIENT
Start: 2020-05-19 | End: 2020-11-06

## 2020-05-19 NOTE — TELEPHONE ENCOUNTER
Have we ever prescribed this med? Yes.  If yes, what date? 09/19/19    Last OV: 12/17/19 with Lakshmi King PA-C    Next OV: 06/17/2020 with Lakshmi King PA-C    DX: Chronic obstructive pulmonary disease, unspecified COPD type (HCC) (J44.9    Medications:   Requested Prescriptions     Pending Prescriptions Disp Refills   • STIOLTO RESPIMAT 2.5-2.5 MCG/ACT Aero Soln [Pharmacy Med Name: STIOLTO RESPIMAT INHAL SPRAY]  5     Sig: INHALE 2 PUFFS BY MOUTH EVERY DAY.

## 2020-07-16 ENCOUNTER — TELEPHONE (OUTPATIENT)
Dept: CARDIOLOGY | Facility: MEDICAL CENTER | Age: 65
End: 2020-07-16

## 2020-07-16 DIAGNOSIS — I27.20 PULMONARY HYPERTENSION (HCC): ICD-10-CM

## 2020-07-16 RX ORDER — FUROSEMIDE 40 MG/1
40 TABLET ORAL
Qty: 40 TAB | Refills: 2 | Status: SHIPPED | OUTPATIENT
Start: 2020-07-17 | End: 2021-04-19

## 2020-07-16 NOTE — TELEPHONE ENCOUNTER
Refill for Lasix 20mg tab was sent for 90 day supply with 3 refills on 3/12/20. Refill for Lasix 40mg tab was sent for 90 day supply with 3 refills on 6/25/19. Called pt to confirm that he is still taking 40mg every M W F and 20mg every T TH Sat and Sun. He confirms this dosing but is out of the 40mg tabs. New Rx sent.

## 2020-07-24 ENCOUNTER — TELEPHONE (OUTPATIENT)
Dept: SCHEDULING | Facility: IMAGING CENTER | Age: 65
End: 2020-07-24

## 2020-07-24 NOTE — TELEPHONE ENCOUNTER
Outcome: Unable to Leave Message- memory full     Please transfer to Patient Outreach Team at [phone] when patient returns call.      Attempt # 1

## 2020-07-31 NOTE — TELEPHONE ENCOUNTER
Outcome: Left Message    If patient calls back please transfer to Patient Outreach at [phone].    Attempt #2

## 2020-08-11 ENCOUNTER — APPOINTMENT (OUTPATIENT)
Dept: PULMONOLOGY | Facility: HOSPICE | Age: 65
End: 2020-08-11
Payer: MEDICARE

## 2020-10-16 ENCOUNTER — APPOINTMENT (OUTPATIENT)
Dept: PULMONOLOGY | Facility: HOSPICE | Age: 65
End: 2020-10-16
Payer: MEDICARE

## 2020-10-20 ENCOUNTER — TELEPHONE (OUTPATIENT)
Dept: HEALTH INFORMATION MANAGEMENT | Facility: OTHER | Age: 65
End: 2020-10-20

## 2020-10-20 DIAGNOSIS — Z87.891 PERSONAL HISTORY OF TOBACCO USE, PRESENTING HAZARDS TO HEALTH: ICD-10-CM

## 2020-10-20 NOTE — LETTER
[address]  [place] [address]  P [phone]  F [phone]         Date: December 17, 2020    Richard Wymanlock  [address]  [place] [address]    Re:  Low-dose chest CT performed on 1/21/19 for lung cancer screening with  follow-up CT recommended after 12 months.    Medical Record Number: [id number]    Dear Rcihard,    Our records indicate that you are due for a low-dose chest CT (LDCT) examination.    Please call our Imaging scheduling department at [phone] to schedule your LDCT.     If you are receiving care elsewhere, please let us know at your earliest convenience so we may update our records.    Here are some other important points you should know:  • Your low-dose Chest CT report will be sent to your healthcare provider and is available to participants in [facility].    • Although low-dose chest CT is very effective at finding lung cancer early, it cannot find all lung cancers. If you develop any new symptoms such as shortness of breath, chest pain, or coughing up blood, please call your doctor.  • Please keep in mind that good health involves quitting smoking (for help, call [facility] Quit Tobacco program at [phone]), an annual physical exam, and continued screening with low-dose chest CT.    Thank you for participating in the Lung Cancer Screening program.  If you have any questions about this letter or our program, please call our Diagnostic Nurse Navigator, Sandra Begum at [phone].    Sincerely,  Jane Dior MD, [facility]  Medical Director  [facility] Lung Cancer Screening Program

## 2020-11-06 ENCOUNTER — TELEPHONE (OUTPATIENT)
Dept: PULMONOLOGY | Facility: HOSPICE | Age: 65
End: 2020-11-06

## 2020-11-06 DIAGNOSIS — J44.9 CHRONIC OBSTRUCTIVE PULMONARY DISEASE, UNSPECIFIED COPD TYPE (HCC): ICD-10-CM

## 2020-11-06 RX ORDER — TIOTROPIUM BROMIDE AND OLODATEROL 3.124; 2.736 UG/1; UG/1
SPRAY, METERED RESPIRATORY (INHALATION)
Qty: 1 EACH | Refills: 5 | Status: SHIPPED | OUTPATIENT
Start: 2020-11-06 | End: 2020-12-09

## 2020-11-06 NOTE — TELEPHONE ENCOUNTER
Patient called for a refill Stiolto Respimat.  He use [facility] on [address].  His phone number is [phone]

## 2020-11-06 NOTE — TELEPHONE ENCOUNTER
Have we ever prescribed this med? Yes.  If yes, what date? 05/19/2020    Last OV: 12/17/2019 - EVETTE WHEAT    Next OV: 11/19/2020 - EVETTE WHEAT    DX: COPD    Medications: Stiolto

## 2020-11-12 ENCOUNTER — TELEPHONE (OUTPATIENT)
Dept: CARDIOLOGY | Facility: MEDICAL CENTER | Age: 65
End: 2020-11-12

## 2020-11-12 NOTE — TELEPHONE ENCOUNTER
Called patient to see if he has completed fasting blood work ordered by [facility] to get results prior to upcoming appointment. Unable to reach patient, left voicemail for call back.

## 2020-11-19 ENCOUNTER — APPOINTMENT (OUTPATIENT)
Dept: SLEEP MEDICINE | Facility: MEDICAL CENTER | Age: 65
End: 2020-11-19
Payer: MEDICARE

## 2020-12-03 ENCOUNTER — TELEMEDICINE (OUTPATIENT)
Dept: SLEEP MEDICINE | Facility: MEDICAL CENTER | Age: 65
End: 2020-12-03
Payer: MEDICARE

## 2020-12-03 VITALS — HEIGHT: 69 IN | OXYGEN SATURATION: 97 % | BODY MASS INDEX: 19.79 KG/M2 | HEART RATE: 82 BPM

## 2020-12-03 DIAGNOSIS — J96.11 CHRONIC RESPIRATORY FAILURE WITH HYPOXIA (HCC): ICD-10-CM

## 2020-12-03 DIAGNOSIS — J44.1 CHRONIC OBSTRUCTIVE PULMONARY DISEASE WITH ACUTE EXACERBATION (HCC): ICD-10-CM

## 2020-12-03 DIAGNOSIS — Z87.891 PERSONAL HISTORY OF NICOTINE DEPENDENCE: ICD-10-CM

## 2020-12-03 PROCEDURE — 99213 OFFICE O/P EST LOW 20 MIN: CPT | Mod: 95,CR | Performed by: PHYSICIAN ASSISTANT

## 2020-12-03 RX ORDER — LEVALBUTEROL TARTRATE 45 UG/1
2 AEROSOL, METERED ORAL EVERY 4 HOURS PRN
Qty: 1 EACH | Refills: 1 | Status: SHIPPED | OUTPATIENT
Start: 2020-12-03 | End: 2022-01-26 | Stop reason: SDUPTHER

## 2020-12-03 NOTE — PATIENT INSTRUCTIONS
1-covid 19 precautions:  Wear mask in public, social distancing, frequent handwashing, obtain flu shot  2-feeling much better on stiolto   3-follow up in 3 months, sooner if needed

## 2020-12-03 NOTE — PROGRESS NOTES
Virtual Visit: Established Patient   This visit was conducted via [employer] platform using secure and encrypted videoconferencing technology. The patient was in a private location in the state of [place].  The patient's identity was confirmed and verbal consent was obtained for this virtual visit.  Visit initiated 2:52 PM, ended at 3:23 PM.    Subjective:   CC:   Chief Complaint   Patient presents with   • Follow-Up   • COPD     3L 24/7       Richard Vaughn is a 65 y.o. male presenting for evaluation and management of: COPD, chronic respiratory failure with hypoxia.  Patient last seen in clinic 12/17/2019.  He is a former smoker with reported quit date in 2009 and 42-pack-year history.    Pertinent past medical history includes ankylosing spondylitis, kyphoscoliosis, cor pulmonale, pulmonary hypertension.  He is followed by cardiology.  Reports 30% improvement in secretion clearance since stopping use of sugar especially in his morning coffee.    Reviewed provided vitals including heart rate of 82 and BMI of 19.79 kg/m².  Patient is on O2 at 3 L 24/7.    Reviewed most recent imaging including CT scan obtained 1/21/2019 demonstrating mild to moderate emphysematous changes, mild bronchial wall thickening consistent with chronic bronchitis, no suspicious nodules or masses, no lymphadenopathy.  Patient has been recommended to continue annual low-dose lung cancer screening.  He is reluctant to do so during Covid pandemic.    ROS     Denies any recent fevers or chills. No nausea or vomiting.  Reports nasal congestion.  Reports productive cough with white to yellow secretions.  Reports shortness of breath with activity, occasional chest pain, mild expiratory wheezing.  Reports intermittent heartburn, upper and lower dentures, no difficulty with swallowing.    No Known Allergies    Current medicines (including changes today)  Current Outpatient Medications   Medication Sig Dispense Refill   • levalbuterol (XOPENEX HFA) 45  MCG/ACT inhaler Inhale 2 Puffs every four hours as needed for Shortness of Breath. 1 Each 1   • STIOLTO RESPIMAT 2.5-2.5 MCG/ACT Aero Soln INHALE 2 PUFFS BY MOUTH EVERY DAY 1 Each 5   • furosemide (LASIX) 40 MG Tab Take 1 Tab by mouth every Monday, Wednesday, and Friday. As directed (Every Monday, Wednesday and Friday) 40 Tab 2   • Home Care Oxygen Inhale 3 L/min by mouth Continuous.     • levalbuterol (XOPENEX) 1.25 MG/3ML Nebu Soln 3 mL by Nebulization route every four hours as needed for Shortness of Breath. 75 mL 11   • furosemide (LASIX) 20 MG Tab TAKE 1 TABLET BY MOUTH IN THE MORNING EVERY TUESDAY, THURSDAY, SATURDAY, AND SUNDAY 50 Tab 3   • STIOLTO RESPIMAT 2.5-2.5 MCG/ACT Aero Soln INHALE 2 PUFFS BY MOUTH EVERY DAY. 1 Inhaler 3     No current facility-administered medications for this visit.        Patient Active Problem List    Diagnosis Date Noted   • Community acquired pneumonia of right lower lobe of lung 06/17/2019     Priority: High   • Chronic obstructive pulmonary disease with acute exacerbation ([facility]) 03/12/2019     Priority: High   • Cor pulmonale (chronic) ([facility]) 05/29/2019   • Pulmonary hypertension ([facility]) 05/29/2019   • Cellulitis 03/12/2019   • Bilateral lower extremity edema 03/12/2019   • Ankylosing spondylitis of cervicothoracic region ([facility]) 11/15/2018   • Tobacco use disorder, mild, in sustained remission, abuse 07/28/2016   • COLD (chronic obstructive lung disease) ([facility]) 07/28/2016       Family History   Problem Relation Age of Onset   • Alzheimer's Disease Mother        He  has a past medical history of Anesthesia, Ankylosing spondylitis ([facility]), Chronic obstructive pulmonary disease ([facility]), Cold, Dental disorder, Diverticulitis, Osteoporosis, Psychiatric problem, and Scoliosis.  He  has a past surgical history that includes sigmoid colectomy (12/6/2008); colostomy (12/6/2008); colostomy closure (4/2/2009); ventral hernia repair (6/11/2009); umbilical hernia repair; exploratory laparotomy  "(12/6/2008); bowel resection (12/6/2008); and ventral hernia repair (10/23/2009).       Objective:   Pulse 82   Ht 1.753 m (5' 9\")   SpO2 97%   BMI 19.79 kg/m²     Physical Exam:  Constitutional: Alert, no distress, well-groomed.  Skin: No rashes in visible areas.  Eye: Round. Conjunctiva clear, lids normal. No icterus.   ENMT: Lips pink without lesions, good dentition, moist mucous membranes. Phonation normal.  Neck: No masses, no thyromegaly. Moves freely without pain.  Respiratory: Mild accessory muscle use, no cough or audible wheeze  Psych: Alert and oriented x3, normal affect and mood.       Assessment and Plan:   The following treatment plan was discussed:     1. Chronic obstructive pulmonary disease with acute exacerbation (HCC)  - levalbuterol (XOPENEX HFA) 45 MCG/ACT inhaler; Inhale 2 Puffs every four hours as needed for Shortness of Breath.  Dispense: 1 Each; Refill: 1  - CT-LUNG CANCER-SCREENING; Future    2. Personal history of nicotine dependence   - CT-LUNG CANCER-SCREENING; Future    3. Chronic respiratory failure with hypoxia (HCC)  -Continue O2 use at 3 L around-the-clock with benefit seen      Follow-up: Return in about 6 months (around 6/3/2021) for Return with Lakshmi King PA-C.               This dictation was created using voice recognition software. The accuracy of the dictation is limited to the abilities of the software. I expect there may be some errors of grammar and possibly content.   "

## 2020-12-09 ENCOUNTER — TELEMEDICINE (OUTPATIENT)
Dept: CARDIOLOGY | Facility: MEDICAL CENTER | Age: 65
End: 2020-12-09
Payer: MEDICARE

## 2020-12-09 VITALS — HEIGHT: 69 IN | BODY MASS INDEX: 19.99 KG/M2 | WEIGHT: 135 LBS

## 2020-12-09 DIAGNOSIS — Z00.00 HEALTH CARE MAINTENANCE: ICD-10-CM

## 2020-12-09 DIAGNOSIS — I27.81 COR PULMONALE (CHRONIC) (HCC): ICD-10-CM

## 2020-12-09 DIAGNOSIS — J44.1 CHRONIC OBSTRUCTIVE PULMONARY DISEASE WITH ACUTE EXACERBATION (HCC): ICD-10-CM

## 2020-12-09 DIAGNOSIS — M45.3 ANKYLOSING SPONDYLITIS OF CERVICOTHORACIC REGION (HCC): ICD-10-CM

## 2020-12-09 PROCEDURE — 99214 OFFICE O/P EST MOD 30 MIN: CPT | Mod: 95,CR | Performed by: INTERNAL MEDICINE

## 2020-12-09 ASSESSMENT — FIBROSIS 4 INDEX: FIB4 SCORE: 3.19

## 2020-12-09 NOTE — PROGRESS NOTES
"Cardiology Telemedicine Visit: Established Patient   This encounter was conducted via Zoom.   Verbal consent was obtained. Patient's identity was verified.    Assessment and Plan:   PCP: Michael Green M.D.  The following treatment plan was discussed:     1. Cor pulmonale (chronic) (HCC)  Stable. Diuretic regimen to as needed  - BMP, nt-BNP ordered    2. Health care maintenance  Labs ordered including lipids, PSA, A1c per his request. He is to see his PCP soon    3. Chronic obstructive pulmonary disease with acute exacerbation (HCC)  Improved phlegm after cutting sugar from the diet.     4. Ankylosing spondylitis of cervicothoracic region (HCC)  With kyphoscoliosis    Follow up: Return in about 1 year (around 12/9/2021).    Subjective:   History: Richard Vaughn is a 65 y.o. male with a past medical history of Ankylosing spondylitis with kyphoscoliosis and mixed restrictive/obstructive lung disease requiring oxygen presenting for follow up of cor pulmonale.  His health has been stable over the past year.  No edema with ongoing alternating day diuretic regimen between 20 and 40 mg of Lasix.  He has been very isolated since the pandemic began, and reports only having gone out 3 times.  He has a treadmill at home but has not been using this but hopes that he can find the motivation to start using this.  He is not walking outside due to the cumbersome nature of bringing a oxygen tank.  He does continue to experience chest discomfort with forward leaning posture which corrects promptly with sitting upright.  He had remarkable improvement in phlegm after removing sugar from the diet, particularly in his morning coffee        ROS  Denies any recent fevers or chills. No nausea or vomiting. No chest pains or shortness of breath. All other systems reviewed and negative except as per the HPI       Objective:   Vitals obtained by patient:  Respirations through observation: 14  Ht 1.753 m (5' 9\")   Wt 61.2 kg " (135 lb)   BMI 19.94 kg/m²     Physical Exam:  Constitutional: Alert, no distress, well-groomed.  Skin: No rashes in visible areas.  Eye: Round. Conjunctiva clear, lids normal. No icterus.   ENMT: Lips pink without lesions, good dentition, moist mucous membranes. Phonation normal.  Neck: No masses, no thyromegaly. Moves freely without pain.  CV: Pulse as reported by patient  Respiratory: Unlabored respiratory effort, no cough or audible wheeze  Psych: Alert and oriented x3, normal affect and mood.     No Known Allergies  Current medicines (including changes today)  Current Outpatient Medications   Medication Sig Dispense Refill   • levalbuterol (XOPENEX HFA) 45 MCG/ACT inhaler Inhale 2 Puffs every four hours as needed for Shortness of Breath. 1 Each 1   • furosemide (LASIX) 40 MG Tab Take 1 Tab by mouth every Monday, Wednesday, and Friday. As directed (Every Monday, Wednesday and Friday) 40 Tab 2   • furosemide (LASIX) 20 MG Tab TAKE 1 TABLET BY MOUTH IN THE MORNING EVERY TUESDAY, THURSDAY, SATURDAY, AND SUNDAY 50 Tab 3   • levalbuterol (XOPENEX) 1.25 MG/3ML Nebu Soln 3 mL by Nebulization route every four hours as needed for Shortness of Breath. 75 mL 11   • STIOLTO RESPIMAT 2.5-2.5 MCG/ACT Aero Soln INHALE 2 PUFFS BY MOUTH EVERY DAY. 1 Inhaler 3   • Home Care Oxygen Inhale 3 L/min by mouth Continuous.       No current facility-administered medications for this visit.      Patient Active Problem List    Diagnosis Date Noted   • Community acquired pneumonia of right lower lobe of lung 06/17/2019     Priority: High   • Chronic obstructive pulmonary disease with acute exacerbation (HCC) 03/12/2019     Priority: High   • Cor pulmonale (chronic) ([facility]) 05/29/2019   • Pulmonary hypertension ([facility]) 05/29/2019   • Cellulitis 03/12/2019   • Bilateral lower extremity edema 03/12/2019   • Ankylosing spondylitis of cervicothoracic region ([facility]) 11/15/2018   • Tobacco use disorder, mild, in sustained remission, abuse 07/28/2016    • COLD (chronic obstructive lung disease) (HCC) 07/28/2016     Family History   Problem Relation Age of Onset   • Alzheimer's Disease Mother      He  has a past medical history of Anesthesia, Ankylosing spondylitis (HCC), Chronic obstructive pulmonary disease (HCC), Cold, Dental disorder, Diverticulitis, Osteoporosis, Psychiatric problem, and Scoliosis.  He  has a past surgical history that includes sigmoid colectomy (12/6/2008); colostomy (12/6/2008); colostomy closure (4/2/2009); ventral hernia repair (6/11/2009); umbilical hernia repair; exploratory laparotomy (12/6/2008); bowel resection (12/6/2008); and ventral hernia repair (10/23/2009).  Past Medical History:   Diagnosis Date   • Anesthesia     poss. family hx malignant hyperthermia   • Ankylosing spondylitis (HCC)    • Chronic obstructive pulmonary disease (HCC)    • Cold    • Dental disorder     dentures   • Diverticulitis    • Osteoporosis    • Psychiatric problem     CONSULT FOR DRUG WITHDRAWAL AFTER LAST SURGERY   • Scoliosis      No Known Allergies  Outpatient Encounter Medications as of 12/9/2020   Medication Sig Dispense Refill   • levalbuterol (XOPENEX HFA) 45 MCG/ACT inhaler Inhale 2 Puffs every four hours as needed for Shortness of Breath. 1 Each 1   • furosemide (LASIX) 40 MG Tab Take 1 Tab by mouth every Monday, Wednesday, and Friday. As directed (Every Monday, Wednesday and Friday) 40 Tab 2   • furosemide (LASIX) 20 MG Tab TAKE 1 TABLET BY MOUTH IN THE MORNING EVERY TUESDAY, THURSDAY, SATURDAY, AND SUNDAY 50 Tab 3   • levalbuterol (XOPENEX) 1.25 MG/3ML Nebu Soln 3 mL by Nebulization route every four hours as needed for Shortness of Breath. 75 mL 11   • STIOLTO RESPIMAT 2.5-2.5 MCG/ACT Aero Soln INHALE 2 PUFFS BY MOUTH EVERY DAY. 1 Inhaler 3   • [DISCONTINUED] STIOLTO RESPIMAT 2.5-2.5 MCG/ACT Aero Soln INHALE 2 PUFFS BY MOUTH EVERY DAY (Patient not taking: Reported on 12/9/2020) 1 Each 5   • Home Care Oxygen Inhale 3 L/min by mouth Continuous.        No facility-administered encounter medications on file as of 2020.      Social History     Socioeconomic History   • Marital status:      Spouse name: Not on file   • Number of children: Not on file   • Years of education: Not on file   • Highest education level: Not on file   Occupational History   • Not on file   Social Needs   • Financial resource strain: Not on file   • Food insecurity     Worry: Not on file     Inability: Not on file   • Transportation needs     Medical: Not on file     Non-medical: Not on file   Tobacco Use   • Smoking status: Former Smoker     Packs/day: 1.00     Years: 42.00     Pack years: 42.00     Types: Cigarettes     Start date: 1967     Quit date: 2009     Years since quittin.5   • Smokeless tobacco: Never Used   • Tobacco comment: continued abstinance   Substance and Sexual Activity   • Alcohol use: No   • Drug use: No     Comment: NOT AT THIS TIME     • Sexual activity: Not Currently   Lifestyle   • Physical activity     Days per week: Not on file     Minutes per session: Not on file   • Stress: Not on file   Relationships   • Social connections     Talks on phone: Not on file     Gets together: Not on file     Attends Taoism service: Not on file     Active member of club or organization: Not on file     Attends meetings of clubs or organizations: Not on file     Relationship status: Not on file   • Intimate partner violence     Fear of current or ex partner: Not on file     Emotionally abused: Not on file     Physically abused: Not on file     Forced sexual activity: Not on file   Other Topics Concern   • Not on file   Social History Narrative   • Not on file       Studies  Lab Results   Component Value Date/Time    CHOLSTRLTOT 199 2017 01:57 PM     (H) 2017 01:57 PM    HDL 55 2017 01:57 PM    TRIGLYCERIDE 74 2017 01:57 PM       Lab Results   Component Value Date/Time    SODIUM 135 2019 02:00 AM    POTASSIUM 4.0  06/19/2019 02:00 AM    CHLORIDE 99 06/19/2019 02:00 AM    CO2 29 06/19/2019 02:00 AM    GLUCOSE 123 (H) 06/19/2019 02:00 AM    BUN 25 (H) 06/19/2019 02:00 AM    CREATININE 0.74 06/19/2019 02:00 AM    CREATININE 1.2 04/03/2009 04:00 AM     Lab Results   Component Value Date/Time    ALKPHOSPHAT 58 06/17/2019 09:54 PM    ASTSGOT 27 06/17/2019 09:54 PM    ALTSGPT 11 06/17/2019 09:54 PM    TBILIRUBIN 1.4 06/17/2019 09:54 PM        For this encounter I reviewed medical records

## 2020-12-30 DIAGNOSIS — J44.9 CHRONIC OBSTRUCTIVE PULMONARY DISEASE, UNSPECIFIED COPD TYPE (HCC): ICD-10-CM

## 2020-12-30 RX ORDER — TIOTROPIUM BROMIDE AND OLODATEROL 3.124; 2.736 UG/1; UG/1
2 SPRAY, METERED RESPIRATORY (INHALATION) DAILY
Qty: 3 EACH | Refills: 3 | Status: SHIPPED | OUTPATIENT
Start: 2020-12-30 | End: 2021-09-20

## 2020-12-30 NOTE — TELEPHONE ENCOUNTER
Patient called in today needs refill on his Stiolto Respimat. Please send to [facility] on [address].

## 2020-12-31 NOTE — TELEPHONE ENCOUNTER
Have we ever prescribed this med? Yes.  If yes, what date? 09/19/19    Last OV: 12/03/20    Next OV: none scheduled      DX:  copd    Medications: stiolto

## 2021-01-15 ENCOUNTER — TELEPHONE (OUTPATIENT)
Dept: SLEEP MEDICINE | Facility: MEDICAL CENTER | Age: 66
End: 2021-01-15

## 2021-01-15 NOTE — TELEPHONE ENCOUNTER
MEDICATION PRIOR AUTHORIZATION NEEDED:    1. Name of Medication: Levalbuterol HFA 45 mcg    2. Requested By (Name of Pharmacy): [facility]     3. Is insurance on file current? yes    4. What is the name & phone number of the 3rd party payor? [facility] [phone]

## 2021-02-24 DIAGNOSIS — I27.20 PULMONARY HYPERTENSION (HCC): ICD-10-CM

## 2021-02-24 RX ORDER — FUROSEMIDE 20 MG/1
TABLET ORAL
Qty: 50 TABLET | Refills: 3 | Status: SHIPPED | OUTPATIENT
Start: 2021-02-24 | End: 2022-04-28

## 2021-02-24 NOTE — TELEPHONE ENCOUNTER
Juan C Arboleda M.D.  You 8 minutes ago (11:46 AM)     Needs basic metabolic panel      Called pt and reminded of fasting labs BE ordered during 12/9/20 appt.

## 2021-03-03 DIAGNOSIS — Z23 NEED FOR VACCINATION: ICD-10-CM

## 2021-04-12 DIAGNOSIS — I27.20 PULMONARY HYPERTENSION (HCC): ICD-10-CM

## 2021-04-12 RX ORDER — FUROSEMIDE 40 MG/1
TABLET ORAL
Qty: 40 TABLET | Refills: 2 | OUTPATIENT
Start: 2021-04-12

## 2021-04-19 DIAGNOSIS — I27.20 PULMONARY HYPERTENSION (HCC): ICD-10-CM

## 2021-04-19 DIAGNOSIS — R60.0 BILATERAL LOWER EXTREMITY EDEMA: ICD-10-CM

## 2021-04-19 RX ORDER — FUROSEMIDE 40 MG/1
TABLET ORAL
Qty: 40 TABLET | Refills: 1 | Status: SHIPPED | OUTPATIENT
Start: 2021-04-19 | End: 2022-01-18 | Stop reason: SDUPTHER

## 2021-04-20 NOTE — TELEPHONE ENCOUNTER
Juan C Arboleda M.D.  You 8 minutes ago (5:16 PM)     Needs BMP for lasix       Called pt and reminded again of labs ordered by BE on 12/9. He is aware of the need for labs, but explains that he lives with family members who have babies, and he is waiting until 2 weeks after his 2nd COVID shot before getting the lab work. He estimates that he will be able to within the next couple weeks.

## 2021-05-03 ENCOUNTER — PATIENT OUTREACH (OUTPATIENT)
Dept: HEALTH INFORMATION MANAGEMENT | Facility: OTHER | Age: 66
End: 2021-05-03

## 2021-05-03 NOTE — PROGRESS NOTES
Outcome: Left Message    Please transfer to Patient Outreach Team at [phone] when patient returns call.    WebIZ Checked & Epic Updated:  no    HealthConnect Verified: no    Attempt # 1

## 2021-06-15 ENCOUNTER — HOSPITAL ENCOUNTER (OUTPATIENT)
Dept: LAB | Facility: MEDICAL CENTER | Age: 66
End: 2021-06-15
Attending: INTERNAL MEDICINE
Payer: MEDICARE

## 2021-06-15 DIAGNOSIS — I27.20 PULMONARY HYPERTENSION (HCC): ICD-10-CM

## 2021-06-15 DIAGNOSIS — I27.81 COR PULMONALE (CHRONIC) (HCC): ICD-10-CM

## 2021-06-15 DIAGNOSIS — J44.1 CHRONIC OBSTRUCTIVE PULMONARY DISEASE WITH ACUTE EXACERBATION (HCC): ICD-10-CM

## 2021-06-15 DIAGNOSIS — Z00.00 HEALTH CARE MAINTENANCE: ICD-10-CM

## 2021-06-15 LAB
ALBUMIN SERPL BCP-MCNC: 4.8 G/DL (ref 3.2–4.9)
ALP SERPL-CCNC: 58 U/L (ref 30–99)
ALT SERPL-CCNC: 17 U/L (ref 2–50)
ANION GAP SERPL CALC-SCNC: 8 MMOL/L (ref 7–16)
AST SERPL-CCNC: 23 U/L (ref 12–45)
BILIRUB CONJ SERPL-MCNC: <0.2 MG/DL (ref 0.1–0.5)
BILIRUB INDIRECT SERPL-MCNC: NORMAL MG/DL (ref 0–1)
BILIRUB SERPL-MCNC: 0.3 MG/DL (ref 0.1–1.5)
BUN SERPL-MCNC: 24 MG/DL (ref 8–22)
CALCIUM SERPL-MCNC: 9.5 MG/DL (ref 8.5–10.5)
CHLORIDE SERPL-SCNC: 96 MMOL/L (ref 96–112)
CHOLEST SERPL-MCNC: 239 MG/DL (ref 100–199)
CO2 SERPL-SCNC: 35 MMOL/L (ref 20–33)
CREAT SERPL-MCNC: 1.44 MG/DL (ref 0.5–1.4)
EST. AVERAGE GLUCOSE BLD GHB EST-MCNC: 103 MG/DL
FASTING STATUS PATIENT QL REPORTED: NORMAL
GLUCOSE SERPL-MCNC: 101 MG/DL (ref 65–99)
HBA1C MFR BLD: 5.2 % (ref 4–5.6)
HDLC SERPL-MCNC: 71 MG/DL
LDLC SERPL CALC-MCNC: 156 MG/DL
NT-PROBNP SERPL IA-MCNC: 419 PG/ML (ref 0–125)
POTASSIUM SERPL-SCNC: 4.4 MMOL/L (ref 3.6–5.5)
PROT SERPL-MCNC: 8.1 G/DL (ref 6–8.2)
PSA SERPL-MCNC: 0.72 NG/ML (ref 0–4)
SODIUM SERPL-SCNC: 139 MMOL/L (ref 135–145)
TRIGL SERPL-MCNC: 60 MG/DL (ref 0–149)
TSH SERPL DL<=0.005 MIU/L-ACNC: 1.05 UIU/ML (ref 0.38–5.33)

## 2021-06-15 PROCEDURE — 83880 ASSAY OF NATRIURETIC PEPTIDE: CPT

## 2021-06-15 PROCEDURE — 83036 HEMOGLOBIN GLYCOSYLATED A1C: CPT

## 2021-06-15 PROCEDURE — 84443 ASSAY THYROID STIM HORMONE: CPT

## 2021-06-15 PROCEDURE — 80048 BASIC METABOLIC PNL TOTAL CA: CPT

## 2021-06-15 PROCEDURE — 80061 LIPID PANEL: CPT

## 2021-06-15 PROCEDURE — 85027 COMPLETE CBC AUTOMATED: CPT

## 2021-06-15 PROCEDURE — 84153 ASSAY OF PSA TOTAL: CPT

## 2021-06-15 PROCEDURE — 80076 HEPATIC FUNCTION PANEL: CPT

## 2021-06-15 PROCEDURE — 36415 COLL VENOUS BLD VENIPUNCTURE: CPT

## 2021-06-16 DIAGNOSIS — I27.81 COR PULMONALE (CHRONIC) (HCC): ICD-10-CM

## 2021-06-16 DIAGNOSIS — I27.20 PULMONARY HYPERTENSION (HCC): ICD-10-CM

## 2021-06-16 DIAGNOSIS — J44.1 CHRONIC OBSTRUCTIVE PULMONARY DISEASE WITH ACUTE EXACERBATION (HCC): ICD-10-CM

## 2021-06-16 LAB
ERYTHROCYTE [DISTWIDTH] IN BLOOD BY AUTOMATED COUNT: 43.9 FL (ref 35.9–50)
HCT VFR BLD AUTO: 43.3 % (ref 42–52)
HGB BLD-MCNC: 13.8 G/DL (ref 14–18)
MCH RBC QN AUTO: 28.4 PG (ref 27–33)
MCHC RBC AUTO-ENTMCNC: 31.9 G/DL (ref 33.7–35.3)
MCV RBC AUTO: 89.1 FL (ref 81.4–97.8)
PLATELET # BLD AUTO: 198 K/UL (ref 164–446)
PMV BLD AUTO: 11.2 FL (ref 9–12.9)
RBC # BLD AUTO: 4.86 M/UL (ref 4.7–6.1)
WBC # BLD AUTO: 8.8 K/UL (ref 4.8–10.8)

## 2021-06-16 NOTE — PROGRESS NOTES
----- Message -----   From: Keli Rodrigues   Sent: 6/15/2021  12:39 PM PDT   To: Juan C Arboleda M.D.   Subject:  Order                                     Hi Dr. Arboleda,     I have one of your patients here at an outpatient lab and his CBC Without Differential is . Will you please extend this or put in a new order? I have already drawn the tube for it.     Best,   Keli       New order placed. Responded to staff message to notify Keli with the lab.

## 2021-06-17 ENCOUNTER — TELEPHONE (OUTPATIENT)
Dept: CARDIOLOGY | Facility: MEDICAL CENTER | Age: 66
End: 2021-06-17

## 2021-06-17 DIAGNOSIS — I27.20 PULMONARY HYPERTENSION (HCC): ICD-10-CM

## 2021-06-17 DIAGNOSIS — I27.81 COR PULMONALE (CHRONIC) (HCC): ICD-10-CM

## 2021-06-17 NOTE — TELEPHONE ENCOUNTER
Pt returned call. Discussed lab results showing decreased kidney function. Instructed to stop Lasix and repeat labs in 2 weeks. Order placed.

## 2021-06-30 ENCOUNTER — APPOINTMENT (OUTPATIENT)
Dept: MEDICAL GROUP | Facility: PHYSICIAN GROUP | Age: 66
End: 2021-06-30
Payer: MEDICARE

## 2021-08-25 ENCOUNTER — HOSPITAL ENCOUNTER (OUTPATIENT)
Dept: LAB | Facility: MEDICAL CENTER | Age: 66
End: 2021-08-25
Attending: INTERNAL MEDICINE
Payer: MEDICARE

## 2021-09-17 DIAGNOSIS — J44.9 CHRONIC OBSTRUCTIVE PULMONARY DISEASE, UNSPECIFIED COPD TYPE (HCC): ICD-10-CM

## 2021-09-20 RX ORDER — TIOTROPIUM BROMIDE AND OLODATEROL 3.124; 2.736 UG/1; UG/1
SPRAY, METERED RESPIRATORY (INHALATION)
Qty: 1 EACH | Refills: 1 | Status: SHIPPED | OUTPATIENT
Start: 2021-09-20 | End: 2021-11-22

## 2021-09-20 NOTE — TELEPHONE ENCOUNTER
Have we ever prescribed this med? Yes.  If yes, what date? 12/30/2020    Last OV: 12/03/2020 - Lakshmi King    Next OV: was due back 6/2021    DX: COPD    Medications: Stiolto

## 2021-10-05 ENCOUNTER — TELEPHONE (OUTPATIENT)
Dept: HEALTH INFORMATION MANAGEMENT | Facility: OTHER | Age: 66
End: 2021-10-05

## 2021-10-05 NOTE — TELEPHONE ENCOUNTER
Outcome: Left voice message regarding Lung Cancer Screening Program     Please transfer to Patient Outreach Team at [phone] when patient returns call.     Attempt # 1

## 2021-10-06 ENCOUNTER — HOSPITAL ENCOUNTER (OUTPATIENT)
Dept: LAB | Facility: MEDICAL CENTER | Age: 66
End: 2021-10-06
Attending: INTERNAL MEDICINE
Payer: MEDICARE

## 2021-10-06 DIAGNOSIS — I27.81 COR PULMONALE (CHRONIC) (HCC): ICD-10-CM

## 2021-10-06 DIAGNOSIS — I27.20 PULMONARY HYPERTENSION (HCC): ICD-10-CM

## 2021-10-06 LAB
ANION GAP SERPL CALC-SCNC: 10 MMOL/L (ref 7–16)
BUN SERPL-MCNC: 22 MG/DL (ref 8–22)
CALCIUM SERPL-MCNC: 9.8 MG/DL (ref 8.5–10.5)
CHLORIDE SERPL-SCNC: 99 MMOL/L (ref 96–112)
CO2 SERPL-SCNC: 32 MMOL/L (ref 20–33)
CREAT SERPL-MCNC: 1.1 MG/DL (ref 0.5–1.4)
GLUCOSE SERPL-MCNC: 93 MG/DL (ref 65–99)
POTASSIUM SERPL-SCNC: 5 MMOL/L (ref 3.6–5.5)
SODIUM SERPL-SCNC: 141 MMOL/L (ref 135–145)

## 2021-10-06 PROCEDURE — 80048 BASIC METABOLIC PNL TOTAL CA: CPT

## 2021-10-06 PROCEDURE — 36415 COLL VENOUS BLD VENIPUNCTURE: CPT

## 2021-10-07 ENCOUNTER — PATIENT MESSAGE (OUTPATIENT)
Dept: HEALTH INFORMATION MANAGEMENT | Facility: OTHER | Age: 66
End: 2021-10-07

## 2021-10-08 ENCOUNTER — PATIENT MESSAGE (OUTPATIENT)
Dept: CARDIOLOGY | Facility: MEDICAL CENTER | Age: 66
End: 2021-10-08

## 2021-10-08 DIAGNOSIS — I27.20 PULMONARY HYPERTENSION (HCC): ICD-10-CM

## 2021-10-08 DIAGNOSIS — I27.81 COR PULMONALE (CHRONIC) (HCC): ICD-10-CM

## 2021-10-11 NOTE — PATIENT COMMUNICATION
You  Juan C Arboleda M.D. 3 hours ago (8:34 AM)     Lasix was stopped on 6/17 due to elevated creatinine. He did repeat labs on 10/6, but now reporting swelling. Did you want him to restart Lasix? Scheduled or just PRN?   Thanks!      Juan C Arboleda M.D.  You 3 hours ago (9:12 AM)     Can resume lasix, BMP and BNP in two weeks. Needs FADIA Visit ASAP- needs to be in person.     Thanks   BE      LVM with pt at [phone] notifying that he can resume Lasix at previous dose and informed that we will schedule him to see SC on 10/13 at 9:30am. Requested that he call back to confirm he received the message.

## 2021-10-12 NOTE — PATIENT COMMUNICATION
Received Teams message from scheduling that pt had called to reschedule his appt tomorrow morning because it was too early. He was requesting a call back.   Called pt. He explains that he and his wife are day sleepers, so he is unable to make the appt tomorrow at 9:30 and had to reschedule until 11/11. He prefers appts after 2pm. He reports that since he restarted the Lasix his swelling has improved significantly. Added pt to cancellation lists for both BE and SC and encouraged him to notify us of further issues.

## 2021-10-12 NOTE — PATIENT COMMUNICATION
LVM with pt at [phone] requesting that he call or Mychart to confirm that he received the message and whether or not he will be able to make the 10/13 appt with SC.  Also attempted call to [phone], but mailbox is full and unable to LVM.

## 2021-11-04 ENCOUNTER — TELEPHONE (OUTPATIENT)
Dept: CARDIOLOGY | Facility: MEDICAL CENTER | Age: 66
End: 2021-11-04

## 2021-11-04 NOTE — TELEPHONE ENCOUNTER
Called and left  today on 11/4/2021 to remind him that he has an upcoming appointment on 11/11/2021 at 2:45 PM with Katharine Honeycutt and to remind him that he needs to do his labs before his upcoming appointment and if he has any question we can be reached at [phone].

## 2021-11-12 ENCOUNTER — HOSPITAL ENCOUNTER (OUTPATIENT)
Dept: LAB | Facility: MEDICAL CENTER | Age: 66
End: 2021-11-12
Attending: INTERNAL MEDICINE
Payer: MEDICARE

## 2021-11-12 DIAGNOSIS — I27.81 COR PULMONALE (CHRONIC) (HCC): ICD-10-CM

## 2021-11-12 DIAGNOSIS — I27.20 PULMONARY HYPERTENSION (HCC): ICD-10-CM

## 2021-11-12 LAB
ANION GAP SERPL CALC-SCNC: 9 MMOL/L (ref 7–16)
BUN SERPL-MCNC: 26 MG/DL (ref 8–22)
CALCIUM SERPL-MCNC: 9.8 MG/DL (ref 8.5–10.5)
CHLORIDE SERPL-SCNC: 96 MMOL/L (ref 96–112)
CO2 SERPL-SCNC: 34 MMOL/L (ref 20–33)
CREAT SERPL-MCNC: 1.16 MG/DL (ref 0.5–1.4)
GLUCOSE SERPL-MCNC: 95 MG/DL (ref 65–99)
NT-PROBNP SERPL IA-MCNC: 216 PG/ML (ref 0–125)
POTASSIUM SERPL-SCNC: 4.6 MMOL/L (ref 3.6–5.5)
SODIUM SERPL-SCNC: 139 MMOL/L (ref 135–145)

## 2021-11-12 PROCEDURE — 80048 BASIC METABOLIC PNL TOTAL CA: CPT

## 2021-11-12 PROCEDURE — 83880 ASSAY OF NATRIURETIC PEPTIDE: CPT

## 2021-11-12 PROCEDURE — 36415 COLL VENOUS BLD VENIPUNCTURE: CPT

## 2021-11-24 NOTE — NON-PROVIDER
Outcome: Left Message    Please transfer to Patient Outreach Team at [phone] when patient returns call.      HealthConnect Verified: yes    Attempt # 2

## 2022-01-18 ENCOUNTER — TELEMEDICINE (OUTPATIENT)
Dept: CARDIOLOGY | Facility: MEDICAL CENTER | Age: 67
End: 2022-01-18
Payer: MEDICARE

## 2022-01-18 VITALS — OXYGEN SATURATION: 97 % | HEART RATE: 80 BPM | HEIGHT: 68 IN | BODY MASS INDEX: 20.53 KG/M2

## 2022-01-18 DIAGNOSIS — I27.20 PULMONARY HYPERTENSION (HCC): ICD-10-CM

## 2022-01-18 DIAGNOSIS — R60.0 BILATERAL LOWER EXTREMITY EDEMA: ICD-10-CM

## 2022-01-18 DIAGNOSIS — J44.1 CHRONIC OBSTRUCTIVE PULMONARY DISEASE WITH ACUTE EXACERBATION (HCC): ICD-10-CM

## 2022-01-18 DIAGNOSIS — I27.81 COR PULMONALE (CHRONIC) (HCC): ICD-10-CM

## 2022-01-18 DIAGNOSIS — R06.02 SOB (SHORTNESS OF BREATH): ICD-10-CM

## 2022-01-18 DIAGNOSIS — Z79.899 HIGH RISK MEDICATION USE: ICD-10-CM

## 2022-01-18 PROCEDURE — 99214 OFFICE O/P EST MOD 30 MIN: CPT | Mod: 95 | Performed by: NURSE PRACTITIONER

## 2022-01-18 RX ORDER — FUROSEMIDE 40 MG/1
40 TABLET ORAL
Qty: 50 TABLET | Refills: 3 | Status: SHIPPED | OUTPATIENT
Start: 2022-01-19 | End: 2022-04-28

## 2022-01-18 RX ORDER — CEPHALEXIN 500 MG/1
CAPSULE ORAL
COMMUNITY
Start: 2021-10-25 | End: 2022-01-18

## 2022-01-18 RX ORDER — TRIAMCINOLONE ACETONIDE 0.25 MG/G
CREAM TOPICAL
COMMUNITY
Start: 2021-11-23 | End: 2022-01-18

## 2022-01-18 ASSESSMENT — ENCOUNTER SYMPTOMS
PALPITATIONS: 0
MYALGIAS: 0
COUGH: 0
ABDOMINAL PAIN: 0
FEVER: 0
ORTHOPNEA: 0
DIZZINESS: 0
SHORTNESS OF BREATH: 1
PND: 0
CLAUDICATION: 0

## 2022-01-18 NOTE — PROGRESS NOTES
Chief Complaint   Patient presents with   • Pulmonary Hypertension       Subjective   This evaluation was conducted via Zoom using secure and encrypted videoconferencing technology. The patient was in a private location in the state of [place].    The patient's identity was confirmed and verbal consent was obtained for this virtual visit.    Rihcard Bob is a 66 y.o. male who presents today for follow up on his cor pulmonale.    Patient of Dr. Arboleda. He was last seen 12/9/2020 with Dr. Arboleda.  During that visit, patient was sent for follow-up lab testing.    Patient reports he had run out of his Lasix late last year for about 1 week, ended up getting a refill, and has been taking it for a couple of months.    Patient was supposed to have a follow-up with Dr. Arboleda, but he is not sure why the appointment got canceled or removed from his MyChart.    Patient reports having some slight increases in shortness of breath with walking across the room.  He also mentions some slight lower extremity edema.  He otherwise denies chest pain, palpitations, orthopnea, PND or dizziness/lightheadedness.    He is not weighing himself at home.    He currently is taking furosemide 40 mg daily on Mondays, Wednesdays, Fridays.  And 20 mg daily on the other days.    He reports with the shortness of breath, his oxygen levels/saturation have increased, and his heart rates have lowered slightly to the 80s.    He does have a follow-up with pulmonary in a few weeks.    Additonally, patient has the following medical problems:    -COPD, using oxygen at 3L, followed by Pulmonary     -Ankylosing spondylitis with kyphoscoliosis    Past Medical History:   Diagnosis Date   • Anesthesia     poss. family hx malignant hyperthermia   • Ankylosing spondylitis (HCC)    • Chronic obstructive pulmonary disease (HCC)    • Cold    • Dental disorder     dentures   • Diverticulitis    • Osteoporosis    • Psychiatric problem     CONSULT FOR DRUG WITHDRAWAL AFTER  LAST SURGERY   • Scoliosis      Past Surgical History:   Procedure Laterality Date   • VENTRAL HERNIA REPAIR  10/23/2009    Performed by ALEJANDRA EASTON at SURGERY SAME DAY [facility] ORS   • VENTRAL HERNIA REPAIR  2009    Performed by ALEJANDRA EASTON at SURGERY [facility] ORS   • COLOSTOMY CLOSURE  2009    Performed by ALEJANDRA EASTON at SURGERY [facility] ORS   • SIGMOID COLECTOMY  2008    Performed by ALEJANDRA EASTON at SURGERY [facility] ORS   • COLOSTOMY  2008    Performed by ALEJANDRA EASTON at SURGERY [facility] ORS   • EXPLORATORY LAPAROTOMY  2008    Performed by ALEJANDRA EASTON at SURGERY [facility] ORS   • BOWEL RESECTION  2008    Performed by ALEJANDRA EASTON at SURGERY [facility] ORS   • UMBILICAL HERNIA REPAIR       Family History   Problem Relation Age of Onset   • Alzheimer's Disease Mother      Social History     Socioeconomic History   • Marital status:      Spouse name: Not on file   • Number of children: Not on file   • Years of education: Not on file   • Highest education level: Not on file   Occupational History   • Not on file   Tobacco Use   • Smoking status: Former Smoker     Packs/day: 1.00     Years: 42.00     Pack years: 42.00     Types: Cigarettes     Start date: 1967     Quit date: 2009     Years since quittin.6   • Smokeless tobacco: Never Used   • Tobacco comment: continued abstinance   Vaping Use   • Vaping Use: Never used   Substance and Sexual Activity   • Alcohol use: No   • Drug use: No     Comment: NOT AT THIS TIME     • Sexual activity: Not Currently   Other Topics Concern   • Not on file   Social History Narrative   • Not on file     Social Determinants of Health     Financial Resource Strain:    • Difficulty of Paying Living Expenses: Not on file   Food Insecurity:    • Worried About Running Out of Food in the Last Year: Not on file   • Ran Out of Food in the Last Year: Not on file   Transportation Needs:    • Lack of  Transportation (Medical): Not on file   • Lack of Transportation (Non-Medical): Not on file   Physical Activity:    • Days of Exercise per Week: Not on file   • Minutes of Exercise per Session: Not on file   Stress:    • Feeling of Stress : Not on file   Social Connections:    • Frequency of Communication with Friends and Family: Not on file   • Frequency of Social Gatherings with Friends and Family: Not on file   • Attends Latter day Services: Not on file   • Active Member of Clubs or Organizations: Not on file   • Attends Club or Organization Meetings: Not on file   • Marital Status: Not on file   Intimate Partner Violence:    • Fear of Current or Ex-Partner: Not on file   • Emotionally Abused: Not on file   • Physically Abused: Not on file   • Sexually Abused: Not on file   Housing Stability:    • Unable to Pay for Housing in the Last Year: Not on file   • Number of Places Lived in the Last Year: Not on file   • Unstable Housing in the Last Year: Not on file     No Known Allergies  Outpatient Encounter Medications as of 1/18/2022   Medication Sig Dispense Refill   • [START ON 1/19/2022] furosemide (LASIX) 40 MG Tab Take 1 Tablet by mouth every Monday, Wednesday, and Friday. 50 Tablet 3   • Tiotropium Bromide-Olodaterol (STIOLTO RESPIMAT) 2.5-2.5 MCG/ACT Aero Soln Inhale 2 Puffs every day. 3 Each 0   • furosemide (LASIX) 20 MG Tab TAKE 1 TABLET BY MOUTH IN THE MORNING EVERY TUESDAY, THURSDAY, SATURDAY, AND SUNDAY 50 tablet 3   • levalbuterol (XOPENEX HFA) 45 MCG/ACT inhaler Inhale 2 Puffs every four hours as needed for Shortness of Breath. 1 Each 1   • Home Care Oxygen Inhale 3 L/min by mouth Continuous.     • levalbuterol (XOPENEX) 1.25 MG/3ML Nebu Soln 3 mL by Nebulization route every four hours as needed for Shortness of Breath. 75 mL 11   • [DISCONTINUED] triamcinolone acetonide (KENALOG) 0.025 % Cream  (Patient not taking: Reported on 1/18/2022)     • [DISCONTINUED] mupirocin (BACTROBAN) 2 % Ointment  (Patient  "not taking: Reported on 1/18/2022)     • [DISCONTINUED] cephALEXin (KEFLEX) 500 MG Cap  (Patient not taking: Reported on 1/18/2022)     • [DISCONTINUED] furosemide (LASIX) 40 MG Tab TAKE 1 TAB ORALLY EVERY MONDAY WEDNESDAY AND FRIDAY.AS DIRECTED (EVERY MONDAY, WEDNESDAY AND FRIDAY) 40 tablet 1     No facility-administered encounter medications on file as of 1/18/2022.     Review of Systems   Constitutional: Negative for fever and malaise/fatigue.   Respiratory: Positive for shortness of breath. Negative for cough.    Cardiovascular: Positive for leg swelling. Negative for chest pain, palpitations, orthopnea, claudication and PND.   Gastrointestinal: Negative for abdominal pain.   Musculoskeletal: Negative for myalgias.   Neurological: Negative for dizziness.   All other systems reviewed and are negative.             Objective     Pulse 80   Ht 1.727 m (5' 8\")   SpO2 97%   BMI 20.53 kg/m²     Physical Exam  Constitutional:       Appearance: He is well-developed.   HENT:      Head: Normocephalic and atraumatic.   Eyes:      General: Lids are normal.   Pulmonary:      Effort: Pulmonary effort is normal.   Musculoskeletal:      Cervical back: Normal range of motion.   Neurological:      Mental Status: He is oriented to person, place, and time.   Psychiatric:         Speech: Speech normal.         Behavior: Behavior normal.         Thought Content: Thought content normal.         Judgment: Judgment normal.       Lab Results   Component Value Date/Time    CHOLSTRLTOT 239 (H) 06/15/2021 12:41 PM     (H) 06/15/2021 12:41 PM    HDL 71 06/15/2021 12:41 PM    TRIGLYCERIDE 60 06/15/2021 12:41 PM       Lab Results   Component Value Date/Time    SODIUM 139 11/12/2021 02:20 PM    POTASSIUM 4.6 11/12/2021 02:20 PM    CHLORIDE 96 11/12/2021 02:20 PM    CO2 34 (H) 11/12/2021 02:20 PM    GLUCOSE 95 11/12/2021 02:20 PM    BUN 26 (H) 11/12/2021 02:20 PM    CREATININE 1.16 11/12/2021 02:20 PM    CREATININE 1.2 04/03/2009 04:00 " AM     Lab Results   Component Value Date/Time    ALKPHOSPHAT 58 06/15/2021 12:41 PM    ASTSGOT 23 06/15/2021 12:41 PM    ALTSGPT 17 06/15/2021 12:41 PM    TBILIRUBIN 0.3 06/15/2021 12:41 PM      Transthoracic Echo Report 3/12/2019  Normal left ventricular size, wall thickness, and systolic function.  Left ventricular ejection fraction is visually estimated to be 55%.  Moderately dilated right ventricle.  Mildly reduced right ventricular systolic function.  Enlarged right atrium.  Mildly dilated left atrium.  Right atrial pressure is estimated to be 8 mmHg, mildly elevated.  Estimated right ventricular systolic pressure  is 43 mmHg consistent   with mild pulmonary hypertension.      No prior study is available for comparison.          Assessment & Plan     1. Cor pulmonale (chronic) ([facility])  Basic Metabolic Panel    proBrain Natriuretic Peptide, NT   2. Pulmonary hypertension (HCC)  Basic Metabolic Panel    proBrain Natriuretic Peptide, NT    furosemide (LASIX) 40 MG Tab   3. Chronic obstructive pulmonary disease with acute exacerbation ([facility])  Basic Metabolic Panel    proBrain Natriuretic Peptide, NT   4. High risk medication use  Basic Metabolic Panel    proBrain Natriuretic Peptide, NT   5. SOB (shortness of breath)  Basic Metabolic Panel    proBrain Natriuretic Peptide, NT   6. Bilateral lower extremity edema  furosemide (LASIX) 40 MG Tab       Medical Decision Making: Today's Assessment/Status/Plan:        Cor pulmonale/pulmonary hypertension:  -Discussed with patient about increasing his furosemide to 40 mg daily for the week, patient is hesitant but will increase for the week.  Patient to resume his prior dosing after  -Patient to obtain BMP and NT proBNP  -Also discussed obtaining an echo, patient would like to hold off until his follow-up  -Patient to contact the office if he continues to have difficulty or other concerns    COPD:  -Using oxygen and inhalers  -Has a follow-up with pulmonary on 1/26/2022    FU  in clinic in 4 weeks with Dr. Arboleda or myself with labs. Sooner if needed.    Patient verbalizes understanding and agrees with the plan of care.     PLEASE NOTE: This Note was created using voice recognition Software. I have made every reasonable attempt to correct obvious errors, but I expect that there are errors of grammar and possibly content that I did not discover before finalizing the note

## 2022-01-21 ENCOUNTER — PATIENT MESSAGE (OUTPATIENT)
Dept: HEALTH INFORMATION MANAGEMENT | Facility: OTHER | Age: 67
End: 2022-01-21
Payer: MEDICARE

## 2022-01-23 DIAGNOSIS — J44.9 CHRONIC OBSTRUCTIVE PULMONARY DISEASE, UNSPECIFIED COPD TYPE (HCC): ICD-10-CM

## 2022-01-24 NOTE — TELEPHONE ENCOUNTER
Have we ever prescribed this med? Yes.  If yes, what date? 11/23/21    Last OV: 12/03/20 with Lakshmi King Pa-C    Next OV: 01/26/22 with Lakshmi King PA-C    DX: COPD    Medications:   Requested Prescriptions     Pending Prescriptions Disp Refills   • STIOLTO RESPIMAT 2.5-2.5 MCG/ACT Aero Soln [Pharmacy Med Name: STIOLTO RESPIMAT INHAL SPRAY]       Sig: INHALE 2 PUFFS BY MOUTH EVERY DAY

## 2022-01-25 RX ORDER — TIOTROPIUM BROMIDE AND OLODATEROL 3.124; 2.736 UG/1; UG/1
SPRAY, METERED RESPIRATORY (INHALATION)
Qty: 12 G | Refills: 3 | Status: SHIPPED | OUTPATIENT
Start: 2022-01-25 | End: 2022-10-21 | Stop reason: SDUPTHER

## 2022-01-26 ENCOUNTER — TELEMEDICINE (OUTPATIENT)
Dept: SLEEP MEDICINE | Facility: MEDICAL CENTER | Age: 67
End: 2022-01-26
Payer: MEDICARE

## 2022-01-26 VITALS
RESPIRATION RATE: 14 BRPM | BODY MASS INDEX: 22.87 KG/M2 | HEART RATE: 73 BPM | OXYGEN SATURATION: 97 % | HEIGHT: 68 IN | WEIGHT: 150.9 LBS

## 2022-01-26 DIAGNOSIS — J44.9 CHRONIC OBSTRUCTIVE PULMONARY DISEASE, UNSPECIFIED COPD TYPE (HCC): ICD-10-CM

## 2022-01-26 DIAGNOSIS — J96.11 CHRONIC RESPIRATORY FAILURE WITH HYPOXIA (HCC): ICD-10-CM

## 2022-01-26 PROCEDURE — 99213 OFFICE O/P EST LOW 20 MIN: CPT | Mod: 95 | Performed by: PHYSICIAN ASSISTANT

## 2022-01-26 RX ORDER — LEVALBUTEROL TARTRATE 45 UG/1
2 AEROSOL, METERED ORAL EVERY 4 HOURS PRN
Qty: 1 EACH | Refills: 1 | Status: SHIPPED | OUTPATIENT
Start: 2022-01-26 | End: 2022-05-27

## 2022-01-26 RX ORDER — TIOTROPIUM BROMIDE AND OLODATEROL 3.124; 2.736 UG/1; UG/1
2 SPRAY, METERED RESPIRATORY (INHALATION) DAILY
Qty: 1 EACH | Refills: 0 | COMMUNITY
Start: 2022-01-26 | End: 2022-05-27

## 2022-01-26 ASSESSMENT — FIBROSIS 4 INDEX: FIB4 SCORE: 1.92

## 2022-01-26 NOTE — PROGRESS NOTES
Virtual Visit: Established Patient   This visit was conducted via Zoom using secure and encrypted videoconferencing technology. The patient was in a private location in the state [facility].  The patient's identity was confirmed and verbal consent was obtained for this virtual visit.    Subjective:   CC:   Chief Complaint   Patient presents with   • Follow-Up     COPD; Last seen 12/3/2020       Richard Bob is a 66 y.o. male presenting for evaluation and management of: COPD and chronic respiratory failure with hypoxia.  He is a former smoker with reported 42-pack-year history and quit date in 2009.  Patient last seen via telemedicine 12/3/2020.    Pertinent past medical history includes ankylosing spondylitis, kyphoscoliosis, cor pulmonale, pulmonary hypertension followed by cardiology.  Patient previously reported improvement in secretion clearance since eliminating sugar from his diet.  He does require oxygen at 3 L simple mask 24/7.  Does not tolerate nasal cannula in his nose.    No recent chest imaging, chest x-ray in 2019 demonstrated hyperexpansion, hazy right pulmonary opacity.    Patient did have a CT scan for lung cancer screening also in 2019 demonstrating mild to moderate emphysematous changes and mild bronchial wall thickening consistent with chronic bronchitis, no suspicious nodules or masses, no lymphadenopathy, exaggerated thoracic kyphosis.    Echocardiogram obtained in 2019 demonstrated normal left ventricular chamber size, wall thickness, systolic function.  LVEF estimated 55%, indeterminant diastolic function.  Mildly reduced right ventricular systolic function with moderately dilated right ventricle, enlarged right atrium, mildly dilated left atrium, mild tricuspid regurgitation with estimated RVSP of 43.    Pulmonary function testing last obtained in 2019 demonstrated FEV1 of 0.55 L or 17% predicted, FVC 1.85 L or 43% predicted, FEV1/FVC ratio 30, residual volume 275% predicted, %  predicted and DLCO 34%. Per pulmonologist interpretation severe obstructive lung disease with partial reversibility noted.    Review of Systems   Constitutional: Negative for chills, fever, malaise/fatigue and weight loss.   HENT: Positive for sinus pain. Negative for congestion, hearing loss, nosebleeds, sore throat and tinnitus.    Respiratory: Positive for cough, sputum production (clear to slightly green ) and shortness of breath. Negative for wheezing.    Cardiovascular: Positive for chest pain (muscular chronic) and leg swelling (right ). Negative for palpitations and orthopnea.   Gastrointestinal: Negative for heartburn.        Upper and lower, no difficulty swallowing    Neurological: Negative for dizziness, tremors and headaches.   Psychiatric/Behavioral: The patient does not have insomnia.        Current medicines (including changes today)  Current Outpatient Medications   Medication Sig Dispense Refill   • Tiotropium Bromide-Olodaterol (STIOLTO RESPIMAT) 2.5-2.5 MCG/ACT Aero Soln Take 2 Puffs by mouth every day. 1 Each 0   • levalbuterol (XOPENEX HFA) 45 MCG/ACT inhaler Inhale 2 Puffs every four hours as needed for Shortness of Breath. 1 Each 1   • STIOLTO RESPIMAT 2.5-2.5 MCG/ACT Aero Soln INHALE 2 PUFFS BY MOUTH EVERY DAY 12 g 3   • furosemide (LASIX) 40 MG Tab Take 1 Tablet by mouth every Monday, Wednesday, and Friday. 50 Tablet 3   • Home Care Oxygen Inhale 3 L/min by mouth Continuous.     • levalbuterol (XOPENEX) 1.25 MG/3ML Nebu Soln 3 mL by Nebulization route every four hours as needed for Shortness of Breath. 75 mL 11   • furosemide (LASIX) 20 MG Tab TAKE 1 TABLET BY MOUTH IN THE MORNING EVERY TUESDAY, THURSDAY, SATURDAY, AND SUNDAY (Patient not taking: Reported on 1/26/2022) 50 tablet 3     No current facility-administered medications for this visit.       Patient Active Problem List    Diagnosis Date Noted   • Community acquired pneumonia of right lower lobe of lung 06/17/2019   • Cor pulmonale  "(chronic) ([facility]) 05/29/2019   • Pulmonary hypertension ([facility]) 05/29/2019   • Cellulitis 03/12/2019   • Chronic obstructive pulmonary disease with acute exacerbation ([facility]) 03/12/2019   • Bilateral lower extremity edema 03/12/2019   • Ankylosing spondylitis of cervicothoracic region ([facility]) 11/15/2018   • Tobacco use disorder, mild, in sustained remission, abuse 07/28/2016   • COLD (chronic obstructive lung disease) ([facility]) 07/28/2016        Objective:   Pulse 73   Resp 14   Ht 1.727 m (5' 8\")   Wt 68.4 kg (150 lb 14.4 oz)   SpO2 97% Comment: 3lpm  BMI 22.94 kg/m²     Physical Exam:  Constitutional: Alert, no distress, well-groomed.  Skin: No rashes in visible areas.  Eye: Round. Conjunctiva clear, lids normal. No icterus.   ENMT: Lips pink without lesions, good dentition, moist mucous membranes. Phonation normal.  Neck: No masses, no thyromegaly. Moves freely without pain.  Respiratory: Mildly labored respiratory effort, occasional cough no audible wheeze  Psych: Alert and oriented x3, normal affect and mood.     Assessment and Plan:   The following treatment plan was discussed:     1. Chronic obstructive pulmonary disease, unspecified COPD type ([facility])  - Tiotropium Bromide-Olodaterol (STIOLTO RESPIMAT) 2.5-2.5 MCG/ACT Aero Soln; Take 2 Puffs by mouth every day.  Dispense: 1 Each; Refill: 0  - levalbuterol (XOPENEX HFA) 45 MCG/ACT inhaler; Inhale 2 Puffs every four hours as needed for Shortness of Breath.  Dispense: 1 Each; Refill: 1    2. Chronic respiratory failure with hypoxia ([facility])    Sample Stiolto provided pending his usual next mail delivery.    Follow-up: Return in about 6 months (around 7/26/2022) for Return with Lakshmi King PA-C.                 This dictation was created using voice recognition software. The accuracy of the dictation is limited to the abilities of the software. I expect there may be some errors of grammar and possibly content.   "

## 2022-01-26 NOTE — PATIENT INSTRUCTIONS
1-feeling better over all  2-leg is healed  3-not requiring levalbuterol reviewed indications  4-sample of Stiolto  5-continue to use and benefit from oxygen with simple mask  6-follow up in 6 months

## 2022-03-01 ENCOUNTER — TELEPHONE (OUTPATIENT)
Dept: CARDIOLOGY | Facility: MEDICAL CENTER | Age: 67
End: 2022-03-01
Payer: MEDICARE

## 2022-03-02 NOTE — TELEPHONE ENCOUNTER
IZABEL Goddard,    This patient called and is asking to speak to you about a few things after his last visit with IZABEL on 01/18. He stated that you have not returned his call or followed up with him after that appt. Can you please call him back at [phone].    If tomorrow please call after 2pm.    Thank you,    WON

## 2022-03-02 NOTE — TELEPHONE ENCOUNTER
S/W pt, upon chart review patient has not made any attempts to call or message us.     Pt states he never saw his AVS come through in [employer]. Pt has been taking 40mg of Lasix daily since 1/18/22 however, instruction stated to only increase for 1 week and then go back down to normal dosing with labs. Pt has not done labs either. Advised pt to get labs done as soon as possible. Hold diuretic all together until Saturday dose- if he gains more than 3 pounds in 1 will- will contact office.     Did discuss where to go in [employer] for AVS, if patient still has trouble. Will contact [facility] support. States he has two names so perhaps that is creating issues.     Was to f/u with BE  Or IZABEL in 4 weeks. Offered to schedule but patient stated he needed to check is calendar first.     To IZABEL- FYI

## 2022-03-16 ENCOUNTER — HOSPITAL ENCOUNTER (OUTPATIENT)
Dept: LAB | Facility: MEDICAL CENTER | Age: 67
End: 2022-03-16
Attending: NURSE PRACTITIONER
Payer: MEDICARE

## 2022-03-16 DIAGNOSIS — I27.81 COR PULMONALE (CHRONIC) (HCC): ICD-10-CM

## 2022-03-16 DIAGNOSIS — J44.1 CHRONIC OBSTRUCTIVE PULMONARY DISEASE WITH ACUTE EXACERBATION (HCC): ICD-10-CM

## 2022-03-16 DIAGNOSIS — R06.02 SOB (SHORTNESS OF BREATH): ICD-10-CM

## 2022-03-16 DIAGNOSIS — I27.20 PULMONARY HYPERTENSION (HCC): ICD-10-CM

## 2022-03-16 DIAGNOSIS — Z79.899 HIGH RISK MEDICATION USE: ICD-10-CM

## 2022-03-16 LAB
ANION GAP SERPL CALC-SCNC: 10 MMOL/L (ref 7–16)
BUN SERPL-MCNC: 20 MG/DL (ref 8–22)
CALCIUM SERPL-MCNC: 9.5 MG/DL (ref 8.5–10.5)
CHLORIDE SERPL-SCNC: 99 MMOL/L (ref 96–112)
CO2 SERPL-SCNC: 34 MMOL/L (ref 20–33)
CREAT SERPL-MCNC: 1.06 MG/DL (ref 0.5–1.4)
GFR SERPLBLD CREATININE-BSD FMLA CKD-EPI: 76 ML/MIN/1.73 M 2
GLUCOSE SERPL-MCNC: 114 MG/DL (ref 65–99)
NT-PROBNP SERPL IA-MCNC: 195 PG/ML (ref 0–125)
POTASSIUM SERPL-SCNC: 4.8 MMOL/L (ref 3.6–5.5)
SODIUM SERPL-SCNC: 143 MMOL/L (ref 135–145)

## 2022-03-16 PROCEDURE — 36415 COLL VENOUS BLD VENIPUNCTURE: CPT

## 2022-03-16 PROCEDURE — 83880 ASSAY OF NATRIURETIC PEPTIDE: CPT

## 2022-03-16 PROCEDURE — 80048 BASIC METABOLIC PNL TOTAL CA: CPT

## 2022-03-17 NOTE — TELEPHONE ENCOUNTER
This patient was supposed to follow-up with either Dr. Arboleda or myself.  He only saw me because his appointment was moved with Dr. Arboleda.  Please have him follow-up with Dr. Arboleda to be direct.

## 2022-04-27 DIAGNOSIS — R60.0 BILATERAL LOWER EXTREMITY EDEMA: ICD-10-CM

## 2022-04-27 DIAGNOSIS — I27.20 PULMONARY HYPERTENSION (HCC): ICD-10-CM

## 2022-04-28 RX ORDER — FUROSEMIDE 40 MG/1
TABLET ORAL
Qty: 40 TABLET | Refills: 2 | Status: SHIPPED | OUTPATIENT
Start: 2022-04-28 | End: 2022-05-27 | Stop reason: SDUPTHER

## 2022-04-28 RX ORDER — FUROSEMIDE 20 MG/1
TABLET ORAL
Qty: 50 TABLET | Refills: 2 | Status: SHIPPED | OUTPATIENT
Start: 2022-04-28 | End: 2022-05-27

## 2022-05-27 ENCOUNTER — TELEMEDICINE (OUTPATIENT)
Dept: CARDIOLOGY | Facility: MEDICAL CENTER | Age: 67
End: 2022-05-27
Payer: MEDICARE

## 2022-05-27 VITALS
WEIGHT: 150 LBS | DIASTOLIC BLOOD PRESSURE: 83 MMHG | HEIGHT: 68 IN | SYSTOLIC BLOOD PRESSURE: 138 MMHG | BODY MASS INDEX: 22.73 KG/M2 | HEART RATE: 84 BPM

## 2022-05-27 DIAGNOSIS — I27.20 PULMONARY HYPERTENSION (HCC): ICD-10-CM

## 2022-05-27 DIAGNOSIS — I27.81 COR PULMONALE (CHRONIC) (HCC): ICD-10-CM

## 2022-05-27 DIAGNOSIS — M45.3 ANKYLOSING SPONDYLITIS OF CERVICOTHORACIC REGION (HCC): ICD-10-CM

## 2022-05-27 DIAGNOSIS — H93.13 TINNITUS OF BOTH EARS: ICD-10-CM

## 2022-05-27 DIAGNOSIS — R60.0 BILATERAL LOWER EXTREMITY EDEMA: ICD-10-CM

## 2022-05-27 PROCEDURE — 99214 OFFICE O/P EST MOD 30 MIN: CPT | Mod: 95 | Performed by: INTERNAL MEDICINE

## 2022-05-27 RX ORDER — FUROSEMIDE 40 MG/1
40 TABLET ORAL DAILY
Qty: 90 TABLET | Refills: 3 | Status: SHIPPED | OUTPATIENT
Start: 2022-05-27 | End: 2023-03-02 | Stop reason: SDUPTHER

## 2022-05-27 ASSESSMENT — FIBROSIS 4 INDEX: FIB4 SCORE: 1.92

## 2022-05-27 NOTE — PROGRESS NOTES
Cardiology Telemedicine Visit: Established Patient   This encounter was conducted via Zoom.   Verbal consent was obtained. Patient's identity was verified.    Assessment and Plan:   PCP: Michael Green M.D.  The following treatment plan was discussed:     1. Pulmonary hypertension (HCC)    2. Bilateral lower extremity edema    3. Cor pulmonale (chronic) (HCC)    4. Tinnitus of both ears    5. Ankylosing spondylitis of cervicothoracic region (HCC)        Richard Bob is having uncontrolled breathlessness and edema and I suggested moving Lasix to 40 mg daily which has historically been successful for him.  I will update his laboratory profile in the coming weeks as he may benefit from statin therapy.  I also asked that he complete a carotid duplex given the new, episodic pulsatile tinnitus.    Follow up: 3 months    Subjective:     Chief Complaint   Patient presents with   • Other     F/V Dx: Cor pulmonale (chronic) (HCC)   • Hypertension     F/V Dx: Pulmonary hypertension (HCC)           History: Richard Bob is a 66 y.o. male with history of restrictive/obstructive oxygen dependent respiratory disease, cor pulmonale with RVH and mild pulmonary hypertension with chronic lower extremity edema presenting for follow-up.  He also has kyphotic spine disease-possibly ankylosing spondylitis.     He has been struggling with alternating 20 and 40 mg Lasix doses.  He finds that when he takes the 20 mg dose breathlessness is much more pronounced.  He remains predominantly homebound since the pandemic but is hopeful to get out more and is planning to get another COVID booster.  He is also experiencing episodic pulsatile tinnitus.      ROS   Denies any recent fevers or chills. No nausea or vomiting. No chest pains or shortness of breath. All other systems reviewed and negative except as per the HPI       Objective:   Vitals obtained by patient:  Respirations through observation: 12  /83 (BP Location: Left  "arm, Patient Position: Sitting, BP Cuff Size: Adult)   Pulse 84   Ht 1.727 m (5' 8\")   Wt 68 kg (150 lb)   BMI 22.81 kg/m²     Physical Exam:  Constitutional: Alert, no distress, well-groomed.  Skin: No rashes in visible areas.  Eye: Round. Conjunctiva clear, lids normal. No icterus.   ENMT: Lips pink without lesions, good dentition, moist mucous membranes. Phonation normal.  Neck: No masses, no thyromegaly. Moves freely without pain.  CV: Pulse as reported by patient  Respiratory: Unlabored respiratory effort, no cough or audible wheeze  Psych: Alert and oriented x3, normal affect and mood.     The 10-year ASCVD risk score (Neely HOLDEN Jr., et al., 2013) is: 16.6%  No Known Allergies  Current medicines (including changes today)  Current Outpatient Medications   Medication Sig Dispense Refill   • furosemide (LASIX) 40 MG Tab Take 1 Tablet by mouth every day. 90 Tablet 3   • STIOLTO RESPIMAT 2.5-2.5 MCG/ACT Aero Soln INHALE 2 PUFFS BY MOUTH EVERY DAY 12 g 3   • Home Care Oxygen Inhale 3 L/min by mouth Continuous.       No current facility-administered medications for this visit.     Patient Active Problem List    Diagnosis Date Noted   • Community acquired pneumonia of right lower lobe of lung 06/17/2019     Priority: High   • Chronic obstructive pulmonary disease with acute exacerbation ([facility]) 03/12/2019     Priority: High   • Cor pulmonale (chronic) ([facility]) 05/29/2019   • Pulmonary hypertension ([facility]) 05/29/2019   • Cellulitis 03/12/2019   • Bilateral lower extremity edema 03/12/2019   • Ankylosing spondylitis of cervicothoracic region ([facility]) 11/15/2018   • Tobacco use disorder, mild, in sustained remission, abuse 07/28/2016   • COLD (chronic obstructive lung disease) ([facility]) 07/28/2016     Family History   Problem Relation Age of Onset   • Alzheimer's Disease Mother      He  has a past medical history of Anesthesia, Ankylosing spondylitis ([facility]), Chronic obstructive pulmonary disease (HCC), Cold, Dental disorder, " Diverticulitis, Osteoporosis, Psychiatric problem, and Scoliosis.  He  has a past surgical history that includes sigmoid colectomy (12/6/2008); colostomy (12/6/2008); colostomy closure (4/2/2009); ventral hernia repair (6/11/2009); umbilical hernia repair; exploratory laparotomy (12/6/2008); bowel resection (12/6/2008); and ventral hernia repair (10/23/2009).  Past Medical History:   Diagnosis Date   • Anesthesia     poss. family hx malignant hyperthermia   • Ankylosing spondylitis (HCC)    • Chronic obstructive pulmonary disease (HCC)    • Cold    • Dental disorder     dentures   • Diverticulitis    • Osteoporosis    • Psychiatric problem     CONSULT FOR DRUG WITHDRAWAL AFTER LAST SURGERY   • Scoliosis      No Known Allergies  Outpatient Encounter Medications as of 5/27/2022   Medication Sig Dispense Refill   • furosemide (LASIX) 40 MG Tab Take 1 Tablet by mouth every day. 90 Tablet 3   • STIOLTO RESPIMAT 2.5-2.5 MCG/ACT Aero Soln INHALE 2 PUFFS BY MOUTH EVERY DAY 12 g 3   • Home Care Oxygen Inhale 3 L/min by mouth Continuous.     • [DISCONTINUED] furosemide (LASIX) 20 MG Tab TAKE 1 TABLET BY MOUTH IN THE MORNING EVERY TUESDAY, THURSDAY, SATURDAY, AND SUNDAY 50 Tablet 2   • [DISCONTINUED] furosemide (LASIX) 40 MG Tab TAKE 1 TAB ORALLY EVERY MONDAY WEDNESDAY AND FRIDAY.AS DIRECTED (EVERY MONDAY, WEDNESDAY AND FRIDAY) 40 Tablet 2   • [DISCONTINUED] Tiotropium Bromide-Olodaterol (STIOLTO RESPIMAT) 2.5-2.5 MCG/ACT Aero Soln Take 2 Puffs by mouth every day. (Patient not taking: Reported on 5/27/2022) 1 Each 0   • [DISCONTINUED] levalbuterol (XOPENEX HFA) 45 MCG/ACT inhaler Inhale 2 Puffs every four hours as needed for Shortness of Breath. (Patient not taking: Reported on 5/27/2022) 1 Each 1   • [DISCONTINUED] levalbuterol (XOPENEX) 1.25 MG/3ML Nebu Soln 3 mL by Nebulization route every four hours as needed for Shortness of Breath. (Patient not taking: Reported on 5/27/2022) 75 mL 11     No facility-administered  encounter medications on file as of 2022.     Social History     Socioeconomic History   • Marital status:      Spouse name: Not on file   • Number of children: Not on file   • Years of education: Not on file   • Highest education level: Not on file   Occupational History   • Not on file   Tobacco Use   • Smoking status: Former Smoker     Packs/day: 1.00     Years: 42.00     Pack years: 42.00     Types: Cigarettes     Start date: 1967     Quit date: 2009     Years since quittin.9   • Smokeless tobacco: Never Used   • Tobacco comment: continued abstinance   Vaping Use   • Vaping Use: Never used   Substance and Sexual Activity   • Alcohol use: No   • Drug use: No     Comment: NOT AT THIS TIME     • Sexual activity: Not Currently   Other Topics Concern   • Not on file   Social History Narrative   • Not on file     Social Determinants of Health     Financial Resource Strain: Not on file   Food Insecurity: Not on file   Transportation Needs: Not on file   Physical Activity: Not on file   Stress: Not on file   Social Connections: Not on file   Intimate Partner Violence: Not on file   Housing Stability: Not on file       Studies  Lab Results   Component Value Date/Time    CHOLSTRLTOT 239 (H) 06/15/2021 12:41 PM     (H) 06/15/2021 12:41 PM    HDL 71 06/15/2021 12:41 PM    TRIGLYCERIDE 60 06/15/2021 12:41 PM       Lab Results   Component Value Date/Time    SODIUM 143 2022 02:27 PM    POTASSIUM 4.8 2022 02:27 PM    CHLORIDE 99 2022 02:27 PM    CO2 34 (H) 2022 02:27 PM    GLUCOSE 114 (H) 2022 02:27 PM    BUN 20 2022 02:27 PM    CREATININE 1.06 2022 02:27 PM    CREATININE 1.2 2009 04:00 AM     Lab Results   Component Value Date/Time    ALKPHOSPHAT 58 06/15/2021 12:41 PM    ASTSGOT 23 06/15/2021 12:41 PM    ALTSGPT 17 06/15/2021 12:41 PM    TBILIRUBIN 0.3 06/15/2021 12:41 PM        For this encounter I reviewed the following medical records BMP,  Lipid profile and CBC

## 2022-06-13 ENCOUNTER — TELEPHONE (OUTPATIENT)
Dept: MEDICAL GROUP | Facility: MEDICAL CENTER | Age: 67
End: 2022-06-13
Payer: MEDICARE

## 2022-06-13 NOTE — TELEPHONE ENCOUNTER
ANNUAL WELLNESS VISIT PRE-VISIT PLANNING    Phone Number Called: [phone] (home)   Call outcome: Called patient. No answer. Left voice message.  Message: Appointment scheduled with , Tuesday, 6/14/2022, check-in: 3:05 PM, [address], [phone]    Left message for patient to call back regarding pre-visit planning. Please transfer call to [phone].      1.  Reviewed notes from the last office visit: Yes    2.  If any orders were ordered or intended to be done prior to visit (i.e. 6 mos follow-up), do we have results/consult notes or has patient scheduled?        •  Labs - Last office visit with  was on 02/06/2020.  Note: If patient appointment is for lab review and patient did not complete labs, check with provider if OK to reschedule patient until labs completed.       •  Imaging - Imaging was not ordered at last office visit.       •  Referrals - No referrals were ordered at last office visit.    3.  Immunizations were updated in Epic using Reconcile Outside Information activity? Yes       •  Is patient due for Tdap? YES. Patient was not notified of copay/out of pocket cost.       •  Is patient due for Shingrix? YES. Patient was not notified of copay/out of pocket cost. [facility] locations are presently not administering the Shringix Vaccine.    4.  Patient is due for the following Health Maintenance Topics:   Health Maintenance Due   Topic Date Due   • HEPATITIS C SCREENING  Never done   • COLORECTAL CANCER SCREENING  Never done   • IMM ZOSTER VACCINES (1 of 2) Never done   • IMM DTaP/Tdap/Td Vaccine (1 - Tdap) 02/02/2008   • LUNG CANCER SCREENING  01/21/2020   • IMM PNEUMOCOCCAL VACCINE: 65+ Years (2 - PCV) 03/13/2020   • Annual Pulmonary Function Test / Spirometry  03/27/2020     5.  Reviewed/Updated the following with patient:       •   Preferred Pharmacy? No, called patient, no answer, not reviewed with patient       •   Preferred Lab? No, called patient, no answer, not  reviewed with patient       •   Preferred Communication? No, called patient, no answer, not reviewed with patient       •   Allergies? No, called patient, no answer, not reviewed with patient       •   Medications? No, called patient, no answer, not reviewed with patient       •   Social History? No, called patient, no answer, not reviewed with patient       •   Family History (document living status of immediate family members and if + hx of  cancer, diabetes, hypertension, hyperlipidemia, heart attack, stroke) No, called patient, no answer, not reviewed with patient    6.  Care Team Updated:       •   DME Company (gait device, O2, CPAP, etc.): No, called patient, no answer, not reviewed with patient       •   Other Specialists (eye doctor, derm, GYN, cardiology, endo, etc): No, called patient, no answer, not reviewed with patient    7.  Patient was not advised: “This is a free wellness visit. The provider will screen for medical conditions to help you stay healthy. If you have other concerns to address you may be asked to discuss these at a separate visit or there may be an additional fee.” No, called patient, no answer, not reviewed with patient    8.  AHA (Puls8) form printed for Provider? Yes

## 2022-06-14 ENCOUNTER — APPOINTMENT (OUTPATIENT)
Dept: MEDICAL GROUP | Facility: MEDICAL CENTER | Age: 67
End: 2022-06-14
Payer: MEDICARE

## 2022-06-21 ENCOUNTER — OFFICE VISIT (OUTPATIENT)
Dept: MEDICAL GROUP | Facility: MEDICAL CENTER | Age: 67
End: 2022-06-21
Payer: MEDICARE

## 2022-06-21 VITALS
TEMPERATURE: 97.6 F | OXYGEN SATURATION: 93 % | WEIGHT: 149 LBS | SYSTOLIC BLOOD PRESSURE: 132 MMHG | BODY MASS INDEX: 22.07 KG/M2 | RESPIRATION RATE: 16 BRPM | DIASTOLIC BLOOD PRESSURE: 78 MMHG | HEART RATE: 64 BPM | HEIGHT: 69 IN

## 2022-06-21 DIAGNOSIS — J96.11 CHRONIC RESPIRATORY FAILURE WITH HYPOXIA (HCC): ICD-10-CM

## 2022-06-21 DIAGNOSIS — R60.0 BILATERAL LOWER EXTREMITY EDEMA: ICD-10-CM

## 2022-06-21 DIAGNOSIS — J44.9 CHRONIC OBSTRUCTIVE PULMONARY DISEASE, UNSPECIFIED COPD TYPE (HCC): ICD-10-CM

## 2022-06-21 DIAGNOSIS — I27.20 PULMONARY HYPERTENSION (HCC): ICD-10-CM

## 2022-06-21 PROBLEM — J18.9 COMMUNITY ACQUIRED PNEUMONIA OF RIGHT LOWER LOBE OF LUNG: Status: RESOLVED | Noted: 2019-06-17 | Resolved: 2022-06-21

## 2022-06-21 PROCEDURE — 99214 OFFICE O/P EST MOD 30 MIN: CPT | Performed by: FAMILY MEDICINE

## 2022-06-21 ASSESSMENT — FIBROSIS 4 INDEX: FIB4 SCORE: 1.92

## 2022-06-21 ASSESSMENT — PATIENT HEALTH QUESTIONNAIRE - PHQ9: CLINICAL INTERPRETATION OF PHQ2 SCORE: 0

## 2022-06-21 NOTE — PROGRESS NOTES
"CC: COPD, chronic respiratory failure with hypoxia, pulmonary hypertension, leg swelling    HPI:   Richard presents today to discuss the following:    Chronic obstructive pulmonary disease, unspecified COPD type ([facility])/ Chronic respiratory failure with hypoxia ([facility])  Patient is mostly asymptomatic.  He has been on Stiolto Respimat, and levalbuterol as needed.  Patient has also been on oxygen at 2 to 3 L/min 24/7.  Oxygen saturation today is 93% in 3 L of oxygen.  Possibly patient has obstructive and restrictive lung disease due to ankylosing spondylitis.    Pulmonary hypertension ([facility])/  Bilateral lower extremity edema  Probably due to cor pulmonale.  Echocardiogram in 2019 showed right ventricular systolic pressure was 43 mmHg.  LE swelling has improved.  However patient has been on Lasix 40 mg daily.      Patient Active Problem List    Diagnosis Date Noted   • Cor pulmonale (chronic) ([facility]) 05/29/2019   • Pulmonary hypertension ([facility]) 05/29/2019   • Cellulitis 03/12/2019   • Chronic obstructive pulmonary disease with acute exacerbation ([facility]) 03/12/2019   • Bilateral lower extremity edema 03/12/2019   • Ankylosing spondylitis of cervicothoracic region ([facility]) 11/15/2018   • Tobacco use disorder, mild, in sustained remission, abuse 07/28/2016   • COLD (chronic obstructive lung disease) ([facility]) 07/28/2016       Current Outpatient Medications   Medication Sig Dispense Refill   • furosemide (LASIX) 40 MG Tab Take 1 Tablet by mouth every day. 90 Tablet 3   • STIOLTO RESPIMAT 2.5-2.5 MCG/ACT Aero Soln INHALE 2 PUFFS BY MOUTH EVERY DAY 12 g 3   • Home Care Oxygen Inhale 3 L/min by mouth Continuous.       No current facility-administered medications for this visit.         Allergies as of 06/21/2022   • (No Known Allergies)        ROS: Denies any chest pain, Shortness of breath, Changes bowel or bladder, Lower extremity edema.    Physical Exam:  /78   Pulse 64   Temp 36.4 °C (97.6 °F)   Resp 16   Ht 1.753 m (5' 9\")   Wt " 67.6 kg (149 lb)   SpO2 93%   BMI 22.00 kg/m²   Gen.: Well-developed, well-nourished, no apparent distress,pleasant and cooperative with the examination  Skin:  Warm and dry with good turgor. No rashes or suspicious lesions in visible areas  HEENT:Sinuses nontender with palpation, TMs clear, nares patent with pink mucosa and clear rhinorrhea,no septal deviation ,polyps or lesions. lips without lesions, oropharynx clear.  Neck: Trachea midline,no masses or adenopathy. No JVD.  Cor: Regular rate and rhythm without murmur, gallop or rub.  Lungs: Respirations unlabored.Clear to auscultation with equal breath sounds bilaterally. No wheezes, rhonchi.  Extremities: No cyanosis, clubbing or edema.      Assessment and Plan.   66 y.o. male     1. Chronic obstructive pulmonary disease, unspecified COPD type (HCC)  Stable.  Continue on Stiolto Respimat, and levalbuterol as needed.  Continue on oxygen at 2 to 3 L/min 24/7    2. Pulmonary hypertension (HCC)  Probably due to cor pulmonale.  Echocardiogram in 2019 showed right ventricular systolic pressure was 43 mmHg.  Currently no leg swelling.  Continue on Lasix 40 mg daily.  Repeat echocardiogram.    - EC-ECHOCARDIOGRAM COMPLETE W/ CONT; Future    3. Bilateral lower extremity edema  Has improved.  Continue on Lasix    - EC-ECHOCARDIOGRAM COMPLETE W/ CONT; Future    4. Chronic respiratory failure with hypoxia (HCC)  Possibly due to COPD/restrictive lung disease due to ankylosing spondylitis.    Continue on oxygen at 2 to 3 L/min 24/7.    - EC-ECHOCARDIOGRAM COMPLETE W/ CONT; Future

## 2022-06-23 ENCOUNTER — TELEPHONE (OUTPATIENT)
Dept: HEALTH INFORMATION MANAGEMENT | Facility: OTHER | Age: 67
End: 2022-06-23

## 2022-07-07 ENCOUNTER — HOSPITAL ENCOUNTER (OUTPATIENT)
Dept: LAB | Facility: MEDICAL CENTER | Age: 67
End: 2022-07-07
Attending: INTERNAL MEDICINE
Payer: MEDICARE

## 2022-07-07 DIAGNOSIS — R60.0 BILATERAL LOWER EXTREMITY EDEMA: ICD-10-CM

## 2022-07-07 DIAGNOSIS — I27.20 PULMONARY HYPERTENSION (HCC): ICD-10-CM

## 2022-07-07 LAB
ALBUMIN SERPL BCP-MCNC: 4.7 G/DL (ref 3.2–4.9)
ALBUMIN/GLOB SERPL: 1.5 G/DL
ALP SERPL-CCNC: 58 U/L (ref 30–99)
ALT SERPL-CCNC: 12 U/L (ref 2–50)
ANION GAP SERPL CALC-SCNC: 8 MMOL/L (ref 7–16)
AST SERPL-CCNC: 18 U/L (ref 12–45)
BILIRUB SERPL-MCNC: 0.4 MG/DL (ref 0.1–1.5)
BUN SERPL-MCNC: 19 MG/DL (ref 8–22)
CALCIUM SERPL-MCNC: 9.6 MG/DL (ref 8.5–10.5)
CHLORIDE SERPL-SCNC: 99 MMOL/L (ref 96–112)
CHOLEST SERPL-MCNC: 233 MG/DL (ref 100–199)
CO2 SERPL-SCNC: 34 MMOL/L (ref 20–33)
CREAT SERPL-MCNC: 1.19 MG/DL (ref 0.5–1.4)
ERYTHROCYTE [DISTWIDTH] IN BLOOD BY AUTOMATED COUNT: 44 FL (ref 35.9–50)
FASTING STATUS PATIENT QL REPORTED: NORMAL
GFR SERPLBLD CREATININE-BSD FMLA CKD-EPI: 66 ML/MIN/1.73 M 2
GLOBULIN SER CALC-MCNC: 3.2 G/DL (ref 1.9–3.5)
GLUCOSE SERPL-MCNC: 102 MG/DL (ref 65–99)
HCT VFR BLD AUTO: 43.9 % (ref 42–52)
HDLC SERPL-MCNC: 63 MG/DL
HGB BLD-MCNC: 13.7 G/DL (ref 14–18)
LDLC SERPL CALC-MCNC: 155 MG/DL
MAGNESIUM SERPL-MCNC: 2.2 MG/DL (ref 1.5–2.5)
MCH RBC QN AUTO: 28 PG (ref 27–33)
MCHC RBC AUTO-ENTMCNC: 31.2 G/DL (ref 33.7–35.3)
MCV RBC AUTO: 89.6 FL (ref 81.4–97.8)
NT-PROBNP SERPL IA-MCNC: 171 PG/ML (ref 0–125)
PLATELET # BLD AUTO: 181 K/UL (ref 164–446)
PMV BLD AUTO: 10.5 FL (ref 9–12.9)
POTASSIUM SERPL-SCNC: 4.8 MMOL/L (ref 3.6–5.5)
PROT SERPL-MCNC: 7.9 G/DL (ref 6–8.2)
PSA SERPL-MCNC: 0.91 NG/ML (ref 0–4)
RBC # BLD AUTO: 4.9 M/UL (ref 4.7–6.1)
SODIUM SERPL-SCNC: 141 MMOL/L (ref 135–145)
TRIGL SERPL-MCNC: 73 MG/DL (ref 0–149)
TSH SERPL DL<=0.005 MIU/L-ACNC: 1.58 UIU/ML (ref 0.38–5.33)
WBC # BLD AUTO: 6.8 K/UL (ref 4.8–10.8)

## 2022-07-07 PROCEDURE — 84153 ASSAY OF PSA TOTAL: CPT

## 2022-07-07 PROCEDURE — 84443 ASSAY THYROID STIM HORMONE: CPT

## 2022-07-07 PROCEDURE — 80061 LIPID PANEL: CPT

## 2022-07-07 PROCEDURE — 83880 ASSAY OF NATRIURETIC PEPTIDE: CPT

## 2022-07-07 PROCEDURE — 36415 COLL VENOUS BLD VENIPUNCTURE: CPT

## 2022-07-07 PROCEDURE — 85027 COMPLETE CBC AUTOMATED: CPT

## 2022-07-07 PROCEDURE — 80053 COMPREHEN METABOLIC PANEL: CPT

## 2022-07-07 PROCEDURE — 83735 ASSAY OF MAGNESIUM: CPT

## 2022-07-08 ENCOUNTER — TELEPHONE (OUTPATIENT)
Dept: CARDIOLOGY | Facility: MEDICAL CENTER | Age: 67
End: 2022-07-08
Payer: MEDICARE

## 2022-07-08 NOTE — TELEPHONE ENCOUNTER
----- Message from HIGINIO Mayorga sent at 7/8/2022  8:19 AM PDT -----  Start rosuvastatin 5 mg QPM if interested. SC     Released  Not seen      Dear Richard,     Your labs show that your BNP is trending down. How is your breathing doing? Your cholesterol is still high. I would recommend you start on a cholesterol medication. Are you agreeable to this? If so, we will send this to your pharmacy.      Katharine Honeycutt DNP, APRN, AGACNP-[facility]   Written by HIGINIO Mayorga on 7/8/2022  8:19 AM PDT

## 2022-07-27 ENCOUNTER — TELEPHONE (OUTPATIENT)
Dept: SLEEP MEDICINE | Facility: MEDICAL CENTER | Age: 67
End: 2022-07-27
Payer: MEDICARE

## 2022-07-27 NOTE — TELEPHONE ENCOUNTER
MEDICATION PRIOR AUTHORIZATION NEEDED:    1. Name of Medication: Xopenex HFA 45 mcg    2. Requested By (Name of Pharmacy): [facility]     3. Is insurance on file current? yes    4. What is the name & phone number of the 3rd party payor? [facility] [phone]

## 2022-10-19 ENCOUNTER — APPOINTMENT (OUTPATIENT)
Dept: MEDICAL GROUP | Facility: MEDICAL CENTER | Age: 67
End: 2022-10-19
Payer: MEDICARE

## 2022-10-21 DIAGNOSIS — J44.9 CHRONIC OBSTRUCTIVE PULMONARY DISEASE, UNSPECIFIED COPD TYPE (HCC): ICD-10-CM

## 2022-10-21 RX ORDER — TIOTROPIUM BROMIDE AND OLODATEROL 3.124; 2.736 UG/1; UG/1
2 SPRAY, METERED RESPIRATORY (INHALATION)
Qty: 3 EACH | Refills: 3 | Status: SHIPPED | OUTPATIENT
Start: 2022-10-21 | End: 2022-10-25

## 2022-10-21 NOTE — TELEPHONE ENCOUNTER
Have we ever prescribed this med? Yes.  If yes, what date? 06/27/22    Last OV: 01/26/22    Next OV: 12/15/22    DX: copd    Medications: stiolto    Patient is almost out    Will want samples if he is in the [place]

## 2022-11-02 ENCOUNTER — TELEPHONE (OUTPATIENT)
Dept: SLEEP MEDICINE | Facility: MEDICAL CENTER | Age: 67
End: 2022-11-02

## 2022-11-02 NOTE — TELEPHONE ENCOUNTER
Patient states he is sick. He has been coughing up green chunky mucus and bad congestion for about a week now. He would like something to help with he symptoms.no other symptoms reported.

## 2022-11-08 ENCOUNTER — DOCUMENTATION (OUTPATIENT)
Dept: HEALTH INFORMATION MANAGEMENT | Facility: OTHER | Age: 67
End: 2022-11-08
Payer: MEDICARE

## 2022-11-14 ENCOUNTER — APPOINTMENT (OUTPATIENT)
Dept: RADIOLOGY | Facility: MEDICAL CENTER | Age: 67
DRG: 189 | End: 2022-11-14
Attending: EMERGENCY MEDICINE
Payer: COMMERCIAL

## 2022-11-14 ENCOUNTER — HOSPITAL ENCOUNTER (INPATIENT)
Facility: MEDICAL CENTER | Age: 67
LOS: 2 days | DRG: 189 | End: 2022-11-16
Attending: EMERGENCY MEDICINE | Admitting: HOSPITALIST
Payer: COMMERCIAL

## 2022-11-14 DIAGNOSIS — J96.21 ACUTE ON CHRONIC RESPIRATORY FAILURE WITH HYPOXIA (HCC): ICD-10-CM

## 2022-11-14 DIAGNOSIS — M41.9 SCOLIOSIS, UNSPECIFIED SCOLIOSIS TYPE, UNSPECIFIED SPINAL REGION: ICD-10-CM

## 2022-11-14 DIAGNOSIS — G89.29 OTHER CHRONIC PAIN: ICD-10-CM

## 2022-11-14 DIAGNOSIS — J44.1 ACUTE EXACERBATION OF CHRONIC OBSTRUCTIVE PULMONARY DISEASE (COPD) (HCC): ICD-10-CM

## 2022-11-14 DIAGNOSIS — I27.20 PULMONARY HYPERTENSION (HCC): ICD-10-CM

## 2022-11-14 DIAGNOSIS — K30 INDIGESTION: ICD-10-CM

## 2022-11-14 DIAGNOSIS — M45.3 ANKYLOSING SPONDYLITIS OF CERVICOTHORACIC REGION (HCC): ICD-10-CM

## 2022-11-14 PROBLEM — J96.20 ACUTE ON CHRONIC RESPIRATORY FAILURE (HCC): Status: ACTIVE | Noted: 2022-11-14

## 2022-11-14 LAB
ALBUMIN SERPL BCP-MCNC: 4.5 G/DL (ref 3.2–4.9)
ALBUMIN/GLOB SERPL: 1.5 G/DL
ALP SERPL-CCNC: 56 U/L (ref 30–99)
ALT SERPL-CCNC: 12 U/L (ref 2–50)
ANION GAP SERPL CALC-SCNC: 10 MMOL/L (ref 7–16)
APPEARANCE UR: CLEAR
AST SERPL-CCNC: 19 U/L (ref 12–45)
BASOPHILS # BLD AUTO: 0.3 % (ref 0–1.8)
BASOPHILS # BLD: 0.05 K/UL (ref 0–0.12)
BILIRUB SERPL-MCNC: 0.5 MG/DL (ref 0.1–1.5)
BILIRUB UR QL STRIP.AUTO: NEGATIVE
BUN SERPL-MCNC: 21 MG/DL (ref 8–22)
CALCIUM SERPL-MCNC: 9.2 MG/DL (ref 8.5–10.5)
CHLORIDE SERPL-SCNC: 95 MMOL/L (ref 96–112)
CO2 SERPL-SCNC: 33 MMOL/L (ref 20–33)
COLOR UR: YELLOW
CREAT SERPL-MCNC: 0.95 MG/DL (ref 0.5–1.4)
EKG IMPRESSION: NORMAL
EOSINOPHIL # BLD AUTO: 0.01 K/UL (ref 0–0.51)
EOSINOPHIL NFR BLD: 0.1 % (ref 0–6.9)
ERYTHROCYTE [DISTWIDTH] IN BLOOD BY AUTOMATED COUNT: 46.5 FL (ref 35.9–50)
FLUAV RNA SPEC QL NAA+PROBE: NEGATIVE
FLUBV RNA SPEC QL NAA+PROBE: NEGATIVE
GFR SERPLBLD CREATININE-BSD FMLA CKD-EPI: 87 ML/MIN/1.73 M 2
GLOBULIN SER CALC-MCNC: 3.1 G/DL (ref 1.9–3.5)
GLUCOSE SERPL-MCNC: 113 MG/DL (ref 65–99)
GLUCOSE UR STRIP.AUTO-MCNC: NEGATIVE MG/DL
HCT VFR BLD AUTO: 40.2 % (ref 42–52)
HGB BLD-MCNC: 12.7 G/DL (ref 14–18)
IMM GRANULOCYTES # BLD AUTO: 0.08 K/UL (ref 0–0.11)
IMM GRANULOCYTES NFR BLD AUTO: 0.5 % (ref 0–0.9)
KETONES UR STRIP.AUTO-MCNC: NEGATIVE MG/DL
LACTATE SERPL-SCNC: 0.7 MMOL/L (ref 0.5–2)
LACTATE SERPL-SCNC: 0.8 MMOL/L (ref 0.5–2)
LEUKOCYTE ESTERASE UR QL STRIP.AUTO: NEGATIVE
LYMPHOCYTES # BLD AUTO: 0.65 K/UL (ref 1–4.8)
LYMPHOCYTES NFR BLD: 3.7 % (ref 22–41)
MCH RBC QN AUTO: 27.9 PG (ref 27–33)
MCHC RBC AUTO-ENTMCNC: 31.6 G/DL (ref 33.7–35.3)
MCV RBC AUTO: 88.4 FL (ref 81.4–97.8)
MICRO URNS: NORMAL
MONOCYTES # BLD AUTO: 0.77 K/UL (ref 0–0.85)
MONOCYTES NFR BLD AUTO: 4.4 % (ref 0–13.4)
NEUTROPHILS # BLD AUTO: 15.81 K/UL (ref 1.82–7.42)
NEUTROPHILS NFR BLD: 91 % (ref 44–72)
NITRITE UR QL STRIP.AUTO: NEGATIVE
NRBC # BLD AUTO: 0 K/UL
NRBC BLD-RTO: 0 /100 WBC
PH UR STRIP.AUTO: 7 [PH] (ref 5–8)
PLATELET # BLD AUTO: 195 K/UL (ref 164–446)
PMV BLD AUTO: 10.4 FL (ref 9–12.9)
POTASSIUM SERPL-SCNC: 4 MMOL/L (ref 3.6–5.5)
PROT SERPL-MCNC: 7.6 G/DL (ref 6–8.2)
PROT UR QL STRIP: NEGATIVE MG/DL
RBC # BLD AUTO: 4.55 M/UL (ref 4.7–6.1)
RBC UR QL AUTO: NEGATIVE
RSV RNA SPEC QL NAA+PROBE: NEGATIVE
SARS-COV-2 RNA RESP QL NAA+PROBE: NOTDETECTED
SODIUM SERPL-SCNC: 138 MMOL/L (ref 135–145)
SP GR UR STRIP.AUTO: 1.01
SPECIMEN SOURCE: NORMAL
TROPONIN T SERPL-MCNC: 39 NG/L (ref 6–19)
TROPONIN T SERPL-MCNC: 41 NG/L (ref 6–19)
UROBILINOGEN UR STRIP.AUTO-MCNC: 0.2 MG/DL
WBC # BLD AUTO: 17.4 K/UL (ref 4.8–10.8)

## 2022-11-14 PROCEDURE — 700102 HCHG RX REV CODE 250 W/ 637 OVERRIDE(OP): Performed by: HOSPITALIST

## 2022-11-14 PROCEDURE — 96374 THER/PROPH/DIAG INJ IV PUSH: CPT

## 2022-11-14 PROCEDURE — 83605 ASSAY OF LACTIC ACID: CPT

## 2022-11-14 PROCEDURE — 36415 COLL VENOUS BLD VENIPUNCTURE: CPT

## 2022-11-14 PROCEDURE — 80053 COMPREHEN METABOLIC PANEL: CPT

## 2022-11-14 PROCEDURE — 84484 ASSAY OF TROPONIN QUANT: CPT

## 2022-11-14 PROCEDURE — 96375 TX/PRO/DX INJ NEW DRUG ADDON: CPT

## 2022-11-14 PROCEDURE — 700105 HCHG RX REV CODE 258: Performed by: HOSPITALIST

## 2022-11-14 PROCEDURE — 71045 X-RAY EXAM CHEST 1 VIEW: CPT

## 2022-11-14 PROCEDURE — 700101 HCHG RX REV CODE 250: Performed by: EMERGENCY MEDICINE

## 2022-11-14 PROCEDURE — 99285 EMERGENCY DEPT VISIT HI MDM: CPT

## 2022-11-14 PROCEDURE — 99223 1ST HOSP IP/OBS HIGH 75: CPT | Mod: AI | Performed by: HOSPITALIST

## 2022-11-14 PROCEDURE — 87040 BLOOD CULTURE FOR BACTERIA: CPT

## 2022-11-14 PROCEDURE — 770006 HCHG ROOM/CARE - MED/SURG/GYN SEMI*

## 2022-11-14 PROCEDURE — 85025 COMPLETE CBC W/AUTO DIFF WBC: CPT

## 2022-11-14 PROCEDURE — 81003 URINALYSIS AUTO W/O SCOPE: CPT

## 2022-11-14 PROCEDURE — C9803 HOPD COVID-19 SPEC COLLECT: HCPCS | Performed by: EMERGENCY MEDICINE

## 2022-11-14 PROCEDURE — 94640 AIRWAY INHALATION TREATMENT: CPT

## 2022-11-14 PROCEDURE — A9270 NON-COVERED ITEM OR SERVICE: HCPCS | Performed by: HOSPITALIST

## 2022-11-14 PROCEDURE — 87086 URINE CULTURE/COLONY COUNT: CPT

## 2022-11-14 PROCEDURE — 700111 HCHG RX REV CODE 636 W/ 250 OVERRIDE (IP): Performed by: HOSPITALIST

## 2022-11-14 PROCEDURE — 93005 ELECTROCARDIOGRAM TRACING: CPT

## 2022-11-14 PROCEDURE — 0241U HCHG SARS-COV-2 COVID-19 NFCT DS RESP RNA 4 TRGT MIC: CPT

## 2022-11-14 PROCEDURE — 700111 HCHG RX REV CODE 636 W/ 250 OVERRIDE (IP): Performed by: EMERGENCY MEDICINE

## 2022-11-14 PROCEDURE — 93005 ELECTROCARDIOGRAM TRACING: CPT | Performed by: EMERGENCY MEDICINE

## 2022-11-14 RX ORDER — OXYCODONE HYDROCHLORIDE 10 MG/1
10 TABLET ORAL
Status: DISCONTINUED | OUTPATIENT
Start: 2022-11-14 | End: 2022-11-16 | Stop reason: HOSPADM

## 2022-11-14 RX ORDER — METHYLPREDNISOLONE SODIUM SUCCINATE 40 MG/ML
40 INJECTION, POWDER, LYOPHILIZED, FOR SOLUTION INTRAMUSCULAR; INTRAVENOUS EVERY 6 HOURS
Status: DISCONTINUED | OUTPATIENT
Start: 2022-11-15 | End: 2022-11-15

## 2022-11-14 RX ORDER — DOXYCYCLINE 100 MG/1
100 TABLET ORAL EVERY 12 HOURS
Status: DISCONTINUED | OUTPATIENT
Start: 2022-11-14 | End: 2022-11-16 | Stop reason: HOSPADM

## 2022-11-14 RX ORDER — MORPHINE SULFATE 4 MG/ML
4 INJECTION INTRAVENOUS
Status: DISCONTINUED | OUTPATIENT
Start: 2022-11-14 | End: 2022-11-16 | Stop reason: HOSPADM

## 2022-11-14 RX ORDER — ONDANSETRON 2 MG/ML
4 INJECTION INTRAMUSCULAR; INTRAVENOUS EVERY 4 HOURS PRN
Status: DISCONTINUED | OUTPATIENT
Start: 2022-11-14 | End: 2022-11-16 | Stop reason: HOSPADM

## 2022-11-14 RX ORDER — IPRATROPIUM BROMIDE AND ALBUTEROL SULFATE 2.5; .5 MG/3ML; MG/3ML
3 SOLUTION RESPIRATORY (INHALATION)
Status: DISCONTINUED | OUTPATIENT
Start: 2022-11-14 | End: 2022-11-16 | Stop reason: HOSPADM

## 2022-11-14 RX ORDER — POLYETHYLENE GLYCOL 3350 17 G/17G
1 POWDER, FOR SOLUTION ORAL
Status: DISCONTINUED | OUTPATIENT
Start: 2022-11-14 | End: 2022-11-16 | Stop reason: HOSPADM

## 2022-11-14 RX ORDER — OXYCODONE HYDROCHLORIDE 5 MG/1
5 TABLET ORAL
Status: DISCONTINUED | OUTPATIENT
Start: 2022-11-14 | End: 2022-11-16 | Stop reason: HOSPADM

## 2022-11-14 RX ORDER — METHYLPREDNISOLONE SODIUM SUCCINATE 40 MG/ML
40 INJECTION, POWDER, LYOPHILIZED, FOR SOLUTION INTRAMUSCULAR; INTRAVENOUS EVERY 6 HOURS
Status: DISCONTINUED | OUTPATIENT
Start: 2022-11-14 | End: 2022-11-14

## 2022-11-14 RX ORDER — METHYLPREDNISOLONE SODIUM SUCCINATE 125 MG/2ML
125 INJECTION, POWDER, LYOPHILIZED, FOR SOLUTION INTRAMUSCULAR; INTRAVENOUS ONCE
Status: COMPLETED | OUTPATIENT
Start: 2022-11-14 | End: 2022-11-14

## 2022-11-14 RX ORDER — SODIUM CHLORIDE, SODIUM LACTATE, POTASSIUM CHLORIDE, CALCIUM CHLORIDE 600; 310; 30; 20 MG/100ML; MG/100ML; MG/100ML; MG/100ML
INJECTION, SOLUTION INTRAVENOUS CONTINUOUS
Status: ACTIVE | OUTPATIENT
Start: 2022-11-14 | End: 2022-11-15

## 2022-11-14 RX ORDER — ACETAMINOPHEN 325 MG/1
650 TABLET ORAL EVERY 6 HOURS PRN
Status: DISCONTINUED | OUTPATIENT
Start: 2022-11-14 | End: 2022-11-16 | Stop reason: HOSPADM

## 2022-11-14 RX ORDER — ENOXAPARIN SODIUM 100 MG/ML
40 INJECTION SUBCUTANEOUS DAILY
Status: DISCONTINUED | OUTPATIENT
Start: 2022-11-14 | End: 2022-11-16 | Stop reason: HOSPADM

## 2022-11-14 RX ORDER — IPRATROPIUM BROMIDE AND ALBUTEROL SULFATE 2.5; .5 MG/3ML; MG/3ML
3 SOLUTION RESPIRATORY (INHALATION)
Status: DISCONTINUED | OUTPATIENT
Start: 2022-11-14 | End: 2022-11-15

## 2022-11-14 RX ORDER — BISACODYL 10 MG
10 SUPPOSITORY, RECTAL RECTAL
Status: DISCONTINUED | OUTPATIENT
Start: 2022-11-14 | End: 2022-11-16 | Stop reason: HOSPADM

## 2022-11-14 RX ORDER — AMOXICILLIN 250 MG
2 CAPSULE ORAL 2 TIMES DAILY
Status: DISCONTINUED | OUTPATIENT
Start: 2022-11-14 | End: 2022-11-16 | Stop reason: HOSPADM

## 2022-11-14 RX ORDER — TIOTROPIUM BROMIDE AND OLODATEROL 3.124; 2.736 UG/1; UG/1
1 SPRAY, METERED RESPIRATORY (INHALATION) 2 TIMES DAILY
COMMUNITY
End: 2023-02-01

## 2022-11-14 RX ORDER — ONDANSETRON 4 MG/1
4 TABLET, ORALLY DISINTEGRATING ORAL EVERY 4 HOURS PRN
Status: DISCONTINUED | OUTPATIENT
Start: 2022-11-14 | End: 2022-11-16 | Stop reason: HOSPADM

## 2022-11-14 RX ADMIN — DOXYCYCLINE 100 MG: 100 TABLET, FILM COATED ORAL at 17:59

## 2022-11-14 RX ADMIN — SODIUM CHLORIDE, POTASSIUM CHLORIDE, SODIUM LACTATE AND CALCIUM CHLORIDE: 600; 310; 30; 20 INJECTION, SOLUTION INTRAVENOUS at 20:26

## 2022-11-14 RX ADMIN — MORPHINE SULFATE 4 MG: 4 INJECTION INTRAVENOUS at 17:58

## 2022-11-14 RX ADMIN — METHYLPREDNISOLONE SODIUM SUCCINATE 125 MG: 125 INJECTION, POWDER, FOR SOLUTION INTRAMUSCULAR; INTRAVENOUS at 17:15

## 2022-11-14 RX ADMIN — MORPHINE SULFATE 4 MG: 4 INJECTION INTRAVENOUS at 21:02

## 2022-11-14 RX ADMIN — ALBUTEROL SULFATE 2.5 MG: 2.5 SOLUTION RESPIRATORY (INHALATION) at 16:48

## 2022-11-14 ASSESSMENT — PATIENT HEALTH QUESTIONNAIRE - PHQ9
1. LITTLE INTEREST OR PLEASURE IN DOING THINGS: SEVERAL DAYS
SUM OF ALL RESPONSES TO PHQ9 QUESTIONS 1 AND 2: 2
5. POOR APPETITE OR OVEREATING: NOT AT ALL
7. TROUBLE CONCENTRATING ON THINGS, SUCH AS READING THE NEWSPAPER OR WATCHING TELEVISION: NOT AT ALL
8. MOVING OR SPEAKING SO SLOWLY THAT OTHER PEOPLE COULD HAVE NOTICED. OR THE OPPOSITE, BEING SO FIGETY OR RESTLESS THAT YOU HAVE BEEN MOVING AROUND A LOT MORE THAN USUAL: NOT AT ALL
3. TROUBLE FALLING OR STAYING ASLEEP OR SLEEPING TOO MUCH: NOT AT ALL
6. FEELING BAD ABOUT YOURSELF - OR THAT YOU ARE A FAILURE OR HAVE LET YOURSELF OR YOUR FAMILY DOWN: NEARLY EVERY DAY
9. THOUGHTS THAT YOU WOULD BE BETTER OFF DEAD, OR OF HURTING YOURSELF: NOT AT ALL
2. FEELING DOWN, DEPRESSED, IRRITABLE, OR HOPELESS: SEVERAL DAYS
SUM OF ALL RESPONSES TO PHQ QUESTIONS 1-9: 6
4. FEELING TIRED OR HAVING LITTLE ENERGY: SEVERAL DAYS

## 2022-11-14 ASSESSMENT — ENCOUNTER SYMPTOMS
SORE THROAT: 0
SHORTNESS OF BREATH: 1
BLURRED VISION: 0
DIARRHEA: 0
CHILLS: 0
DIZZINESS: 0
SPUTUM PRODUCTION: 1
VOMITING: 0
DOUBLE VISION: 0
PALPITATIONS: 0
LOSS OF CONSCIOUSNESS: 0
BACK PAIN: 0
FEVER: 0
ABDOMINAL PAIN: 0
COUGH: 1
HEADACHES: 0
NAUSEA: 0

## 2022-11-14 ASSESSMENT — COGNITIVE AND FUNCTIONAL STATUS - GENERAL
STANDING UP FROM CHAIR USING ARMS: A LITTLE
HELP NEEDED FOR BATHING: A LITTLE
MOVING FROM LYING ON BACK TO SITTING ON SIDE OF FLAT BED: A LITTLE
DAILY ACTIVITIY SCORE: 22
CLIMB 3 TO 5 STEPS WITH RAILING: A LITTLE
MOBILITY SCORE: 19
SUGGESTED CMS G CODE MODIFIER MOBILITY: CK
DRESSING REGULAR UPPER BODY CLOTHING: A LITTLE
MOVING TO AND FROM BED TO CHAIR: A LITTLE
SUGGESTED CMS G CODE MODIFIER DAILY ACTIVITY: CJ
WALKING IN HOSPITAL ROOM: A LITTLE

## 2022-11-14 ASSESSMENT — LIFESTYLE VARIABLES
HAVE YOU EVER FELT YOU SHOULD CUT DOWN ON YOUR DRINKING: NO
HOW MANY TIMES IN THE PAST YEAR HAVE YOU HAD 5 OR MORE DRINKS IN A DAY: 0
EVER HAD A DRINK FIRST THING IN THE MORNING TO STEADY YOUR NERVES TO GET RID OF A HANGOVER: NO
TOTAL SCORE: 0
EVER_SMOKED: YES
TOTAL SCORE: 0
AVERAGE NUMBER OF DAYS PER WEEK YOU HAVE A DRINK CONTAINING ALCOHOL: 0
CONSUMPTION TOTAL: NEGATIVE
TOTAL SCORE: 0
HAVE PEOPLE ANNOYED YOU BY CRITICIZING YOUR DRINKING: NO
EVER FELT BAD OR GUILTY ABOUT YOUR DRINKING: NO
DOES PATIENT WANT TO STOP DRINKING: NO
ALCOHOL_USE: NO
ON A TYPICAL DAY WHEN YOU DRINK ALCOHOL HOW MANY DRINKS DO YOU HAVE: 0

## 2022-11-14 ASSESSMENT — FIBROSIS 4 INDEX
FIB4 SCORE: 1.94
FIB4 SCORE: 1.98

## 2022-11-14 ASSESSMENT — PAIN DESCRIPTION - PAIN TYPE: TYPE: ACUTE PAIN

## 2022-11-14 NOTE — ED TRIAGE NOTES
"Chief Complaint   Patient presents with    Shortness of Breath     Pt c/o \"weird colored phlegm\" for about 3 weeks and states he has been having new SOB for approx 3 days. He states \"I feel like I can't get a full breath in.\" Pt wears 3L baseline for COPD and increased his home O2 to 4L, current O2 saturations at 99%. Pt c/o additional chest, shoulder, shoulder blade, and bilateral arm pain.       Wheelchair to triage for above complaint.   Educated on triage process, encourage to inform staff of any changes.     BP (!) 160/91   Pulse 92   Temp 37.5 °C (99.5 °F) (Temporal)   Resp 18   Ht 1.753 m (5' 9\")   Wt 63.5 kg (140 lb)   SpO2 99%   BMI 20.67 kg/m²     "

## 2022-11-15 ENCOUNTER — APPOINTMENT (OUTPATIENT)
Dept: CARDIOLOGY | Facility: MEDICAL CENTER | Age: 67
DRG: 189 | End: 2022-11-15
Attending: INTERNAL MEDICINE
Payer: COMMERCIAL

## 2022-11-15 ENCOUNTER — HOME HEALTH ADMISSION (OUTPATIENT)
Dept: HOME HEALTH SERVICES | Facility: HOME HEALTHCARE | Age: 67
End: 2022-11-15
Payer: MEDICARE

## 2022-11-15 LAB
ANION GAP SERPL CALC-SCNC: 12 MMOL/L (ref 7–16)
BUN SERPL-MCNC: 20 MG/DL (ref 8–22)
CALCIUM SERPL-MCNC: 9.3 MG/DL (ref 8.5–10.5)
CHLORIDE SERPL-SCNC: 94 MMOL/L (ref 96–112)
CO2 SERPL-SCNC: 30 MMOL/L (ref 20–33)
CREAT SERPL-MCNC: 0.89 MG/DL (ref 0.5–1.4)
CRP SERPL HS-MCNC: 6.2 MG/DL (ref 0–0.75)
ERYTHROCYTE [SEDIMENTATION RATE] IN BLOOD BY WESTERGREN METHOD: 14 MM/HOUR (ref 0–20)
GFR SERPLBLD CREATININE-BSD FMLA CKD-EPI: 93 ML/MIN/1.73 M 2
GLUCOSE SERPL-MCNC: 112 MG/DL (ref 65–99)
LV EJECT FRACT  99904: 64
LV EJECT FRACT MOD 2C 99903: 63.81
LV EJECT FRACT MOD 4C 99902: 64.13
LV EJECT FRACT MOD BP 99901: 63.32
POTASSIUM SERPL-SCNC: 4.1 MMOL/L (ref 3.6–5.5)
PROCALCITONIN SERPL-MCNC: <0.05 NG/ML
SODIUM SERPL-SCNC: 136 MMOL/L (ref 135–145)

## 2022-11-15 PROCEDURE — 94669 MECHANICAL CHEST WALL OSCILL: CPT

## 2022-11-15 PROCEDURE — 84145 PROCALCITONIN (PCT): CPT

## 2022-11-15 PROCEDURE — A9270 NON-COVERED ITEM OR SERVICE: HCPCS | Performed by: HOSPITALIST

## 2022-11-15 PROCEDURE — 700101 HCHG RX REV CODE 250: Performed by: HOSPITALIST

## 2022-11-15 PROCEDURE — 99233 SBSQ HOSP IP/OBS HIGH 50: CPT | Performed by: INTERNAL MEDICINE

## 2022-11-15 PROCEDURE — 700102 HCHG RX REV CODE 250 W/ 637 OVERRIDE(OP): Performed by: INTERNAL MEDICINE

## 2022-11-15 PROCEDURE — 700111 HCHG RX REV CODE 636 W/ 250 OVERRIDE (IP): Performed by: INTERNAL MEDICINE

## 2022-11-15 PROCEDURE — 770006 HCHG ROOM/CARE - MED/SURG/GYN SEMI*

## 2022-11-15 PROCEDURE — 94640 AIRWAY INHALATION TREATMENT: CPT

## 2022-11-15 PROCEDURE — 700101 HCHG RX REV CODE 250: Performed by: INTERNAL MEDICINE

## 2022-11-15 PROCEDURE — 85652 RBC SED RATE AUTOMATED: CPT

## 2022-11-15 PROCEDURE — 93306 TTE W/DOPPLER COMPLETE: CPT

## 2022-11-15 PROCEDURE — 93306 TTE W/DOPPLER COMPLETE: CPT | Mod: 26 | Performed by: INTERNAL MEDICINE

## 2022-11-15 PROCEDURE — 94664 DEMO&/EVAL PT USE INHALER: CPT

## 2022-11-15 PROCEDURE — RXMED WILLOW AMBULATORY MEDICATION CHARGE: Performed by: INTERNAL MEDICINE

## 2022-11-15 PROCEDURE — 99221 1ST HOSP IP/OBS SF/LOW 40: CPT | Mod: GC | Performed by: INTERNAL MEDICINE

## 2022-11-15 PROCEDURE — 86140 C-REACTIVE PROTEIN: CPT

## 2022-11-15 PROCEDURE — 700111 HCHG RX REV CODE 636 W/ 250 OVERRIDE (IP): Performed by: HOSPITALIST

## 2022-11-15 PROCEDURE — 700102 HCHG RX REV CODE 250 W/ 637 OVERRIDE(OP): Performed by: HOSPITALIST

## 2022-11-15 PROCEDURE — A9270 NON-COVERED ITEM OR SERVICE: HCPCS | Performed by: INTERNAL MEDICINE

## 2022-11-15 PROCEDURE — 80048 BASIC METABOLIC PNL TOTAL CA: CPT

## 2022-11-15 PROCEDURE — 97162 PT EVAL MOD COMPLEX 30 MIN: CPT

## 2022-11-15 RX ORDER — NAPROXEN 500 MG/1
500 TABLET ORAL
COMMUNITY
Start: 2022-11-15 | End: 2023-02-01

## 2022-11-15 RX ORDER — ACETAMINOPHEN 325 MG/1
650 TABLET ORAL EVERY 6 HOURS PRN
Qty: 30 TABLET | Refills: 0 | COMMUNITY
Start: 2022-11-15

## 2022-11-15 RX ORDER — IPRATROPIUM BROMIDE AND ALBUTEROL SULFATE 2.5; .5 MG/3ML; MG/3ML
3 SOLUTION RESPIRATORY (INHALATION) EVERY 4 HOURS PRN
Qty: 90 ML | Refills: 3 | Status: SHIPPED | OUTPATIENT
Start: 2022-11-15

## 2022-11-15 RX ORDER — BUDESONIDE AND FORMOTEROL FUMARATE DIHYDRATE 160; 4.5 UG/1; UG/1
2 AEROSOL RESPIRATORY (INHALATION)
Status: DISCONTINUED | OUTPATIENT
Start: 2022-11-15 | End: 2022-11-16 | Stop reason: HOSPADM

## 2022-11-15 RX ORDER — OMEPRAZOLE 20 MG/1
20 CAPSULE, DELAYED RELEASE ORAL DAILY
Qty: 30 CAPSULE | Refills: 0 | Status: SHIPPED | OUTPATIENT
Start: 2022-11-15 | End: 2023-02-01

## 2022-11-15 RX ORDER — OXYCODONE HYDROCHLORIDE 5 MG/1
5 TABLET ORAL EVERY 6 HOURS PRN
Qty: 12 TABLET | Refills: 0 | Status: SHIPPED | OUTPATIENT
Start: 2022-11-15 | End: 2022-11-19

## 2022-11-15 RX ORDER — IPRATROPIUM BROMIDE AND ALBUTEROL SULFATE 2.5; .5 MG/3ML; MG/3ML
3 SOLUTION RESPIRATORY (INHALATION)
Status: DISCONTINUED | OUTPATIENT
Start: 2022-11-15 | End: 2022-11-16

## 2022-11-15 RX ORDER — DOXYCYCLINE 100 MG/1
100 TABLET ORAL EVERY 12 HOURS
Qty: 8 TABLET | Refills: 0 | Status: SHIPPED | OUTPATIENT
Start: 2022-11-15 | End: 2022-11-20

## 2022-11-15 RX ORDER — PREDNISONE 20 MG/1
40 TABLET ORAL DAILY
Status: DISCONTINUED | OUTPATIENT
Start: 2022-11-15 | End: 2022-11-16 | Stop reason: HOSPADM

## 2022-11-15 RX ORDER — M-VIT,TX,IRON,MINS/CALC/FOLIC 27MG-0.4MG
1 TABLET ORAL DAILY
COMMUNITY

## 2022-11-15 RX ORDER — PREDNISONE 10 MG/1
TABLET ORAL
Qty: 45 TABLET | Refills: 0 | Status: SHIPPED | OUTPATIENT
Start: 2022-11-15 | End: 2023-02-01

## 2022-11-15 RX ADMIN — METHYLPREDNISOLONE SODIUM SUCCINATE 40 MG: 40 INJECTION, POWDER, FOR SOLUTION INTRAMUSCULAR; INTRAVENOUS at 06:15

## 2022-11-15 RX ADMIN — MORPHINE SULFATE 4 MG: 4 INJECTION INTRAVENOUS at 22:45

## 2022-11-15 RX ADMIN — DOXYCYCLINE 100 MG: 100 TABLET, FILM COATED ORAL at 17:56

## 2022-11-15 RX ADMIN — MORPHINE SULFATE 4 MG: 4 INJECTION INTRAVENOUS at 03:12

## 2022-11-15 RX ADMIN — OXYCODONE HYDROCHLORIDE 10 MG: 10 TABLET ORAL at 21:26

## 2022-11-15 RX ADMIN — IPRATROPIUM BROMIDE AND ALBUTEROL SULFATE 3 ML: .5; 2.5 SOLUTION RESPIRATORY (INHALATION) at 08:59

## 2022-11-15 RX ADMIN — MORPHINE SULFATE 4 MG: 4 INJECTION INTRAVENOUS at 12:23

## 2022-11-15 RX ADMIN — PREDNISONE 40 MG: 20 TABLET ORAL at 11:39

## 2022-11-15 RX ADMIN — BUDESONIDE AND FORMOTEROL FUMARATE DIHYDRATE 2 PUFF: 160; 4.5 AEROSOL RESPIRATORY (INHALATION) at 14:15

## 2022-11-15 RX ADMIN — MORPHINE SULFATE 4 MG: 4 INJECTION INTRAVENOUS at 06:15

## 2022-11-15 RX ADMIN — MORPHINE SULFATE 4 MG: 4 INJECTION INTRAVENOUS at 00:07

## 2022-11-15 RX ADMIN — IPRATROPIUM BROMIDE AND ALBUTEROL SULFATE 3 ML: .5; 2.5 SOLUTION RESPIRATORY (INHALATION) at 14:15

## 2022-11-15 RX ADMIN — MORPHINE SULFATE 4 MG: 4 INJECTION INTRAVENOUS at 15:00

## 2022-11-15 RX ADMIN — TIOTROPIUM BROMIDE INHALATION SPRAY 5 MCG: 3.12 SPRAY, METERED RESPIRATORY (INHALATION) at 14:15

## 2022-11-15 RX ADMIN — MORPHINE SULFATE 4 MG: 4 INJECTION INTRAVENOUS at 17:56

## 2022-11-15 RX ADMIN — DOXYCYCLINE 100 MG: 100 TABLET, FILM COATED ORAL at 06:15

## 2022-11-15 RX ADMIN — METHYLPREDNISOLONE SODIUM SUCCINATE 40 MG: 40 INJECTION, POWDER, FOR SOLUTION INTRAMUSCULAR; INTRAVENOUS at 00:07

## 2022-11-15 RX ADMIN — MORPHINE SULFATE 4 MG: 4 INJECTION INTRAVENOUS at 09:07

## 2022-11-15 ASSESSMENT — ENCOUNTER SYMPTOMS
SHORTNESS OF BREATH: 1
CHILLS: 0
BACK PAIN: 1
FEVER: 0
HEADACHES: 0
VOMITING: 0
NAUSEA: 0
SPUTUM PRODUCTION: 1
NECK PAIN: 1
COUGH: 1
WHEEZING: 1

## 2022-11-15 ASSESSMENT — GAIT ASSESSMENTS
GAIT LEVEL OF ASSIST: SUPERVISED
DISTANCE (FEET): 35
DEVIATION: BRADYKINETIC

## 2022-11-15 ASSESSMENT — PAIN DESCRIPTION - PAIN TYPE
TYPE: ACUTE PAIN

## 2022-11-15 ASSESSMENT — COGNITIVE AND FUNCTIONAL STATUS - GENERAL
TURNING FROM BACK TO SIDE WHILE IN FLAT BAD: A LITTLE
MOVING FROM LYING ON BACK TO SITTING ON SIDE OF FLAT BED: A LITTLE
STANDING UP FROM CHAIR USING ARMS: A LITTLE
MOBILITY SCORE: 18
SUGGESTED CMS G CODE MODIFIER MOBILITY: CK
CLIMB 3 TO 5 STEPS WITH RAILING: A LITTLE
MOVING TO AND FROM BED TO CHAIR: A LITTLE
WALKING IN HOSPITAL ROOM: A LITTLE

## 2022-11-15 NOTE — H&P
"Hospital Medicine History & Physical Note    Date of Service  11/14/2022    Primary Care Physician  Michael Green M.D.    Consultants      Specialist Names:     Code Status  Full Code    Chief Complaint  Chief Complaint   Patient presents with    Shortness of Breath     Pt c/o \"weird colored phlegm\" for about 3 weeks and states he has been having new SOB for approx 3 days. He states \"I feel like I can't get a full breath in.\" Pt wears 3L baseline for COPD and increased his home O2 to 4L, current O2 saturations at 99%. Pt c/o additional chest, shoulder, shoulder blade, and bilateral arm pain.       History of Presenting Illness  Richard Bob is a 67 y.o. male who presented 11/14/2022 with a well-known history of COPD.  He has chronic respiratory failure and is maintained on 3 L of home oxygen at his baseline.    Patient presents with complaint of shortness of breath that started about 3 days ago.  He notes that in the previous week he had had increased cough which was productive of a green/brown sputum however he had otherwise been breathing \"okay\".  Beginning 3 days ago, he developed more shortness of breath.  He has been using his inhalers more frequently however notes that they are no longer seeming to make any difference.  On arrival to the ED, the patient was satting in the low 80s on his baseline 3 L, but after her breathing treatments, and currently on 6 L mask he is maintaining mid 90s.  He reports that he is breathing more easily now than when he arrived, but is still not doing as well as his baseline.    I discussed the plan of care with patient.    Review of Systems  Review of Systems   Constitutional:  Negative for chills and fever.   HENT:  Negative for nosebleeds and sore throat.    Eyes:  Negative for blurred vision and double vision.   Respiratory:  Positive for cough, sputum production and shortness of breath.    Cardiovascular:  Negative for chest pain, palpitations and leg swelling. "   Gastrointestinal:  Negative for abdominal pain, diarrhea, nausea and vomiting.   Genitourinary:  Negative for dysuria and urgency.   Musculoskeletal:  Negative for back pain.   Skin:  Negative for rash.   Neurological:  Negative for dizziness, loss of consciousness and headaches.     Past Medical History   has a past medical history of Anesthesia, Ankylosing spondylitis (HCC), Chronic obstructive pulmonary disease (HCC), Cold, Dental disorder, Diverticulitis, Osteoporosis, Psychiatric problem, and Scoliosis.    Surgical History   has a past surgical history that includes sigmoid colectomy (12/6/2008); colostomy (12/6/2008); colostomy closure (4/2/2009); ventral hernia repair (6/11/2009); umbilical hernia repair; exploratory laparotomy (12/6/2008); bowel resection (12/6/2008); and ventral hernia repair (10/23/2009).     Family History  family history includes Alzheimer's Disease in his mother.   Family history reviewed with patient. There is no family history that is pertinent to the chief complaint.     Social History   reports that he quit smoking about 13 years ago. His smoking use included cigarettes. He started smoking about 55 years ago. He has a 42.00 pack-year smoking history. He has never used smokeless tobacco. He reports that he does not drink alcohol and does not use drugs.    Allergies  No Known Allergies    Medications  Prior to Admission Medications   Prescriptions Last Dose Informant Patient Reported? Taking?   Home Care Oxygen   Yes No   Sig: Inhale 3 L/min by mouth Continuous.   Tiotropium Bromide-Olodaterol (STIOLTO RESPIMAT) 2.5-2.5 MCG/ACT Aero Soln 11/14/2022 at am  Yes Yes   Sig: Inhale 1 Puff 2 times a day.   furosemide (LASIX) 40 MG Tab 11/13/2022 at am  No No   Sig: Take 1 Tablet by mouth every day.   levalbuterol (XOPENEX HFA) 45 MCG/ACT inhaler unknown at unknown  No No   Sig: INHALE 2 PUFFS EVERY 4 HOURS AS NEEDED FOR SHORTNESS OF BREATH   Patient taking differently: 2 Puffs every four  hours as needed.      Facility-Administered Medications: None       Physical Exam  Temp:  [37.5 °C (99.5 °F)] 37.5 °C (99.5 °F)  Pulse:  [91-92] 91  Resp:  [18] 18  BP: (160)/(91) 160/91  SpO2:  [99 %] 99 %  Blood Pressure : (!) 160/91   Temperature: 37.5 °C (99.5 °F)   Pulse: 91   Respiration: 18   Pulse Oximetry: 99 %       Physical Exam  Constitutional:       General: He is not in acute distress.     Appearance: He is well-developed. He is not diaphoretic.   HENT:      Head: Normocephalic and atraumatic.   Eyes:      Conjunctiva/sclera: Conjunctivae normal.   Neck:      Vascular: No JVD.   Cardiovascular:      Rate and Rhythm: Normal rate.      Heart sounds: No murmur heard.    No gallop.   Pulmonary:      Effort: Pulmonary effort is normal. No respiratory distress.      Breath sounds: No stridor. Wheezing present. No rales.      Comments: Prolonged expiratory phase  Abdominal:      Palpations: Abdomen is soft.      Tenderness: There is no abdominal tenderness. There is no guarding or rebound.   Musculoskeletal:         General: No tenderness.      Right lower leg: Edema present.      Left lower leg: Edema present.      Comments: Trace edema   Skin:     General: Skin is warm and dry.      Findings: No rash.   Neurological:      Mental Status: He is alert and oriented to person, place, and time.   Psychiatric:         Mood and Affect: Mood normal.         Behavior: Behavior normal.         Thought Content: Thought content normal.       Laboratory:  Recent Labs     11/14/22  1336   WBC 17.4*   RBC 4.55*   HEMOGLOBIN 12.7*   HEMATOCRIT 40.2*   MCV 88.4   MCH 27.9   MCHC 31.6*   RDW 46.5   PLATELETCT 195   MPV 10.4     Recent Labs     11/14/22  1336   SODIUM 138   POTASSIUM 4.0   CHLORIDE 95*   CO2 33   GLUCOSE 113*   BUN 21   CREATININE 0.95   CALCIUM 9.2     Recent Labs     11/14/22  1336   ALTSGPT 12   ASTSGOT 19   ALKPHOSPHAT 56   TBILIRUBIN 0.5   GLUCOSE 113*         No results for input(s): NTPROBNP in the last  72 hours.      Recent Labs     11/14/22  1336 11/14/22  1633   TROPONINT 41* 39*       Imaging:  DX-CHEST-PORTABLE (1 VIEW)   Final Result      No acute cardiac or pulmonary abnormalities are identified.          X-Ray:  I have personally reviewed the images and compared with prior images.    Assessment/Plan:  Justification for Admission Status  I anticipate this patient will require at least two midnights for appropriate medical management, necessitating inpatient admission because patient has acute on chronic respiratory failure, and is likely to require greater than 48 hours in-house    Patient will need a Med/Surg bed on MEDICAL service .  The need is secondary to acute on chronic respiratory failure.    Acute on chronic respiratory failure with hypoxia (HCC)- (present on admission)  Assessment & Plan  Patient's chest x-ray is clear  Clinically he does not look to be in heart failure  Suspicion of PE is low  We will treat the patient for COPD exacerbation  Systemic steroids  Scheduled broncho dialators  Continue inhaled steroid, long-acting beta agonist  Empiric doxycycline  O2 and RT protocols      Ankylosing spondylitis of cervicothoracic region (HCC)- (present on admission)  Assessment & Plan  Not on any immunomodulating medications  PRN support  Kyphotic deformity is contributing to his resp failure      VTE prophylaxis: enoxaparin ppx

## 2022-11-15 NOTE — DISCHARGE PLANNING
ATTN: Case Management  RE: Referral for Home Health    As of 11.15.22, we have accepted the Home Health referral for the patient listed above.    A [facility] clinician will be out to see the patient within 48 hours. If you have any questions or concerns regarding the patient’s transition to Home Health, please do not hesitate to contact us at [phone].      We look forward to collaborating with you,  [facility] Health Team

## 2022-11-15 NOTE — RESPIRATORY CARE
COPD EDUCATION by COPD CLINICAL EDUCATOR  11/15/2022  at  10:48 AM by Donna Helton RRT     Patient interviewed by COPD education team.  Patient unable to participate in full program.  A short intervention has been conducted.  A comprehensive packet including information about COPD, types of treatments to manage their disease and safe home Oxygen usage was provided and reviewed with patient at the bedside.  We reviewed his respiratory medications, and he was given a spacer and flutter with instruction. Patient has requested a prescription for Albuterol rescue inhaler and nebulizer medication, this was communicated to the attending MD.      Patient does not have a smart phone to participate in RPM.    COPD Screen  COPD Risk Screening  Do you have a history of COPD?: Yes  Do you have a Pulmonologist?: Yes    COPD Assessment  COPD Clinical Specialists ONLY  COPD Education Initiated: Yes--Short Intervention (Given COPD booklet, spacer, flutter. No smart phone for RPM, req OP Rx for Albuterol PRN (neb and MDI), req f/u appt w/ Pulm moved up)  DME Company: [facility]  DME Equipment Type: O2 @ 3 L, nebulizer  Physician Follow Up Appointment:  (declined appt assistance)  Physician Name: Michael Green M.D.  Pulmonary Follow Up Appointment:  (appt requested) 11/21/2022 @ 14:40 with HIGINIO Burgess  Pulmonologist Name: Lakshmi King P.A.-C.  Referrals Initiated: Yes  Pulmonary Rehab: Yes (given info)  Smoking Cessation: N/A (quit in 2009)  Palliative Care:  (not ordered, never seen)  Hospice: N/A  Home Health Care: Yes  [facility] Outreach: N/A  [facility]: Yes (established)  [facility]: Yes (placed on referral list)  Private In-Home Care Agency: N/A  Is this a COPD exacerbation patient?: Yes (per H&P, order set used, IP Pulmonary notified)  $ Demo/Eval of SVN's, MDI's and Aerosols: Yes (given spacer and flutter)  (OP) Pulmonary Function Testing: Yes (3/27/2019: FEV1 17%, ratio  "30%)      Meds to Beds  Would the patient like to opt in for Bedside Medication Delivery at Discharge?: Yes, interested     MY COPD ACTION PLAN     It is recommended that patients and physicians /healthcare providers complete this action plan together. This plan should be discussed at each physician visit and updated as needed.    The green, yellow and red zones show groups of symptoms of COPD. This list of symptoms is not comprehensive, and you may experience other symptoms. In the \"Actions\" column, your healthcare provider has recommended actions for you to take based on your symptoms.    Patient Name: Richard Bob   YOB: 1955   Last Updated on:     Green Zone:  I am doing well today Actions     Usual activitiy and exercise level   Take daily medications     Usual amounts of cough and phlegm/mucus   Use oxygen as prescribed     Sleep well at night   Continue regular exercise/diet plan     Appetite is good   At all times avoid cigarette smoke, inhaled irritants     Daily Medications (these medications are taken every day):   Tiotropium and Olodaterol (Stiolto) 1 Puff Twice daily        Yellow Zone:  I am having a bad day or a COPD flare Actions     More breathless than usual   Continue daily medications     I have less energy for my daily activities   Use quick relief inhaler as ordered     Increased or thicker phlegm/mucus   Use oxygen as prescribed     Using quick relief inhaler/nebulizer more often   Get plenty of rest     Swelling of ankles more than usual   Use pursed lip breathing     More coughing than usual   At all times avoid cigarette smoke, inhaled irritants     I feel like I have a \"chest cold\"     Poor sleep and my symptoms woke me up     My appetite is not good     My medicine is not helping      Call provider immediately if symptoms don’t improve     Continue daily medications, add rescue medications:   Albuterol  Albuterol/Ipratropium (Combivent, Duoneb) 2 Puffs  3mL via " "nebulizer Every 4 hours PRN  Every 4 hours PRN       Medications to be used during a flare up, (as Discussed with Provider):           Additional Information:  Use the spacer with your rescue inhaler when nebulizer is not available.     Use the AiroBika flutter after nebulizer treatments and when you need help clearing lungs.    Talk to your pulmonologist about having a \"Sick Pack\" prescription.      Red Zone:  I need urgent medical care Actions     Severe shortness of breath even at rest   Call 911 or seek medical care immediately     Not able to do any activity because of breathing      Fever or shaking chills      Feeling confused or very drowsy       Chest pains      Coughing up blood                  "

## 2022-11-15 NOTE — DISCHARGE PLANNING
Case Management Discharge Planning    Admission Date: 11/14/2022  GMLOS: 3.4  ALOS: 1    6-Clicks ADL Score: 22  6-Clicks Mobility Score: 19      Anticipated Discharge Dispo:  d/c home w/ HH     DME Needed: Yes    DME Ordered: Yes    Action(s) Taken: Updated Provider/Nurse on Discharge Plan    Escalations Completed: Provider    Medically Clear: No    Next Steps: INS CM, Arcadio, went to see pt to acquire choice for both DME: Nebulizer and HH.    Lsw and DPa see order and face to face as above.    When Arcadio brings back completed choice forms, DPa will fax referrals.    Arcadio indicates pt will get [facility]-in home MD. He lives at home w/ spouse, who is his assist/cg. He uses a w/c in home.    At rounds, RN indicates pt is concerned if he d/dali home he may pass. Md questioned if pt needs to see psych as pt is stable to d/c from [facility].    Lsw updated team as above. DME and HH completed choices have been faxed.    RN indicates pt is reporting about his O2 tanks at home and possible toxic concerns. Lsw suggested pt call his O2 DME provider [facility].     Lsw updated team pt was offered to change his DME provider this AM, and he stated he would continue w/ them.        Barriers to Discharge: Medical clearance, Pending Placement, and DME    Is the patient up for discharge tomorrow: No

## 2022-11-15 NOTE — CARE PLAN
The patient is Stable - Low risk of patient condition declining or worsening    Shift Goals  Clinical Goals: Pain management, COPD education  Patient Goals: Pain management, COPD education  Family Goals: COPD education    Progress made toward(s) clinical / shift goals:  Patient educated on maintaining O2 saturation between 88-92% for COPD. Patient provided education packed on COPD. All questions answered at this time.      Problem: Knowledge Deficit - Standard  Goal: Patient and family/care givers will demonstrate understanding of plan of care, disease process/condition, diagnostic tests and medications  Outcome: Progressing     Problem: Knowledge Deficit - COPD  Goal: Patient/significant other demonstrates understanding of disease process, utilization of the Action Plan, medications and discharge instruction  Outcome: Progressing     Problem: Risk for Infection - COPD  Goal: Patient will remain free from signs and symptoms of infection  Outcome: Progressing     Problem: Nutrition - Advanced  Goal: Patient will display progressive weight gain toward goal have adequate food and fluid intake  Outcome: Progressing     Problem: Ineffective Airway Clearance  Goal: Patient will maintain patent airway with clear/clearing breath sounds  Outcome: Progressing     Problem: Impaired Gas Exchange  Goal: Patient will demonstrate improved ventilation and adequate oxygenation and participate in treatment regimen within the level of ability/situation.  Outcome: Progressing     Problem: Risk for Aspiration  Goal: Patient's risk for aspiration will be absent or decrease  Outcome: Progressing     Problem: Self Care  Goal: Patient will have the ability to perform ADLs independently or with assistance (bathe, groom, dress, toilet and feed)  Outcome: Progressing     Problem: Fall Risk  Goal: Patient will remain free from falls  Outcome: Progressing     Problem: Pain - Standard  Goal: Alleviation of pain or a reduction in pain to the  patient’s comfort goal  Outcome: Progressing       Patient is not progressing towards the following goals:

## 2022-11-15 NOTE — ASSESSMENT & PLAN NOTE
Patient's chest x-ray is clear  Clinically he does not look to be in heart failure  Suspicion of PE is low  We will treat the patient for COPD exacerbation  Systemic steroids  Scheduled broncho dialators  Continue inhaled steroid, long-acting beta agonist  Empiric doxycycline  O2 and RT protocols

## 2022-11-15 NOTE — CARE PLAN
The patient is Stable - Low risk of patient condition declining or worsening    Shift Goals  Clinical Goals: Pain management, COPD education, admission screening  Patient Goals: Pain management, COPD education  Family Goals: COPD education      Problem: Knowledge Deficit - Standard  Goal: Patient and family/care givers will demonstrate understanding of plan of care, disease process/condition, diagnostic tests and medications  Outcome: Progressing     Problem: Knowledge Deficit - COPD  Goal: Patient/significant other demonstrates understanding of disease process, utilization of the Action Plan, medications and discharge instruction  Outcome: Progressing     Problem: Self Care  Goal: Patient will have the ability to perform ADLs independently or with assistance (bathe, groom, dress, toilet and feed)  Outcome: Progressing     Problem: Fall Risk  Goal: Patient will remain free from falls  Outcome: Progressing     Problem: Pain - Standard  Goal: Alleviation of pain or a reduction in pain to the patient’s comfort goal  Outcome: Progressing       Progress made toward(s) clinical / shift goals:  Patient updated on the plan of care. Education offered on COPD, O2 demands, and O2 saturation. All questions answered. Fall precautions in place. Admission screening completed. Hourly rounding provided.

## 2022-11-15 NOTE — ED PROVIDER NOTES
"ED Provider Note    CHIEF COMPLAINT  Chief Complaint   Patient presents with    Shortness of Breath     Pt c/o \"weird colored phlegm\" for about 3 weeks and states he has been having new SOB for approx 3 days. He states \"I feel like I can't get a full breath in.\" Pt wears 3L baseline for COPD and increased his home O2 to 4L, current O2 saturations at 99%. Pt c/o additional chest, shoulder, shoulder blade, and bilateral arm pain.       HPI  Richard Bob is a 67 y.o. male here for evaluation of shortness of breath.  Patient states is been ongoing for \"many years, but over the last few days he has had increasing shortness of breath.  He has no fever chills, and no vomiting.  He states that he is having production of green phlegm, and is normally on 3 L of oxygen at home, but has had to turn this up to 4 or 5 liters lately.  He has chest pain upon taking a deep breath in, but no pain to the back, no headache, and no abdominal pain.  Patient states that he has used his albuterol inhaler prior to arrival with very minimal relief.  He states he has had to be admitted to the hospital for previous COPD exacerbations.      ROS  See HPI for further details, o/w negative.     PAST MEDICAL HISTORY   has a past medical history of Anesthesia, Ankylosing spondylitis (HCC), Chronic obstructive pulmonary disease (HCC), Cold, Dental disorder, Diverticulitis, Osteoporosis, Psychiatric problem, and Scoliosis.    SOCIAL HISTORY  Social History     Tobacco Use    Smoking status: Former     Packs/day: 1.00     Years: 42.00     Pack years: 42.00     Types: Cigarettes     Start date: 1967     Quit date: 2009     Years since quittin.4    Smokeless tobacco: Never    Tobacco comments:     continued abstinance   Vaping Use    Vaping Use: Never used   Substance and Sexual Activity    Alcohol use: No    Drug use: No     Comment: NOT AT THIS TIME      Sexual activity: Not Currently       Family History  No bleeding " disorders    SURGICAL HISTORY   has a past surgical history that includes sigmoid colectomy (12/6/2008); colostomy (12/6/2008); colostomy closure (4/2/2009); ventral hernia repair (6/11/2009); umbilical hernia repair; exploratory laparotomy (12/6/2008); bowel resection (12/6/2008); and ventral hernia repair (10/23/2009).    CURRENT MEDICATIONS  Home Medications       Reviewed by Joselyn Aguirre R.N. (Registered Nurse) on 11/14/22 at 1304  Med List Status: Not Addressed     Medication Last Dose Status   furosemide (LASIX) 40 MG Tab  Active   Home Care Oxygen  Active   levalbuterol (XOPENEX HFA) 45 MCG/ACT inhaler  Active   STIOLTO RESPIMAT 2.5-2.5 MCG/ACT Aero Soln  Active                    ALLERGIES  No Known Allergies    REVIEW OF SYSTEMS  See HPI for further details. Review of systems as above, otherwise all other systems are negative.     PHYSICAL EXAM  Constitutional: Well developed, well nourished.  Moderate acute distress.  HEENT: Normocephalic, atraumatic. Posterior pharynx clear and dry  Eyes:  EOMI. Normal sclera.  Neck: Supple, Full range of motion, nontender.  Chest/Pulmonary:   Diminished all fields, mild retractions symmetrical expansion.   Cardio: Regular rate and rhythm with no murmur.   Abdomen: Soft, nontender. No peritoneal signs. No guarding. No palpable masses.  Musculoskeletal: No deformity, no edema, neurovascular intact.   Neuro: Labored speech, appropriate, cooperative, cranial nerves II-XII grossly intact.  Psych: Normal mood and affect    PROCEDURES     MEDICAL RECORD  I have reviewed patient's medical record and pertinent results are listed.    COURSE & MEDICAL DECISION MAKING  I have reviewed any medical record information, laboratory studies and radiographic results as noted above.    CRITICAL CARE  The very real possibility of a deterioration of this patient's condition required the highest level of my preparedness for sudden, emergent intervention.  I provided critical care  services, which included medication orders, frequent reevaluations of the patient's condition and response to treatment, ordering and reviewing test results, and discussing the case with various consultants.  The critical care time associated with the care of the patient was 45 minutes. Review chart for interventions. This time is exclusive of any other billable procedures.     3:33 PM   patient placed on a nonrebreather mask with improvement  He is now satting in the mid 90s.    4:11 PM  Patient comfortable, mild retractions, will order breathing treatment.    4:31 PM  Pt at baseline.  He will be admitted to the hospitalist service.     FINAL IMPRESSION  Copd exacerbation   Critical care time 45 minutes.       Electronically signed by: Alex Bravo D.O., 11/14/2022 4:08 PM

## 2022-11-15 NOTE — DISCHARGE PLANNING
[facility] TCN chart review completed. Collaborated with Primary CM Carmelita SUNSHINE and ARTHUR Tamayo prior to meeting with the pt. The most current review of medical record, knowledge of pt's PLOF and social support, LACE+ score of 65, 6 clicks scores of ( not entered)  mobility were considered.      Pt seen at bedside, HOB elevated,eating breakfast, O2 mask in  place. Mbr endorses living with his Spouse in a single brandon house with 1 1/2 step leading to front door. Mbr endorses being modified independent at baseline  using a wheelchair and reports being dependent with some of his IADLs and ADLS.  Mbr denies additional assistance with food , housing, and transportation. TCN noted mbr's own Wheelchair present at bedside.    Mbr verified he uses Oxygen supplementation at home via [facility]. Mbr verbalizes some concerns regarding current DME company- TCN notified [facility] Leadership via email 11/165/2022. TCN informed mbr that [facility] mbrs can now avail other DME suppliers such as [employer], [employer] and [employer]. Provided mbr with my business card including  [facility] Customer service tel# [phone] . Mbr opted to stay with [facility] at this time.  Mbr also verbalizes his wish to change [facility] product type. Informed mbr and instructed to call [facility] enrollement during this open enrollment season. Mbr verbalizes understanding.       Introduced TCN program. Provided education regarding post acute levels of care. Discussed [facility] plan benefits (Meds to Beds, medical uber and [facility] transitional care). Pt verbalizes understanding. Mb is amenable to [facility] services. Sending referral to [facility]. Choice proactively obtained for  O2 DME and HH and given to ARTHUR Tamayo. TCN will continue to follow and collaborate with discharge planning team as additional post acute needs arise. Thank you.     Completed today:  Choice obtained: HH, DME (O2 #[facility])  [facility] referral ( sent) 11/15/2022

## 2022-11-15 NOTE — FACE TO FACE
Face to Face Supporting Documentation - Home Health    The encounter with this patient was in whole or in part the primary reason for home health admission.    Date of encounter:   Patient:                    MRN:                       YOB: 2024  Richard Bob  [id number]  1955     Home health to see patient for:  Skilled Nursing care for assessment, interventions & education, Physical Therapy evaluation and treatment, and Occupational therapy evaluation and treatment    Skilled need for:  Exacerbation of Chronic Disease State COPD    Skilled nursing interventions to include:  Comment: med management also pain management and need for PT    Homebound status evidenced by:  Needs the assistance of another person in order to leave the home. Leaving home requires a considerable and taxing effort. There is a normal inability to leave the home.    Community Physician to provide follow up care: Michael Green M.D.     Optional Interventions? No      I certify the face to face encounter for this home health care referral meets the CMS requirements and the encounter/clinical assessment with the patient was, in whole, or in part, for the medical condition(s) listed above, which is the primary reason for home health care. Based on my clinical findings: the service(s) are medically necessary, support the need for home health care, and the homebound criteria are met.  I certify that this patient has had a face to face encounter by myself.  Mamadou Christy M.D. - NPI: [id number]

## 2022-11-15 NOTE — ASSESSMENT & PLAN NOTE
Not on any immunomodulating medications  PRN support  Kyphotic deformity is contributing to his resp failure

## 2022-11-15 NOTE — PROGRESS NOTES
4 Eyes Skin Assessment Completed by NAOMY Peralta and NAOMY Narayan.    Head WDL  Ears WDL  Nose WDL  Mouth WDL  Neck WDL  Breast/Chest WDL  Shoulder Blades WDL  Spine WDL  (R) Arm/Elbow/Hand WDL  (L) Arm/Elbow/Hand WDL  Abdomen Scar  Groin Redness and Rash  Scrotum/Coccyx/Buttocks WDL  (R) Leg Redness, Blanching, Scab, and Abrasion to shin and flaky  (L) Leg Redness, Blanching, Scab, and Abrasion to shin and flaky  (R) Heel/Foot/Toe Redness and Blanching and flaky  (L) Heel/Foot/Toe Redness and Blanching and flaky          Devices In Places Blood Pressure Cuff, Pulse Ox, and Oxy Mask      Interventions In Place Pillows and Pressure Redistribution Mattress    Possible Skin Injury No    Pictures Uploaded Into Epic N/A  Wound Consult Placed N/A  RN Wound Prevention Protocol Ordered No

## 2022-11-15 NOTE — PROGRESS NOTES
Patient arrived to unit at 19:50 accompanied by spouse. A&Ox4. On 4L oxymask. 3L oxymask is baseline per patient. Ambulated from [facility] to hospital bed with SBA. Patient reporting increased SOB after ambulation.  in use. 4 eye skin assessment completed. Admission screening completed. Patient has personal wheelchair and pulse ox at bedside. Bed in the lowest position, bed alarm on, personal items and call light within reach. Hourly rounding provided.

## 2022-11-15 NOTE — PROGRESS NOTES
"Hospital Medicine Daily Progress Note    Date of Service  11/15/2022    Chief Complaint  Richard Bob is a 67 y.o. male admitted 11/14/2022 with Shortness of Breath (Pt c/o \"weird colored phlegm\" for about 3 weeks and states he has been having new SOB for approx 3 days. He states \"I feel like I can't get a full breath in.\" Pt wears 3L baseline for COPD and increased his home O2 to 4L, current O2 saturations at 99%. Pt c/o additional chest, shoulder, shoulder blade, and bilateral arm pain.)      Hospital Course  No notes on file  Richard Bob is a 67 y.o. male who presented 11/14/2022 with a well-known history of COPD.  He has chronic respiratory failure and is maintained on 3 L of home oxygen at his baseline.     Patient presents with complaint of shortness of breath that started about 3 days ago.  He notes that in the previous week he had had increased cough which was productive of a green/brown sputum however he had otherwise been breathing \"okay\".  Beginning 3 days ago, he developed more shortness of breath.  He has been using his inhalers more frequently however notes that they are no longer seeming to make any difference.  On arrival to the ED, the patient was satting in the low 80s on his baseline 3 L, but after her breathing treatments, and currently on 6 L mask he is maintaining mid 90s.  He reports that he is breathing more easily now than when he arrived, but is still not doing as well as his baseline.\"    Dr. Gramajo, 11/14/2022    Interval Problem Update  11/15. Patient is now baseline O2 but he is anxious. I ordered HHC. Pulmonology consulted recommended keeping one more day. I spoke with him at length also re: his pain and ankylosing spondylitis.    I have discussed this patient's plan of care and discharge plan at IDT rounds today with Case Management, Nursing, Nursing leadership, and other members of the IDT team.    Consultants/Specialty  Pulmonology    Code Status  Full " Code    Disposition  Patient is medically cleared for discharge.   Anticipate discharge to to home with organized home healthcare and close outpatient follow-up.  I have placed the appropriate orders for post-discharge needs.    Review of Systems  Review of Systems   Unable to perform ROS: Mental acuity      Physical Exam  Temp:  [36.6 °C (97.8 °F)-37.5 °C (99.5 °F)] 36.6 °C (97.8 °F)  Pulse:  [] 83  Resp:  [16-18] 16  BP: (119-160)/(69-93) 126/72  SpO2:  [94 %-99 %] 96 %    Physical Exam  Vitals and nursing note reviewed.   Constitutional:       Comments: Older appearing   HENT:      Head: Normocephalic and atraumatic.      Right Ear: External ear normal.      Left Ear: External ear normal.      Nose: Nose normal.      Mouth/Throat:      Mouth: Mucous membranes are moist.   Eyes:      General: No scleral icterus.     Conjunctiva/sclera: Conjunctivae normal.   Cardiovascular:      Rate and Rhythm: Normal rate and regular rhythm.      Heart sounds: No murmur heard.    No friction rub. No gallop.   Pulmonary:      Effort: Pulmonary effort is normal.      Breath sounds: Wheezing (occ, prolonges exp rate) present.   Abdominal:      General: Abdomen is flat. Bowel sounds are normal. There is no distension.      Palpations: Abdomen is soft.      Tenderness: There is no abdominal tenderness. There is no guarding.   Musculoskeletal:         General: Deformity (kyphoscoliosis) present. Normal range of motion.      Cervical back: Normal range of motion and neck supple.   Skin:     General: Skin is warm.   Neurological:      Mental Status: He is alert and oriented to person, place, and time. Mental status is at baseline.      Comments: Cognitive deficits   Psychiatric:         Mood and Affect: Mood normal.         Behavior: Behavior normal.         Thought Content: Thought content normal.         Judgment: Judgment normal.      Comments: Anxious, mood can be labile       Fluids    Intake/Output Summary (Last 24 hours) at  11/15/2022 0744  Last data filed at 11/15/2022 0329  Gross per 24 hour   Intake 660.28 ml   Output 550 ml   Net 110.28 ml       Laboratory  Recent Labs     11/14/22  1336   WBC 17.4*   RBC 4.55*   HEMOGLOBIN 12.7*   HEMATOCRIT 40.2*   MCV 88.4   MCH 27.9   MCHC 31.6*   RDW 46.5   PLATELETCT 195   MPV 10.4     Recent Labs     11/14/22  1336 11/15/22  0003   SODIUM 138 136   POTASSIUM 4.0 4.1   CHLORIDE 95* 94*   CO2 33 30   GLUCOSE 113* 112*   BUN 21 20   CREATININE 0.95 0.89   CALCIUM 9.2 9.3                   Imaging  EC-ECHOCARDIOGRAM COMPLETE W/O CONT   Final Result      DX-CHEST-PORTABLE (1 VIEW)   Final Result      No acute cardiac or pulmonary abnormalities are identified.           Assessment/Plan  * Acute on chronic respiratory failure with hypoxia, COPD (HCC)- (present on admission)  Assessment & Plan  Patient's chest x-ray is clear  Clinically he does not look to be in heart failure  Suspicion of PE is low  We will treat the patient for COPD exacerbation  Systemic steroids  Scheduled broncho dialators  Continue inhaled steroid, long-acting beta agonist  Empiric doxycycline  O2 and RT protocols      Pulmonary hypertension (HCC)- (present on admission)  Assessment & Plan  Mild on 2019 echo  Lasix was held    Ankylosing spondylitis of cervicothoracic region (HCC)- (present on admission)  Assessment & Plan  Not on any immunomodulating medications  PRN support  Kyphotic deformity is contributing to his resp failure       VTE prophylaxis: enoxaparin ppx    I have performed a physical exam and reviewed and updated ROS and Plan today (11/15/2022). In review of yesterday's note (11/14/2022), there are no changes except as documented above.

## 2022-11-15 NOTE — DISCHARGE PLANNING
Received Choice form at 1048  Agency/Facility Name: [facility]   Referral sent per Choice form at 1050    Received Choice form at 1048  Agency/Facility Name: [facility] ([facility]bul[facility])  Referral sent per Choice form at 1051    1129- Spoke To: Audra  Agency[facility]Facility Name: [facility]  Plan or Request: pt will have a co pay, Audra will call and speak to him.

## 2022-11-15 NOTE — PROGRESS NOTES
Received report and assumed care of patient at change of shift. Patient is A&Ox4, on 3L O2, patient reports this is baseline. Patient reports 8/10 pain at this time.Per patient, pain is chronic and requires strict pain management. Patient assessment completed, bed in lowest position, and call light and personal belongings are within reach. Patient expressed no further needs at this time.      This RN also provided patient with COPD information and education papers.

## 2022-11-15 NOTE — CONSULTS
Pulmonary Consultation    Date of consult: 11/15/2022    Referring Physician  Mamadou Christy M.D.    Reason for Consultation  COPD exacerbation    History of Pr COPDesenting Illness  67 y.o. male who presented 11/14/2022 with 3 days of shortness of breath and wheezing.  Patient has known history of COPD, quite severe on PFT 2019 demonstrating FEV1 of 17%, FEV1/FVC ratio of 30.  He is a former smoker and quit about 13 years ago.  He also notably does have a history of ankylosing spondylitis, although restrictive defect is not noted on his most recent PFTs.  He has been on Stiolto Respimat LABA/LAMA inhaler therapy at home with leave albuterol as needed rescue.  He does not have a history of frequent admissions for COPD exacerbations.  He states that 3 weeks ago, his great grandchild started living with him in his house, and this child attends /, and as such patient now has new exposures to respiratory illnesses.  Patient does have a history of chronic hypoxic respiratory failure requiring 3 L nasal cannula baseline at home.  Last CT chest from 12/2019 demonstrates both emphysema and evidence of chronic bronchitis with bronchial thickening.  He is currently established in pulm clinic.     On admission, patient required 6 L of oxygen, however this has rapidly been weaned back to 3 L baseline with duo nebs and steroids and doxycycline.  On review of his oxygen saturations, even at his baseline of 3 L, he appears to be over oxygenated with most of his saturations being between 95 to 99%.  Today he states he is subjectively improved, but does not feel ready to go home, as he still experiences significant shortness of breath that is not usual for him. He states he feels a lot of anxiety about his COPD and chronic hypoxia.     Labs notable for elevated WBC 17.4, normal lactic acid 0.7, negative COVID or flu or RSV.  Per my read, chest x-ray does not demonstrate any evidence of pulmonary vascular  congestion or consolidation.    Code Status  Full Code    Review of Systems  Review of Systems   Constitutional:  Negative for chills and fever.   HENT:  Positive for congestion.    Respiratory:  Positive for cough, sputum production, shortness of breath and wheezing.    Cardiovascular:  Negative for chest pain and leg swelling.   Gastrointestinal:  Negative for nausea and vomiting.   Musculoskeletal:  Positive for back pain, joint pain and neck pain.   Neurological:  Negative for headaches.     Past Medical History   has a past medical history of Anesthesia, Ankylosing spondylitis (HCC), Chronic obstructive pulmonary disease (HCC), Cold, Dental disorder, Diverticulitis, Osteoporosis, Psychiatric problem, and Scoliosis.    Surgical History   has a past surgical history that includes sigmoid colectomy (12/6/2008); colostomy (12/6/2008); colostomy closure (4/2/2009); ventral hernia repair (6/11/2009); umbilical hernia repair; exploratory laparotomy (12/6/2008); bowel resection (12/6/2008); and ventral hernia repair (10/23/2009).    Family History  family history includes Alzheimer's Disease in his mother.    Social History   reports that he quit smoking about 13 years ago. His smoking use included cigarettes. He started smoking about 55 years ago. He has a 42.00 pack-year smoking history. He has never used smokeless tobacco. He reports that he does not drink alcohol and does not use drugs.    Medications  Home Medications       Reviewed by Fabian Hernandes R.N. (Registered Nurse) on 11/15/22 at 0647  Med List Status: Complete     Medication Last Dose Status   furosemide (LASIX) 40 MG Tab 11/13/2022 Active   Home Care Oxygen  Active   levalbuterol (XOPENEX HFA) 45 MCG/ACT inhaler unknown Active   therapeutic multivitamin-minerals (THERAGRAN-M) Tab  Active   Tiotropium Bromide-Olodaterol (STIOLTO RESPIMAT) 2.5-2.5 MCG/ACT Aero Soln 11/14/2022 Active                  Current Facility-Administered Medications   Medication  Dose Route Frequency Provider Last Rate Last Admin    umeclidinium-vilanterol (Anoro Ellipta) inhaler 1 Puff  1 Puff Inhalation DAILY Tao Zurita D.O.        Respiratory Therapy Consult   Nebulization Continuous RT Tao Zurita D.O.        ipratropium-albuterol (DUONEB) nebulizer solution  3 mL Nebulization Q4HRS (RT) Tao Zurita D.O.        ipratropium-albuterol (DUONEB) nebulizer solution  3 mL Nebulization Q2HRS PRN (RT) Tao Zurita D.O.        senna-docusate (PERICOLACE or SENOKOT S) 8.6-50 MG per tablet 2 Tablet  2 Tablet Oral BID Tao Zurita D.O.        And    polyethylene glycol/lytes (MIRALAX) PACKET 1 Packet  1 Packet Oral QDAY PRN Tao Zurita D.O.        And    magnesium hydroxide (MILK OF MAGNESIA) suspension 30 mL  30 mL Oral QDAY PRN Tao Zurita D.O.        And    bisacodyl (DULCOLAX) suppository 10 mg  10 mg Rectal QDAY PRN JAY RezaO.        enoxaparin (Lovenox) inj 40 mg  40 mg Subcutaneous DAILY AT 1800 JAY RezaO.        acetaminophen (Tylenol) tablet 650 mg  650 mg Oral Q6HRS PRN Tao Zurita D.O.        Pharmacy Consult Request ...Pain Management Review 1 Each  1 Each Other PHARMACY TO DOSE Tao Zurita D.O.        oxyCODONE immediate-release (ROXICODONE) tablet 5 mg  5 mg Oral Q3HRS PRN JAY RezaOLupe        Or    oxyCODONE immediate release (ROXICODONE) tablet 10 mg  10 mg Oral Q3HRS PRN JAY RezaOLupe        Or    morphine 4 MG/ML injection 4 mg  4 mg Intravenous Q3HRS PRN JAY RezaO.   4 mg at 11/15/22 0615    ondansetron (ZOFRAN) syringe/vial injection 4 mg  4 mg Intravenous Q4HRS PRN Tao Zurita D.O.        ondansetron (ZOFRAN ODT) dispertab 4 mg  4 mg Oral Q4HRS PRN Tao Zurita D.O.        doxycycline monohydrate (ADOXA) tablet 100 mg  100 mg Oral Q12HRS Tao Zurita D.O.    100 mg at 11/15/22 0615    methylPREDNISolone (SOLU-MEDROL) 40 MG injection 40 mg  40 mg Intravenous Q6HRS ORLANDO Reza.JUANITA   40 mg at 11/15/22 0615     Allergies  No Known Allergies    Vital Signs last 24 hours  Temp:  [36.6 °C (97.8 °F)-37.5 °C (99.5 °F)] 36.7 °C (98 °F)  Pulse:  [] 85  Resp:  [16-18] 18  BP: (119-160)/(69-93) 140/69  SpO2:  [94 %-99 %] 97 %    Physical Exam  Physical Exam  Constitutional:       Appearance: He is normal weight.      Comments: Anxious appearing, but calms down with redirection and no significant respiratory distress   HENT:      Head: Normocephalic and atraumatic.      Right Ear: External ear normal.      Left Ear: External ear normal.      Nose: Nose normal.      Mouth/Throat:      Mouth: Mucous membranes are moist.   Eyes:      General: No scleral icterus.     Extraocular Movements: Extraocular movements intact.      Pupils: Pupils are equal, round, and reactive to light.   Neck:      Comments: Kyphotic c-spine  Cardiovascular:      Rate and Rhythm: Normal rate and regular rhythm.      Pulses: Normal pulses.      Heart sounds: Normal heart sounds. No murmur heard.  Pulmonary:      Effort: Pulmonary effort is normal.      Breath sounds: No wheezing, rhonchi or rales.      Comments: No wheezing, reduced breath sounds throughout  Abdominal:      Palpations: Abdomen is soft.   Musculoskeletal:         General: Normal range of motion.      Cervical back: Normal range of motion.      Comments: Kyphotic spine   Skin:     Capillary Refill: Capillary refill takes less than 2 seconds.      Coloration: Skin is not pale.   Neurological:      General: No focal deficit present.      Mental Status: He is alert and oriented to person, place, and time.         Fluids    Intake/Output Summary (Last 24 hours) at 11/15/2022 0815  Last data filed at 11/15/2022 0758  Gross per 24 hour   Intake 900.28 ml   Output 550 ml   Net 350.28 ml       Laboratory  Recent Results (from the past  48 hour(s))   EKG (NOW)    Collection Time: 22 12:58 PM   Result Value Ref Range    Report       [facility] Emergency Dept.    Test Date:  2022  Pt Name:    RUPINDER ROBERTS                Department: ER  MRN:        [id number]                      Room:  Gender:     Male                         Technician: [id number]  :        1955                   Requested By:ER TRIAGE PROTOCOL  Order #:    [id number]                    Reading MD:    Measurements  Intervals                                Axis  Rate:       89                           P:          77  MN:         154                          QRS:        71  QRSD:       102                          T:          74  QT:         365  QTc:        445    Interpretive Statements  Sinus rhythm  Baseline wander in lead(s) V2  Compared to ECG 2019 22:19:04  Sinus tachycardia no longer present     Lactic acid (lactate)    Collection Time: 22  1:36 PM   Result Value Ref Range    Lactic Acid 0.7 0.5 - 2.0 mmol/L   Comp Metabolic Panel    Collection Time: 22  1:36 PM   Result Value Ref Range    Sodium 138 135 - 145 mmol/L    Potassium 4.0 3.6 - 5.5 mmol/L    Chloride 95 (L) 96 - 112 mmol/L    Co2 33 20 - 33 mmol/L    Anion Gap 10.0 7.0 - 16.0    Glucose 113 (H) 65 - 99 mg/dL    Bun 21 8 - 22 mg/dL    Creatinine 0.95 0.50 - 1.40 mg/dL    Calcium 9.2 8.5 - 10.5 mg/dL    AST(SGOT) 19 12 - 45 U/L    ALT(SGPT) 12 2 - 50 U/L    Alkaline Phosphatase 56 30 - 99 U/L    Total Bilirubin 0.5 0.1 - 1.5 mg/dL    Albumin 4.5 3.2 - 4.9 g/dL    Total Protein 7.6 6.0 - 8.2 g/dL    Globulin 3.1 1.9 - 3.5 g/dL    A-G Ratio 1.5 g/dL   CBC with Differential    Collection Time: 22  1:36 PM   Result Value Ref Range    WBC 17.4 (H) 4.8 - 10.8 K/uL    RBC 4.55 (L) 4.70 - 6.10 M/uL    Hemoglobin 12.7 (L) 14.0 - 18.0 g/dL    Hematocrit 40.2 (L) 42.0 - 52.0 %    MCV 88.4 81.4 - 97.8 fL    MCH 27.9 27.0 - 33.0 pg    MCHC 31.6 (L) 33.7 - 35.3 g/dL    RDW  46.5 35.9 - 50.0 fL    Platelet Count 195 164 - 446 K/uL    MPV 10.4 9.0 - 12.9 fL    Neutrophils-Polys 91.00 (H) 44.00 - 72.00 %    Lymphocytes 3.70 (L) 22.00 - 41.00 %    Monocytes 4.40 0.00 - 13.40 %    Eosinophils 0.10 0.00 - 6.90 %    Basophils 0.30 0.00 - 1.80 %    Immature Granulocytes 0.50 0.00 - 0.90 %    Nucleated RBC 0.00 /100 WBC    Neutrophils (Absolute) 15.81 (H) 1.82 - 7.42 K/uL    Lymphs (Absolute) 0.65 (L) 1.00 - 4.80 K/uL    Monos (Absolute) 0.77 0.00 - 0.85 K/uL    Eos (Absolute) 0.01 0.00 - 0.51 K/uL    Baso (Absolute) 0.05 0.00 - 0.12 K/uL    Immature Granulocytes (abs) 0.08 0.00 - 0.11 K/uL    NRBC (Absolute) 0.00 K/uL   Troponins NOW    Collection Time: 11/14/22  1:36 PM   Result Value Ref Range    Troponin T 41 (H) 6 - 19 ng/L   ESTIMATED GFR    Collection Time: 11/14/22  1:36 PM   Result Value Ref Range    GFR (CKD-EPI) 87 >60 mL/min/1.73 m 2   Lactic acid (lactate): Repeat if initial lactic acid result is greater than 2    Collection Time: 11/14/22  4:33 PM   Result Value Ref Range    Lactic Acid 0.8 0.5 - 2.0 mmol/L   Urinalysis    Collection Time: 11/14/22  4:33 PM    Specimen: Urine   Result Value Ref Range    Color Yellow     Character Clear     Specific Gravity 1.010 <1.035    Ph 7.0 5.0 - 8.0    Glucose Negative Negative mg/dL    Ketones Negative Negative mg/dL    Protein Negative Negative mg/dL    Bilirubin Negative Negative    Urobilinogen, Urine 0.2 Negative    Nitrite Negative Negative    Leukocyte Esterase Negative Negative    Occult Blood Negative Negative    Micro Urine Req see below    Troponins in two (2) hours    Collection Time: 11/14/22  4:33 PM   Result Value Ref Range    Troponin T 39 (H) 6 - 19 ng/L   CoV-2, FLU A/B, and RSV by PCR (2-4 Hours CEPHEID) : Collect NP swab in VTM    Collection Time: 11/14/22  4:33 PM    Specimen: Respirate   Result Value Ref Range    Influenza virus A RNA Negative Negative    Influenza virus B, PCR Negative Negative    RSV, PCR Negative  Negative    SARS-CoV-2 by PCR NotDetected     SARS-CoV-2 Source NP Swab    Basic Metabolic Panel (BMP)    Collection Time: 11/15/22 12:03 AM   Result Value Ref Range    Sodium 136 135 - 145 mmol/L    Potassium 4.1 3.6 - 5.5 mmol/L    Chloride 94 (L) 96 - 112 mmol/L    Co2 30 20 - 33 mmol/L    Glucose 112 (H) 65 - 99 mg/dL    Bun 20 8 - 22 mg/dL    Creatinine 0.89 0.50 - 1.40 mg/dL    Calcium 9.3 8.5 - 10.5 mg/dL    Anion Gap 12.0 7.0 - 16.0   ESTIMATED GFR    Collection Time: 11/15/22 12:03 AM   Result Value Ref Range    GFR (CKD-EPI) 93 >60 mL/min/1.73 m 2     Imaging  EC-ECHOCARDIOGRAM COMPLETE W/O CONT         DX-CHEST-PORTABLE (1 VIEW)   Final Result      No acute cardiac or pulmonary abnormalities are identified.          Assessment/Plan    #Acute on chronic hypoxic respiratory failure secondary to COPD exacerbation--acute component resolved, back to baseline oxygen 3 L  #New home exposures to pediatric respiratory illnesses, likely instigating factor  #Gold class C COPD, with severe restrictive defect and emphysema evidenced on PFT from 2019 and CT chest from 2019  #Leukocytosis  #Chronic bronchitis  -Recommend prednisone 40 mg for 5 days  -Given bronchitic component, also recommend doxycycline for 5 days  -RT protocols and DuoNeb therapy  -Recommend titrating oxygen requirements to SpO2 88 to 92%  -Recommend repeating O2 walk test prior to discharge, as patient's actual oxygen requirements are likely less than what he is using at this time  -Triple inhaler therapy as inpatient, as outpatient, okay to continue Stiolto Respimat, which gives LABA/LAMA coverage  -Recommend prescribing Pulmicort on discharge to complete triple inhaler therapy  -We will follow-up with patient as outpatient in pulm clinic  -We will consider transitioning to [facility] as outpatient, however not doing this at this time, as patient just filled his Stiolto Respimat prior to this admission        Discussed patient condition and risk of  morbidity and/or mortality with Hospitalist, RN, and Patient.        DIONI SHAIKH, PGY 3

## 2022-11-15 NOTE — THERAPY
"Physical Therapy   Initial Evaluation     Patient Name: Richard Bob  Age:  67 y.o., Sex:  male  Medical Record #: [id number]  Today's Date: 11/15/2022          Assessment  Mr. Bob is a 68 y/o male who presents to acute secondary to acute on chronic respiratory failure due to COPD exacerbation. PMH of ankylosis spondylitis of cervicothoracic region. Upon introduction of therapist and purpose of therapy eval pt became irritable stating \"nobody is addressing why I am here\". Attempted to explain purpose of therapy with COPD, pt initially cutting of therapist, then agreeable to listen. Stating \"well I haven't even tried to stand up yet and I'm scared!\" Explained this was therapist's purpose is to assess his mobility now that he has had some pulmonary treatments. On 3L supplemental O2 which is his baseline.     Able to come to EOB without assist. Took break here due to SOB, SpO2 >92%. When ready stood and ambulated around room. Steady gait, no LOB. Pt is SOB however SpO2 92-95% throughout entire therapy eval. Attempted to discuss 4WW, patient states \"why would I use that\". Explained how it can be an energy conservation tool when he becomes SOB or needs a break in the home. Pt then revealed that he does have a 4WW and now understands how it can be used that way. Also has w/c which he uses outside of the home. Recommended to use these two devices if his SOB becomes severe. Pt very frustrated, explained that in addition to this physical assessment there is also the medical assessment from his team of doctors. Pt left speaking to pulmonology team which is who he feels he has the most concerns to address. Functionally is mobilizing without assist and has all equipment needed. Patient will not be actively followed for physical therapy services at this time, however may be seen if requested by physician for 1 more visit within 30 days to address any discharge or equipment needs.     Plan    Recommend Physical Therapy for " Evaluation only     DC Equipment Recommendations: None  Discharge Recommendations: Anticipate that the patient will have no further physical therapy needs after discharge from the hospital            Objective       11/15/22 1139   Prior Living Situation   Prior Services None   Housing / Facility 1 Story House   Steps Into Home 1   Equipment Owned 4-Wheel Walker;Wheelchair   Lives with - Patient's Self Care Capacity Spouse   Prior Level of Functional Mobility   Bed Mobility Independent   Transfer Status Independent   Ambulation Independent   Distance Ambulation (Feet) 50  (household)   Assistive Devices Used None   Wheelchair Independent   Comments reports uses w/c out of home due to not wanting to walk with an O2 tank   Cognition    Level of Consciousness Alert   Comments Very irritable with therapist   Passive ROM Lower Body   Passive ROM Lower Body WDL   Active ROM Lower Body    Active ROM Lower Body  WDL   Strength Lower Body   Lower Body Strength  WDL   Sensation Lower Body   Lower Extremity Sensation   WDL   Balance Assessment   Sitting Balance (Static) Fair +   Sitting Balance (Dynamic) Fair +   Standing Balance (Static) Fair   Standing Balance (Dynamic) Fair   Weight Shift Sitting Good   Weight Shift Standing Fair   Comments no AD   Gait Analysis   Gait Level Of Assist Supervised   Assistive Device None   Distance (Feet) 35   # of Times Distance was Traveled 1   Deviation Bradykinetic   Weight Bearing Status no restrictions   Comments ambulated in room   Bed Mobility    Supine to Sit Supervised   Sit to Supine   (at EOB with MD)   Scooting Supervised   Functional Mobility   Sit to Stand Supervised

## 2022-11-15 NOTE — ED NOTES
Report from WON RN. Pt appears to be resting in [facility]. Pt eating warm meal tray provided and has no additional requests at this time. Pt remains on 4L oxymask

## 2022-11-16 ENCOUNTER — PHARMACY VISIT (OUTPATIENT)
Dept: PHARMACY | Facility: MEDICAL CENTER | Age: 67
End: 2022-11-16
Payer: MEDICARE

## 2022-11-16 VITALS
BODY MASS INDEX: 20.67 KG/M2 | SYSTOLIC BLOOD PRESSURE: 157 MMHG | HEART RATE: 88 BPM | DIASTOLIC BLOOD PRESSURE: 89 MMHG | OXYGEN SATURATION: 96 % | TEMPERATURE: 97.6 F | RESPIRATION RATE: 19 BRPM | HEIGHT: 69 IN | WEIGHT: 139.55 LBS

## 2022-11-16 LAB
BACTERIA UR CULT: NORMAL
SIGNIFICANT IND 70042: NORMAL
SITE SITE: NORMAL
SOURCE SOURCE: NORMAL

## 2022-11-16 PROCEDURE — A9270 NON-COVERED ITEM OR SERVICE: HCPCS | Performed by: HOSPITALIST

## 2022-11-16 PROCEDURE — 700111 HCHG RX REV CODE 636 W/ 250 OVERRIDE (IP): Performed by: INTERNAL MEDICINE

## 2022-11-16 PROCEDURE — 700111 HCHG RX REV CODE 636 W/ 250 OVERRIDE (IP): Performed by: HOSPITALIST

## 2022-11-16 PROCEDURE — 700102 HCHG RX REV CODE 250 W/ 637 OVERRIDE(OP): Performed by: INTERNAL MEDICINE

## 2022-11-16 PROCEDURE — A9270 NON-COVERED ITEM OR SERVICE: HCPCS | Performed by: INTERNAL MEDICINE

## 2022-11-16 PROCEDURE — 700102 HCHG RX REV CODE 250 W/ 637 OVERRIDE(OP): Performed by: HOSPITALIST

## 2022-11-16 PROCEDURE — 99239 HOSP IP/OBS DSCHRG MGMT >30: CPT | Performed by: INTERNAL MEDICINE

## 2022-11-16 RX ORDER — IPRATROPIUM BROMIDE AND ALBUTEROL SULFATE 2.5; .5 MG/3ML; MG/3ML
3 SOLUTION RESPIRATORY (INHALATION)
Status: DISCONTINUED | OUTPATIENT
Start: 2022-11-16 | End: 2022-11-16 | Stop reason: HOSPADM

## 2022-11-16 RX ORDER — BUDESONIDE AND FORMOTEROL FUMARATE DIHYDRATE 80; 4.5 UG/1; UG/1
2 AEROSOL RESPIRATORY (INHALATION) 2 TIMES DAILY
Qty: 1 EACH | Refills: 3 | Status: SHIPPED | OUTPATIENT
Start: 2022-11-16 | End: 2023-02-01

## 2022-11-16 RX ORDER — BUDESONIDE AND FORMOTEROL FUMARATE DIHYDRATE 160; 4.5 UG/1; UG/1
2 AEROSOL RESPIRATORY (INHALATION) 2 TIMES DAILY
Qty: 10.2 G | Refills: 3 | Status: SHIPPED | OUTPATIENT
Start: 2022-11-16

## 2022-11-16 RX ORDER — FUROSEMIDE 20 MG/1
20 TABLET ORAL
Status: DISCONTINUED | OUTPATIENT
Start: 2022-11-16 | End: 2022-11-16 | Stop reason: HOSPADM

## 2022-11-16 RX ADMIN — MORPHINE SULFATE 4 MG: 4 INJECTION INTRAVENOUS at 07:26

## 2022-11-16 RX ADMIN — OXYCODONE HYDROCHLORIDE 10 MG: 10 TABLET ORAL at 06:23

## 2022-11-16 RX ADMIN — BUDESONIDE AND FORMOTEROL FUMARATE DIHYDRATE 2 PUFF: 160; 4.5 AEROSOL RESPIRATORY (INHALATION) at 05:02

## 2022-11-16 RX ADMIN — DOXYCYCLINE 100 MG: 100 TABLET, FILM COATED ORAL at 05:03

## 2022-11-16 RX ADMIN — OXYCODONE HYDROCHLORIDE 10 MG: 10 TABLET ORAL at 03:25

## 2022-11-16 RX ADMIN — OXYCODONE HYDROCHLORIDE 10 MG: 10 TABLET ORAL at 10:23

## 2022-11-16 RX ADMIN — PREDNISONE 40 MG: 20 TABLET ORAL at 05:02

## 2022-11-16 ASSESSMENT — PAIN DESCRIPTION - PAIN TYPE
TYPE: ACUTE PAIN

## 2022-11-16 NOTE — CARE PLAN
Problem: Knowledge Deficit - Standard  Goal: Patient and family/care givers will demonstrate understanding of plan of care, disease process/condition, diagnostic tests and medications  Outcome: Discharged - Not Met     Problem: Knowledge Deficit - COPD  Goal: Patient/significant other demonstrates understanding of disease process, utilization of the Action Plan, medications and discharge instruction  Outcome: Discharged - Not Met     Problem: Risk for Infection - COPD  Goal: Patient will remain free from signs and symptoms of infection  Outcome: Discharged - Not Met     Problem: Nutrition - Advanced  Goal: Patient will display progressive weight gain toward goal have adequate food and fluid intake  Outcome: Discharged - Not Met     Problem: Ineffective Airway Clearance  Goal: Patient will maintain patent airway with clear/clearing breath sounds  Outcome: Discharged - Not Met     Problem: Impaired Gas Exchange  Goal: Patient will demonstrate improved ventilation and adequate oxygenation and participate in treatment regimen within the level of ability/situation.  Outcome: Discharged - Not Met     Problem: Risk for Aspiration  Goal: Patient's risk for aspiration will be absent or decrease  Outcome: Discharged - Not Met     Problem: Self Care  Goal: Patient will have the ability to perform ADLs independently or with assistance (bathe, groom, dress, toilet and feed)  Outcome: Discharged - Not Met     Problem: Fall Risk  Goal: Patient will remain free from falls  Outcome: Discharged - Not Met     Problem: Pain - Standard  Goal: Alleviation of pain or a reduction in pain to the patient’s comfort goal  Outcome: Discharged - Not Met   The patient is Stable - Low risk of patient condition declining or worsening    Shift Goals  Clinical Goals: COPD education and O2 management  Patient Goals: pain control  Family Goals: N/A    Progress made toward(s) clinical / shift goals:  pt will be discharged today from the discharge  vin  Patient is not progressing towards the following goals:

## 2022-11-16 NOTE — DISCHARGE INSTRUCTIONS
Chronic Obstructive Pulmonary Disease (COPD) is a long-term, progressive lung disease that makes it harder to breathe. It includes chronic bronchitis, emphysema, and refractory (non-reversible) asthma. With COPD, the airways in your lungs become inflamed and thicken, and the tissue where oxygen is exchanged is destroyed. The flow of air in and out of your lungs decreases. When that happens, less oxygen gets into your body tissues, and it becomes harder to get rid of the waste gas carbon dioxide. As the disease gets worse, shortness of breath makes it harder to remain active. There is no cure for COPD, but it is preventable and treatable.    COPD Patient Discharge Instructions    Diet  Follow a low fat, low cholesterol, low salt diet unless instructed otherwise by your Doctor. Read the labels on the back of food products and track your intake of fat, cholesterol and salt.  No smoking  Discontinuing smoking will have the biggest impact on preventing progression of disease.  To participate in [employer]’s Quit Tobacco Program, call [phone] or visit [employer]/QuitTobacco  Oxygen  If your doctor has order that you wear oxygen at home, it is important to wear it as ordered.  Do not smoke, vape, or use e-cigarettes with oxygen on.  Store in an appropriate location: upright in its marcus or laid flat down, away from open flames and stoves.   Do not use oil-based creams and moisturizers (ie: petroleum products, oil-based lip moisturizers) or aerosol sprays (ie: hair sprays or deodorants) when using your oxygen equipment.  Be careful with tubing placement to prevent yourself and others from tripping.  Medications  Refer to your personalized Action Plan to manage your symptoms.  Warning signs of an exacerbation  Breathing fast and shallow, worsening shortness of breath (like you just finished exercising)  Chest tightness  Increases in sputum production  Changes in sputum color  Lower oxygen levels than baseline  When  to call your doctor  If the warning signs of an exacerbation do not improve  Refer to your personalized Action Plan   Pulmonary Rehab  Your doctor has ordered you a referral to Pulmonary Rehab. Call [phone] to schedule an appointment  Attend your follow-up appointment with your PCP and/or Pulmonologist  Remote Monitoring: At the direction of the remote monitoring on-call provider, you may increase your oxygen by 2 liters above your baseline.     See the educational handout provided by your COPD Navigator for more information. This also explains more about COPD, symptoms of an exacerbation, and some of the tests that you have undergone.         -- Continue steroids, antibiotics, nebulizers.  Complete 5-day course of prednisone.  -- Continue LABA/LAMA, add ICS (pulmicort 180) at discharge to complete triple therapy.  Instructed to rinse mouth after use to avoid risk of thrush.  -- Ambulatory O2 walk test prior to discharge; reassuring PT/OT eval  -- Counseled about the need to titrate supplemental oxygen to maintain saturations 88 to 92%.  -- Patient not a candidate for RPM as he does not have a smart phone by which to download the madhu.  -- We will arrange for close outpatient pulmonary follow-up and referral to pulmonary rehabilitation

## 2022-11-16 NOTE — DISCHARGE PLANNING
[facility] TCN chart review completed. Noted patient with acceptance to [facility]  11/15/2022 and [facility] referral previously sent. Collaborated with discharge planning team, DOLLY Mae.     TCN met with patient at bedside. Discussed current services at discharge including [facility]  and [facility]. Patient questions answered. No additional TCN needs identified, noting patient is an anticipated discharge today.     TCN remains available to further collaborate should needs arise necessitating TCN involvement in patient dc plan prior to anticipated dc. Thank you.    Previously completed:  Choice obtained: SOWMYA, MARKO (O2 #1 Preferred DME). Noted patient acceptance to [facility]  11/15/2022  [facility] referral ( sent) 11/15/2022

## 2022-11-16 NOTE — PROGRESS NOTES
Rec'd report from day shift RN. Pt assessment completed and pt is AA&Ox4. Pt complains of pain 7/10 to neck and medicated per MAR PRN orders. Pt on 3L face mask. All needs met, Bed locked, bed in lowest position, call light and personal belongings within reach and treaded socks in place for safety. Hourly rounding in place.

## 2022-11-16 NOTE — DISCHARGE SUMMARY
"Discharge Summary    CHIEF COMPLAINT ON ADMISSION  Chief Complaint   Patient presents with    Shortness of Breath     Pt c/o \"weird colored phlegm\" for about 3 weeks and states he has been having new SOB for approx 3 days. He states \"I feel like I can't get a full breath in.\" Pt wears 3L baseline for COPD and increased his home O2 to 4L, current O2 saturations at 99%. Pt c/o additional chest, shoulder, shoulder blade, and bilateral arm pain.       Reason for Admission  chest pain; sob     Admission Date  11/14/2022    CODE STATUS  Full Code    HPI & HOSPITAL COURSE  This is a 67 y.o. male here with Shortness of Breath (Pt c/o \"weird colored phlegm\" for about 3 weeks and states he has been having new SOB for approx 3 days. He states \"I feel like I can't get a full breath in.\" Pt wears 3L baseline for COPD and increased his home O2 to 4L, current O2 saturations at 99%. Pt c/o additional chest, shoulder, shoulder blade, and bilateral arm pain.)  Please review Dr. Tao Zurita, D.O. notes for further details of history of present illness, past medical/social/family histories, allergies and medications. Please review Dr. Capone, Pulmonology consultation notes.  He has a history of COPD and on chronic 3LO2, ankylosing spondylitis/kyphoscoliosis, mild pulmonary hypertension. He presents with shortness of breath and pretty anxious about it. He also complained of cough with associated chest and shoulder discomfort.  At the ED, he is afebrile, slightly hypertensive. On baseline 3LO2.  CXR appeared clear.  Leukocytosis.  Echo ordered came back normal EF.  Pulmonology consulted. He is treated for COPD exac and pul HTN exac.He was put on doxycycline. He will continue Lasix.They switched his inhalers and cleared him for discharge. Home Health Care was arranged.    Patient s blood pressure improved. Advised Richard Bob to check blood pressure at home at least twice a day and have a log for primary provider to review. " While he has ankylosing spondylitis, his ESR was low/normal. He can follow up with a rheumatologist and orthopedics doctor.       At discharge date, Richard Bob afebrile and hemodynamically stable.  Richard Bob wanted to be discharged today.    Discharge Physical Exam  Vitals and nursing note reviewed.   Constitutional:       Comments: Older appearing   HENT:      Head: Normocephalic and atraumatic.      Right Ear: External ear normal.      Left Ear: External ear normal.      Nose: Nose normal.      Mouth/Throat:      Mouth: Mucous membranes are moist.   Eyes:      General: No scleral icterus.     Conjunctiva/sclera: Conjunctivae normal.   Cardiovascular:      Rate and Rhythm: Normal rate and regular rhythm.      Heart sounds: No murmur heard.    No friction rub. No gallop.   Pulmonary:      Effort: Pulmonary effort is normal.      Breath sounds: Wheezing (occ, prolonges exp rate) present.   Abdominal:      General: Abdomen is flat. Bowel sounds are normal. There is no distension.      Palpations: Abdomen is soft.      Tenderness: There is no abdominal tenderness. There is no guarding.   Musculoskeletal:         General: Deformity (kyphoscoliosis) present. Normal range of motion.      Cervical back: Normal range of motion and neck supple.   Skin:     General: Skin is warm.   Neurological:      Mental Status: He is alert and oriented to person, place, and time. Mental status is at baseline.      Comments: Cognitive deficits   Psychiatric:         Mood and Affect: Mood normal.         Behavior: Behavior normal.         Thought Content: Thought content normal.         Judgment: Judgment normal.      Comments: Anxious, mood can be labile     Imaging  EC-ECHOCARDIOGRAM COMPLETE W/O CONT   Final Result      DX-CHEST-PORTABLE (1 VIEW)   Final Result      No acute cardiac or pulmonary abnormalities are identified.                Therefore, he is discharged in fair and stable condition to home with organized home  healthcare and close outpatient follow-up.    The patient met 2-midnight criteria for an inpatient stay at the time of discharge.    Discharge Date  11/16/2022    FOLLOW UP ITEMS POST DISCHARGE      DISCHARGE DIAGNOSES  Principal Problem:    Acute on chronic respiratory failure with hypoxia, COPD, grade 1 diastolic dysfunction (HCC) POA: Yes  Active Problems:    Pulmonary hypertension (HCC) POA: Yes    Tobacco use disorder, mild, in sustained remission, abuse POA: Yes    COLD (chronic obstructive lung disease) (HCC) POA: Yes    Ankylosing spondylitis of cervicothoracic region (HCC) POA: Yes    Acute on chronic respiratory failure (HCC) POA: Yes      FOLLOW UP  Future Appointments   Date Time Provider Department Center   11/21/2022  2:40 PM HIGINIO Burgess [facility] None   12/15/2022  2:00 PM Lakshmi King P.A.-C. [facility] None     No follow-up provider specified.  Follow up with Michael Green M.D. in 1 week. Outpatient echo pending  Follow up with your pulmonologist in 1 week for hypoxia, COPD and mild pulmonary hypertension  Follow up with your cardiologist in 1-2 weeks for grade 1 diastolic dysfunction  Follow up with a rheumatologist in 1-2 weeks for ankylosing spondylitis.   Follow up with an spine physician whether it is orthopedics or neurosurgeon in 1-2 weeks for kyphoscoliosis and pain complaints  Can also establish with Pain Clinic in 1-2 weeks for chronic pain.  NURSING  Provide resources/pamphlets for COPD, pulmonary hypertension, hypoxia/O2 use, diuretic use.  MEDICATIONS ON DISCHARGE     Medication List        START taking these medications        Instructions   acetaminophen 325 MG Tabs  Commonly known as: Tylenol   Take 2 Tablets by mouth every 6 hours as needed for Mild Pain, Moderate Pain or Fever.  Dose: 650 mg     doxycycline monohydrate 100 MG tablet  Commonly known as: ADOXA   Take 1 Tablet by mouth every 12 hours for 4 days.  Dose: 100 mg     ipratropium-albuterol 0.5-2.5 (3) MG/3ML  nebulizer solution  Commonly known as: DUONEB   Inhale 3 mL by nebulization every four hours as needed for Shortness of Breath.  Dose: 3 mL     omeprazole 20 MG delayed-release capsule  Commonly known as: PRILOSEC   Take 1 Capsule by mouth every day.  Dose: 20 mg     oxyCODONE immediate-release 5 MG Tabs  Commonly known as: ROXICODONE   Take 1 Tablet by mouth every 6 hours as needed for Severe Pain for up to 3 days.  Dose: 5 mg     predniSONE 10 MG Tabs  Commonly known as: DELTASONE   Take 4 tablets (40 mg) by mouth daily for 3 days, then Take 3 tablets (30 mg) daily for 5 days, then Take 2 tablets (20 mg) daily for 5 days, then Take 1 tablet (10 mg) daily for 5 days, then take 1/2 tablet (5 mg) daily for 5 days, then Stop            CHANGE how you take these medications        Instructions   levalbuterol 45 MCG/ACT inhaler  What changed: See the new instructions.  Commonly known as: XOPENEX HFA   Doctor's comments: DX Code Needed  .  INHALE 2 PUFFS EVERY 4 HOURS AS NEEDED FOR SHORTNESS OF BREATH            CONTINUE taking these medications        Instructions   furosemide 40 MG Tabs  Commonly known as: LASIX   Take 1 Tablet by mouth every day.  Dose: 40 mg     Home Care Oxygen   Inhale 3 L/min by mouth Continuous.  Dose: 3 L/min     naproxen 500 MG Tabs  Commonly known as: NAPROSYN   Take 1 Tablet by mouth 2 times daily with meals as needed (pain).  Dose: 500 mg     Stiolto Respimat 2.5-2.5 MCG/ACT Aers  Generic drug: Tiotropium Bromide-Olodaterol   Inhale 1 Puff 2 times a day.  Dose: 1 Puff     therapeutic multivitamin-minerals Tabs   Take 1 Tablet by mouth every day. Per patient takes [place] multivitamin  Dose: 1 Tablet              Allergies  No Known Allergies    DIET  Orders Placed This Encounter   Procedures    Diet Order Diet: Regular     Standing Status:   Standing     Number of Occurrences:   1     Order Specific Question:   Diet:     Answer:   Regular [1]       ACTIVITY  As per  HHC      CONSULTATIONS  Pulonology    PROCEDURES      LABORATORY  Lab Results   Component Value Date    SODIUM 136 11/15/2022    POTASSIUM 4.1 11/15/2022    CHLORIDE 94 (L) 11/15/2022    CO2 30 11/15/2022    GLUCOSE 112 (H) 11/15/2022    BUN 20 11/15/2022    CREATININE 0.89 11/15/2022    CREATININE 1.2 04/03/2009        Lab Results   Component Value Date    WBC 17.4 (H) 11/14/2022    HEMOGLOBIN 12.7 (L) 11/14/2022    HEMATOCRIT 40.2 (L) 11/14/2022    PLATELETCT 195 11/14/2022        Total time of the discharge process exceeds 40 minutes.

## 2022-11-16 NOTE — CARE PLAN
The patient is Stable - Low risk of patient condition declining or worsening    Shift Goals  Clinical Goals: COPD education and O2 management  Patient Goals: pain control  Family Goals: N/A    Progress made toward(s) clinical / shift goals:  Pt titrated down and on 2.5L face mask and O2 satts stable. Pt medicated per MAR PRN orders for pain and able to rest.    Problem: Knowledge Deficit - Standard  Goal: Patient and family/care givers will demonstrate understanding of plan of care, disease process/condition, diagnostic tests and medications  Outcome: Progressing     Problem: Knowledge Deficit - COPD  Goal: Patient/significant other demonstrates understanding of disease process, utilization of the Action Plan, medications and discharge instruction  Outcome: Progressing     Problem: Ineffective Airway Clearance  Goal: Patient will maintain patent airway with clear/clearing breath sounds  Outcome: Progressing     Problem: Impaired Gas Exchange  Goal: Patient will demonstrate improved ventilation and adequate oxygenation and participate in treatment regimen within the level of ability/situation.  Outcome: Progressing       Patient is not progressing towards the following goals:

## 2022-11-17 ENCOUNTER — PHARMACY VISIT (OUTPATIENT)
Dept: PHARMACY | Facility: MEDICAL CENTER | Age: 67
End: 2022-11-17
Payer: MEDICARE

## 2022-11-17 PROCEDURE — RXMED WILLOW AMBULATORY MEDICATION CHARGE: Performed by: INTERNAL MEDICINE

## 2022-11-21 ENCOUNTER — OFFICE VISIT (OUTPATIENT)
Dept: SLEEP MEDICINE | Facility: MEDICAL CENTER | Age: 67
End: 2022-11-21
Payer: MEDICARE

## 2022-11-21 VITALS
BODY MASS INDEX: 20.44 KG/M2 | HEART RATE: 86 BPM | DIASTOLIC BLOOD PRESSURE: 80 MMHG | OXYGEN SATURATION: 95 % | HEIGHT: 69 IN | SYSTOLIC BLOOD PRESSURE: 140 MMHG | WEIGHT: 138 LBS | RESPIRATION RATE: 16 BRPM

## 2022-11-21 DIAGNOSIS — J96.11 CHRONIC RESPIRATORY FAILURE WITH HYPOXIA (HCC): ICD-10-CM

## 2022-11-21 DIAGNOSIS — J44.9 CHRONIC OBSTRUCTIVE PULMONARY DISEASE, UNSPECIFIED COPD TYPE (HCC): ICD-10-CM

## 2022-11-21 PROCEDURE — 99214 OFFICE O/P EST MOD 30 MIN: CPT | Performed by: NURSE PRACTITIONER

## 2022-11-21 RX ORDER — FLUTICASONE FUROATE, UMECLIDINIUM BROMIDE AND VILANTEROL TRIFENATATE 100; 62.5; 25 UG/1; UG/1; UG/1
1 POWDER RESPIRATORY (INHALATION) DAILY
Qty: 2 EACH | Refills: 0 | COMMUNITY
Start: 2022-11-21 | End: 2023-02-01

## 2022-11-21 ASSESSMENT — FIBROSIS 4 INDEX: FIB4 SCORE: 1.94

## 2022-11-23 NOTE — PROGRESS NOTES
Chief Complaint   Patient presents with    Hospital Follow-up     D/C 11/17/2022 for SOB        HPI:  Richard Bob is a 67 y.o. year old male here today for follow-up on COPD with hospital discharge.  Last seen in pulmonary clinic via telemedicine on 1/26/2022.  Patient presented to the emergency room on 11/14/2022 with increasing shortness of breath x3 days with increased cough and different colored sputum (green).  Patient was currently requiring increased oxygen at 4 L/min as well.  Patient also has a history of ankylosing spondylosis/kyphoscoliosis, and pulmonary hypertension.  Patient was ultimately treated for COPD exacerbation and pulmonary hypertension exacerbation.  He was treated with doxycycline and prednisone.  Patient was discharged on Stiolto and Symbicort and also given a prednisone taper.  He feels like his cough and congestion have improved.  Patient denies any change in activity tolerance.  I did discuss pulmonary rehab with patient, but at this time he will defer.  Patient currently has a mMRC of 3/has to stop after walking approximately 100 yards to catch his breath.  Patient does feel his breathing has improved on steroid treatment.  He does endorse that he has not yet started the Symbicort regimen and is currently only taking Stiolto.    Echocardiogram (11/15/2022):  The left ventricle is normal in size and thickness.    Normal left ventricular systolic function. The ejection fraction is measured to be 64% by Rizo's biplane.  No significant valvular abnormalities. Unable to estimate right ventricular systolic pressure due to an   inadequate tricuspid regurgitant jet.    Current echocardiogram compared to 1 in 2019 shows resolution of enlarged right ventricle and dilated right atria.    Chest x-ray (11/14/2022):  No acute cardiac or pulmonary abnormalities are identified.    ROS: As per HPI and otherwise negative if not stated.    Past Medical History:   Diagnosis Date    Anesthesia      "poss. family hx malignant hyperthermia    Ankylosing spondylitis (HCC)     Chronic obstructive pulmonary disease (HCC)     Cold     Dental disorder     dentures    Diverticulitis     Osteoporosis     Psychiatric problem     CONSULT FOR DRUG WITHDRAWAL AFTER LAST SURGERY    Scoliosis        Past Surgical History:   Procedure Laterality Date    VENTRAL HERNIA REPAIR  10/23/2009    Performed by ALEJANDRA EASTON at [facility]    VENTRAL HERNIA REPAIR  6/11/2009    Performed by ALEJANDRA EASTON at [facility]    COLOSTOMY CLOSURE  4/2/2009    Performed by ALEJANDRA EASTON at [facility]    SIGMOID COLECTOMY  12/6/2008    Performed by ALEJANDRA EASTON at [facility]    COLOSTOMY  12/6/2008    Performed by ALEJANDRA EASTON at [facility]    EXPLORATORY LAPAROTOMY  12/6/2008    Performed by ALEJANDRA EASTON at [facility]    BOWEL RESECTION  12/6/2008    Performed by ALEJANDRA EASTON at [facility]    UMBILICAL HERNIA REPAIR         Family History   Problem Relation Age of Onset    Alzheimer's Disease Mother        Allergies as of 11/21/2022    (No Known Allergies)        Vitals:  BP (!) 140/80 (BP Location: Left arm, Patient Position: Sitting, BP Cuff Size: Adult)   Pulse 86   Resp 16   Ht 1.753 m (5' 9\")   Wt 62.6 kg (138 lb)   SpO2 95%     Current medications as of today   Current Outpatient Medications   Medication Sig Dispense Refill    fluticasone-umeclidin-vilant (TRELEGY ELLIPTA) 100-62.5-25 MCG/ACT AEROSOL POWDER, BREATH ACTIVATED inhalation Inhale 1 Inhalation every day. 2 Each 0    budesonide-formoterol (SYMBICORT) 160-4.5 MCG/ACT Aerosol Inhale 2 Puffs 2 times a day. 10.2 g 3    therapeutic multivitamin-minerals (THERAGRAN-M) Tab Take 1 Tablet by mouth every day. Per patient takes [place] multivitamin      acetaminophen (TYLENOL) 325 MG Tab Take 2 Tablets by mouth every 6 hours as needed for Mild Pain, Moderate Pain or " Fever. 30 Tablet 0    ipratropium-albuterol (DUONEB) 0.5-2.5 (3) MG/3ML nebulizer solution Inhale 3 mL by nebulization every four hours as needed for Shortness of Breath. 90 mL 3    predniSONE (DELTASONE) 10 MG Tab Take 4 tablets (40 mg) by mouth daily for 3 days, then Take 3 tablets (30 mg) daily for 5 days, then Take 2 tablets (20 mg) daily for 5 days, then Take 1 tablet (10 mg) daily for 5 days, then take 1/2 tablet (5 mg) daily for 5 days, then Stop 45 Tablet 0    omeprazole (PRILOSEC) 20 MG delayed-release capsule Take 1 Capsule by mouth every day. 30 Capsule 0    Tiotropium Bromide-Olodaterol (STIOLTO RESPIMAT) 2.5-2.5 MCG/ACT Aero Soln Inhale 1 Puff 2 times a day.      levalbuterol (XOPENEX HFA) 45 MCG/ACT inhaler INHALE 2 PUFFS EVERY 4 HOURS AS NEEDED FOR SHORTNESS OF BREATH (Patient taking differently: 2 Puffs every four hours as needed.) 15 g 11    furosemide (LASIX) 40 MG Tab Take 1 Tablet by mouth every day. 90 Tablet 3    budesonide-formoterol (SYMBICORT) 80-4.5 MCG/ACT Aerosol Inhale 2 Puffs 2 times a day. (Patient not taking: Reported on 11/21/2022) 1 Each 3    naproxen (NAPROSYN) 500 MG Tab Take 1 Tablet by mouth 2 times daily with meals as needed (pain).      Home Care Oxygen Inhale 3 L/min by mouth Continuous. (Patient not taking: Reported on 11/21/2022)       No current facility-administered medications for this visit.         Physical Exam:   Gen:           Alert and oriented, No apparent distress. Mood and affect appropriate, normal interaction with examiner.  Eyes:          PERRL, EOM intact, sclere white, conjunctive moist.  Ears:          Not examined.   Hearing:     Grossly intact.  Nose:          Normal, no lesions or deformities.  Dentition:    Good dentition.  Oropharynx:   Tongue normal, posterior pharynx without erythema or exudate.  Neck:        Supple, trachea midline, no masses.  Respiratory Effort: No intercostal retractions or use of accessory muscles.   Lung Auscultation:       Clear to auscultation bilaterally; no rales, rhonchi or wheezing.  CV:            Regular rate and rhythm. No murmurs, rubs or gallops.  Abd:           Not examined.   Lymphadenopathy: Not examined.  Gait and Station: Normal.  Digits and Nails: No clubbing, cyanosis, petechiae, or nodes.   Cranial Nerves: II-XII grossly intact.  Skin:        No rashes, lesions or ulcers noted.               Ext:           No cyanosis or edema.      Assessment:  1. Chronic obstructive pulmonary disease, unspecified COPD type (HCC)  Referral to Pharmacotherapy Service    CANCELED: Referral to Pharmacotherapy Service      2. Chronic respiratory failure with hypoxia (HCC)  Referral to Pharmacotherapy Service    CANCELED: Referral to Pharmacotherapy Service          Plan:  Patient currently stable.  Recently discharged from ER on prednisone taper treated with antibiotics.  Patient has Stiolto and Symbicort on hand.  He has not yet started Symbicort, but I did stress the importance of being on triple therapy for optimal treatment of COPD.  Patient states it is difficult to use medication several times in the day.  I did give sample of Trelegy which she will trial after completing his Stiolto which he recently paid for and Symbicort.  Referral placed to pharmacotherapy to see if patient can apply for financial assistance.  I did offer her a referral to pulmonary rehab, but patient declined at this time.  Patient will follow-up in approximately 3 months, but can be seen sooner if needed.    Please note that this dictation was created using voice recognition software. I have made every reasonable attempt to correct obvious errors, but it is possible there are errors of grammar and possibly content that I did not discover before finalizing the note.

## 2022-12-14 ENCOUNTER — PATIENT MESSAGE (OUTPATIENT)
Dept: HEALTH INFORMATION MANAGEMENT | Facility: OTHER | Age: 67
End: 2022-12-14

## 2022-12-14 ENCOUNTER — PATIENT OUTREACH (OUTPATIENT)
Dept: HEALTH INFORMATION MANAGEMENT | Facility: OTHER | Age: 67
End: 2022-12-14
Payer: MEDICARE

## 2022-12-14 NOTE — PROGRESS NOTES
CHW Nikolas was asked to call the pt and introduce the CCM Program to the pt. Pt did not answer and this CHW left a VM. This CHW will put on the follow up call list for next week..

## 2022-12-15 ENCOUNTER — TELEMEDICINE (OUTPATIENT)
Dept: SLEEP MEDICINE | Facility: MEDICAL CENTER | Age: 67
End: 2022-12-15
Payer: MEDICARE

## 2022-12-15 VITALS — HEIGHT: 69 IN | WEIGHT: 145 LBS | BODY MASS INDEX: 21.48 KG/M2

## 2022-12-15 DIAGNOSIS — Z87.891 FORMER SMOKER: ICD-10-CM

## 2022-12-15 DIAGNOSIS — J96.11 CHRONIC RESPIRATORY FAILURE WITH HYPOXIA (HCC): ICD-10-CM

## 2022-12-15 DIAGNOSIS — J44.9 CHRONIC OBSTRUCTIVE PULMONARY DISEASE, UNSPECIFIED COPD TYPE (HCC): ICD-10-CM

## 2022-12-15 PROCEDURE — 99213 OFFICE O/P EST LOW 20 MIN: CPT | Mod: 95 | Performed by: PHYSICIAN ASSISTANT

## 2022-12-15 ASSESSMENT — FIBROSIS 4 INDEX: FIB4 SCORE: 1.94

## 2022-12-15 NOTE — PATIENT INSTRUCTIONS
1- Patient instructed ideal oral care immediately following ICS inhaler use to decrease risk of oral thrush including gargle with water and spit x 2, gargle and swallow x 1, brush gums or teeth, brush tongue gently.  2-begin Trelegy when able (do not take with either Symbicort or Stiolto)  3-pharmacotherapy consult pending  4- able to walk a little further   5-discussed oxygen needs with goal 92-96  6-obtain overnight oximetry on 2.5L   7-follow up in 3 months, sooner if needed

## 2022-12-15 NOTE — PROGRESS NOTES
Virtual Visit: Established Patient   This visit was conducted via Zoom using secure and encrypted viThe patient was in their home [facility] the [facility].  The patient's identity was confirmed and verbal consent was obtained for this virtual visit.     Subjective:   CC:   Chief Complaint   Patient presents with    COPD     3L    Follow-Up       Richard Bob is a 67 y.o. male presenting for evaluation and management of: Severe COPD, chronic respiratory failure with hypoxia.  He is a former smoker with reported 42-pack-year history and quit date in 2009.  Last seen in clinic 11/21/2022 by EMIYL Lerner for COPD exacerbation hospital follow-up visit.  Previously seen by Lakshmi King PA-C on 1/26/2022 via telemedicine.    Pertinent past medical history includes ankylosing spondylitis, kyphoscoliosis, cor pulmonale, pulmonary hypertension followed by cardiology.  He requires O2 at 3 L ATC and prefers simple open mask.  Does not tolerate nasal cannula prongs in his nose for any period of time.    Reviewed reported vitals, BMI 21.41 kg/m2.    Reviewed home medication regimen including Symbicort 160, DuoNeb, Stiolto Respimat, Xopenex.  Omeprazole.  Patient was provided with sample of Trelegy at last clinic appointment.  States benefit but cost prohibitive.  He was previously referred to pharmacotherapy.    Reviewed most recent imaging including echocardiogram obtained 11/15/2022 demonstrating normal left ventricular chamber size, wall thickness, systolic function.  LVEF estimated 64%.  Grade 1 diastolic dysfunction.  Normal right ventricular chamber size and systolic function.  Unable to estimate RVSP due to inadequate tricuspid regurgitant jet.    Chest x-ray obtained 11/14/2022 demonstrated no acute cardiopulmonary abnormalities.    Echocardiogram obtained in 2019 demonstrated normal left ventricular chamber size, wall thickness, systolic function.  LVEF estimated 55%, indeterminant diastolic function.  Mildly  reduced right ventricular systolic function with moderately dilated right ventricle, enlarged right atrium, mildly dilated left atrium, mild tricuspid regurgitation with estimated RVSP of 43.     Pulmonary function testing last obtained in 2019 demonstrated FEV1 of 0.55 L or 17% predicted, FVC 1.85 L or 43% predicted, FEV1/FVC ratio 30, residual volume 275% predicted, % predicted and DLCO 34%. Per pulmonologist interpretation severe obstructive lung disease with partial reversibility noted.    Review of Systems   Constitutional:  Positive for malaise/fatigue (moderate). Negative for chills, fever and weight loss.   HENT:  Positive for congestion, sinus pain (occasional) and sore throat (mild intermittent). Negative for hearing loss, nosebleeds and tinnitus.    Respiratory:  Positive for cough, sputum production (dark green now yellow tinged), shortness of breath (with any activity) and wheezing (occasionally).    Cardiovascular:  Positive for chest pain (sore from tripod). Negative for palpitations, orthopnea and leg swelling.   Gastrointestinal:  Negative for heartburn.        Upper and lower dentures, no swallowing difficulty    Neurological:  Positive for dizziness (occasional with position). Negative for tremors and headaches.   Psychiatric/Behavioral:  The patient does not have insomnia.      Current medicines (including changes today)  Current Outpatient Medications   Medication Sig Dispense Refill    fluticasone-umeclidin-vilant (TRELEGY ELLIPTA) 100-62.5-25 MCG/ACT AEROSOL POWDER, BREATH ACTIVATED inhalation Inhale 1 Inhalation every day. 2 Each 0    budesonide-formoterol (SYMBICORT) 160-4.5 MCG/ACT Aerosol Inhale 2 Puffs 2 times a day. 10.2 g 3    therapeutic multivitamin-minerals (THERAGRAN-M) Tab Take 1 Tablet by mouth every day. Per patient takes [place] multivitamin      acetaminophen (TYLENOL) 325 MG Tab Take 2 Tablets by mouth every 6 hours as needed for Mild Pain, Moderate Pain or Fever. 30  Tablet 0    ipratropium-albuterol (DUONEB) 0.5-2.5 (3) MG/3ML nebulizer solution Inhale 3 mL by nebulization every four hours as needed for Shortness of Breath. 90 mL 3    predniSONE (DELTASONE) 10 MG Tab Take 4 tablets (40 mg) by mouth daily for 3 days, then Take 3 tablets (30 mg) daily for 5 days, then Take 2 tablets (20 mg) daily for 5 days, then Take 1 tablet (10 mg) daily for 5 days, then take 1/2 tablet (5 mg) daily for 5 days, then Stop 45 Tablet 0    omeprazole (PRILOSEC) 20 MG delayed-release capsule Take 1 Capsule by mouth every day. 30 Capsule 0    naproxen (NAPROSYN) 500 MG Tab Take 1 Tablet by mouth 2 times daily with meals as needed (pain).      Tiotropium Bromide-Olodaterol (STIOLTO RESPIMAT) 2.5-2.5 MCG/ACT Aero Soln Inhale 1 Puff 2 times a day.      levalbuterol (XOPENEX HFA) 45 MCG/ACT inhaler INHALE 2 PUFFS EVERY 4 HOURS AS NEEDED FOR SHORTNESS OF BREATH (Patient taking differently: 2 Puffs every four hours as needed.) 15 g 11    furosemide (LASIX) 40 MG Tab Take 1 Tablet by mouth every day. 90 Tablet 3    Home Care Oxygen Inhale 3 L/min continuous.      budesonide-formoterol (SYMBICORT) 80-4.5 MCG/ACT Aerosol Inhale 2 Puffs 2 times a day. 1 Each 3     No current facility-administered medications for this visit.       Patient Active Problem List    Diagnosis Date Noted    Acute on chronic respiratory failure ([facility]) 11/14/2022    Cor pulmonale (chronic) ([facility]) 05/29/2019    Pulmonary hypertension ([facility]) 05/29/2019    Cellulitis 03/12/2019    Chronic obstructive pulmonary disease with acute exacerbation ([facility]) 03/12/2019    Bilateral lower extremity edema 03/12/2019    Acute on chronic respiratory failure with hypoxia, COPD, grade 1 diastolic dysfunction ([facility]) 03/12/2019    Ankylosing spondylitis of cervicothoracic region ([facility]) 11/15/2018    Tobacco use disorder, mild, in sustained remission, abuse 07/28/2016    COLD (chronic obstructive lung disease) ([facility]) 07/28/2016        Objective:   Ht 1.753 m (5'  "9\")   Wt 65.8 kg (145 lb)   BMI 21.41 kg/m²     Physical Exam:  Constitutional: Alert, no distress, well-groomed.  Skin: No rashes in visible areas.  Eye: Round. Conjunctiva clear, lids normal. No icterus.   ENMT: Lips pink without lesions, good dentition, moist mucous membranes. Phonation normal.  Neck: No masses, no thyromegaly. Moves freely without pain.  Respiratory: Mildly labored respiratory effort, does tripod.  No cough or audible wheeze  Psych: Alert and oriented x3, normal affect and mood.     Assessment and Plan:   The following treatment plan was discussed:     1. Chronic obstructive pulmonary disease, unspecified COPD type ([facility])  Reviewed ideal oral care following inhaled corticosteroid/maintenance inhaler use.  Has been using both Symbicort and Stiolto.  Hold the use and trial Trelegy.  Expedite pharmacotherapy consult.  He has been able to walk a little bit further.  Encouraged continued activity.  Previously referred to pulmonary rehab but declined at this time.  2. Chronic respiratory failure with hypoxia ([facility])  Reviewed oxygen saturation goals of 92 to 96%.  Currently needs 3 L O2 daytime with open mask.  Obtain overnight oximetry on 2.5 L.  3. Former smoker  Remains abstinent of tobacco, quit date 2009.    Follow-up: Return in about 3 months (around 3/15/2023) for Return with Lakshmi King PA-C.          This dictation was created using voice recognition software. The accuracy of the dictation is limited to the abilities of the software. I expect there may be some errors of grammar and possibly content.   "

## 2022-12-21 ENCOUNTER — PATIENT OUTREACH (OUTPATIENT)
Dept: HEALTH INFORMATION MANAGEMENT | Facility: OTHER | Age: 67
End: 2022-12-21
Payer: MEDICARE

## 2022-12-21 ENCOUNTER — PATIENT MESSAGE (OUTPATIENT)
Dept: HEALTH INFORMATION MANAGEMENT | Facility: OTHER | Age: 67
End: 2022-12-21

## 2022-12-21 DIAGNOSIS — J44.1 CHRONIC OBSTRUCTIVE PULMONARY DISEASE WITH ACUTE EXACERBATION (HCC): ICD-10-CM

## 2022-12-21 NOTE — PROGRESS NOTES
MICHEAL Connor called the pt to try and introduce the CCM program to the pt. Pt did not answer and this CHW will put the pt on the follow up call list for next week.

## 2022-12-22 ENCOUNTER — PATIENT OUTREACH (OUTPATIENT)
Dept: HEALTH INFORMATION MANAGEMENT | Facility: OTHER | Age: 67
End: 2022-12-22
Payer: MEDICARE

## 2022-12-22 DIAGNOSIS — J44.9 CHRONIC OBSTRUCTIVE PULMONARY DISEASE, UNSPECIFIED COPD TYPE (HCC): ICD-10-CM

## 2022-12-22 NOTE — PROGRESS NOTES
CHW Nikolas received a call back from the pt and was able to introduce the CCM program to the pt. Pt accepted the program and is set for enrollment on 12/27 at 3pm with the CCM nurse Alphonso. This CHW also sent the pt a [employer] message with the information.

## 2022-12-27 ENCOUNTER — PATIENT OUTREACH (OUTPATIENT)
Dept: HEALTH INFORMATION MANAGEMENT | Facility: OTHER | Age: 67
End: 2022-12-27
Payer: MEDICARE

## 2022-12-27 DIAGNOSIS — J44.1 CHRONIC OBSTRUCTIVE PULMONARY DISEASE WITH ACUTE EXACERBATION (HCC): ICD-10-CM

## 2022-12-28 NOTE — PROGRESS NOTES
MICHEAL Connor Called the pt to reschedule the CCM appointment. The pt is now rescheduled for 12/30 @3pm.

## 2022-12-30 ENCOUNTER — PATIENT OUTREACH (OUTPATIENT)
Dept: HEALTH INFORMATION MANAGEMENT | Facility: OTHER | Age: 67
End: 2022-12-30
Payer: MEDICARE

## 2022-12-30 DIAGNOSIS — J44.9 CHRONIC OBSTRUCTIVE PULMONARY DISEASE, UNSPECIFIED COPD TYPE (HCC): ICD-10-CM

## 2022-12-30 PROCEDURE — 99439 CHRNC CARE MGMT STAF EA ADDL: CPT | Performed by: FAMILY MEDICINE

## 2022-12-30 PROCEDURE — 99490 CHRNC CARE MGMT STAFF 1ST 20: CPT | Performed by: FAMILY MEDICINE

## 2022-12-30 SDOH — ECONOMIC STABILITY: FOOD INSECURITY: WITHIN THE PAST 12 MONTHS, THE FOOD YOU BOUGHT JUST DIDN'T LAST AND YOU DIDN'T HAVE MONEY TO GET MORE.: NEVER TRUE

## 2022-12-30 SDOH — ECONOMIC STABILITY: HOUSING INSECURITY
IN THE LAST 12 MONTHS, WAS THERE A TIME WHEN YOU DID NOT HAVE A STEADY PLACE TO SLEEP OR SLEPT IN A SHELTER (INCLUDING NOW)?: NO

## 2022-12-30 SDOH — HEALTH STABILITY: PHYSICAL HEALTH: ON AVERAGE, HOW MANY DAYS PER WEEK DO YOU ENGAGE IN MODERATE TO STRENUOUS EXERCISE (LIKE A BRISK WALK)?: 0 DAYS

## 2022-12-30 SDOH — ECONOMIC STABILITY: TRANSPORTATION INSECURITY
IN THE PAST 12 MONTHS, HAS LACK OF TRANSPORTATION KEPT YOU FROM MEETINGS, WORK, OR FROM GETTING THINGS NEEDED FOR DAILY LIVING?: NO

## 2022-12-30 SDOH — ECONOMIC STABILITY: TRANSPORTATION INSECURITY
IN THE PAST 12 MONTHS, HAS THE LACK OF TRANSPORTATION KEPT YOU FROM MEDICAL APPOINTMENTS OR FROM GETTING MEDICATIONS?: NO

## 2022-12-30 SDOH — ECONOMIC STABILITY: FOOD INSECURITY: WITHIN THE PAST 12 MONTHS, YOU WORRIED THAT YOUR FOOD WOULD RUN OUT BEFORE YOU GOT MONEY TO BUY MORE.: NEVER TRUE

## 2022-12-30 SDOH — ECONOMIC STABILITY: INCOME INSECURITY: IN THE LAST 12 MONTHS, WAS THERE A TIME WHEN YOU WERE NOT ABLE TO PAY THE MORTGAGE OR RENT ON TIME?: NO

## 2022-12-30 SDOH — HEALTH STABILITY: PHYSICAL HEALTH: ON AVERAGE, HOW MANY MINUTES DO YOU ENGAGE IN EXERCISE AT THIS LEVEL?: 0 MIN

## 2022-12-30 SDOH — ECONOMIC STABILITY: HOUSING INSECURITY: IN THE LAST 12 MONTHS, HOW MANY PLACES HAVE YOU LIVED?: 1

## 2022-12-30 ASSESSMENT — SOCIAL DETERMINANTS OF HEALTH (SDOH)
IN A TYPICAL WEEK, HOW MANY TIMES DO YOU TALK ON THE PHONE WITH FAMILY, FRIENDS, OR NEIGHBORS?: MORE THAN THREE TIMES A WEEK
DO YOU BELONG TO ANY CLUBS OR ORGANIZATIONS SUCH AS CHURCH GROUPS UNIONS, FRATERNAL OR ATHLETIC GROUPS, OR SCHOOL GROUPS?: NO
HOW HARD IS IT FOR YOU TO PAY FOR THE VERY BASICS LIKE FOOD, HOUSING, MEDICAL CARE, AND HEATING?: NOT HARD AT ALL
WITHIN THE LAST YEAR, HAVE YOU BEEN HUMILIATED OR EMOTIONALLY ABUSED IN OTHER WAYS BY YOUR PARTNER OR EX-PARTNER?: NO
HOW OFTEN DO YOU ATTEND CHURCH OR RELIGIOUS SERVICES?: NEVER
WITHIN THE LAST YEAR, HAVE TO BEEN RAPED OR FORCED TO HAVE ANY KIND OF SEXUAL ACTIVITY BY YOUR PARTNER OR EX-PARTNER?: NO
HOW OFTEN DO YOU GET TOGETHER WITH FRIENDS OR RELATIVES?: MORE THAN THREE TIMES A WEEK
WITHIN THE LAST YEAR, HAVE YOU BEEN AFRAID OF YOUR PARTNER OR EX-PARTNER?: NO
WITHIN THE LAST YEAR, HAVE YOU BEEN KICKED, HIT, SLAPPED, OR OTHERWISE PHYSICALLY HURT BY YOUR PARTNER OR EX-PARTNER?: NO
HOW OFTEN DO YOU ATTENT MEETINGS OF THE CLUB OR ORGANIZATION YOU BELONG TO?: NEVER

## 2022-12-30 ASSESSMENT — LIFESTYLE VARIABLES
HOW OFTEN DO YOU HAVE A DRINK CONTAINING ALCOHOL: NEVER
SKIP TO QUESTIONS 9-10: 1
HOW MANY STANDARD DRINKS CONTAINING ALCOHOL DO YOU HAVE ON A TYPICAL DAY: PATIENT DOES NOT DRINK
HOW OFTEN DO YOU HAVE SIX OR MORE DRINKS ON ONE OCCASION: NEVER
AUDIT-C TOTAL SCORE: 0

## 2022-12-30 NOTE — PROGRESS NOTES
"INITIAL CARE MANAGEMENT CARE PLAN/ASSESSMENT     Called and spoke to patient to enroll him in the Personal Care Management Program. Patient gave his verbal consent and expressed his desire to enroll in the program. Patient verified his identity by providing this RN with his full name and . Patient is eligible for [facility] due to his risk score of 4 and his COPD, CRF, Pulmonary HTN, and Cor Pulmonale diagnoses. Patient is a 67 year old  male who lives in a private residence with his wife, granddaughter, and his 2 great grandchildren. Patient is independent with all of his ADL's and IADL's but gets very SOB and tires easily. Patient is an active , but wife takes him to his appointments because he has to use a wheelchair. Patient wears 3l 02 via face mask at home. Patient transports by wheelchair if he has to walk long distances. Patient has a cane and walker that he does not use and reports no recent falls.  Patient does not follow a prescribed diet. Patient does not exercise due to his SOB. Patient states he has not been feeling well since he was discharged from the hospital on 2022 and made medications changes. This RN answered some of patient's medication questions which patient states helped him with his anxiety. Patient has been struggling with constipation so this RN educated patient on OTC options he could try to help relive his constipation. Patient believes the constipation is also a big part of why he is not feeling well. Patient has no specific goals at this time other than \"surviving/staying alive\" and managing his COPD. Patient does not have an Advanced Directive, so this RN will mail him resources for completing an Advanced Directive and a blank POLST to complete.   Patient understands the monthly follow-up call requirements of the PCM program. First monthly call is scheduled for 2023 @ 4:00pm. Patient has this RN's name and PCM program phone number and knows he can call with any " health questions or concerns. Patient has no other immediate needs/concerns to address at this time.  Patient uses the Internet and [employer]. Patient is a day sleeper, no calls before 3:00pm.     Medication Self-Management Goals:     Reviewed medications listed below with patient.      Current Outpatient Medications:     fluticasone-umeclidin-vilant (TRELEGY ELLIPTA) 100-62.5-25 MCG/ACT AEROSOL POWDER, BREATH ACTIVATED inhalation, Inhale 1 Inhalation every day., Disp: 2 Each, Rfl: 0    budesonide-formoterol (SYMBICORT) 160-4.5 MCG/ACT Aerosol, Inhale 2 Puffs 2 times a day., Disp: 10.2 g, Rfl: 3    budesonide-formoterol (SYMBICORT) 80-4.5 MCG/ACT Aerosol, Inhale 2 Puffs 2 times a day., Disp: 1 Each, Rfl: 3    therapeutic multivitamin-minerals (THERAGRAN-M) Tab, Take 1 Tablet by mouth every day. Per patient takes [place] multivitamin, Disp: , Rfl:     acetaminophen (TYLENOL) 325 MG Tab, Take 2 Tablets by mouth every 6 hours as needed for Mild Pain, Moderate Pain or Fever., Disp: 30 Tablet, Rfl: 0    ipratropium-albuterol (DUONEB) 0.5-2.5 (3) MG/3ML nebulizer solution, Inhale 3 mL by nebulization every four hours as needed for Shortness of Breath., Disp: 90 mL, Rfl: 3    predniSONE (DELTASONE) 10 MG Tab, Take 4 tablets (40 mg) by mouth daily for 3 days, then Take 3 tablets (30 mg) daily for 5 days, then Take 2 tablets (20 mg) daily for 5 days, then Take 1 tablet (10 mg) daily for 5 days, then take 1/2 tablet (5 mg) daily for 5 days, then Stop, Disp: 45 Tablet, Rfl: 0    omeprazole (PRILOSEC) 20 MG delayed-release capsule, Take 1 Capsule by mouth every day., Disp: 30 Capsule, Rfl: 0    naproxen (NAPROSYN) 500 MG Tab, Take 1 Tablet by mouth 2 times daily with meals as needed (pain)., Disp: , Rfl:     Tiotropium Bromide-Olodaterol (STIOLTO RESPIMAT) 2.5-2.5 MCG/ACT Aero Soln, Inhale 1 Puff 2 times a day., Disp: , Rfl:     levalbuterol (XOPENEX HFA) 45 MCG/ACT inhaler, INHALE 2 PUFFS EVERY 4 HOURS AS NEEDED FOR SHORTNESS  "OF BREATH (Patient taking differently: 2 Puffs every four hours as needed.), Disp: 15 g, Rfl: 11    furosemide (LASIX) 40 MG Tab, Take 1 Tablet by mouth every day., Disp: 90 Tablet, Rfl: 3    Home Care Oxygen, Inhale 3 L/min continuous., Disp: , Rfl:          Goal: Patient will take his medications as scheduled/prescribed.        Physical/Functional/Environmental Status:     Activities of Daily Living:  Bathing: independent   Dressing: independent  Grooming: independent  Mouth Care: independent  Toileting: independent  Climbing a Flight of Stairs: needs assistance    Independent Activities of Daily Living:  Shopping: independent  Cooking: independent  Managing Medications: independent  Using the phone and looking up numbers: independent  Driving or using public transportation: independent  Managing Finances: independent        12/30/2022   STEADI Fall Risk   One or more falls in the last year No       Multiple values from one day are sorted in reverse-chronological order               Goal: Patient will be free from falls and injuries from falls.      Financial Status:     Patient is financially stable      Goal: N/A     Transportation Status:    Patient is an Active      Goal: N/A    Mental/Behavioral/Psychosocial Status:        2/6/2020 6/21/2022 11/14/2022   Depression Screen (PHQ-2/PHQ-9)   PHQ-2 Total Score   2   PHQ-2 Total Score 3 0    PHQ-9 Total Score   6   PHQ-9 Total Score 9         Multiple values from one day are sorted in reverse-chronological order       Interpretation of PHQ-9 Total Score   Score Severity   1-4 No Depression   5-9 Mild Depression   10-14 Moderate Depression   15-19 Moderately Severe Depression   20-27 Severe Depression       Goal:  N/A      Chronic Care Management Care Plan         Patient has no specific goals at this time other than \"surviving/staying alive\" and managing his COPD.     Goal:  Patient will complete an advanced Directive   Barriers: Knowledge deficit, patient " motivation/adherence   Interventions: Provide patient with education/motivation on importance of completing an Advanced Directive and encourage patient to speak to his PCP about a POLST, Provide patient with education/resources on completing an Advanced Directive.     Start Date: 12/30/2022  End Date:           Discussion: Goals, barriers and interventions     Goals: discussed and Created      Next Scheduled patient outreach:  1/30/2022 @ 4:00pm

## 2023-01-03 ENCOUNTER — PATIENT OUTREACH (OUTPATIENT)
Dept: HEALTH INFORMATION MANAGEMENT | Facility: OTHER | Age: 68
End: 2023-01-03
Payer: MEDICARE

## 2023-01-03 DIAGNOSIS — J44.9 CHRONIC OBSTRUCTIVE PULMONARY DISEASE, UNSPECIFIED COPD TYPE (HCC): ICD-10-CM

## 2023-01-03 PROCEDURE — 99999 PR NO CHARGE: CPT | Performed by: FAMILY MEDICINE

## 2023-01-18 ENCOUNTER — APPOINTMENT (OUTPATIENT)
Dept: RADIOLOGY | Facility: MEDICAL CENTER | Age: 68
DRG: 190 | End: 2023-01-18
Attending: EMERGENCY MEDICINE
Payer: COMMERCIAL

## 2023-01-18 ENCOUNTER — HOSPITAL ENCOUNTER (INPATIENT)
Facility: MEDICAL CENTER | Age: 68
LOS: 4 days | DRG: 190 | End: 2023-01-22
Attending: EMERGENCY MEDICINE | Admitting: STUDENT IN AN ORGANIZED HEALTH CARE EDUCATION/TRAINING PROGRAM
Payer: COMMERCIAL

## 2023-01-18 DIAGNOSIS — R79.89 ELEVATED TROPONIN: ICD-10-CM

## 2023-01-18 DIAGNOSIS — J96.01 ACUTE RESPIRATORY FAILURE WITH HYPOXIA (HCC): ICD-10-CM

## 2023-01-18 DIAGNOSIS — J18.9 PNEUMONIA DUE TO INFECTIOUS ORGANISM, UNSPECIFIED LATERALITY, UNSPECIFIED PART OF LUNG: ICD-10-CM

## 2023-01-18 DIAGNOSIS — J44.1 ACUTE EXACERBATION OF CHRONIC OBSTRUCTIVE PULMONARY DISEASE (COPD) (HCC): ICD-10-CM

## 2023-01-18 DIAGNOSIS — A41.9 SEPSIS, DUE TO UNSPECIFIED ORGANISM, UNSPECIFIED WHETHER ACUTE ORGAN DYSFUNCTION PRESENT (HCC): ICD-10-CM

## 2023-01-18 PROBLEM — I27.20 PULMONARY HYPERTENSION (HCC): Status: ACTIVE | Noted: 2023-01-18

## 2023-01-18 PROBLEM — I21.A1 MYOCARDIAL INFARCTION DUE TO DEMAND ISCHEMIA (HCC): Status: ACTIVE | Noted: 2023-01-18

## 2023-01-18 LAB
ALBUMIN SERPL BCP-MCNC: 4.3 G/DL (ref 3.2–4.9)
ALBUMIN/GLOB SERPL: 1.4 G/DL
ALP SERPL-CCNC: 82 U/L (ref 30–99)
ALT SERPL-CCNC: 79 U/L (ref 2–50)
ANION GAP SERPL CALC-SCNC: 9 MMOL/L (ref 7–16)
APPEARANCE UR: CLEAR
AST SERPL-CCNC: 90 U/L (ref 12–45)
BACTERIA #/AREA URNS HPF: NEGATIVE /HPF
BASOPHILS # BLD AUTO: 0.3 % (ref 0–1.8)
BASOPHILS # BLD: 0.05 K/UL (ref 0–0.12)
BILIRUB SERPL-MCNC: 0.2 MG/DL (ref 0.1–1.5)
BILIRUB UR QL STRIP.AUTO: NEGATIVE
BUN SERPL-MCNC: 26 MG/DL (ref 8–22)
CALCIUM ALBUM COR SERPL-MCNC: 9 MG/DL (ref 8.5–10.5)
CALCIUM SERPL-MCNC: 9.2 MG/DL (ref 8.5–10.5)
CHLORIDE SERPL-SCNC: 97 MMOL/L (ref 96–112)
CO2 SERPL-SCNC: 34 MMOL/L (ref 20–33)
COLOR UR: YELLOW
CREAT SERPL-MCNC: 1.2 MG/DL (ref 0.5–1.4)
D DIMER PPP IA.FEU-MCNC: 0.71 UG/ML (FEU) (ref 0–0.5)
EKG IMPRESSION: NORMAL
EOSINOPHIL # BLD AUTO: 0.14 K/UL (ref 0–0.51)
EOSINOPHIL NFR BLD: 0.9 % (ref 0–6.9)
EPI CELLS #/AREA URNS HPF: NEGATIVE /HPF
ERYTHROCYTE [DISTWIDTH] IN BLOOD BY AUTOMATED COUNT: 45.9 FL (ref 35.9–50)
FLUAV RNA SPEC QL NAA+PROBE: NEGATIVE
FLUBV RNA SPEC QL NAA+PROBE: NEGATIVE
GFR SERPLBLD CREATININE-BSD FMLA CKD-EPI: 65 ML/MIN/1.73 M 2
GLOBULIN SER CALC-MCNC: 3.1 G/DL (ref 1.9–3.5)
GLUCOSE SERPL-MCNC: 117 MG/DL (ref 65–99)
GLUCOSE UR STRIP.AUTO-MCNC: NEGATIVE MG/DL
HCT VFR BLD AUTO: 40.2 % (ref 42–52)
HGB BLD-MCNC: 12.2 G/DL (ref 14–18)
HYALINE CASTS #/AREA URNS LPF: ABNORMAL /LPF
IMM GRANULOCYTES # BLD AUTO: 0.15 K/UL (ref 0–0.11)
IMM GRANULOCYTES NFR BLD AUTO: 1 % (ref 0–0.9)
INR PPP: 1.07 (ref 0.87–1.13)
KETONES UR STRIP.AUTO-MCNC: NEGATIVE MG/DL
LACTATE SERPL-SCNC: 1.2 MMOL/L (ref 0.5–2)
LACTATE SERPL-SCNC: 1.4 MMOL/L (ref 0.5–2)
LACTATE SERPL-SCNC: 1.7 MMOL/L (ref 0.5–2)
LACTATE SERPL-SCNC: 1.9 MMOL/L (ref 0.5–2)
LEUKOCYTE ESTERASE UR QL STRIP.AUTO: NEGATIVE
LYMPHOCYTES # BLD AUTO: 0.92 K/UL (ref 1–4.8)
LYMPHOCYTES NFR BLD: 6 % (ref 22–41)
MAGNESIUM SERPL-MCNC: 2.1 MG/DL (ref 1.5–2.5)
MCH RBC QN AUTO: 27.9 PG (ref 27–33)
MCHC RBC AUTO-ENTMCNC: 30.3 G/DL (ref 33.7–35.3)
MCV RBC AUTO: 91.8 FL (ref 81.4–97.8)
MICRO URNS: ABNORMAL
MONOCYTES # BLD AUTO: 0.67 K/UL (ref 0–0.85)
MONOCYTES NFR BLD AUTO: 4.3 % (ref 0–13.4)
NEUTROPHILS # BLD AUTO: 13.5 K/UL (ref 1.82–7.42)
NEUTROPHILS NFR BLD: 87.5 % (ref 44–72)
NITRITE UR QL STRIP.AUTO: NEGATIVE
NRBC # BLD AUTO: 0 K/UL
NRBC BLD-RTO: 0 /100 WBC
NT-PROBNP SERPL IA-MCNC: 184 PG/ML (ref 0–125)
PH UR STRIP.AUTO: 5.5 [PH] (ref 5–8)
PLATELET # BLD AUTO: 197 K/UL (ref 164–446)
PMV BLD AUTO: 10.4 FL (ref 9–12.9)
POTASSIUM SERPL-SCNC: 4.1 MMOL/L (ref 3.6–5.5)
PROCALCITONIN SERPL-MCNC: <0.05 NG/ML
PROT SERPL-MCNC: 7.4 G/DL (ref 6–8.2)
PROT UR QL STRIP: 100 MG/DL
PROTHROMBIN TIME: 13.8 SEC (ref 12–14.6)
RBC # BLD AUTO: 4.38 M/UL (ref 4.7–6.1)
RBC # URNS HPF: ABNORMAL /HPF
RBC UR QL AUTO: ABNORMAL
RSV RNA SPEC QL NAA+PROBE: NEGATIVE
SARS-COV-2 RNA RESP QL NAA+PROBE: NOTDETECTED
SODIUM SERPL-SCNC: 140 MMOL/L (ref 135–145)
SP GR UR STRIP.AUTO: 1.01
SPECIMEN SOURCE: NORMAL
TROPONIN T SERPL-MCNC: 113 NG/L (ref 6–19)
TROPONIN T SERPL-MCNC: 197 NG/L (ref 6–19)
TROPONIN T SERPL-MCNC: 240 NG/L (ref 6–19)
TROPONIN T SERPL-MCNC: 46 NG/L (ref 6–19)
UROBILINOGEN UR STRIP.AUTO-MCNC: 0.2 MG/DL
WBC # BLD AUTO: 15.4 K/UL (ref 4.8–10.8)
WBC #/AREA URNS HPF: ABNORMAL /HPF

## 2023-01-18 PROCEDURE — 700102 HCHG RX REV CODE 250 W/ 637 OVERRIDE(OP): Performed by: STUDENT IN AN ORGANIZED HEALTH CARE EDUCATION/TRAINING PROGRAM

## 2023-01-18 PROCEDURE — 700105 HCHG RX REV CODE 258: Performed by: STUDENT IN AN ORGANIZED HEALTH CARE EDUCATION/TRAINING PROGRAM

## 2023-01-18 PROCEDURE — 99285 EMERGENCY DEPT VISIT HI MDM: CPT

## 2023-01-18 PROCEDURE — 71045 X-RAY EXAM CHEST 1 VIEW: CPT

## 2023-01-18 PROCEDURE — A9270 NON-COVERED ITEM OR SERVICE: HCPCS | Performed by: STUDENT IN AN ORGANIZED HEALTH CARE EDUCATION/TRAINING PROGRAM

## 2023-01-18 PROCEDURE — 700101 HCHG RX REV CODE 250: Performed by: STUDENT IN AN ORGANIZED HEALTH CARE EDUCATION/TRAINING PROGRAM

## 2023-01-18 PROCEDURE — 81001 URINALYSIS AUTO W/SCOPE: CPT

## 2023-01-18 PROCEDURE — 770020 HCHG ROOM/CARE - TELE (206)

## 2023-01-18 PROCEDURE — 85025 COMPLETE CBC W/AUTO DIFF WBC: CPT

## 2023-01-18 PROCEDURE — 700111 HCHG RX REV CODE 636 W/ 250 OVERRIDE (IP): Performed by: EMERGENCY MEDICINE

## 2023-01-18 PROCEDURE — 87040 BLOOD CULTURE FOR BACTERIA: CPT | Mod: 91

## 2023-01-18 PROCEDURE — 93005 ELECTROCARDIOGRAM TRACING: CPT | Performed by: EMERGENCY MEDICINE

## 2023-01-18 PROCEDURE — C9803 HOPD COVID-19 SPEC COLLECT: HCPCS | Performed by: EMERGENCY MEDICINE

## 2023-01-18 PROCEDURE — 84145 PROCALCITONIN (PCT): CPT

## 2023-01-18 PROCEDURE — 36415 COLL VENOUS BLD VENIPUNCTURE: CPT

## 2023-01-18 PROCEDURE — 99223 1ST HOSP IP/OBS HIGH 75: CPT | Mod: AI | Performed by: STUDENT IN AN ORGANIZED HEALTH CARE EDUCATION/TRAINING PROGRAM

## 2023-01-18 PROCEDURE — 87086 URINE CULTURE/COLONY COUNT: CPT

## 2023-01-18 PROCEDURE — 94640 AIRWAY INHALATION TREATMENT: CPT

## 2023-01-18 PROCEDURE — 84484 ASSAY OF TROPONIN QUANT: CPT | Mod: 91

## 2023-01-18 PROCEDURE — 83605 ASSAY OF LACTIC ACID: CPT | Mod: 91

## 2023-01-18 PROCEDURE — 80053 COMPREHEN METABOLIC PANEL: CPT

## 2023-01-18 PROCEDURE — 700111 HCHG RX REV CODE 636 W/ 250 OVERRIDE (IP): Performed by: STUDENT IN AN ORGANIZED HEALTH CARE EDUCATION/TRAINING PROGRAM

## 2023-01-18 PROCEDURE — 96365 THER/PROPH/DIAG IV INF INIT: CPT

## 2023-01-18 PROCEDURE — 83735 ASSAY OF MAGNESIUM: CPT

## 2023-01-18 PROCEDURE — 83880 ASSAY OF NATRIURETIC PEPTIDE: CPT

## 2023-01-18 PROCEDURE — 85379 FIBRIN DEGRADATION QUANT: CPT

## 2023-01-18 PROCEDURE — 85610 PROTHROMBIN TIME: CPT

## 2023-01-18 PROCEDURE — 96375 TX/PRO/DX INJ NEW DRUG ADDON: CPT

## 2023-01-18 PROCEDURE — 0241U HCHG SARS-COV-2 COVID-19 NFCT DS RESP RNA 4 TRGT MIC: CPT

## 2023-01-18 PROCEDURE — 93005 ELECTROCARDIOGRAM TRACING: CPT | Performed by: STUDENT IN AN ORGANIZED HEALTH CARE EDUCATION/TRAINING PROGRAM

## 2023-01-18 PROCEDURE — 700101 HCHG RX REV CODE 250: Performed by: EMERGENCY MEDICINE

## 2023-01-18 PROCEDURE — 96367 TX/PROPH/DG ADDL SEQ IV INF: CPT

## 2023-01-18 RX ORDER — AZITHROMYCIN 500 MG/1
500 INJECTION, POWDER, LYOPHILIZED, FOR SOLUTION INTRAVENOUS ONCE
Status: COMPLETED | OUTPATIENT
Start: 2023-01-18 | End: 2023-01-18

## 2023-01-18 RX ORDER — DOXYCYCLINE 100 MG/1
100 TABLET ORAL EVERY 12 HOURS
Status: DISCONTINUED | OUTPATIENT
Start: 2023-01-18 | End: 2023-01-22 | Stop reason: HOSPADM

## 2023-01-18 RX ORDER — IPRATROPIUM BROMIDE AND ALBUTEROL SULFATE 2.5; .5 MG/3ML; MG/3ML
3 SOLUTION RESPIRATORY (INHALATION) EVERY 4 HOURS PRN
Status: ON HOLD | COMMUNITY
End: 2023-01-22

## 2023-01-18 RX ORDER — ALBUTEROL SULFATE 2.5 MG/3ML
2.5 SOLUTION RESPIRATORY (INHALATION) EVERY 4 HOURS PRN
Status: SHIPPED | COMMUNITY
End: 2023-01-18

## 2023-01-18 RX ORDER — IPRATROPIUM BROMIDE AND ALBUTEROL SULFATE 2.5; .5 MG/3ML; MG/3ML
3 SOLUTION RESPIRATORY (INHALATION)
Status: DISCONTINUED | OUTPATIENT
Start: 2023-01-18 | End: 2023-01-20

## 2023-01-18 RX ORDER — HYDROMORPHONE HYDROCHLORIDE 1 MG/ML
0.25 INJECTION, SOLUTION INTRAMUSCULAR; INTRAVENOUS; SUBCUTANEOUS
Status: DISCONTINUED | OUTPATIENT
Start: 2023-01-18 | End: 2023-01-22 | Stop reason: HOSPADM

## 2023-01-18 RX ORDER — BIOTIN 10 MG
1 TABLET ORAL EVERY EVENING
COMMUNITY

## 2023-01-18 RX ORDER — ONDANSETRON 2 MG/ML
4 INJECTION INTRAMUSCULAR; INTRAVENOUS EVERY 4 HOURS PRN
Status: DISCONTINUED | OUTPATIENT
Start: 2023-01-18 | End: 2023-01-22 | Stop reason: HOSPADM

## 2023-01-18 RX ORDER — OXYCODONE HYDROCHLORIDE 5 MG/1
2.5 TABLET ORAL
Status: DISCONTINUED | OUTPATIENT
Start: 2023-01-18 | End: 2023-01-22 | Stop reason: HOSPADM

## 2023-01-18 RX ORDER — FUROSEMIDE 40 MG/1
40 TABLET ORAL EVERY MORNING
Status: ON HOLD | COMMUNITY
End: 2023-02-20

## 2023-01-18 RX ORDER — ONDANSETRON 4 MG/1
4 TABLET, ORALLY DISINTEGRATING ORAL EVERY 4 HOURS PRN
Status: DISCONTINUED | OUTPATIENT
Start: 2023-01-18 | End: 2023-01-22 | Stop reason: HOSPADM

## 2023-01-18 RX ORDER — MORPHINE SULFATE 4 MG/ML
2 INJECTION INTRAVENOUS ONCE
Status: COMPLETED | OUTPATIENT
Start: 2023-01-18 | End: 2023-01-18

## 2023-01-18 RX ORDER — METHYLPREDNISOLONE SODIUM SUCCINATE 125 MG/2ML
62.5 INJECTION, POWDER, LYOPHILIZED, FOR SOLUTION INTRAMUSCULAR; INTRAVENOUS ONCE
Status: COMPLETED | OUTPATIENT
Start: 2023-01-18 | End: 2023-01-18

## 2023-01-18 RX ORDER — IPRATROPIUM BROMIDE AND ALBUTEROL SULFATE 2.5; .5 MG/3ML; MG/3ML
3 SOLUTION RESPIRATORY (INHALATION)
Status: COMPLETED | OUTPATIENT
Start: 2023-01-18 | End: 2023-01-18

## 2023-01-18 RX ORDER — AMOXICILLIN 250 MG
2 CAPSULE ORAL 2 TIMES DAILY
Status: DISCONTINUED | OUTPATIENT
Start: 2023-01-18 | End: 2023-01-22 | Stop reason: HOSPADM

## 2023-01-18 RX ORDER — OXYCODONE HYDROCHLORIDE 5 MG/1
5 TABLET ORAL
Status: DISCONTINUED | OUTPATIENT
Start: 2023-01-18 | End: 2023-01-22 | Stop reason: HOSPADM

## 2023-01-18 RX ORDER — ACETAMINOPHEN 325 MG/1
650 TABLET ORAL EVERY 6 HOURS PRN
Status: DISCONTINUED | OUTPATIENT
Start: 2023-01-18 | End: 2023-01-22 | Stop reason: HOSPADM

## 2023-01-18 RX ORDER — IPRATROPIUM BROMIDE AND ALBUTEROL SULFATE 2.5; .5 MG/3ML; MG/3ML
3 SOLUTION RESPIRATORY (INHALATION)
Status: DISCONTINUED | OUTPATIENT
Start: 2023-01-18 | End: 2023-01-19

## 2023-01-18 RX ORDER — BISACODYL 10 MG
10 SUPPOSITORY, RECTAL RECTAL
Status: DISCONTINUED | OUTPATIENT
Start: 2023-01-18 | End: 2023-01-22 | Stop reason: HOSPADM

## 2023-01-18 RX ORDER — POLYETHYLENE GLYCOL 3350 17 G/17G
1 POWDER, FOR SOLUTION ORAL
Status: DISCONTINUED | OUTPATIENT
Start: 2023-01-18 | End: 2023-01-22 | Stop reason: HOSPADM

## 2023-01-18 RX ORDER — GUAIFENESIN/DEXTROMETHORPHAN 100-10MG/5
10 SYRUP ORAL EVERY 6 HOURS PRN
Status: DISCONTINUED | OUTPATIENT
Start: 2023-01-18 | End: 2023-01-22 | Stop reason: HOSPADM

## 2023-01-18 RX ORDER — LABETALOL HYDROCHLORIDE 5 MG/ML
10 INJECTION, SOLUTION INTRAVENOUS EVERY 4 HOURS PRN
Status: DISCONTINUED | OUTPATIENT
Start: 2023-01-18 | End: 2023-01-22 | Stop reason: HOSPADM

## 2023-01-18 RX ORDER — NICOTINE 21 MG/24HR
21 PATCH, TRANSDERMAL 24 HOURS TRANSDERMAL
Status: DISCONTINUED | OUTPATIENT
Start: 2023-01-18 | End: 2023-01-18

## 2023-01-18 RX ORDER — FLUTICASONE PROPIONATE 44 UG/1
2 AEROSOL, METERED RESPIRATORY (INHALATION)
Status: DISCONTINUED | OUTPATIENT
Start: 2023-01-18 | End: 2023-01-19

## 2023-01-18 RX ORDER — AMMONIUM LACTATE 12 G/100G
LOTION TOPICAL 2 TIMES DAILY
Status: DISCONTINUED | OUTPATIENT
Start: 2023-01-18 | End: 2023-01-22 | Stop reason: HOSPADM

## 2023-01-18 RX ORDER — ENOXAPARIN SODIUM 100 MG/ML
40 INJECTION SUBCUTANEOUS DAILY
Status: DISCONTINUED | OUTPATIENT
Start: 2023-01-18 | End: 2023-01-22 | Stop reason: HOSPADM

## 2023-01-18 RX ORDER — PREDNISONE 20 MG/1
40 TABLET ORAL DAILY
Status: DISCONTINUED | OUTPATIENT
Start: 2023-01-19 | End: 2023-01-21

## 2023-01-18 RX ORDER — CEFTRIAXONE 2 G/1
2000 INJECTION, POWDER, FOR SOLUTION INTRAMUSCULAR; INTRAVENOUS ONCE
Status: COMPLETED | OUTPATIENT
Start: 2023-01-18 | End: 2023-01-18

## 2023-01-18 RX ADMIN — IPRATROPIUM BROMIDE AND ALBUTEROL SULFATE 3 ML: .5; 2.5 SOLUTION RESPIRATORY (INHALATION) at 14:43

## 2023-01-18 RX ADMIN — ENOXAPARIN SODIUM 40 MG: 40 INJECTION SUBCUTANEOUS at 17:13

## 2023-01-18 RX ADMIN — GUAIFENESIN SYRUP AND DEXTROMETHORPHAN 10 ML: 100; 10 SYRUP ORAL at 21:00

## 2023-01-18 RX ADMIN — IPRATROPIUM BROMIDE AND ALBUTEROL SULFATE 3 ML: .5; 2.5 SOLUTION RESPIRATORY (INHALATION) at 09:45

## 2023-01-18 RX ADMIN — OXYCODONE HYDROCHLORIDE 5 MG: 5 TABLET ORAL at 14:31

## 2023-01-18 RX ADMIN — IPRATROPIUM BROMIDE AND ALBUTEROL SULFATE 3 ML: .5; 2.5 SOLUTION RESPIRATORY (INHALATION) at 22:16

## 2023-01-18 RX ADMIN — CEFTRIAXONE SODIUM 2000 MG: 2 INJECTION, POWDER, FOR SOLUTION INTRAMUSCULAR; INTRAVENOUS at 10:41

## 2023-01-18 RX ADMIN — SENNOSIDES AND DOCUSATE SODIUM 2 TABLET: 50; 8.6 TABLET ORAL at 17:13

## 2023-01-18 RX ADMIN — AMPICILLIN SODIUM AND SULBACTAM SODIUM 3 G: 2; 1 INJECTION, POWDER, FOR SOLUTION INTRAMUSCULAR; INTRAVENOUS at 17:23

## 2023-01-18 RX ADMIN — OXYCODONE HYDROCHLORIDE 5 MG: 5 TABLET ORAL at 21:01

## 2023-01-18 RX ADMIN — OXYCODONE HYDROCHLORIDE 5 MG: 5 TABLET ORAL at 23:44

## 2023-01-18 RX ADMIN — METHYLPREDNISOLONE SODIUM SUCCINATE 62.5 MG: 125 INJECTION, POWDER, FOR SOLUTION INTRAMUSCULAR; INTRAVENOUS at 09:47

## 2023-01-18 RX ADMIN — OXYCODONE HYDROCHLORIDE 5 MG: 5 TABLET ORAL at 18:01

## 2023-01-18 RX ADMIN — AZITHROMYCIN MONOHYDRATE 500 MG: 500 INJECTION, POWDER, LYOPHILIZED, FOR SOLUTION INTRAVENOUS at 10:45

## 2023-01-18 RX ADMIN — AMPICILLIN SODIUM AND SULBACTAM SODIUM 3 G: 2; 1 INJECTION, POWDER, FOR SOLUTION INTRAMUSCULAR; INTRAVENOUS at 13:45

## 2023-01-18 RX ADMIN — MORPHINE SULFATE 2 MG: 4 INJECTION INTRAVENOUS at 09:46

## 2023-01-18 RX ADMIN — AMPICILLIN SODIUM AND SULBACTAM SODIUM 3 G: 2; 1 INJECTION, POWDER, FOR SOLUTION INTRAMUSCULAR; INTRAVENOUS at 23:46

## 2023-01-18 RX ADMIN — OXYCODONE HYDROCHLORIDE 2.5 MG: 5 TABLET ORAL at 11:17

## 2023-01-18 RX ADMIN — DOXYCYCLINE 100 MG: 100 TABLET, FILM COATED ORAL at 18:01

## 2023-01-18 ASSESSMENT — PAIN SCALES - WONG BAKER: WONGBAKER_NUMERICALRESPONSE: HURTS JUST A LITTLE BIT

## 2023-01-18 ASSESSMENT — PAIN SCALES - PAIN ASSESSMENT IN ADVANCED DEMENTIA (PAINAD)
BODYLANGUAGE: RELAXED
CONSOLABILITY: NO NEED TO CONSOLE
FACIALEXPRESSION: SMILING OR INEXPRESSIVE
BREATHING: NORMAL
TOTALSCORE: 0

## 2023-01-18 ASSESSMENT — PAIN DESCRIPTION - PAIN TYPE
TYPE: CHRONIC PAIN
TYPE: CHRONIC PAIN

## 2023-01-18 ASSESSMENT — COPD QUESTIONNAIRES
DURING THE PAST 4 WEEKS HOW MUCH DID YOU FEEL SHORT OF BREATH: MOST  OR ALL OF THE TIME
COPD SCREENING SCORE: 9
DO YOU EVER COUGH UP ANY MUCUS OR PHLEGM?: YES, A FEW DAYS A WEEK OR MONTH
HAVE YOU SMOKED AT LEAST 100 CIGARETTES IN YOUR ENTIRE LIFE: YES

## 2023-01-18 ASSESSMENT — ENCOUNTER SYMPTOMS
CHILLS: 1
SHORTNESS OF BREATH: 1
WHEEZING: 1
FEVER: 1

## 2023-01-18 ASSESSMENT — PATIENT HEALTH QUESTIONNAIRE - PHQ9
2. FEELING DOWN, DEPRESSED, IRRITABLE, OR HOPELESS: NOT AT ALL
SUM OF ALL RESPONSES TO PHQ9 QUESTIONS 1 AND 2: 0
1. LITTLE INTEREST OR PLEASURE IN DOING THINGS: NOT AT ALL

## 2023-01-18 ASSESSMENT — PAIN DESCRIPTION - DESCRIPTORS: DESCRIPTORS: STABBING

## 2023-01-18 NOTE — ASSESSMENT & PLAN NOTE
RESOLVED  Present on admission based on WBC > 12k, HR >90, RR > 20, and source like pulmonary; nonetheless no evident clinical deterioration of patient.

## 2023-01-18 NOTE — ASSESSMENT & PLAN NOTE
With known history of COPD stage III imaging highly suggestive of emphysema component.  Plan:  1.  COPD quality bundle initiated  2.  Ceftriaxone in addition to doxycycline  3.  Oral steroids  4.  Guideline directed medical therapy initiated Jenna Del Valle formal consultation with pulmonology.

## 2023-01-18 NOTE — ASSESSMENT & PLAN NOTE
As previously documented on chart review continue with judicious fluid resuscitation patient will likely require diuresis.

## 2023-01-18 NOTE — ED NOTES
Lab called with a critical Troponin value of 113 at 1130. MD Maki notified of critical lab value at 1132.

## 2023-01-18 NOTE — ASSESSMENT & PLAN NOTE
Elevated troponin on admission.   Currently no chest pain or discomfort; resolved since admission.   No worsening SOB; improved.   No evident ECG changes in AM.   Likely demand ischemia in setting of possible COPD exacerbation vs pneumonia vs PE

## 2023-01-18 NOTE — ED PROVIDER NOTES
ER Provider Note    Scribed for Vitaliy Roberts M.d. by Kevin Foster. 1/18/2023  9:27 AM    Primary Care Provider: No primary care provider on file.    CHIEF COMPLAINT  Chief Complaint   Patient presents with    Shortness of Breath    Chest Pain     EXTERNAL RECORDS REVIEWED  None available    Additional history obtained from EMS including prehospital oxygen saturation of 80%.    HPI/ROS  LIMITATION TO HISTORY   Select: : None    Nikolas Bob is a 67 y.o. male who presents to the ED complaining of shortness of breath onset prior to arrival.   The patient reports that he has had shortness of breath for years now, but it is currently very bad. Patient admits to using his albuterol three times today prior to arrival.  Has a cough that is chronic.  Shortness of breath is acutely worse and is off of his baseline.  He reports associated neck and shoulder pain, sore throat, chest pain, and cough. Patient adds that he is hyperventilating. He reports that he is always in pain, but is here primarily for his shortness of breath. Patient denies any fevers. He is requesting pain and or anxiety medications at this time. Patient has a history of COPD, and takes lasix and albuterol. Patient denies any allergies to medications. Patient denies any history of blood clots.  Denies any other aggravating leaving factors or associated complaints.  Noted to be in moderate distress.    PAST MEDICAL HISTORY  COPD  Congestive heart failure  Edema    SURGICAL HISTORY  No past surgical history on file.    FAMILY HISTORY  No family history on file.    SOCIAL HISTORY   None noted.    CURRENT MEDICATIONS  Previous Medications    ALBUTEROL (PROVENTIL) 2.5MG/3ML NEBU SOLN SOLUTION FOR NEBULIZATION    Take 2.5 mg by nebulization every four hours as needed for Shortness of Breath.    FLUTICASONE-UMECLIDIN-VILANT (TRELEGY ELLIPTA INH)    Inhale 1 Puff every morning.    FUROSEMIDE (LASIX) 40 MG TAB    Take 40 mg by mouth every morning.    MULTIPLE  "VITAMINS-MINERALS (MULTIVITAMIN ADULT) CHEW TAB    Chew 1 Tablet every evening.       ALLERGIES  Patient has no allergy information on record.    PHYSICAL EXAM  /89   Pulse (!) 114   Temp 36.9 °C (98.5 °F) (Temporal)   Resp 20   Ht 1.753 m (5' 9\")   Wt 61.2 kg (135 lb)   SpO2 100%   BMI 19.94 kg/m²   Constitutional: Wake alert anxious sitting up moderate distress, tachypneic with retractions.  HENT: Normocephalic, Atraumatic, Bilateral external ears normal, Oropharynx moist,  Eyes: PERRL, EOMI, Conjunctiva normal, No discharge.   Neck: Normal range of motion,   Cardiovascular: Tachycardic no murmurs rubs or gallops  Thorax & Lungs: Wheezing, poor air movement and retractions bilaterally.  Abdomen: Bowel sounds normal, Soft, No tenderness  Skin: Warm, Dry, No erythema, No rash.   Back: No tenderness, No CVA tenderness.   Musculoskeletal: Good range of motion in all major joints.  Mild edema bilaterally.  Neurologic: Alert No focal deficits noted.   Psychiatric: Affect normal     DIAGNOSTIC STUDIES    Labs:   Results for orders placed or performed during the hospital encounter of 01/18/23   CBC with Differential   Result Value Ref Range    WBC 15.4 (H) 4.8 - 10.8 K/uL    RBC 4.38 (L) 4.70 - 6.10 M/uL    Hemoglobin 12.2 (L) 14.0 - 18.0 g/dL    Hematocrit 40.2 (L) 42.0 - 52.0 %    MCV 91.8 81.4 - 97.8 fL    MCH 27.9 27.0 - 33.0 pg    MCHC 30.3 (L) 33.7 - 35.3 g/dL    RDW 45.9 35.9 - 50.0 fL    Platelet Count 197 164 - 446 K/uL    MPV 10.4 9.0 - 12.9 fL    Neutrophils-Polys 87.50 (H) 44.00 - 72.00 %    Lymphocytes 6.00 (L) 22.00 - 41.00 %    Monocytes 4.30 0.00 - 13.40 %    Eosinophils 0.90 0.00 - 6.90 %    Basophils 0.30 0.00 - 1.80 %    Immature Granulocytes 1.00 (H) 0.00 - 0.90 %    Nucleated RBC 0.00 /100 WBC    Neutrophils (Absolute) 13.50 (H) 1.82 - 7.42 K/uL    Lymphs (Absolute) 0.92 (L) 1.00 - 4.80 K/uL    Monos (Absolute) 0.67 0.00 - 0.85 K/uL    Eos (Absolute) 0.14 0.00 - 0.51 K/uL    Baso " (Absolute) 0.05 0.00 - 0.12 K/uL    Immature Granulocytes (abs) 0.15 (H) 0.00 - 0.11 K/uL    NRBC (Absolute) 0.00 K/uL   Complete Metabolic Panel (CMP)   Result Value Ref Range    Sodium 140 135 - 145 mmol/L    Potassium 4.1 3.6 - 5.5 mmol/L    Chloride 97 96 - 112 mmol/L    Co2 34 (H) 20 - 33 mmol/L    Anion Gap 9.0 7.0 - 16.0    Glucose 117 (H) 65 - 99 mg/dL    Bun 26 (H) 8 - 22 mg/dL    Creatinine 1.20 0.50 - 1.40 mg/dL    Calcium 9.2 8.5 - 10.5 mg/dL    AST(SGOT) 90 (H) 12 - 45 U/L    ALT(SGPT) 79 (H) 2 - 50 U/L    Alkaline Phosphatase 82 30 - 99 U/L    Total Bilirubin 0.2 0.1 - 1.5 mg/dL    Albumin 4.3 3.2 - 4.9 g/dL    Total Protein 7.4 6.0 - 8.2 g/dL    Globulin 3.1 1.9 - 3.5 g/dL    A-G Ratio 1.4 g/dL   Troponins NOW   Result Value Ref Range    Troponin T 46 (H) 6 - 19 ng/L   URINALYSIS    Specimen: Urine   Result Value Ref Range    Color Yellow     Character Clear     Specific Gravity 1.013 <1.035    Ph 5.5 5.0 - 8.0    Glucose Negative Negative mg/dL    Ketones Negative Negative mg/dL    Protein 100 (A) Negative mg/dL    Bilirubin Negative Negative    Urobilinogen, Urine 0.2 Negative    Nitrite Negative Negative    Leukocyte Esterase Negative Negative    Occult Blood Trace (A) Negative    Micro Urine Req Microscopic    proBrain Natriuretic Peptide, NT   Result Value Ref Range    NT-proBNP 184 (H) 0 - 125 pg/mL   D-DIMER   Result Value Ref Range    D-Dimer 0.71 (H) 0.00 - 0.50 ug/mL (FEU)   CoV-2, FLU A/B, and RSV by PCR (2-4 Hours CEPHEID) : Collect NP swab in VTM    Specimen: Respirate   Result Value Ref Range    Influenza virus A RNA Negative Negative    Influenza virus B, PCR Negative Negative    RSV, PCR Negative Negative    SARS-CoV-2 by PCR NotDetected     SARS-CoV-2 Source NP Swab    URINE MICROSCOPIC (W/UA)   Result Value Ref Range    WBC 0-2 (A) /hpf    RBC 0-2 (A) /hpf    Bacteria Negative None /hpf    Epithelial Cells Negative /hpf    Hyaline Cast 0-2 /lpf   CORRECTED CALCIUM   Result Value Ref  Range    Correct Calcium 9.0 8.5 - 10.5 mg/dL   ESTIMATED GFR   Result Value Ref Range    GFR (CKD-EPI) 65 >60 mL/min/1.73 m 2   Prothrombin time (INR)   Result Value Ref Range    PT 13.8 12.0 - 14.6 sec    INR 1.07 0.87 - 1.13   PROCALCITONIN   Result Value Ref Range    Procalcitonin <0.05 <0.25 ng/mL   MAGNESIUM   Result Value Ref Range    Magnesium 2.1 1.5 - 2.5 mg/dL   EKG   Result Value Ref Range    Report       [facility] Emergency Dept.    Test Date:  2023  Pt Name:    IVY ROBERTS                 Department: ER  MRN:        [id number]                      Room:       RD 08  Gender:     Male                         Technician: [id number]  :        [id number]                   Requested By:ER TRIAGE PROTOCOL  Order #:    [id number]                    Reading MD: FLOR TOBAR. AMD    Measurements  Intervals                                Axis  Rate:       111                          P:          66  MA:         118                          QRS:        50  QRSD:       123                          T:          66  QT:         363  QTc:        494    Interpretive Statements  Sinus tachycardia  Nonspecific intraventricular conduction delay  Probable anterolateral infarct, old  No previous ECG available for comparison  Electronically Signed On 2023 9:34:01 PST by FLOR TOBAR. AMD          EKG:   I have independently interpreted this EKG  12 Lead EKG: interpreted by me as above.      Radiology:   The attending emergency physician has independently interpreted the diagnostic imaging associated with this visit and am waiting the final reading from the radiologist.     DX-CHEST-PORTABLE (1 VIEW)   Final Result      1.  Hazy interstitial opacities, greatest at the right upper lung favoring multifocal pneumonia.   2.  Bilateral lower lung nodular opacities favoring nipple shadow. If there is clinical concern, exam can be repeated with nipple markers.         interpreted by the radiologist  and reviewed by me.     COURSE & MEDICAL DECISION MAKING     ED Observation Status? Yes; I am placing the patient in to an observation status due to a diagnostic uncertainty as well as therapeutic intensity. Patient placed in observation status at 9:34 AM, 1/18/2023.     Observation plan is as follows: Patient will be observed in the emergency department to see a response of breathing treatments and steroid therapy to see if he is appropriate for discharge or hospitalization.    Differential diagnosis includes but is not limited to pneumonia, pneumothorax, COVID, influenza, COPD, heart failure, pulmonary embolism.        9:34 AM - Patient seen and examined at bedside. Discussed plan of care, including pain management. Patient agrees to the plan of care. The patient will be medicated with Solu-Medrol and Duoneb. Ordered for EKG, Troponin, CMP, CBC with differential, Blood culture, Urine culture, UA, Lactic acid, COV-2, Flu A/B and RSV by PCR, D-Dimer, proBrain Natriuretic peptide, Lactic acid, and DX-Chest to evaluate his symptoms.        Story therapy was called and case discussed with respiratory therapy.  He is put on breathing treatment, steroids.  Chest x-ray ultimately shows will look like pneumonia.  He is worked up with a septic protocol with cultures and labs.  He is treated with empiric antibiotics for pneumonia.      Patient is not have evidence of severe heart failure.  Troponin is elevated likely due to demand and hypoxemia.  He does require hospitalization.  D-dimer is mildly elevated.    Patient's care is escalated he has sepsis, hypoxia, respiratory failure elevated troponin and requires hospitalization.  He is admitted in critical condition.        10:21 AM - Paged Hospitalist.      10:32 AM - I discussed the patient's case and the above findings with Dr. Maki (hospitalist) who agrees to admit the patient.          Upon Reevaluation, the patient's condition has: not improved; and will be  escalated to hospitalization.    Patient discharged from ED Observation status at 10:40 AM today.        INITIAL ASSESSMENT AND PLAN  Care Narrative: Patient presents with acute respiratory distress.  Placed on cardiac monitor and worked up for sepsis, COPD heart failure and the above differential diagnosis.  He is given steroids and breathing treatments.  He gradually has improved but he remains in distress with tachycardia and hypoxemia beyond his baseline.    Because of his acute on chronic respiratory failure requiring additional oxygen ongoing breathing treatments require hospitalization.    ADDITIONAL PROBLEM LIST AND DISPOSITION    Problem #1: COPD  Problem #2: Heart failure    I have discussed management of the patient with the following physicians and [facility]    Discussion of management with other Q or appropriate source(s): RT regarding breathing treatments and              Patient will be hospitalized by Dr. Maki.     FINAL DIANGOSIS  1. Acute exacerbation of chronic obstructive pulmonary disease (COPD) (HCC)    2. Acute respiratory failure with hypoxia (HCC)    3. Elevated troponin    4. Pneumonia due to infectious organism, unspecified laterality, unspecified part of lung    5. Sepsis, due to unspecified organism, unspecified whether acute organ dysfunction present (HCC)           The note accurately reflects work and decisions made by me.  Vitaliy Roberts M.D.  1/18/2023  11:25 AM     Kevin BRENNAN (Adamibloreta), am scribing for, and in the presence of, Vitaliy Roberts M.D..    Electronically signed by: Kevin Foster (Isaías), 1/18/2023    Vitaliy BRENNAN M.D. personally performed the services described in this documentation, as scribed by Kevin Foster in my presence, and it is both accurate and complete.

## 2023-01-18 NOTE — ED TRIAGE NOTES
"Chief Complaint   Patient presents with   • Shortness of Breath   • Chest Pain     /89   Pulse (!) 114   Temp 36.9 °C (98.5 °F) (Temporal)   Resp 20   Ht 1.753 m (5' 9\")   Wt 61.2 kg (135 lb)   SpO2 100%   BMI 19.94 kg/m²     Pt BIB EMS from home for c/o SOB since 0700 and sudden chest pain radiating to neck. Pt reported taking albuterol at home at 0400. Pt refused interventions from EMS. Pt c/o of severe anxiety and SOB.  "

## 2023-01-18 NOTE — ED NOTES
Pharmacy Medication Reconciliation    ~Medication Reconciliation partially completed per patient & patient family at bedside  ~Allergies reviewed  ~Patient home pharmacy :  [facility]    ~Med rec to follow up with patient home pharmacy to verify antibiotic and steroid usage

## 2023-01-18 NOTE — H&P
Hospital Medicine History & Physical Note    Date of Service  1/18/2023    Primary Care Physician  No primary care provider on file.    Consultants  pulmonary    Specialist Names: Cash MIRANDA     Code Status  Full Code    Chief Complaint  Chief Complaint   Patient presents with    Shortness of Breath    Chest Pain       History of Presenting Illness  Nikolas Bob is a 67 y.o. male who presented 1/18/2023 with shortness of breath chest pain.  Patient 67-year-old male with a history of COPD/emphysema stage III persistently on 3 L baseline oxygen, hypertension, pulmonary hypertension, kyphosis, who was in his usual state of health until approximately 2300 on 1/17/2023 when he began having increased amounts of shortness of breath patient did several rounds of DuoNeb's including extra DuoNeb treatment however without alleviation.  Patient again persistently concerned and called EMS to seek higher level of care further medical services    Upon evaluation emergency department patient is presently on 4 L of oxygen patient had respiratory rates greater than 22 with saturating in the mid 90s but still high complaints of breathlessness.  Patient received one-time dose of morphine in addition to breathing treatment steroids emergency department with alleviation of symptomatology.  Upon my evaluation at bedside patient is now using accessory muscles does have marked kyphosis/scoliosis on physical examination does have coarse breath sounds bilaterally in posterior lung fields discussed with patient course of care patient is agreement with hospitalization.  I discussed the plan of care with patient and bedside RN.    Review of Systems  Review of Systems   Constitutional:  Positive for chills, fever and malaise/fatigue.   Respiratory:  Positive for shortness of breath and wheezing.      Past Medical History   has no past medical history on file.    Surgical History   has no past surgical history on file.     Family History  family  history is not on file.   Family history reviewed with patient. There is no family history that is pertinent to the chief complaint.     Social History       Allergies  No Known Allergies    Medications  Prior to Admission Medications   Prescriptions Last Dose Informant Patient Reported? Taking?   Fluticasone-Umeclidin-Vilant (TRELEGY ELLIPTA INH) 1/17/2023 at AM Patient Yes Yes   Sig: Inhale 1 Puff every morning.   Multiple Vitamins-Minerals (MULTIVITAMIN ADULT) Chew Tab 1/17/2023 at PM Patient Yes Yes   Sig: Chew 1 Tablet every evening.   albuterol (PROVENTIL) 2.5mg/3ml Nebu Soln solution for nebulization 1/18/2023 at 0400 Patient Yes Yes   Sig: Take 2.5 mg by nebulization every four hours as needed for Shortness of Breath.   furosemide (LASIX) 40 MG Tab 1/17/2023 at AM Patient Yes Yes   Sig: Take 40 mg by mouth every morning.      Facility-Administered Medications: None       Physical Exam  Temp:  [36.9 °C (98.5 °F)] 36.9 °C (98.5 °F)  Pulse:  [113-122] 113  Resp:  [18-24] 18  BP: (129-147)/(76-89) 130/83  SpO2:  [97 %-100 %] 98 %  Blood Pressure : 130/83   Temperature: 36.9 °C (98.5 °F)   Pulse: (!) 113   Respiration: 18   Pulse Oximetry: 98 %       Physical Exam  Vitals reviewed.   Constitutional:       General: He is not in acute distress.     Appearance: He is ill-appearing.   Cardiovascular:      Rate and Rhythm: Regular rhythm. Tachycardia present.      Heart sounds: No murmur heard.    No gallop.   Pulmonary:      Effort: Respiratory distress present.      Breath sounds: Wheezing present.   Neurological:      Mental Status: He is alert.       Laboratory:  Recent Labs     01/18/23 0924   WBC 15.4*   RBC 4.38*   HEMOGLOBIN 12.2*   HEMATOCRIT 40.2*   MCV 91.8   MCH 27.9   MCHC 30.3*   RDW 45.9   PLATELETCT 197   MPV 10.4     Recent Labs     01/18/23 0924   SODIUM 140   POTASSIUM 4.1   CHLORIDE 97   CO2 34*   GLUCOSE 117*   BUN 26*   CREATININE 1.20   CALCIUM 9.2     Recent Labs     01/18/23 0924   ALTSGPT  79*   ASTSGOT 90*   ALKPHOSPHAT 82   TBILIRUBIN 0.2   GLUCOSE 117*     Recent Labs     01/18/23  0924   INR 1.07     Recent Labs     01/18/23  0924   NTPROBNP 184*         Recent Labs     01/18/23  0924 01/18/23  1028   TROPONINT 46* 113*       Imaging:  DX-CHEST-PORTABLE (1 VIEW)   Final Result      1.  Hazy interstitial opacities, greatest at the right upper lung favoring multifocal pneumonia.   2.  Bilateral lower lung nodular opacities favoring nipple shadow. If there is clinical concern, exam can be repeated with nipple markers.          EKG:  My impression is: No gross evidence of ACS sinus tachycardia    Assessment/Plan:  Justification for Admission Status  I anticipate this patient will require at least two midnights for appropriate medical management, necessitating inpatient admission because patient with sepsis secondary to pulmonary source with overlap COPD exacerbation and possible contributing myocardial component based on pharmacokinetics and pharmacodynamics this will take at least 48 hours to administer appropriate medical treatment monitor response to treatment and subsequently adjust as indicated.    Patient will need a Telemetry bed on MEDICAL service .  The need is secondary to sepsis secondary to be acquired pneumonia with contributing factors from COPD..    * Sepsis (HCC)- (present on admission)  Assessment & Plan  This is Sepsis Present on admission  SIRS criteria identified on my evaluation include: Tachycardia, with heart rate greater than 90 BPM, Tachypnea, with respirations greater than 20 per minute and Leukocytosis, with WBC greater than 12,000  Source is pulmonary  Sepsis protocol initiated  Fluid resuscitation ordered per protocol  Crystalloid Fluid Administration: Patient has advanced or end-stage heart failure (with documentation of NYHA class III or symptoms with minimal exertion, Or NYHA class IV or symptoms at rest or with activity), for this/these reason(s) it is unsafe for this  patient to receive 30 mL/kg of fluid  Partial Fluid Dose Given: Judicious fluid administration with concern for pulmonary edema., patient to be reassessed shortly after completion of partial fluid bolus to ensure adequacy of resuscitation  IV antibiotics as appropriate for source of sepsis  Reassessment: I have reassessed the patient's hemodynamic status  Patient.  Initiated on ceftriaxone addition to azithromycin.  Continue patient on ceftriaxone discontinue azithromycin initiate doxycycline.        Myocardial infarction due to demand ischemia (HCC)  Assessment & Plan  Patient with elevated troponin level of 113 in setting of sepsis secondary to community-acquired pneumonia representing myocardial infarction type II or demand ischemia.  Continue to trend troponins at present every 4 hours repeat EKG at present.  Continue to treat underlying cause.    Pulmonary hypertension (HCC)  Assessment & Plan  As previously documented on chart review continue with judicious fluid resuscitation patient will likely require diuresis.    Elevated troponin  Assessment & Plan  Patient has Apsley no acute complaints of chest pain at present.  Patient does seem to have a slightly elevated baseline troponin approximately 20-30 upon initial intake today patient had troponin level 40 repeat troponin revealed a value of 113.  Continue to trend troponins every 4 hours in addition to her repeat EKG.  Patient has Apsley no complaints of chest pain at present.    Acute exacerbation of chronic obstructive pulmonary disease (COPD) (HCC)  Assessment & Plan  With known history of COPD stage III imaging highly suggestive of emphysema component.  Plan:  1.  COPD quality bundle initiated  2.  Ceftriaxone in addition to doxycycline  3.  Oral steroids  4.  Guideline directed medical therapy initiated Jenna Del Valle formal consultation with pulmonology.    Acute hypoxemic respiratory failure (HCC)  Assessment & Plan  Secondary to community-acquired  pneumonia secondary to sepsis continue with supplemental oxygen maintain saturation 88 to 92%.  Patient oxygen also present.  Continue to wean as tolerated.  Breathing treatments as ordered      Please note that this dictation was created using voice recognition software. I have made every reasonable attempt to correct obvious errors, but I expect that there are errors of grammar and possibly context that I did not discover before finalizing the note.    VTE prophylaxis: SCDs/TEDs and enoxaparin ppx

## 2023-01-18 NOTE — ASSESSMENT & PLAN NOTE
Most likely due to COPD exacerbation.   Prednisone discontinued.   Doxycycline scheduled until 01/23.   On 3 liters of O2 SpO2 at rest 96-98% and on ambulation SpO2 91%.   No need for rescue inhaler use.   01/21 - CT PE: no evidence of PE; right para-hilar node of undetermined etiology.

## 2023-01-18 NOTE — ASSESSMENT & PLAN NOTE
Patient with elevated troponin level of 113 in setting of sepsis secondary to community-acquired pneumonia representing myocardial infarction type II or demand ischemia.  Continue to trend troponins at present every 4 hours repeat EKG at present.  Continue to treat underlying cause.

## 2023-01-18 NOTE — ED NOTES
Attempted to change pts oxymask to nasal cannula. Pt refused and requested to stay on the oxy mask.

## 2023-01-18 NOTE — PROGRESS NOTES
4 Eyes Skin Assessment Completed by Haleigh RN and Maria Del Carmen RN.    Head WDL  Ears WDL  Nose WDL  Mouth WDL  Neck WDL  Breast/Chest WDL  Shoulder Blades WDL  Spine WDL  (R) Arm/Elbow/Hand WDL  (L) Arm/Elbow/Hand WDL  Abdomen WDL  Groin Excoriation  Scrotum/Coccyx/Buttocks WDL  (R) Leg Redness and Scab  (L) Leg Redness and Scab  (R) Heel/Foot/Toe WDL  (L) Heel/Foot/Toe WDL          Devices In Places Pulse Ox and Oxy Mask      Interventions In Place N/A    Possible Skin Injury Yes    Pictures Uploaded Into Epic Yes  Wound Consult Placed Yes  RN Wound Prevention Protocol Ordered Yes

## 2023-01-19 PROBLEM — I27.20 PULMONARY HYPERTENSION (HCC): Status: RESOLVED | Noted: 2023-01-18 | Resolved: 2023-01-19

## 2023-01-19 PROBLEM — I21.A1 MYOCARDIAL INFARCTION DUE TO DEMAND ISCHEMIA (HCC): Status: RESOLVED | Noted: 2023-01-18 | Resolved: 2023-01-19

## 2023-01-19 PROBLEM — J44.1 ACUTE EXACERBATION OF CHRONIC OBSTRUCTIVE PULMONARY DISEASE (COPD) (HCC): Status: RESOLVED | Noted: 2023-01-18 | Resolved: 2023-01-19

## 2023-01-19 PROBLEM — A41.9 SEPSIS (HCC): Status: RESOLVED | Noted: 2023-01-18 | Resolved: 2023-01-19

## 2023-01-19 LAB
ANION GAP SERPL CALC-SCNC: 10 MMOL/L (ref 7–16)
BASOPHILS # BLD AUTO: 0.2 % (ref 0–1.8)
BASOPHILS # BLD: 0.02 K/UL (ref 0–0.12)
BUN SERPL-MCNC: 18 MG/DL (ref 8–22)
CALCIUM SERPL-MCNC: 8.9 MG/DL (ref 8.5–10.5)
CHLORIDE SERPL-SCNC: 96 MMOL/L (ref 96–112)
CO2 SERPL-SCNC: 27 MMOL/L (ref 20–33)
CREAT SERPL-MCNC: 0.83 MG/DL (ref 0.5–1.4)
EOSINOPHIL # BLD AUTO: 0 K/UL (ref 0–0.51)
EOSINOPHIL NFR BLD: 0 % (ref 0–6.9)
ERYTHROCYTE [DISTWIDTH] IN BLOOD BY AUTOMATED COUNT: 44.9 FL (ref 35.9–50)
GFR SERPLBLD CREATININE-BSD FMLA CKD-EPI: 95 ML/MIN/1.73 M 2
GLUCOSE SERPL-MCNC: 99 MG/DL (ref 65–99)
HCT VFR BLD AUTO: 34.7 % (ref 42–52)
HGB BLD-MCNC: 10.7 G/DL (ref 14–18)
IMM GRANULOCYTES # BLD AUTO: 0.06 K/UL (ref 0–0.11)
IMM GRANULOCYTES NFR BLD AUTO: 0.7 % (ref 0–0.9)
LYMPHOCYTES # BLD AUTO: 0.7 K/UL (ref 1–4.8)
LYMPHOCYTES NFR BLD: 8.7 % (ref 22–41)
MCH RBC QN AUTO: 27.6 PG (ref 27–33)
MCHC RBC AUTO-ENTMCNC: 30.8 G/DL (ref 33.7–35.3)
MCV RBC AUTO: 89.7 FL (ref 81.4–97.8)
MONOCYTES # BLD AUTO: 0.73 K/UL (ref 0–0.85)
MONOCYTES NFR BLD AUTO: 9 % (ref 0–13.4)
NEUTROPHILS # BLD AUTO: 6.57 K/UL (ref 1.82–7.42)
NEUTROPHILS NFR BLD: 81.4 % (ref 44–72)
NRBC # BLD AUTO: 0 K/UL
NRBC BLD-RTO: 0 /100 WBC
PLATELET # BLD AUTO: 172 K/UL (ref 164–446)
PMV BLD AUTO: 10.8 FL (ref 9–12.9)
POTASSIUM SERPL-SCNC: 4.4 MMOL/L (ref 3.6–5.5)
RBC # BLD AUTO: 3.87 M/UL (ref 4.7–6.1)
SODIUM SERPL-SCNC: 133 MMOL/L (ref 135–145)
WBC # BLD AUTO: 8.1 K/UL (ref 4.8–10.8)

## 2023-01-19 PROCEDURE — 700102 HCHG RX REV CODE 250 W/ 637 OVERRIDE(OP): Performed by: STUDENT IN AN ORGANIZED HEALTH CARE EDUCATION/TRAINING PROGRAM

## 2023-01-19 PROCEDURE — 770020 HCHG ROOM/CARE - TELE (206)

## 2023-01-19 PROCEDURE — A9270 NON-COVERED ITEM OR SERVICE: HCPCS

## 2023-01-19 PROCEDURE — 700102 HCHG RX REV CODE 250 W/ 637 OVERRIDE(OP)

## 2023-01-19 PROCEDURE — 700105 HCHG RX REV CODE 258: Performed by: STUDENT IN AN ORGANIZED HEALTH CARE EDUCATION/TRAINING PROGRAM

## 2023-01-19 PROCEDURE — 94640 AIRWAY INHALATION TREATMENT: CPT

## 2023-01-19 PROCEDURE — 36415 COLL VENOUS BLD VENIPUNCTURE: CPT

## 2023-01-19 PROCEDURE — 94664 DEMO&/EVAL PT USE INHALER: CPT

## 2023-01-19 PROCEDURE — 700101 HCHG RX REV CODE 250: Performed by: STUDENT IN AN ORGANIZED HEALTH CARE EDUCATION/TRAINING PROGRAM

## 2023-01-19 PROCEDURE — 700111 HCHG RX REV CODE 636 W/ 250 OVERRIDE (IP): Performed by: STUDENT IN AN ORGANIZED HEALTH CARE EDUCATION/TRAINING PROGRAM

## 2023-01-19 PROCEDURE — 99233 SBSQ HOSP IP/OBS HIGH 50: CPT | Mod: GC | Performed by: HOSPITALIST

## 2023-01-19 PROCEDURE — A9270 NON-COVERED ITEM OR SERVICE: HCPCS | Performed by: STUDENT IN AN ORGANIZED HEALTH CARE EDUCATION/TRAINING PROGRAM

## 2023-01-19 PROCEDURE — 80048 BASIC METABOLIC PNL TOTAL CA: CPT

## 2023-01-19 PROCEDURE — 85025 COMPLETE CBC W/AUTO DIFF WBC: CPT

## 2023-01-19 RX ORDER — ALBUTEROL SULFATE 2.5 MG/3ML
2.5 SOLUTION RESPIRATORY (INHALATION)
Status: DISCONTINUED | OUTPATIENT
Start: 2023-01-20 | End: 2023-01-20

## 2023-01-19 RX ORDER — BUDESONIDE AND FORMOTEROL FUMARATE DIHYDRATE 80; 4.5 UG/1; UG/1
2 AEROSOL RESPIRATORY (INHALATION)
Status: DISCONTINUED | OUTPATIENT
Start: 2023-01-19 | End: 2023-01-22 | Stop reason: HOSPADM

## 2023-01-19 RX ADMIN — DOXYCYCLINE 100 MG: 100 TABLET, FILM COATED ORAL at 05:40

## 2023-01-19 RX ADMIN — Medication: at 17:01

## 2023-01-19 RX ADMIN — OXYCODONE HYDROCHLORIDE 5 MG: 5 TABLET ORAL at 05:39

## 2023-01-19 RX ADMIN — SENNOSIDES AND DOCUSATE SODIUM 2 TABLET: 50; 8.6 TABLET ORAL at 05:39

## 2023-01-19 RX ADMIN — ENOXAPARIN SODIUM 40 MG: 40 INJECTION SUBCUTANEOUS at 17:04

## 2023-01-19 RX ADMIN — AMPICILLIN SODIUM AND SULBACTAM SODIUM 3 G: 2; 1 INJECTION, POWDER, FOR SOLUTION INTRAMUSCULAR; INTRAVENOUS at 05:36

## 2023-01-19 RX ADMIN — IPRATROPIUM BROMIDE AND ALBUTEROL SULFATE 3 ML: .5; 2.5 SOLUTION RESPIRATORY (INHALATION) at 07:18

## 2023-01-19 RX ADMIN — OXYCODONE HYDROCHLORIDE 5 MG: 5 TABLET ORAL at 09:21

## 2023-01-19 RX ADMIN — PREDNISONE 40 MG: 20 TABLET ORAL at 05:40

## 2023-01-19 RX ADMIN — OXYCODONE HYDROCHLORIDE 5 MG: 5 TABLET ORAL at 15:42

## 2023-01-19 RX ADMIN — OXYCODONE HYDROCHLORIDE 5 MG: 5 TABLET ORAL at 12:28

## 2023-01-19 RX ADMIN — IPRATROPIUM BROMIDE AND ALBUTEROL SULFATE 3 ML: .5; 2.5 SOLUTION RESPIRATORY (INHALATION) at 03:39

## 2023-01-19 RX ADMIN — OXYCODONE HYDROCHLORIDE 5 MG: 5 TABLET ORAL at 21:42

## 2023-01-19 RX ADMIN — DOXYCYCLINE 100 MG: 100 TABLET, FILM COATED ORAL at 17:02

## 2023-01-19 RX ADMIN — OXYCODONE HYDROCHLORIDE 5 MG: 5 TABLET ORAL at 18:43

## 2023-01-19 RX ADMIN — OXYCODONE HYDROCHLORIDE 5 MG: 5 TABLET ORAL at 03:00

## 2023-01-19 ASSESSMENT — ENCOUNTER SYMPTOMS
MYALGIAS: 0
DIZZINESS: 0
LOSS OF CONSCIOUSNESS: 0
WEAKNESS: 0
NAUSEA: 0
WEIGHT LOSS: 0
COUGH: 1
INSOMNIA: 0
DEPRESSION: 0
BACK PAIN: 0
NERVOUS/ANXIOUS: 0
CHILLS: 0
EYE REDNESS: 0
HEADACHES: 0
CLAUDICATION: 0
ABDOMINAL PAIN: 0
PALPITATIONS: 0
FEVER: 0
CONSTIPATION: 0
BLURRED VISION: 0
VOMITING: 0
SPUTUM PRODUCTION: 1
SORE THROAT: 0
SHORTNESS OF BREATH: 1
HEARTBURN: 0
DIARRHEA: 0

## 2023-01-19 ASSESSMENT — LIFESTYLE VARIABLES: SUBSTANCE_ABUSE: 0

## 2023-01-19 ASSESSMENT — PATIENT HEALTH QUESTIONNAIRE - PHQ9
1. LITTLE INTEREST OR PLEASURE IN DOING THINGS: NOT AT ALL
2. FEELING DOWN, DEPRESSED, IRRITABLE, OR HOPELESS: NOT AT ALL
SUM OF ALL RESPONSES TO PHQ9 QUESTIONS 1 AND 2: 0

## 2023-01-19 ASSESSMENT — PAIN DESCRIPTION - PAIN TYPE
TYPE: CHRONIC PAIN

## 2023-01-19 NOTE — WOUND TEAM
[facility]  Inpatient Services  Wound and Skin Care Brief Evaluation    Admission Date: 1/18/2023     Last order of IP CONSULT TO WOUND CARE was found on 1/18/2023 from Hospital Encounter on 1/18/2023     HPI, PMH, SH: Reviewed    Chief Complaint   Patient presents with    Shortness of Breath    Chest Pain     Diagnosis: Sepsis (HCC) [A41.9]    Unit where seen by Wound Team: S154/00     Wound consult placed regarding groin and BLE. Chart and images reviewed. This discussed with bedside RN Betsy. This RN in to assess patient. Pt pleasant and agreeable, however, patient is declining all interventions at this time, states he cares for them at home and does not want any additional interventions. No pressure injuries or advanced wound care needs identified. Wound consult completed. No further follow up unless indicated and consulted.               RSKIN:   CURRENTLY IN PLACE (X), APPLIED THIS VISIT (A), ORDERED (O):   Q shift Moi:  X  Q shift pressure point assessments:  X    Surface/Positioning   Pressure redistribution mattress      X      Low Airloss          Bariatric foam      Bariatric MAGALYS     Waffle cushion        Waffle Overlay          Reposition q 2 hours    X  TAPs Turning system     Z Alli Pillow     Offloading/Redistribution SHELLY  Sacral Mepilex (Silicone dressing)     Heel Mepilex (Silicone dressing)         Heel float boots (Prevalon boot)             Float Heels off Bed with Pillows           Respiratory oxymask  Silicone O2 tubing         Gray Foam Ear protectors     Cannula fixation Device (Tender )          High flow offloading Clip    Elastic head band offloading device      Anchorfast                                                         Trach with Optifoam split foam             Containment/Moisture Prevention n/a    Rectal tube or BMS    Purwick/Condom Cath        Santos Catheter    Barrier wipes           Barrier paste       Antifungal tx      Interdry        Mobilization      SHELLY  Up to chair        Ambulate      PT/OT      Nutrition       Dietician        Diabetes Education      PO  X   TF     TPN     NPO   # days     Other

## 2023-01-19 NOTE — CARE PLAN
Problem: Knowledge Deficit - COPD  Goal: Patient/significant other demonstrates understanding of disease process, utilization of the Action Plan, medications and discharge instruction  Outcome: Progressing   The patient is Stable - Low risk of patient condition declining or worsening    Shift Goals  Clinical Goals: Improve work of breathing  Patient Goals: Get better    Progress made toward(s) clinical / shift goals:    Problem: Pain - Standard  Goal: Alleviation of pain or a reduction in pain to the patient’s comfort goal  Outcome: Met     Problem: Knowledge Deficit - Standard  Goal: Patient and family/care givers will demonstrate understanding of plan of care, disease process/condition, diagnostic tests and medications  Outcome: Met     Problem: Risk for Infection - COPD  Goal: Patient will remain free from signs and symptoms of infection  Outcome: Met     Problem: Ineffective Airway Clearance  Goal: Patient will maintain patent airway with clear/clearing breath sounds  Outcome: Met     Problem: Impaired Gas Exchange  Goal: Patient will demonstrate improved ventilation and adequate oxygenation and participate in treatment regimen within the level of ability/situation.  Outcome: Met     Problem: Risk for Aspiration  Goal: Patient's risk for aspiration will be absent or decrease  Outcome: Met     Problem: Self Care  Goal: Patient will have the ability to perform ADLs independently or with assistance (bathe, groom, dress, toilet and feed)  Outcome: Met     Problem: Hemodynamics  Goal: Patient's hemodynamics, fluid balance and neurologic status will be stable or improve  Outcome: Met     Problem: Fluid Volume  Goal: Fluid volume balance will be maintained  Outcome: Met     Problem: Urinary - Renal Perfusion  Goal: Ability to achieve and maintain adequate renal perfusion and functioning will improve  Outcome: Met     Problem: Respiratory  Goal: Patient will achieve/maintain optimum respiratory ventilation and gas  exchange  Outcome: Met     Problem: Mechanical Ventilation  Goal: Safe management of artificial airway and ventilation  Outcome: Met  Goal: Successful weaning off mechanical ventilator, spontaneously maintains adequate gas exchange  Outcome: Met  Goal: Patient will be able to express needs and understand communication  Outcome: Met     Problem: Physical Regulation  Goal: Diagnostic test results will improve  Outcome: Met  Goal: Signs and symptoms of infection will decrease  Outcome: Met     Problem: Knowledge Deficit - COPD  Goal: Patient/significant other demonstrates understanding of disease process, utilization of the Action Plan, medications and discharge instruction  Outcome: Progressing     Problem: Nutrition - Advanced  Goal: Patient will display progressive weight gain toward goal have adequate food and fluid intake  Outcome: Progressing       Patient is not progressing towards the following goals:

## 2023-01-19 NOTE — PROGRESS NOTES
Telemetry Report:    Rhythm: Sinus Tach  Hear Rate:  bpm    CO: 0.156 sec  QRS: 0.101 sec  QT: 0.374 sec            Per telemetry room monitor

## 2023-01-19 NOTE — CARE PLAN
Respiratory Update  Treatment modality: Duo    Frequency: Q4  Treatment modality: PEP  Frequency: QID  Pt tolerating current treatments well with no adverse reactions.

## 2023-01-19 NOTE — CARE PLAN
The patient is Stable - Low risk of patient condition declining or worsening    Shift Goals  Clinical Goals: keep 02 sats above 90% on 3L NC  Patient Goals: rest  Family Goals: feel better      Problem: Pain - Standard  Goal: Alleviation of pain or a reduction in pain to the patient’s comfort goal  1/19/2023 1253 by Betsy Ennis R.N.  Outcome: Progressing  Note: Current pain regimen effective on getting patients pain level under control as needed, per patient. Educated on alternative pain relief therapies such as music, games, heat/cold, TV as distraction, walking in the halls or in room, and controlled breathing techniques. Discussed with MD regarding further pain management long term.  1/19/2023 1253 by Betsy Ennis R.N.  Reactivated     Problem: Knowledge Deficit - COPD  Goal: Patient/significant other demonstrates understanding of disease process, utilization of the Action Plan, medications and discharge instruction  Outcome: Progressing  Note: RT and RN dicussed with patient POC and current med regiment.

## 2023-01-19 NOTE — PROGRESS NOTES
"Around 0800, pt clarified he has not been having chest pain. Around 0900, pt had requested pain medication for 5/10 chronic back and leg pain from prior surgeries and conditions, this RN went to get PRN oxy 2.5mg to administer to patient however the patient upon learning the pain med was 2.5mg rather than the 5mg he as getting got visibly upset and stated this nurse should not have reduced his pain medication and given him the 5mg oxy also stating that this RN should \"get with the program\". This RN educated the pt that with his 5/10 pain does not fit the parameters for oxy 5mg. The patient became increasingly and visibly upset asking why the orders have been changed. Pt also stated that after hearing this that his anxiety has gone up considerably and so has his pain. This RN wasted current med and medicated per MAR based on new pain score, pt also wanted to leave the pain med to the side until after he ate, educated pt that we can not leave narcotics out in the open and that he needs to take it in front of the RN. Notified MD regarding possible pain med dosing and long term pain control for patient now and at home as patient states that he does not take any pain meds at home but is able to keep himself busy and his mind off of it. Also notified MD, regarding issues pt forgot to bring up during their bedside assessments. Pt also apologetic for earlier encounter stating his anxiety was really high due to many issues also stemming from outside the hospital.     1240: per wound team, okay to leave leg and groin wounds open to air. Pt not wanting any creams or powders at this time.   "

## 2023-01-20 ENCOUNTER — APPOINTMENT (OUTPATIENT)
Dept: RADIOLOGY | Facility: MEDICAL CENTER | Age: 68
DRG: 190 | End: 2023-01-20
Payer: COMMERCIAL

## 2023-01-20 ENCOUNTER — APPOINTMENT (OUTPATIENT)
Dept: CARDIOLOGY | Facility: MEDICAL CENTER | Age: 68
DRG: 190 | End: 2023-01-20
Payer: COMMERCIAL

## 2023-01-20 LAB
BACTERIA UR CULT: NORMAL
PROCALCITONIN SERPL-MCNC: 0.11 NG/ML
SIGNIFICANT IND 70042: NORMAL
SITE SITE: NORMAL
SOURCE SOURCE: NORMAL

## 2023-01-20 PROCEDURE — 700102 HCHG RX REV CODE 250 W/ 637 OVERRIDE(OP)

## 2023-01-20 PROCEDURE — A9270 NON-COVERED ITEM OR SERVICE: HCPCS

## 2023-01-20 PROCEDURE — 700111 HCHG RX REV CODE 636 W/ 250 OVERRIDE (IP): Performed by: STUDENT IN AN ORGANIZED HEALTH CARE EDUCATION/TRAINING PROGRAM

## 2023-01-20 PROCEDURE — 84145 PROCALCITONIN (PCT): CPT

## 2023-01-20 PROCEDURE — 770020 HCHG ROOM/CARE - TELE (206)

## 2023-01-20 PROCEDURE — 94640 AIRWAY INHALATION TREATMENT: CPT

## 2023-01-20 PROCEDURE — 700101 HCHG RX REV CODE 250

## 2023-01-20 PROCEDURE — 99233 SBSQ HOSP IP/OBS HIGH 50: CPT | Mod: GC | Performed by: HOSPITALIST

## 2023-01-20 PROCEDURE — A9270 NON-COVERED ITEM OR SERVICE: HCPCS | Performed by: STUDENT IN AN ORGANIZED HEALTH CARE EDUCATION/TRAINING PROGRAM

## 2023-01-20 PROCEDURE — 36415 COLL VENOUS BLD VENIPUNCTURE: CPT

## 2023-01-20 PROCEDURE — 700102 HCHG RX REV CODE 250 W/ 637 OVERRIDE(OP): Performed by: STUDENT IN AN ORGANIZED HEALTH CARE EDUCATION/TRAINING PROGRAM

## 2023-01-20 RX ORDER — LORAZEPAM 1 MG/1
0.5 TABLET ORAL 2 TIMES DAILY PRN
Status: DISCONTINUED | OUTPATIENT
Start: 2023-01-20 | End: 2023-01-22 | Stop reason: HOSPADM

## 2023-01-20 RX ADMIN — OXYCODONE HYDROCHLORIDE 5 MG: 5 TABLET ORAL at 09:44

## 2023-01-20 RX ADMIN — Medication: at 17:12

## 2023-01-20 RX ADMIN — OXYCODONE HYDROCHLORIDE 5 MG: 5 TABLET ORAL at 13:29

## 2023-01-20 RX ADMIN — OXYCODONE HYDROCHLORIDE 5 MG: 5 TABLET ORAL at 03:42

## 2023-01-20 RX ADMIN — BUDESONIDE AND FORMOTEROL FUMARATE DIHYDRATE 2 PUFF: 80; 4.5 AEROSOL RESPIRATORY (INHALATION) at 19:52

## 2023-01-20 RX ADMIN — OXYCODONE HYDROCHLORIDE 5 MG: 5 TABLET ORAL at 19:49

## 2023-01-20 RX ADMIN — ENOXAPARIN SODIUM 40 MG: 40 INJECTION SUBCUTANEOUS at 17:11

## 2023-01-20 RX ADMIN — TIOTROPIUM BROMIDE INHALATION SPRAY 5 MCG: 3.12 SPRAY, METERED RESPIRATORY (INHALATION) at 05:00

## 2023-01-20 RX ADMIN — BUDESONIDE AND FORMOTEROL FUMARATE DIHYDRATE 2 PUFF: 80; 4.5 AEROSOL RESPIRATORY (INHALATION) at 09:10

## 2023-01-20 RX ADMIN — OXYCODONE HYDROCHLORIDE 5 MG: 5 TABLET ORAL at 16:38

## 2023-01-20 RX ADMIN — SENNOSIDES AND DOCUSATE SODIUM 2 TABLET: 50; 8.6 TABLET ORAL at 19:50

## 2023-01-20 RX ADMIN — DOXYCYCLINE 100 MG: 100 TABLET, FILM COATED ORAL at 17:11

## 2023-01-20 RX ADMIN — DOXYCYCLINE 100 MG: 100 TABLET, FILM COATED ORAL at 06:43

## 2023-01-20 RX ADMIN — OXYCODONE HYDROCHLORIDE 5 MG: 5 TABLET ORAL at 00:38

## 2023-01-20 RX ADMIN — OXYCODONE HYDROCHLORIDE 5 MG: 5 TABLET ORAL at 06:43

## 2023-01-20 RX ADMIN — PREDNISONE 40 MG: 20 TABLET ORAL at 06:44

## 2023-01-20 RX ADMIN — ALBUTEROL SULFATE 2.5 MG: 2.5 SOLUTION RESPIRATORY (INHALATION) at 08:51

## 2023-01-20 RX ADMIN — OXYCODONE HYDROCHLORIDE 5 MG: 5 TABLET ORAL at 22:54

## 2023-01-20 ASSESSMENT — ENCOUNTER SYMPTOMS
HEADACHES: 0
CLAUDICATION: 0
NERVOUS/ANXIOUS: 0
INSOMNIA: 0
HEARTBURN: 0
VOMITING: 0
DEPRESSION: 0
CONSTIPATION: 0
SPUTUM PRODUCTION: 1
WEAKNESS: 0
PALPITATIONS: 0
BACK PAIN: 0
EYE REDNESS: 0
DIZZINESS: 0
SORE THROAT: 0
WEIGHT LOSS: 0
MYALGIAS: 0
LOSS OF CONSCIOUSNESS: 0
CHILLS: 0
NAUSEA: 0
COUGH: 1
BLURRED VISION: 0
FEVER: 0
SHORTNESS OF BREATH: 1
DIARRHEA: 0
ABDOMINAL PAIN: 0

## 2023-01-20 ASSESSMENT — LIFESTYLE VARIABLES: SUBSTANCE_ABUSE: 0

## 2023-01-20 NOTE — PROGRESS NOTES
0800: Went in to see patient this morning and the patient was alert and orientated x4. Patient wanted another dose of his spiriva and the the RT said patient already received that. The patient was notified and I told him that this was how it has been ordered and that I would speak to the doctor about it.     0900: [facility] resident came by to speak with patient and was notified of the patient's concerns with breathing treatments and potential allergy to contrast dye. The patient refused CT thorax last night because of this reason.     1230: [facility] medical came in to talk to patient about plan of care and breathing treatments. Medical team had patient standing up and on the side of the bed so see the patient's oxygen saturations. The patient got short of breath and anxious and had to sit back down. MD wanted to keep patient on a current respiratory treatments spiriva at this time and make albuterol treatments PRN.     1350: The patient was less anxious at this time and was able to take pain meds and eat lunch.     1515: Case management went in to talk to patient and contacted patient  to speak with the patient about VA benefits.

## 2023-01-20 NOTE — DISCHARGE PLANNING
Date:  20  RE: Lexis Grey    : 1987  Estimated Date of Delivery: 10/14/20  EGA: [id number]  OB/GYN: Carlos Gray            [employer] glucose flow sheet       Current regimen:  Lantus 38 units at 9 PM daily  (Started Lantus on 20)  1800 calorie gestational diabetes meal plan; 3 meals and 3 snacks daily  bedtime snack that includes 15 grams of carbohydrates with 2 to 3 ounces of protein  No more than 8 to 10 hours of fasting from bedtime snack to breakfast meal    SMBG 4 times per day; fasting and 2 hrs pp with a OneTouch Verio blood glucose meter  Walk 20-30 minutes after dinner if there are no exercise restrictions from OB  Plan:  Lantus- continue 38 units at 9 PM daily  Call out to patient to confirm current insulin dose, also asked patient to confirm current regimen via Group[employer] message  Continue GDM diet and testing  Always have glucose available to treat hypoglycemia, use 15 by 15 rule  SMBG 4 x per day (Fasting, 2 hour after start of each meal) using OneTouch Verio Flex glucose meter  8/3/20 HbA1c 5 5% normal    20 fetal growth appeared normal and TRESA normal       Diabetes Self Management Support Plan outside of ongoing care: Spouse/Family    Date due to report next:  Wednesday, 20 or sooner if needed         Darlene Homans RD, LDN  [facility] Maternal Fetal Medicine  Diabetes and Pregnancy Program Rec'd info from TCN -per spouse insurance is different that on facesheet. PFA contacted=detail  s  ecure VM  to verify insurance w/ pt/spouse.

## 2023-01-20 NOTE — PROGRESS NOTES
Telemetry Report:    Rhythm: Normal Sinus Rhythm/ Sinus Tach  Hear Rate: 96-109bpm  Ectopy:    NH: 0.146 sec  QRS: 0.093 sec  QT: 0.337 sec            Per telemetry room monitor

## 2023-01-20 NOTE — DISCHARGE PLANNING
Received Choice form at 1612  Agency/Facility Name: [facility]  Referral not sent, pending HH order

## 2023-01-20 NOTE — DISCHARGE PLANNING
CHART MARKED FOR MERGE per EPIC  --------      HTH/SCP TCN chart review completed. Collaborated  very closely with Primary CM, Previous [facility], [facility] HUM team and S1 RN Manager.   This TCN received Teams correspondence from previous [facility] [address] regarding the ff:  -Wife wants to be notified whenever TCN and CM speaks with patient regarding DC planning and post-acute services.  - Questions regarding DME provider [facility] and [employer] cost  -and Questions about  [facility] coverage versus [facility]  -Patient's chart marked for merge with a different listed name ( Richard Vaughn)    TCN Action:  -This TCN notified Primary  CM Dina and Unit Nurse Manager regarding important aforementioned information.  -TCN delegated to Primary CM dina to check with Hospital  PFA to verifiy and correct primary payer information in [facility].  -TCN notified and requested  via Teams -[facility]/HTH HUM Team to verify Payer Coverage. Per [place] MARLOW, patient has  active policy with [facility] and no VA coverage.  -TCN placed a telephone outreach to this member's Spouse name Kimi at [phone]- to inform her before I meet with patient per her previous request to [facility]. VERIFIED HIPPA. I introduced myself and my role as TCN to Patient's WIfe. Wife states that her and her 's primary insurance is [employer]. Spouse also states that her  is a disabled  who previously received some services from VA but cannot confirm they have VA Insurance.  Furthermore, Patient's Wife confirmed they have [facility]  DME supplier currently ,This TCN informed WIfe that [facility] is also now contracted with other DME suppliers such as [employer], [employer]EL[employer]. Pt's Wife verbalized understanding.  _TCN met with patient at bedside, currently getting gblood draw from lab personnel. TCN introduced myself and my role as TCN with [facility]/[employer]. Verified HIPPA. This TCN informed patient that I spoke with his Wife prior to meeting with  him. O2 DME choice was discussed and O2 DME choice was obtained, scanned to [facility] , copy given to Patient and Primary JAMES Arroyo.   --TCN asked patient to verify and confirm his full name. Patient states his name is RUPINDER ROBERTS.      Per chart review:  -Previous TCN Rayna requested MD for possible PT OT eval order. Orders not entered. This TCN sent Voalte message request to Dr Jigar Lugo 1/20/2023 12:39 PM  -  Current discharge considerations for possible DC to home with outpatient follow up . Awaiting medical clearance.  TCN will continue to follow and collaborate with discharge planning team as additional post acute needs arise. Thank you.    Completed:  Voalte sent to [facility] Dr Jigar Lugo via Voalte 1/20/2023 requesting  provider order for possible PT/OT consults  Choice obtained: IRF, HH 1/19/2023; )2 DME 1/20/2023  [facility] referral not sent --pt declined 1/19/2023      ADDENDUM 1/20/2023 9:30PM  --------------------------------------------  [facility] is still showing [facility] as Primary and [facility] Nonverified as Secondary

## 2023-01-20 NOTE — RESPIRATORY CARE
"COPD EDUCATION by COPD CLINICAL EDUCATOR  (Phone: [phone])  1/19/2023 at 4:10 PM by Donna Helton RRT     Patient seen by Respiratory Education team to complete Block 1 of a 2 part series. Reference material about the program was given to patient along with our contact information.  Part #1 includes: What is COPD (how the lungs work), common treatments for COPD, the difference between \"rescue\" medications and \"daily\" medications, bronchial hygiene, and explanation of the Action Plan. We discussed appropriate medication technique along with a demonstration, and the correct way to care for and clean equipment.  Advance directives and smoking cessation were discussed as appropriate to this patient. Patient very interested in Respiratory Education therefore the final block of education was provided.  This session discussed signs and symptoms of an exacerbation (flare-up), triggers that can create flare-ups, reiteration of the \"Action Plan\" to refer to daily which will help categorize their symptoms in order to utilize the appropriate therapy, breathing techniques used to treat acute symptoms, and oxygen safety. Question and answer session followed.  Patient describes feeling his breathing was not as good when taking the Trelegy. PIP checked and inadequate for a dry powder inhaler. Communicated this to the attending MD as well as [facility], requesting a change in medication that is not a dry powder inhaler. Patient states he felt best when taking Stiolto, one inhalation twice a day, and Symbicort, 2 puffs once a day. Encouraged patient to discuss this with his pulmonologist at the next appointment.     COPD Screen  COPD Risk Screening  Do you have a history of COPD?: Yes  Do you have a Pulmonologist?: Yes  COPD Population Screener  During the past 4 weeks, how much did you feel short of breath?: Most  or all of the time  Do you ever cough up any mucus or phlegm?: Yes, a few days a week or month  In the past 12 " "months, you do less than you used to because of your breathing problems: Strongly agree  Have you smoked at least 100 cigarettes in your entire life?: Yes  How old are you?: 60+  COPD Screening Score: 9  COPD Coordinator Recommended: Yes    COPD Assessment  COPD Clinical Specialists ONLY  COPD Education Initiated: Yes--Full Intervention (Declined literature as he still has from last visit. Sent email to [facility] about discontinuing DPI due to poor PIF (<30))  COPD Education Session 1: Yes  COPD Education Session 2: Yes  DME Company: [facility]  DME Equipment Type: O2 @ 3 L, nebulizer  Physician Follow Up Appointment:  (declined appt assistance)  Physician Name: Michael Green M.D.  Pulmonary Follow Up Appointment: 01/27/23  Appt Time: 1440  Pulmonologist Name: HIGINIO Burgess  Referrals Initiated: Yes  Pulmonary Rehab: Yes  Smoking Cessation: N/A (quit in 2009)  Hospice: N/A  Home Health Care: N/A  [facility] Outreach: N/A  [facility]: N/A (previously enrolled)  [facility]: N/A  Private In-Home Care Agency: N/A  Is this a COPD exacerbation patient?: Yes (per H&P, order set used, [facility] attending)  $ Demo/Eval of SVN's, MDI's and Aerosols: Yes (PIF checked, not appropriate for DPI, request change to respimat and MDI)  (OP) Pulmonary Function Testing: Yes (3/27/2019: FEV1 17%, ratio 30%)    PFT Results    No results found for: PFT    Meds to Beds  Would the patient like to opt in for Bedside Medication Delivery at Discharge?: Yes, interested     MY COPD ACTION PLAN     It is recommended that patients and physicians /healthcare providers complete this action plan together. This plan should be discussed at each physician visit and updated as needed.    The green, yellow and red zones show groups of symptoms of COPD. This list of symptoms is not comprehensive, and you may experience other symptoms. In the \"Actions\" column, your healthcare provider has recommended actions " "for you to take based on your symptoms.    Patient Name: Nikolas Bob   YOB: 1955   Last Updated on:     Green Zone:  I am doing well today Actions     Usual activitiy and exercise level   Take daily medications     Usual amounts of cough and phlegm/mucus   Use oxygen as prescribed     Sleep well at night   Continue regular exercise/diet plan     Appetite is good   At all times avoid cigarette smoke, inhaled irritants     Daily Medications (these medications are taken every day):   Tiotropium Bromide Monohydrate (Spiriva)  Budesonide-Formoterol Fumarate (Symbicort) 1 Puff  2 Puffs Twice daily  Twice daily     Additional Information:  Use the spacer with the Symbicort and rinse mouth after taking.    Follow doctors recommendations     Yellow Zone:  I am having a bad day or a COPD flare Actions     More breathless than usual   Continue daily medications     I have less energy for my daily activities   Use quick relief inhaler as ordered     Increased or thicker phlegm/mucus   Use oxygen as prescribed     Using quick relief inhaler/nebulizer more often   Get plenty of rest     Swelling of ankles more than usual   Use pursed lip breathing     More coughing than usual   At all times avoid cigarette smoke, inhaled irritants     I feel like I have a \"chest cold\"     Poor sleep and my symptoms woke me up     My appetite is not good     My medicine is not helping      Call provider immediately if symptoms don’t improve     Continue daily medications, add rescue medications:   Albuterol  Albuterol/Ipratropium (Combivent, Duoneb) 2 Puffs  3mL via nebulizer Every 4 hours PRN  Every 4 hours PRN       Medications to be used during a flare up, (as Discussed with Provider):           Additional Information:  Use the spacer with your rescue inhaler when nebulizer is not available.     Keep your nebulizer clean to reduce the risk of lung infections.     Red Zone:  I need urgent medical care Actions     Severe " shortness of breath even at rest   Call 911 or seek medical care immediately     Not able to do any activity because of breathing      Fever or shaking chills      Feeling confused or very drowsy       Chest pains      Coughing up blood

## 2023-01-20 NOTE — DISCHARGE PLANNING
Received Choice Form @: 1044  Agency/ Facility Name: [facility]  Referral Sent per Choice Form @: Not sent as there are no orders or F2F

## 2023-01-20 NOTE — PROGRESS NOTES
[facility] Internal Medicine Daily Progress Note    Date of Service  1/19/2023    R Team: R IM Orange Team   Attending: Marcial Hernández M.d.  Senior Resident: Dr. Lugo  Intern:  Dr. Blank  Contact Number: [phone]    Hospital Course  Mr. Bob 66 yo male patient with PMH of COPD (reported by patient, no formal PFT found) on 3 liters baseline, HTN, Ankylosing spondylitis. Admitted 01/18 with diagnosis of acute on chronic hypoxic respiratory failure likely due to COPD exacerbation but differential diagnosis of PE vs MI vs Heart Failure vs CAP/Atypical. Initiated on antibiotic therapy with Unasyn and Doxycycline. Initial lab work WBC 15.4, Pro .    Interval Problem Update  On present day 01/19/23: No acute events overnight. Patient reports that SOB and chest discomfort/pain have improved since admission but feels anxious regarding underlying cause. Unasyn was discontinued, kept Doxycycline; discontinued Duoneb. Changed inhalers to Spiriva and Symbicort. Requested CT PE to rule out PE; if PE identified will transition Lovenox to 60 mg BID.     I have discussed this patient's plan of care and discharge plan at IDT rounds today with Case Management, Nursing, Nursing leadership, and other members of the IDT team.    Code Status  Full Code    Disposition  Patient is not medically cleared for discharge.   Anticipate discharge to to home with close outpatient follow-up.  I have placed the appropriate orders for post-discharge needs.    Review of Systems  Review of Systems   Constitutional:  Negative for chills, fever and weight loss.   HENT:  Negative for congestion, hearing loss and sore throat.    Eyes:  Negative for blurred vision and redness.   Respiratory:  Positive for cough, sputum production and shortness of breath.    Cardiovascular:  Negative for chest pain, palpitations, claudication and leg swelling.   Gastrointestinal:  Negative for abdominal pain, constipation, diarrhea, heartburn, nausea and vomiting.    Genitourinary:  Negative for dysuria.   Musculoskeletal:  Negative for back pain, joint pain and myalgias.   Skin:  Negative for rash.   Neurological:  Negative for dizziness, loss of consciousness, weakness and headaches.   Endo/Heme/Allergies:  Negative for environmental allergies.   Psychiatric/Behavioral:  Negative for depression and substance abuse. The patient is not nervous/anxious and does not have insomnia.       Physical Exam  Temp:  [36.2 °C (97.1 °F)-37.2 °C (98.9 °F)] 36.8 °C (98.2 °F)  Pulse:  [] 98  Resp:  [16-20] 16  BP: (115-139)/(66-88) 139/84  SpO2:  [96 %-98 %] 98 %    Physical Exam  Constitutional:       General: He is not in acute distress.     Appearance: Normal appearance. He is not ill-appearing or toxic-appearing.   HENT:      Head: Normocephalic.      Nose: Nose normal. No congestion.      Mouth/Throat:      Mouth: Mucous membranes are moist.   Eyes:      Pupils: Pupils are equal, round, and reactive to light.   Cardiovascular:      Rate and Rhythm: Normal rate and regular rhythm.      Pulses: Normal pulses.      Heart sounds: Normal heart sounds.   Pulmonary:      Effort: No respiratory distress.      Breath sounds: Wheezing present.      Comments: SpO2 on 3 liters of O2: 98%  SpO2 on room air: 93-95% with no respiratory distress.   Abdominal:      General: Abdomen is flat. There is no distension.      Palpations: Abdomen is soft. There is no mass.      Tenderness: There is no abdominal tenderness.      Hernia: No hernia is present.   Musculoskeletal:         General: No swelling or tenderness.      Cervical back: No rigidity.      Right lower leg: No edema.      Left lower leg: No edema.   Skin:     General: Skin is warm.      Capillary Refill: Capillary refill takes less than 2 seconds.      Coloration: Skin is not jaundiced.   Neurological:      General: No focal deficit present.      Mental Status: He is alert and oriented to person, place, and time.      Cranial Nerves: No  cranial nerve deficit.      Sensory: No sensory deficit.       Laboratory  Recent Labs     01/18/23  0924 01/19/23  0316   WBC 15.4* 8.1   RBC 4.38* 3.87*   HEMOGLOBIN 12.2* 10.7*   HEMATOCRIT 40.2* 34.7*   MCV 91.8 89.7   MCH 27.9 27.6   MCHC 30.3* 30.8*   RDW 45.9 44.9   PLATELETCT 197 172   MPV 10.4 10.8     Imaging  DX-CHEST-PORTABLE (1 VIEW)   Final Result      1.  Hazy interstitial opacities, greatest at the right upper lung favoring multifocal pneumonia.   2.  Bilateral lower lung nodular opacities favoring nipple shadow. If there is clinical concern, exam can be repeated with nipple markers.      CT-CTA CHEST PULMONARY ARTERY W/ RECONS    (Results Pending)        Assessment/Plan  Problem Representation:    68 yo male patient with PMH of COPD on 3 liters baseline, HTN, Ankylosing spondylitis. Admitted 01/18 due to acute on chronic hypoxic respiratory failure. Currently on steroids, Doxycycline.     * Sepsis (HCC)  Assessment & Plan  RESOLVED  Present on admission based on WBC > 12k, HR >90, RR > 20, and source like pulmonary; nonetheless no evident clinical deterioration of patient.         Elevated troponin  Assessment & Plan  Elevated troponin on admission.   Currently no chest pain or discomfort; resolved since admission.   No worsening SOB; improved.   No evident ECG changes in AM.   Likely demand ischemia in setting of possible COPD exacerbation vs pneumonia vs PE      Acute hypoxemic respiratory failure (HCC)  Assessment & Plan  Patient expresses diagnosis of COPD but no PFT on filed visualized; baseline O2 demand reported as 3 liters.   Patient on room air has SpO2 93-95% and no respiratory distress; on 3 liters of O2 SpO2 98%.   Will continue prednisone at this time.   Will continue Doxycycline and discontinue Unasyn.   Transition inhalers to Spiriva and Symbicort.   Etiology unclear - COPD exacerbations vs PE vs Pneumonia vs HF.   Pending CT PE result to rule out PE > if positive for PE, Lovenox 60  mg BID.          VTE prophylaxis: enoxaparin ppx    I have performed a physical exam and reviewed and updated ROS and Plan today (1/19/2023). In review of yesterday's note (1/18/2023), there are no changes except as documented above.

## 2023-01-20 NOTE — CARE PLAN
Problem: Pain - Standard  Goal: Alleviation of pain or a reduction in pain to the patient’s comfort goal  Outcome: Progressing     Problem: Knowledge Deficit - COPD  Goal: Patient/significant other demonstrates understanding of disease process, utilization of the Action Plan, medications and discharge instruction  Outcome: Progressing     Problem: Nutrition - Advanced  Goal: Patient will display progressive weight gain toward goal have adequate food and fluid intake  Outcome: Progressing   The patient is Stable - Low risk of patient condition declining or worsening    Shift Goals  Clinical Goals: keep 02 sats above 90% on 3L NC  Patient Goals: rest  Family Goals: feel better

## 2023-01-20 NOTE — PROGRESS NOTES
[facility] Internal Medicine Daily Progress Note    Date of Service  1/20/2023    R Team: R IM Orange Team   Attending: Marcial Hernández M.d.  Senior Resident: Dr. Lugo  Intern:  Dr. Blank  Contact Number: [phone]    Hospital Course  Mr. Bob 66 yo male patient with PMH of COPD (reported by patient, no formal PFT found) on 3 liters baseline, HTN, Ankylosing spondylitis. Admitted 01/18 with diagnosis of acute on chronic hypoxic respiratory failure likely due to COPD exacerbation but differential diagnosis of PE vs MI vs Heart Failure vs CAP/Atypical. Initiated on antibiotic therapy with Unasyn and Doxycycline. Initial lab work WBC 15.4, Pro .    Interval Problem Update  On present day 01/20/2023:  No acute overnight events.  Patient states that he feels short of breath today even though his SpO2 sats are normal when weaned down to 0 LPM. Patient was stood up in room and HR noted to increase from 100 to 130 bpm.  Patient's symptoms could be due to albuterol.  D/nean albuterol, duonebs.  PE is of great concern.  However, patient reports a contrast allergy stating that contrast caused him to burn.  0.5 mg ativan BID.      I have discussed this patient's plan of care and discharge plan at IDT rounds today with Case Management, Nursing, Nursing leadership, and other members of the IDT team.    Code Status  Full Code    Disposition  Patient is not medically cleared for discharge.   Anticipate discharge to to home with close outpatient follow-up.  I have placed the appropriate orders for post-discharge needs.    Review of Systems  Review of Systems   Constitutional:  Negative for chills, fever and weight loss.   HENT:  Negative for congestion, hearing loss and sore throat.    Eyes:  Negative for blurred vision and redness.   Respiratory:  Positive for cough, sputum production and shortness of breath.    Cardiovascular:  Negative for chest pain, palpitations, claudication and leg swelling.   Gastrointestinal:   Negative for abdominal pain, constipation, diarrhea, heartburn, nausea and vomiting.   Genitourinary:  Negative for dysuria.   Musculoskeletal:  Negative for back pain, joint pain and myalgias.   Skin:  Negative for rash.   Neurological:  Negative for dizziness, loss of consciousness, weakness and headaches.   Endo/Heme/Allergies:  Negative for environmental allergies.   Psychiatric/Behavioral:  Negative for depression and substance abuse. The patient is not nervous/anxious and does not have insomnia.       Physical Exam  Temp:  [35.9 °C (96.7 °F)-36.8 °C (98.2 °F)] 36.6 °C (97.8 °F)  Pulse:  [] 99  Resp:  [16-18] 18  BP: (133-154)/(73-87) 154/86  SpO2:  [97 %-100 %] 99 %    Physical Exam  Constitutional:       General: He is not in acute distress.     Appearance: Normal appearance. He is not ill-appearing or toxic-appearing.   HENT:      Head: Normocephalic.      Nose: Nose normal. No congestion.      Mouth/Throat:      Mouth: Mucous membranes are moist.   Eyes:      Pupils: Pupils are equal, round, and reactive to light.   Cardiovascular:      Rate and Rhythm: Normal rate and regular rhythm.      Pulses: Normal pulses.      Heart sounds: Normal heart sounds.   Pulmonary:      Effort: No respiratory distress.      Breath sounds: Wheezing present.      Comments: SpO2 on 3 liters of O2: 98%  SpO2 on room air: 93-95% with no respiratory distress.   Abdominal:      General: Abdomen is flat. There is no distension.      Palpations: Abdomen is soft. There is no mass.      Tenderness: There is no abdominal tenderness.      Hernia: No hernia is present.   Musculoskeletal:         General: No swelling or tenderness.      Cervical back: No rigidity.      Right lower leg: No edema.      Left lower leg: No edema.   Skin:     General: Skin is warm.      Capillary Refill: Capillary refill takes less than 2 seconds.      Coloration: Skin is not jaundiced.   Neurological:      General: No focal deficit present.      Mental  Status: He is alert and oriented to person, place, and time.      Cranial Nerves: No cranial nerve deficit.      Sensory: No sensory deficit.       Laboratory  Recent Labs     01/18/23  0924 01/19/23  0316   WBC 15.4* 8.1   RBC 4.38* 3.87*   HEMOGLOBIN 12.2* 10.7*   HEMATOCRIT 40.2* 34.7*   MCV 91.8 89.7   MCH 27.9 27.6   MCHC 30.3* 30.8*   RDW 45.9 44.9   PLATELETCT 197 172   MPV 10.4 10.8     Imaging  DX-CHEST-PORTABLE (1 VIEW)   Final Result      1.  Hazy interstitial opacities, greatest at the right upper lung favoring multifocal pneumonia.   2.  Bilateral lower lung nodular opacities favoring nipple shadow. If there is clinical concern, exam can be repeated with nipple markers.      CT-CTA CHEST PULMONARY ARTERY W/ RECONS    (Results Pending)        Assessment/Plan  Problem Representation:    68 yo male patient with PMH of COPD on 3 liters baseline, HTN, Ankylosing spondylitis. Admitted 01/18 due to acute on chronic hypoxic respiratory failure. Currently on steroids, Doxycycline.     * Sepsis (HCC)  Assessment & Plan  RESOLVED  Present on admission based on WBC > 12k, HR >90, RR > 20, and source like pulmonary; nonetheless no evident clinical deterioration of patient.            Elevated troponin  Assessment & Plan  Elevated troponin on admission.   Currently no chest pain or discomfort; resolved since admission.   No worsening SOB; improved.   No evident ECG changes in AM.   Likely demand ischemia in setting of possible COPD exacerbation vs pneumonia vs PE        Acute hypoxemic respiratory failure (HCC)  Assessment & Plan  Patient expresses diagnosis of COPD but no PFT on filed visualized; baseline O2 demand reported as 3 liters.   Patient on room air has SpO2 93-95% and no respiratory distress; on 3 liters of O2 SpO2 98%.   Will continue prednisone at this time.   Will continue Doxycycline and discontinue Unasyn.   Transition inhalers to Spiriva and Symbicort.   Etiology unclear - COPD exacerbations vs PE vs  Pneumonia vs HF.   Pending CT PE result to rule out PE > if positive for PE, Lovenox 60 mg BID  Unable to perform CT PE because of concern for contrast allergy.  No records on [facility] or care everywhere.       VTE prophylaxis: enoxaparin ppx    I have performed a physical exam and reviewed and updated ROS and Plan today (1/20/2023). In review of yesterday's note (1/19/2023), there are no changes except as documented above.

## 2023-01-20 NOTE — DISCHARGE PLANNING
H[facility]/SCP TCN chart review completed. Collaborated with JAMES Richardson prior to meeting with the pt. The most current review of medical record, knowledge of pt's PLOF and social support, LACE+ score of 59 and 6 clicks scores (none available) were considered.      Pt seen at bedside with wife. Introduced TCN program. Provided education regarding post acute levels of care. Discussed HTH/[facility]P plan benefits (Meds to Beds, medical uber and [facility] transitional care). Pt verbalizes understanding.     Patient lives with his spouse Rush in [place], [place]. He is able to ambulate, but has chronic pain due to ankylosing spondylitis. He uses 3 L O2 at baseline with [facility]. Patient is considering switching to a different DME (O2) provider as he has to buy his own oxymasks. Patient reports his pain, weakness, and debility is becoming worse recently. [facility] rehab is also following patient.     Requested provider consults for: PT/OT consults via voalte. Choice proactively obtained for HH and IRF and faxed to [facility]. TCN will continue to follow and collaborate with discharge planning team as additional post acute needs arise. Thank you.     Completed today:  Voalte sent to MD requesting PT/OT consults  [facility] following patient  Choice obtained: IRF, HH  [facility] referral not sent as patient has declined

## 2023-01-20 NOTE — PROGRESS NOTES
Monitor summary:     Rhythm : SR-ST  Rate : 111  Ectopy : NONE    MI=.17  QRS=.09  QT=.38        Per telemetry strip summary from monitor room.

## 2023-01-20 NOTE — PROGRESS NOTES
Pt has CT scan ordered for today, he refusing imaging for tonight and remembered that he had a reaction to the contrast before and did not tolerate exam, though he recalls he is not allergic to iodine.He wanted to discuss this with his wife in the morning. Education was given regarding importance of having this test done right away, he understands but would want to do it when he's ready.

## 2023-01-21 ENCOUNTER — APPOINTMENT (OUTPATIENT)
Dept: RADIOLOGY | Facility: MEDICAL CENTER | Age: 68
DRG: 190 | End: 2023-01-21
Payer: COMMERCIAL

## 2023-01-21 PROBLEM — M45.9 ANKYLOSING SPONDYLITIS (HCC): Status: ACTIVE | Noted: 2023-01-21

## 2023-01-21 PROBLEM — R91.1 PULMONARY NODULE: Status: ACTIVE | Noted: 2023-01-21

## 2023-01-21 PROCEDURE — 700111 HCHG RX REV CODE 636 W/ 250 OVERRIDE (IP): Performed by: STUDENT IN AN ORGANIZED HEALTH CARE EDUCATION/TRAINING PROGRAM

## 2023-01-21 PROCEDURE — 71275 CT ANGIOGRAPHY CHEST: CPT

## 2023-01-21 PROCEDURE — 700117 HCHG RX CONTRAST REV CODE 255

## 2023-01-21 PROCEDURE — 770020 HCHG ROOM/CARE - TELE (206)

## 2023-01-21 PROCEDURE — 700102 HCHG RX REV CODE 250 W/ 637 OVERRIDE(OP): Performed by: STUDENT IN AN ORGANIZED HEALTH CARE EDUCATION/TRAINING PROGRAM

## 2023-01-21 PROCEDURE — 99233 SBSQ HOSP IP/OBS HIGH 50: CPT | Mod: GC | Performed by: HOSPITALIST

## 2023-01-21 PROCEDURE — A9270 NON-COVERED ITEM OR SERVICE: HCPCS | Performed by: STUDENT IN AN ORGANIZED HEALTH CARE EDUCATION/TRAINING PROGRAM

## 2023-01-21 RX ADMIN — PREDNISONE 40 MG: 20 TABLET ORAL at 05:27

## 2023-01-21 RX ADMIN — ENOXAPARIN SODIUM 40 MG: 40 INJECTION SUBCUTANEOUS at 17:44

## 2023-01-21 RX ADMIN — OXYCODONE HYDROCHLORIDE 5 MG: 5 TABLET ORAL at 08:27

## 2023-01-21 RX ADMIN — Medication: at 17:44

## 2023-01-21 RX ADMIN — BUDESONIDE AND FORMOTEROL FUMARATE DIHYDRATE 2 PUFF: 80; 4.5 AEROSOL RESPIRATORY (INHALATION) at 22:29

## 2023-01-21 RX ADMIN — Medication: at 05:31

## 2023-01-21 RX ADMIN — OXYCODONE HYDROCHLORIDE 5 MG: 5 TABLET ORAL at 11:43

## 2023-01-21 RX ADMIN — DOXYCYCLINE 100 MG: 100 TABLET, FILM COATED ORAL at 05:28

## 2023-01-21 RX ADMIN — OXYCODONE HYDROCHLORIDE 5 MG: 5 TABLET ORAL at 17:43

## 2023-01-21 RX ADMIN — OXYCODONE HYDROCHLORIDE 5 MG: 5 TABLET ORAL at 02:04

## 2023-01-21 RX ADMIN — OXYCODONE HYDROCHLORIDE 5 MG: 5 TABLET ORAL at 05:28

## 2023-01-21 RX ADMIN — OXYCODONE HYDROCHLORIDE 5 MG: 5 TABLET ORAL at 14:43

## 2023-01-21 RX ADMIN — TIOTROPIUM BROMIDE INHALATION SPRAY 5 MCG: 3.12 SPRAY, METERED RESPIRATORY (INHALATION) at 07:39

## 2023-01-21 RX ADMIN — DOXYCYCLINE 100 MG: 100 TABLET, FILM COATED ORAL at 17:43

## 2023-01-21 RX ADMIN — IOHEXOL 50 ML: 350 INJECTION, SOLUTION INTRAVENOUS at 13:16

## 2023-01-21 RX ADMIN — BUDESONIDE AND FORMOTEROL FUMARATE DIHYDRATE 2 PUFF: 80; 4.5 AEROSOL RESPIRATORY (INHALATION) at 07:40

## 2023-01-21 RX ADMIN — OXYCODONE HYDROCHLORIDE 5 MG: 5 TABLET ORAL at 20:46

## 2023-01-21 ASSESSMENT — COGNITIVE AND FUNCTIONAL STATUS - GENERAL
HELP NEEDED FOR BATHING: A LITTLE
DRESSING REGULAR UPPER BODY CLOTHING: A LITTLE
STANDING UP FROM CHAIR USING ARMS: A LITTLE
PERSONAL GROOMING: A LITTLE
TOILETING: A LITTLE
CLIMB 3 TO 5 STEPS WITH RAILING: A LITTLE
MOBILITY SCORE: 20
SUGGESTED CMS G CODE MODIFIER MOBILITY: CJ
MOVING FROM LYING ON BACK TO SITTING ON SIDE OF FLAT BED: A LITTLE
WALKING IN HOSPITAL ROOM: A LITTLE

## 2023-01-21 ASSESSMENT — ENCOUNTER SYMPTOMS
SPUTUM PRODUCTION: 0
SHORTNESS OF BREATH: 0
ABDOMINAL PAIN: 0
NAUSEA: 0
BACK PAIN: 0
CHILLS: 0
HEARTBURN: 0
MYALGIAS: 0
LOSS OF CONSCIOUSNESS: 0
PALPITATIONS: 0
VOMITING: 0
DIZZINESS: 0
SORE THROAT: 0
CONSTIPATION: 0
DIARRHEA: 0
CLAUDICATION: 0
WEAKNESS: 0
COUGH: 0
DEPRESSION: 0
FLANK PAIN: 0
NERVOUS/ANXIOUS: 0
WEIGHT LOSS: 0
HEADACHES: 0
EYE REDNESS: 0
FEVER: 0
BLURRED VISION: 0
INSOMNIA: 0

## 2023-01-21 ASSESSMENT — PAIN DESCRIPTION - PAIN TYPE
TYPE: ACUTE PAIN
TYPE: ACUTE PAIN

## 2023-01-21 ASSESSMENT — LIFESTYLE VARIABLES: SUBSTANCE_ABUSE: 0

## 2023-01-21 NOTE — PROGRESS NOTES
Patient was educated on oxygen therapy and medication orders for his copd exasperation and his upcoming diagnosic interventional radiology test. Patient became anxious when he was told that his orders for scheduled breathing treatments had been discontinued. Patients was saturating at 99% at 3L nasal canula at the time of education. Patient anxiety level began to heighten and his heart rate increased from 98 bpm to 122 bpm during the conversation. As his O2 was titrated down to 2.5 liters via nasal cannula his O2 saturation went down to 98%. Patient was hyper focused on his personal o2 and heart rate monitor on his finger. Patient stated that as soon as I reduced his oxygen he could feel something was wrong with him. I attempted to calm the patient and defer to the O2 monitor on the wall that was reading 98% and it he was exceeding normal O2 saturation for his copd diagnosis. His anxiety level remained elevated during contact as he rehashed his previous hospital stays and medical histories. He verbalized a history of a bad reaction in his past when he was administered contrast dye. Stated that it made his whole body burn and he did not want his upcoming procedure. There is no recorded contrast dye allergy history only his verbalized statement of flushing. I attempted to redirect the patient back to subject of his oxygen requirements and treatment orders. However he continued to focus on ruminating on past medical histories as an impediment to being compliant with o2 supplement weaning. The more he spoke of past events the more his anxiety level increased. Patient stated that he knows he is anxious and worried at times and I offered him prn anxiety medication. Patient then began to inform me that during a previous stay he was given Ativan and he experienced extreme stomach pain and his statements began to intertwine with a history of previous hospital stays and statements of investigations and lawsuits towards  hospitals and healthcare workers that he received treatment from. Patients anxiety and hesitancy towards treatment is a significant barrier to his treatment plan.

## 2023-01-21 NOTE — CARE PLAN
The patient is Stable - Low risk of patient condition declining or worsening    Shift Goals  Clinical Goals: O2 saturation above 90 tirate O2  Patient Goals: comfort and rest  Family Goals: feel better    Patient needs reinforcement in education of copd disease process and his treatment plan. Patient back pain is not managed with current prn orders. Patient request pain medication within two hours of administration of medication.  Patients O2 saturation was maintained at 98% with 2.5 L of O2 with a Oxy mask. Patient does not like to use nasal cannula and states he has a medical condition which prohibits him using a nasal cannula comfortalbly.         Problem: Pain - Standard  Goal: Alleviation of pain or a reduction in pain to the patient’s comfort goal  Outcome: Not Progressing     Problem: Knowledge Deficit - COPD  Goal: Patient/significant other demonstrates understanding of disease process, utilization of the Action Plan, medications and discharge instruction  Outcome: Not Progressing

## 2023-01-21 NOTE — PROGRESS NOTES
Telemetry Monitoring shift summary    Sinus rhythm to sinus tachycardia    0.15/0.08/0.35

## 2023-01-21 NOTE — ASSESSMENT & PLAN NOTE
H/O of AS diagnosed at age 15.   Pain management with Oxycodone PRN.   Lumbar pain better controlled.

## 2023-01-21 NOTE — PROGRESS NOTES
[facility] Internal Medicine Daily Progress Note    Date of Service  1/21/2023    [facility] Team: R IM Orange Team   Attending: Marcial Hernández M.d.  Senior Resident: Dr. Lugo  Intern:  Dr. Blank  Contact Number: [phone]    Hospital Course  Mr. Bob 68 yo male patient with PMH of COPD (PFT 2019) on 3 liters baseline, HTN, Ankylosing spondylitis, and prior colectomy due to diverticulitis. Admitted 01/18 with diagnosis of acute on chronic hypoxic respiratory failure likely due to COPD exacerbation but differential diagnosis on admission were PE vs MI vs Heart Failure vs CAP/Atypical. Initiated on antibiotic therapy with Unasyn and Doxycycline. Initial lab work WBC 15.4, Pro . MI was ruled out; CAP was ruled out.     Interval Problem Update  On present day 01/21/23: No acute events overnight. Patient reports improvement of SOB, chest pain/discomfort, and wheezing. SpO2 on 2 liters of O2 is 96-98%, on ambulation SpO2 91% with 3 liter of O2. CT PE was performed - no evidence of PE; right para-hilar node of undetermined etiology. Indicated vaccinations: Flu and COVID 19 booster. If patient continues to be stable will plan for discharge on 01/22/23.     I have discussed this patient's plan of care and discharge plan at IDT rounds today with Case Management, Nursing, Nursing leadership, and other members of the IDT team.    Code Status  Full Code    Disposition  Patient is not medically cleared for discharge.   Anticipate discharge to to home with close outpatient follow-up.  I have placed the appropriate orders for post-discharge needs.    Review of Systems  Review of Systems   Constitutional:  Negative for chills, fever and weight loss.   HENT:  Negative for congestion, hearing loss and sore throat.    Eyes:  Negative for blurred vision and redness.   Respiratory:  Negative for cough, sputum production and shortness of breath.    Cardiovascular:  Negative for chest pain, palpitations, claudication and leg swelling.    Gastrointestinal:  Negative for abdominal pain, constipation, diarrhea, heartburn, nausea and vomiting.   Genitourinary:  Negative for dysuria and flank pain.   Musculoskeletal:  Negative for back pain, joint pain and myalgias.   Skin:  Negative for rash.   Neurological:  Negative for dizziness, loss of consciousness, weakness and headaches.   Endo/Heme/Allergies:  Negative for environmental allergies.   Psychiatric/Behavioral:  Negative for depression and substance abuse. The patient is not nervous/anxious and does not have insomnia.       Physical Exam  Temp:  [36.1 °C (97 °F)-36.9 °C (98.4 °F)] 36.4 °C (97.6 °F)  Pulse:  [] 112  Resp:  [18-20] 20  BP: (130-158)/() 144/86  SpO2:  [94 %-99 %] 94 %    Physical Exam  Constitutional:       General: He is not in acute distress.     Appearance: Normal appearance. He is not ill-appearing or toxic-appearing.   HENT:      Head: Normocephalic.      Nose: Nose normal. No congestion.      Mouth/Throat:      Mouth: Mucous membranes are moist.   Eyes:      Pupils: Pupils are equal, round, and reactive to light.   Cardiovascular:      Rate and Rhythm: Normal rate and regular rhythm.      Pulses: Normal pulses.      Heart sounds: Normal heart sounds.   Pulmonary:      Effort: Pulmonary effort is normal.      Breath sounds: Normal breath sounds.      Comments: SpO2 on 3 liters of O2: 98%  SpO2 on room air: 93-95% with no respiratory distress.   Abdominal:      General: Abdomen is flat. There is no distension.      Palpations: Abdomen is soft. There is no mass.      Tenderness: There is no abdominal tenderness.      Hernia: No hernia is present.   Musculoskeletal:         General: No swelling or tenderness.      Cervical back: No rigidity.      Right lower leg: No edema.      Left lower leg: No edema.   Lymphadenopathy:      Cervical: No cervical adenopathy.   Skin:     General: Skin is warm.      Capillary Refill: Capillary refill takes less than 2 seconds.       Coloration: Skin is not jaundiced.   Neurological:      General: No focal deficit present.      Mental Status: He is alert and oriented to person, place, and time.      Cranial Nerves: No cranial nerve deficit.      Sensory: No sensory deficit.     Imaging  CT-CTA CHEST PULMONARY ARTERY W/ RECONS   Final Result      1.  No CT evidence of acute pulmonary thromboembolism      2.  Severe emphysema with bronchial thickening that most likely indicates bronchitis. Viral respiratory infection or reactive areas disease should be considered      3.  Right hilar significantly enlarged node, 17 x 21 mm. This could be malignant related to lymphoma or metastasis. No focal infection is seen to support reactive nature. Recommend clinical correlation and consider short interval follow-up      DX-CHEST-PORTABLE (1 VIEW)   Final Result      1.  Hazy interstitial opacities, greatest at the right upper lung favoring multifocal pneumonia.   2.  Bilateral lower lung nodular opacities favoring nipple shadow. If there is clinical concern, exam can be repeated with nipple markers.           Assessment/Plan  Problem Representation:    66 yo male patient with PMH of COPD on 3 liters baseline, HTN, Ankylosing spondylitis. Admitted 01/18 due to acute on chronic hypoxic respiratory failure. Currently on Doxycycline. PE ruled out on 01/21 by CT PE with incidental finding of right hilar node.      * Acute hypoxemic respiratory failure (HCC)  Assessment & Plan  Most likely due to COPD exacerbation.   Prednisone discontinued.   Doxycycline scheduled until 01/23.   On 3 liters of O2 SpO2 at rest 96-98% and on ambulation SpO2 91%.   No need for rescue inhaler use.   01/21 - CT PE: no evidence of PE; right para-hilar node of undetermined etiology.       Pulmonary nodule  Assessment & Plan  Incidental finding on CT PE on 01/21: right hilar nodule of 17 x 21 mm.   No clear etiology at this time.   Will require outpatient follow-up in 3-6 months with  repeat imaging to determine further work-up/follow-up.     Ankylosing spondylitis (HCC)  Assessment & Plan  H/O of AS diagnosed at age 15.   Pain management with Oxycodone PRN.   Lumbar pain better controlled.       Elevated troponin  Assessment & Plan  Elevated troponin on admission.   Currently no chest pain or discomfort; resolved since admission.   No worsening SOB; improved.   No evident ECG changes in AM.   Likely demand ischemia in setting of possible COPD exacerbation vs pneumonia vs PE      Sepsis (HCC)  Assessment & Plan  RESOLVED  Present on admission based on WBC > 12k, HR >90, RR > 20, and source like pulmonary; nonetheless no evident clinical deterioration of patient.              VTE prophylaxis: enoxaparin ppx    I have performed a physical exam and reviewed and updated ROS and Plan today (1/21/2023). In review of yesterday's note (1/20/2023), there are no changes except as documented above.

## 2023-01-22 VITALS
WEIGHT: 135 LBS | TEMPERATURE: 98.9 F | OXYGEN SATURATION: 98 % | HEIGHT: 69 IN | BODY MASS INDEX: 19.99 KG/M2 | SYSTOLIC BLOOD PRESSURE: 136 MMHG | DIASTOLIC BLOOD PRESSURE: 82 MMHG | RESPIRATION RATE: 20 BRPM | HEART RATE: 97 BPM

## 2023-01-22 PROCEDURE — A9270 NON-COVERED ITEM OR SERVICE: HCPCS | Performed by: STUDENT IN AN ORGANIZED HEALTH CARE EDUCATION/TRAINING PROGRAM

## 2023-01-22 PROCEDURE — 700102 HCHG RX REV CODE 250 W/ 637 OVERRIDE(OP): Performed by: STUDENT IN AN ORGANIZED HEALTH CARE EDUCATION/TRAINING PROGRAM

## 2023-01-22 PROCEDURE — 99239 HOSP IP/OBS DSCHRG MGMT >30: CPT | Mod: GC | Performed by: HOSPITALIST

## 2023-01-22 RX ORDER — TIOTROPIUM BROMIDE AND OLODATEROL 3.124; 2.736 UG/1; UG/1
1 SPRAY, METERED RESPIRATORY (INHALATION)
Qty: 4 G | Refills: 1 | Status: SHIPPED | OUTPATIENT
Start: 2023-01-22 | End: 2023-01-27

## 2023-01-22 RX ORDER — BUDESONIDE AND FORMOTEROL FUMARATE DIHYDRATE 80; 4.5 UG/1; UG/1
2 AEROSOL RESPIRATORY (INHALATION) 2 TIMES DAILY
Qty: 10.2 G | Refills: 1 | Status: CANCELLED | OUTPATIENT
Start: 2023-01-22

## 2023-01-22 RX ORDER — DOXYCYCLINE 100 MG/1
100 TABLET ORAL EVERY 12 HOURS
Qty: 2 TABLET | Refills: 0 | Status: ACTIVE | OUTPATIENT
Start: 2023-01-22 | End: 2023-01-23

## 2023-01-22 RX ORDER — AMMONIUM LACTATE 12 G/100G
1 LOTION TOPICAL 2 TIMES DAILY
Qty: 225 G | Refills: 0 | Status: SHIPPED | OUTPATIENT
Start: 2023-01-22

## 2023-01-22 RX ADMIN — DOXYCYCLINE 100 MG: 100 TABLET, FILM COATED ORAL at 04:27

## 2023-01-22 RX ADMIN — BUDESONIDE AND FORMOTEROL FUMARATE DIHYDRATE 2 PUFF: 80; 4.5 AEROSOL RESPIRATORY (INHALATION) at 08:20

## 2023-01-22 RX ADMIN — OXYCODONE HYDROCHLORIDE 5 MG: 5 TABLET ORAL at 16:18

## 2023-01-22 RX ADMIN — OXYCODONE HYDROCHLORIDE 5 MG: 5 TABLET ORAL at 10:43

## 2023-01-22 RX ADMIN — OXYCODONE HYDROCHLORIDE 5 MG: 5 TABLET ORAL at 13:34

## 2023-01-22 RX ADMIN — TIOTROPIUM BROMIDE INHALATION SPRAY 5 MCG: 3.12 SPRAY, METERED RESPIRATORY (INHALATION) at 08:20

## 2023-01-22 RX ADMIN — Medication: at 04:27

## 2023-01-22 RX ADMIN — OXYCODONE HYDROCHLORIDE 5 MG: 5 TABLET ORAL at 07:41

## 2023-01-22 RX ADMIN — OXYCODONE HYDROCHLORIDE 5 MG: 5 TABLET ORAL at 04:27

## 2023-01-22 ASSESSMENT — PAIN DESCRIPTION - PAIN TYPE
TYPE: CHRONIC PAIN
TYPE: ACUTE PAIN
TYPE: ACUTE PAIN

## 2023-01-22 NOTE — PROGRESS NOTES
Monitor summary:     Rhythm : Sinus Rhythm/Sinus Tach  Rate :  bpm  Ectopy : n/a    OH=.10  QRS=.07  QT=.22        Per telemetry strip summary from monitor room.

## 2023-01-22 NOTE — ASSESSMENT & PLAN NOTE
Incidental finding on CT PE on 01/21: right hilar nodule of 17 x 21 mm.   No clear etiology at this time.   Will require outpatient follow-up in 3-6 months with repeat imaging to determine further work-up/follow-up.

## 2023-01-22 NOTE — DISCHARGE PLANNING
HTH/SCP TCN chart review completed. Collaborated with intern Dr. Blank. Per last 2 TCN notes, attempts were made to obtain PT and OT consults, however these were not placed by MD. Upon request today, MD notified this TCN that patient is discharging home. Per MD, he walked with patient and reports he did well. Per Kardex, patient has been ambulating with a FWW. Patient declined [facility] services per TCN note. Pt's spouse was called (message left) to confirm that they do not want a visit from [facility] upon returning home, call back number given; will happily arrange this service if patient and spouse would like, note that pt's LACE score is 62. TCN will continue to follow and collaborate with discharge planning team as additional post acute needs arise. Thank you.    Completed:  Voalte sent to [facility] Dr Jigar Lugo via Voalte 1/20/2023 requesting  provider order for possible PT/OT consults  No consult placed as of 1/22/23. Pt DCing home  Choice obtained: IRF, HH 1/19/2023; )2 DME 1/20/2023  [facility] referral not sent --pt declined 1/19/2023  Phone call placed to pt's spouse to verify this choice

## 2023-01-22 NOTE — PROGRESS NOTES
With patient's permission updated patient's wife who was also in room. Discussed his diagnosis of severe COPD which explain his presenting symptoms during this admission.  Also discussed finding of R hilar LAD which will need further follow up.  Patient states that he does not see his PCP regularly. Emphasized the importance of outpatient follow up.  Called scheduling and requested appt at [facility].  At [facility], he can establish care with Dr. Blank.  Also, offered patient information [facility]  Explained the referral process for pulmonology via PCP and how it is key for patient to establish care with any PCP that he would like when he is ready.  But emphasized sooner than later if possible.  Patient and wife understood and agreed with treatment plan.

## 2023-01-22 NOTE — DISCHARGE SUMMARY
[facility] Internal Medicine Discharge Summary    Attending: Marcial Hernández M.d.  Senior Resident: Dr. Lugo  Intern:  Dr. Blank  Contact Number: [phone]    CHIEF COMPLAINT ON ADMISSION  Chief Complaint   Patient presents with    Shortness of Breath    Chest Pain       Reason for Admission  Acute on chronic hypoxic respiratory failure.     Admission Date  1/18/2023    CODE STATUS  Full Code    HPI & HOSPITAL COURSE  Mr. Bob 68 yo male patient with PMH of COPD (PFT 2019) on 3 liters baseline, HTN, Ankylosing spondylitis, and prior colectomy due to diverticulitis. Admitted 01/18 with diagnosis of acute on chronic hypoxic respiratory failure likely due to COPD exacerbation but differential diagnosis on admission were PE vs MI vs Heart Failure vs CAP/Atypical. MI was ruled out; Community acquired pneumonia was ruled out; PE was ruled out.    #Acute on chronic hypoxic respiratory failure  #COPD exacerbation  #Pulmonary hypertension  - Patient currently on baseline Oxygen of 3 liters, continuous, with no respiratory distress and SpO2 stable at >96%.   - Performed walk O2 evaluation on day of discharge:   - On ambulation: with 3-4 liters of O2, SpO2 95-96%; on room air      SpO2 90-91%.  - At rest: 3 liters, SpO2 97-99%; on room air, SpO2 95%.   - Has received steroid treatment.   - Doxycycline 100 mg twice a day to be completed 01/23/22.   - Discharge on Stiolto Respimat  - Continued Furosemide 40 mg as indicated by outpatient cardiology in setting of Pulmonary hypertension.   - Patient opted for COVID19 vaccine as outpatient.  - 01/21 CT PE ruled out evidence of PE.    #Lung nodule  - 01/21/23 Incidental finding on CT PE of right hilar nodule 17 x 21 mm of undetermined etiology.   - Will require close follow-up in 3 months after discharge in outpatient setting.       Therefore, he is discharged in good and stable condition to home with close outpatient follow-up.    The patient met 2-midnight criteria for an inpatient  stay at the time of discharge.    Discharge Date  01/22/23    Physical Exam on Day of Discharge  Physical Exam  Constitutional:       General: He is not in acute distress.     Appearance: Normal appearance. He is normal weight. He is not ill-appearing, toxic-appearing or diaphoretic.   HENT:      Head: Normocephalic.      Nose: Nose normal. No congestion.      Mouth/Throat:      Mouth: Mucous membranes are moist.   Eyes:      General: No scleral icterus.     Pupils: Pupils are equal, round, and reactive to light.   Cardiovascular:      Rate and Rhythm: Normal rate and regular rhythm.      Pulses: Normal pulses.      Heart sounds: Normal heart sounds.   Pulmonary:      Effort: Pulmonary effort is normal.      Breath sounds: Normal breath sounds.      Comments: On 3 liters of oxygen with SpO2 >96% with no respiratory distress at rest or ambulation.   Abdominal:      General: Abdomen is flat. There is no distension.      Palpations: Abdomen is soft. There is no mass.      Tenderness: There is no abdominal tenderness.      Hernia: No hernia is present.   Genitourinary:     Penis: Normal.       Testes: Normal.   Musculoskeletal:         General: No swelling or tenderness.      Cervical back: No rigidity or tenderness.      Right lower leg: No edema.      Left lower leg: No edema.   Lymphadenopathy:      Cervical: No cervical adenopathy.   Skin:     General: Skin is warm.      Capillary Refill: Capillary refill takes less than 2 seconds.      Coloration: Skin is not jaundiced or pale.   Neurological:      General: No focal deficit present.      Mental Status: He is alert and oriented to person, place, and time.      Cranial Nerves: No cranial nerve deficit.      Sensory: No sensory deficit.       FOLLOW UP ITEMS POST DISCHARGE  CT scan of chest in 3 month to follow-up on lung nodule finding.     DISCHARGE DIAGNOSES  Principal Problem:    Acute hypoxemic respiratory failure (HCC) POA: Unknown  Active Problems:    Pulmonary  nodule POA: Unknown    Elevated troponin POA: Unknown    Ankylosing spondylitis (HCC) POA: Unknown    Sepsis (HCC) POA: Unknown  Resolved Problems:    Acute exacerbation of chronic obstructive pulmonary disease (COPD) (HCC) POA: Unknown    Pulmonary hypertension (HCC) POA: Unknown    Myocardial infarction due to demand ischemia (HCC) POA: Unknown      FOLLOW UP  Future Appointments   Date Time Provider Department Center   1/27/2023  2:40 PM HIGINIO Burgess [facility] None     No follow-up provider specified.    MEDICATIONS ON DISCHARGE     Medication List        ASK your doctor about these medications        Instructions   furosemide 40 MG Tabs  Commonly known as: LASIX   Take 40 mg by mouth every morning.  Dose: 40 mg     Home Care Oxygen   Inhale 3 L/min continuous. Preferred is DME  Dose: 3 L/min     ipratropium-albuterol 0.5-2.5 (3) MG/3ML nebulizer solution  Commonly known as: DUONEB   Take 3 mL by nebulization every four hours as needed for Shortness of Breath.  Dose: 3 mL     Multivitamin Adult Chew   Chew 1 Tablet every evening.  Dose: 1 Tablet     TRELEGY ELLIPTA INH   Inhale 1 Puff every morning.  Dose: 1 Puff              Allergies  No Known Allergies    DIET  Orders Placed This Encounter   Procedures    Diet Order Diet: Regular     Standing Status:   Standing     Number of Occurrences:   1     Order Specific Question:   Diet:     Answer:   Regular [1]       ACTIVITY  As tolerated.  Weight bearing as tolerated    CONSULTATIONS  None    PROCEDURES  None    LABORATORY  Lab Results   Component Value Date    SODIUM 133 (L) 01/19/2023    POTASSIUM 4.4 01/19/2023    CHLORIDE 96 01/19/2023    CO2 27 01/19/2023    GLUCOSE 99 01/19/2023    BUN 18 01/19/2023    CREATININE 0.83 01/19/2023        Lab Results   Component Value Date    WBC 8.1 01/19/2023    HEMOGLOBIN 10.7 (L) 01/19/2023    HEMATOCRIT 34.7 (L) 01/19/2023    PLATELETCT 172 01/19/2023        Total time of the discharge process exceeds 40 minutes.

## 2023-01-22 NOTE — CARE PLAN
The patient is Stable - Low risk of patient condition declining or worsening    Shift Goals  Clinical Goals: discharge planning, mobility  Patient Goals: comfort  Family Goals:     Progress made toward(s) clinical / shift goals:  Discharge planning is ongoing, patient remained comfortable for duration of the shift.       Problem: Communication  Goal: The ability to communicate needs accurately and effectively will improve  Outcome: Progressing  Flowsheets (Taken 1/21/2023 4791)  Communication: Assessed patient's ability to understand and communicate  Note: Patient is very vocal about his needs. Patient communicates clearly and effectively and asks questions appropriately.      Problem: Mobility  Goal: Patient's capacity to carry out activities will improve  Outcome: Progressing  Note: Patient up to chair for breakfast. Patient transferred with standby assist. Additionally, patient walked 130 ft, patient was on 3L on maintained O2 saturation of 90% and above until after exertion when patient desaturated to 87%. Patient took about 5 minutes recover.        Patient is not progressing towards the following goals:

## 2023-01-22 NOTE — CARE PLAN
"The patient is Stable - Low risk of patient condition declining or worsening    Shift Goals  Clinical Goals: monitor O2  Patient Goals: discharge  Family Goals: SHELLY    Progress made toward(s) clinical / shift goals:      Problem: Respiratory  Goal: Patient will achieve/maintain optimum respiratory ventilation and gas exchange  Outcome: Progressing  Flowsheets (Taken 1/22/2023 0018 by Sreekanth Sifuentes)  O2 Delivery Device: Oxymask  Note: Pt O2 sat WDL on 3L/NC. Attempting to wean down oxygen but pt refused. HOB elevated above 30 degrees. Respiratory pattern WDL. No SOB at this time.           Patient is not progressing towards the following goals:      Problem: Psychosocial  Goal: Patient's level of anxiety will decrease  Outcome: Not Progressing  Note: Pt stated that his life \"is very stressful right now.\" Aided in explaining new prognosis. Provided emotional support and taught coping mechanisms.      "

## 2023-01-22 NOTE — DISCHARGE INSTRUCTIONS
Discharge Instructions    Discharged to home by car with relative. Discharged via wheelchair, hospital escort: Yes.  Special equipment needed: Not Applicable    Be sure to schedule a follow-up appointment with your primary care doctor or any specialists as instructed.     Discharge Plan:        I understand that a diet low in cholesterol, fat, and sodium is recommended for good health. Unless I have been given specific instructions below for another diet, I accept this instruction as my diet prescription.   Other diet: n/a    Special Instructions: None    -Is this patient being discharged with medication to prevent blood clots?  No    Is patient discharged on Warfarin / Coumadin?   No     Doxycycline tablets or capsules  What is this medicine?  DOXYCYCLINE (dox carmen menon) is a tetracycline antibiotic. It kills certain bacteria or stops their growth. It is used to treat many kinds of infections, like dental, skin, respiratory, and urinary tract infections. It also treats acne, Lyme disease, malaria, and certain sexually transmitted infections.  This medicine may be used for other purposes; ask your health care provider or pharmacist if you have questions.  COMMON BRAND NAME(S): Acticlate, Adoxa, Adoxa CK, Adoxa Miko, Adoxa TT, Alodox, Avidoxy, Doxal, LYMEPAK, Mondoxyne NL, Monodox, Morgidox 1x, Morgidox 1x Kit, Morgidox 2x, Morgidox 2x Kit, NutriDox, Ocudox, TARGADOX, Vibra-Tabs, Vibramycin  What should I tell my health care provider before I take this medicine?  They need to know if you have any of these conditions:  liver disease  long exposure to sunlight like working outdoors  stomach problems like colitis  an unusual or allergic reaction to doxycycline, tetracycline antibiotics, other medicines, foods, dyes, or preservatives  pregnant or trying to get pregnant  breast-feeding  How should I use this medicine?  Take this medicine by mouth with a full glass of water. Follow the directions on the prescription  label. It is best to take this medicine without food, but if it upsets your stomach take it with food. Take your medicine at regular intervals. Do not take your medicine more often than directed. Take all of your medicine as directed even if you think you are better. Do not skip doses or stop your medicine early.  Talk to your pediatrician regarding the use of this medicine in children. While this drug may be prescribed for selected conditions, precautions do apply.  Overdosage: If you think you have taken too much of this medicine contact a poison control center or emergency room at once.  NOTE: This medicine is only for you. Do not share this medicine with others.  What if I miss a dose?  If you miss a dose, take it as soon as you can. If it is almost time for your next dose, take only that dose. Do not take double or extra doses.  What may interact with this medicine?  antacids  barbiturates  birth control pills  bismuth subsalicylate  carbamazepine  methoxyflurane  other antibiotics  phenytoin  vitamins that contain iron  warfarin  This list may not describe all possible interactions. Give your health care provider a list of all the medicines, herbs, non-prescription drugs, or dietary supplements you use. Also tell them if you smoke, drink alcohol, or use illegal drugs. Some items may interact with your medicine.  What should I watch for while using this medicine?  Tell your doctor or health care professional if your symptoms do not improve.  Do not treat diarrhea with over the counter products. Contact your doctor if you have diarrhea that lasts more than 2 days or if it is severe and watery.  Do not take this medicine just before going to bed. It may not dissolve properly when you lay down and can cause pain in your throat. Drink plenty of fluids while taking this medicine to also help reduce irritation in your throat.  This medicine can make you more sensitive to the sun. Keep out of the sun. If you cannot  avoid being in the sun, wear protective clothing and use sunscreen. Do not use sun lamps or tanning beds/booths.  Birth control pills may not work properly while you are taking this medicine. Talk to your doctor about using an extra method of birth control.  If you are being treated for a sexually transmitted infection, avoid sexual contact until you have finished your treatment. Your sexual partner may also need treatment.  Avoid antacids, aluminum, calcium, magnesium, and iron products for 4 hours before and 2 hours after taking a dose of this medicine.  If you are using this medicine to prevent malaria, you should still protect yourself from contact with mosquitos. Stay in screened-in areas, use mosquito nets, keep your body covered, and use an insect repellent.  What side effects may I notice from receiving this medicine?  Side effects that you should report to your doctor or health care professional as soon as possible:  allergic reactions like skin rash, itching or hives, swelling of the face, lips, or tongue  difficulty breathing  fever  itching in the rectal or genital area  pain on swallowing  rash, fever, and swollen lymph nodes  redness, blistering, peeling or loosening of the skin, including inside the mouth  severe stomach pain or cramps  unusual bleeding or bruising  unusually weak or tired  yellowing of the eyes or skin  Side effects that usually do not require medical attention (report to your doctor or health care professional if they continue or are bothersome):  diarrhea  loss of appetite  nausea, vomiting  This list may not describe all possible side effects. Call your doctor for medical advice about side effects. You may report side effects to FDA at 1-816-FDA-5159.  Where should I keep my medicine?  Keep out of the reach of children.  Store at room temperature, below 30 degrees C (86 degrees F). Protect from light. Keep container tightly closed. Throw away any unused medicine after the  expiration date. Taking this medicine after the expiration date can make you seriously ill.  NOTE: This sheet is a summary. It may not cover all possible information. If you have questions about this medicine, talk to your doctor, pharmacist, or health care provider.  © 2020 [employer]vier/Gold Standard (2020-03-19 13:44:53)    Tiotropium; Olodaterol respiratory inhalation spray  What is this medicine?  TIOTROPIUM; OLODATEROL (lien oh TRO pee um; OH masoud DA ter ol) inhalation is a combination of two medicines that help to open up the airways of your lungs. It is for chronic obstructive pulmonary disease (COPD), including chronic bronchitis or emphysema. Do NOT use for asthma or an acute asthma attack. Do NOT use for a COPD attack.  This medicine may be used for other purposes; ask your health care provider or pharmacist if you have questions.  COMMON BRAND NAME(S): STIOLTO RESPIMAT  What should I tell my health care provider before I take this medicine?  They need to know if you have any of these conditions:  asthma  bladder problems or difficulty passing urine  diabetes  glaucoma  heart disease or irregular heartbeat  high blood pressure  kidney disease  pheochromocytoma  prostate disease  seizures  thyroid disease  an unusual or allergic reaction to tiotropium, olodaterol, ipratropium, atropine, other medicines, foods, dyes, or preservatives  pregnant or trying to get pregnant  breast-feeding  How should I use this medicine?  This medicine is inhaled through the mouth. It is used once per day. Follow the directions on the prescription label. Take your medicine at regular intervals. Do not take your medicine more often than directed. Do not stop taking except on your doctor's advice. Make sure that you are using your inhaler correctly. Ask you doctor or health care provider if you have any questions.  Talk to your pediatrician regarding the use of this medicine in children. Special care may be needed.  Overdosage: If you  think you have taken too much of this medicine contact a poison control center or emergency room at once.  NOTE: This medicine is only for you. Do not share this medicine with others.  What if I miss a dose?  If you miss a dose, use it as soon as you can. If it is almost time for your next dose, use only that dose and continue with your regular schedule. Do not use double or extra doses.  What may interact with this medicine?  Do not take this medicine with any of the following medications:  MAOIs like Carbex, Eldepryl, Marplan, Nardil, and Parnate  This medicine may also interact with the following medications:  atropine  certain medicines for allergy, cough and cold  certain medicines for bladder problems like oxybutynin, tolterodine  certain medicines for stomach problems like dicyclomine, hyoscyamine  certain medicines for travel sickness like scopolamine  certain medicines for Parkinson's disease like benztropine, trihexyphenidyl  diuretics  ipratropium  medicines for depression, anxiety, or psychotic disturbances  medicines for fungal infections like ketoconazole  medicines for weight loss including some herbal products  other medicines that prolong the QT interval (cause an abnormal heart rhythm)  procarbazine  ritonavir  some antibiotics like clarithromycin, erythromycin, levofloxacin, linezolid, and telithromycin  some heart medicines  steroid medicines like prednisone or cortisone  stimulant medicines for attention disorders, weight loss, or to stay awake  theophylline  thyroid hormones  This list may not describe all possible interactions. Give your health care provider a list of all the medicines, herbs, non-prescription drugs, or dietary supplements you use. Also tell them if you smoke, drink alcohol, or use illegal drugs. Some items may interact with your medicine.  What should I watch for while using this medicine?  Visit your doctor or health care professional for regular checkups. Tell your doctor  or health care professional if your symptoms do not get better.  If your symptoms get worse or if you need your short-acting inhalers more often, call your doctor right away. Do not use this medicine more than once every 24 hours.  Do not get the this medicine in your eyes. It can cause irritation, pain, or blurred vision.  You may get dizzy. Do not drive, use machinery, or do anything that needs mental alertness until you know how this medicine affects you. Do not stand or sit up quickly, especially if you are an older patient. This reduces the risk of dizzy or fainting spells.  Clean the inhaler as directed in the patient information sheet that comes with this medicine.  What side effects may I notice from receiving this medicine?  Side effects that you should report to your doctor or health care professional as soon as possible:  allergic reactions like skin rash or hives, swelling of the face, lips, or tongue  breathing problems right after inhaling your medicine  changes in vision  chest pain  difficulty breathing or wheezing that increases or does not go away  fast, irregular heartbeat  feeling faint or lightheaded, falls  general ill feeling or flu-like symptoms  stomach pain  trouble passing urine or change in the amount of urine  unusually weak or tired  Side effects that usually do not require medical attention (report to your doctor or health care professional if they continue or are bothersome):  back pain  constipation  cough  dry mouth  nervousness  runny nose  sore throat  tremor  This list may not describe all possible side effects. Call your doctor for medical advice about side effects. You may report side effects to FDA at 3-043-FDA-8299.  Where should I keep my medicine?  Keep out of the reach of children.  Store at room temperature between 15 and 30 degrees C (59 and 86 degrees F). Do not freeze. The inhaler should be discarded after 3 months or when the inhaler becomes locked, whichever comes  first. Throw away any unopened packages after the expiration date.  NOTE: This sheet is a summary. It may not cover all possible information. If you have questions about this medicine, talk to your doctor, pharmacist, or health care provider.  © 2020 Elsevier/Gold Standard (2019-06-03 12:36:40)

## 2023-01-23 ENCOUNTER — PATIENT OUTREACH (OUTPATIENT)
Dept: SCHEDULING | Facility: IMAGING CENTER | Age: 68
End: 2023-01-23
Payer: COMMERCIAL

## 2023-01-23 NOTE — PROGRESS NOTES
Pt discharged at 1655 via wheelchair escorted by staff to family ride. Discharge education paper work and education has been completed. All IV's pulled. Core measures sheet completed and sign by RN. All belongings checked and taken home with patient. All questions and concerns were met at time of discharge.

## 2023-01-23 NOTE — PROGRESS NOTES
Received report from night shift RNJayne . Assumed care of pt at 0645. Pt reports no pain, nausea, vomiting, numbness, tingling, at this time. AOx4 anxious and cooperative. Updated pt on plan of care. Pt resting comfortably in bed. Fall precautions and education done. Educated on use of call light which is placed nearby patient. Hourly rounding and continuous pulse ox monitoring in place, O2 saturation at 98% on 3L oxymask. Questions and concerns answered at this time. Patient reports no other needs at the moment.

## 2023-01-23 NOTE — DISCHARGE PLANNING
Case Management Discharge Planning    Admission Date: 1/18/2023  GMLOS: 5  ALOS: 4    6-Clicks ADL Score:    6-Clicks Mobility Score: 20      Anticipated Discharge Dispo:      DME Needed: No has DME O2 in the home w/ [facility]. O2 needs @ home baseline    Action(s) Taken: Updated Provider/Nurse on Discharge Plan    Escalations Completed: None    Medically Clear: Yes    Next Steps: spouse to provide ride and bring home o2 tank    Barriers to Discharge: None    Is the patient up for discharge tomorrow: No today    Visited w/ pt @ bedside. No HH needs and is @ O2 needs baseline to current home O2 flow rate.  Discussed w/ pt who states he and spouse have had some family health concerns. Pt reports spouse is bringing O2 travel tank for DC home today and providing ride home.  IMM explained and left @ bedside @ pt request so spouse can see it before he signs it. Pt has insurance provider questions and provided pt with phone number to follow up with the [facility]  ([facility]) on Monday if he wishes.   DC plan: home w/ spouse bringing tank this afternoon. O2 needs the same and is current w/ [facility] for [facility]2 .Pt in agreement w/ plan.  Requested pt give the sign IMM to nursing staff to return to case management.

## 2023-01-23 NOTE — CARE PLAN
The patient is Stable - Low risk of patient condition declining or worsening    Shift Goals  Clinical Goals: Education, adequate oxygenation, discharge  Patient Goals: adequate oxygenation  Family Goals: SHELLY    Progress made toward(s) clinical / shift goals:    Problem: Pain - Standard  Goal: Alleviation of pain or a reduction in pain to the patient’s comfort goal  Outcome: Met  Flowsheets  Taken 1/22/2023 1657  Non Verbal Scale: Calm  Taken 1/22/2023 1618  Pain Rating Scale (NPRS): 6     Problem: Knowledge Deficit - COPD  Goal: Patient/significant other demonstrates understanding of disease process, utilization of the Action Plan, medications and discharge instruction  Outcome: Met     Problem: Discharge Barriers/Planning  Goal: Patient's continuum of care needs are met  Outcome: Met       Patient is not progressing towards the following goals:

## 2023-01-25 ENCOUNTER — TELEPHONE (OUTPATIENT)
Dept: SLEEP MEDICINE | Facility: MEDICAL CENTER | Age: 68
End: 2023-01-25
Payer: MEDICARE

## 2023-01-25 ENCOUNTER — TELEPHONE (OUTPATIENT)
Dept: RESPIRATORY THERAPY | Facility: MEDICAL CENTER | Age: 68
End: 2023-01-25

## 2023-01-25 ENCOUNTER — PATIENT OUTREACH (OUTPATIENT)
Dept: HEALTH INFORMATION MANAGEMENT | Facility: OTHER | Age: 68
End: 2023-01-25
Payer: MEDICARE

## 2023-01-25 DIAGNOSIS — J44.1 CHRONIC OBSTRUCTIVE PULMONARY DISEASE WITH ACUTE EXACERBATION (HCC): ICD-10-CM

## 2023-01-25 DIAGNOSIS — J44.9 CHRONIC OBSTRUCTIVE PULMONARY DISEASE, UNSPECIFIED COPD TYPE (HCC): ICD-10-CM

## 2023-01-26 ENCOUNTER — PATIENT OUTREACH (OUTPATIENT)
Dept: HEALTH INFORMATION MANAGEMENT | Facility: OTHER | Age: 68
End: 2023-01-26
Payer: MEDICARE

## 2023-01-26 DIAGNOSIS — I27.20 PULMONARY HYPERTENSION (HCC): ICD-10-CM

## 2023-01-26 DIAGNOSIS — J44.1 CHRONIC OBSTRUCTIVE PULMONARY DISEASE WITH ACUTE EXACERBATION (HCC): ICD-10-CM

## 2023-01-26 NOTE — PROGRESS NOTES
Received a message from Richard BURTON that he had a patient on the line that needed some clinical advice about his condition.  Spoke with Richard about his condition.  He stated that he just got out of the hospital with COPD exacerbation and that he is feeling SOB at this time.  While talking with Richard he stated that he had a pulso ox on and his saturations were 98-99% on 3L, HR 88,observed in his voice while conversing that there was not any signs of dyspnea with talking.  He stated that he suffers from anxiety and this causes his breathing to go erratic.  Reviewed signs and symptoms with patient of when he should go to the ER.  Pulso ox <88%  Severe SOB, HR sustained >120.  He agreed to monitor his vitals during this time.  Reassured patient that his VS were stable and was baseline for his condition.

## 2023-01-26 NOTE — TELEPHONE ENCOUNTER
I was paged by the patient for concern for hemoptysis.  He states that this morning, he coughed and coughed up a quarter sized amount of dark bloody phlegm.  He thought that it was probably from his nose and that he was having a lot of dryness.  However, this evening, he coughed again and had some bloody phlegm, approximately a quarter size again.  He states that his O2 saturations are stable.  He does not have any shortness of breath.  He states that he does have a very dry nose and that the blood could be coming down, however, he has not had any bloody noses or metallic taste in his mouth.    -Discussed with patient regarding hemoptysis.  He thinks that it could be related to his dry nose, but he is unsure.  -I instructed patient that if he has any additional bleeding that he should go directly to the ER for evaluation.  I instructed him that if he were to cough up more than half of a [place] cup of blood in any 24-hour period that he go directly to the ER.  -I also told him that if he has any shortness of breath or his oxygen saturations worsen, to go directly to the ER.  -Patient is in agreement, he states that he is going to discuss with his wife regarding waiting versus going to the ER at this time.    Toya Vidal, DO   Pulmonary and Critical Care

## 2023-01-26 NOTE — PROGRESS NOTES
CHW Received a call back from the pt who left a message for this CHW to call the pt back. This CHW called back a spoke with the pt about how he is feeling today and if he is still having problems with his COPD. Pt stated he is better today and ended up calling the Pulmonologist who was over the ED and she had a lot of help for him. This CHW discussed Emphysema and how it effects a persons breathing. This CHW will put the pt on the follow up list for 1 week out.

## 2023-01-26 NOTE — PROGRESS NOTES
CHW Nikolas called the pt to check in and see how he is doing today. Pt had called yesterday about not breathing well and This CHW got the pt on the phone with Aba because the [facility] nurse he normally works with was out. This CHW left a message for the pt to call back and will pt the pt on the follow up call list for one week out.

## 2023-01-27 ENCOUNTER — DOCUMENTATION (OUTPATIENT)
Dept: SLEEP MEDICINE | Facility: MEDICAL CENTER | Age: 68
End: 2023-01-27
Payer: MEDICARE

## 2023-01-27 ENCOUNTER — OFFICE VISIT (OUTPATIENT)
Dept: SLEEP MEDICINE | Facility: MEDICAL CENTER | Age: 68
End: 2023-01-27
Payer: COMMERCIAL

## 2023-01-27 VITALS
SYSTOLIC BLOOD PRESSURE: 118 MMHG | DIASTOLIC BLOOD PRESSURE: 52 MMHG | BODY MASS INDEX: 20.73 KG/M2 | HEIGHT: 69 IN | WEIGHT: 140 LBS | HEART RATE: 86 BPM | OXYGEN SATURATION: 96 % | RESPIRATION RATE: 16 BRPM

## 2023-01-27 DIAGNOSIS — J43.2 CENTRILOBULAR EMPHYSEMA (HCC): ICD-10-CM

## 2023-01-27 DIAGNOSIS — R91.8 PULMONARY NODULES: ICD-10-CM

## 2023-01-27 DIAGNOSIS — J96.11 CHRONIC RESPIRATORY FAILURE WITH HYPOXIA (HCC): ICD-10-CM

## 2023-01-27 DIAGNOSIS — I27.20 PULMONARY HYPERTENSION (HCC): ICD-10-CM

## 2023-01-27 PROCEDURE — 99214 OFFICE O/P EST MOD 30 MIN: CPT | Performed by: NURSE PRACTITIONER

## 2023-01-27 RX ORDER — TIOTROPIUM BROMIDE INHALATION SPRAY 3.12 UG/1
5 SPRAY, METERED RESPIRATORY (INHALATION) DAILY
Qty: 2 EACH | Refills: 0 | COMMUNITY
Start: 2023-01-27

## 2023-01-27 RX ORDER — BUDESONIDE AND FORMOTEROL FUMARATE DIHYDRATE 160; 4.5 UG/1; UG/1
2 AEROSOL RESPIRATORY (INHALATION) 2 TIMES DAILY
COMMUNITY
Start: 2023-01-15

## 2023-01-27 RX ORDER — TIOTROPIUM BROMIDE INHALATION SPRAY 3.12 UG/1
5 SPRAY, METERED RESPIRATORY (INHALATION) DAILY
Qty: 1 EACH | Refills: 5 | Status: ON HOLD | OUTPATIENT
Start: 2023-01-27 | End: 2023-02-08

## 2023-01-27 RX ORDER — AZITHROMYCIN 250 MG/1
TABLET, FILM COATED ORAL
Qty: 6 TABLET | Refills: 0 | Status: ON HOLD | OUTPATIENT
Start: 2023-01-27 | End: 2023-02-20

## 2023-01-27 RX ORDER — PREDNISONE 10 MG/1
TABLET ORAL
Qty: 40 TABLET | Refills: 1 | Status: ON HOLD | OUTPATIENT
Start: 2023-01-27 | End: 2023-02-20

## 2023-01-27 ASSESSMENT — FIBROSIS 4 INDEX: FIB4 SCORE: 4.06

## 2023-01-30 ENCOUNTER — PATIENT OUTREACH (OUTPATIENT)
Dept: HEALTH INFORMATION MANAGEMENT | Facility: OTHER | Age: 68
End: 2023-01-30
Payer: MEDICARE

## 2023-01-30 DIAGNOSIS — J44.1 CHRONIC OBSTRUCTIVE PULMONARY DISEASE WITH ACUTE EXACERBATION (HCC): ICD-10-CM

## 2023-01-30 DIAGNOSIS — I27.20 PULMONARY HYPERTENSION (HCC): ICD-10-CM

## 2023-01-30 NOTE — PROGRESS NOTES
CHERROL Connor called the pt for her monthly PCM appointment to let the pt know that Alphonso is no longer with us and Dione will be taking over. Left a message for the pt to return call.

## 2023-02-01 ENCOUNTER — OFFICE VISIT (OUTPATIENT)
Dept: MEDICAL GROUP | Facility: MEDICAL CENTER | Age: 68
End: 2023-02-01
Payer: MEDICARE

## 2023-02-01 VITALS
RESPIRATION RATE: 16 BRPM | HEART RATE: 83 BPM | OXYGEN SATURATION: 97 % | BODY MASS INDEX: 21.18 KG/M2 | HEIGHT: 69 IN | SYSTOLIC BLOOD PRESSURE: 140 MMHG | WEIGHT: 143 LBS | TEMPERATURE: 99.1 F | DIASTOLIC BLOOD PRESSURE: 80 MMHG

## 2023-02-01 DIAGNOSIS — I27.81 COR PULMONALE (CHRONIC) (HCC): ICD-10-CM

## 2023-02-01 DIAGNOSIS — I27.20 PULMONARY HYPERTENSION (HCC): ICD-10-CM

## 2023-02-01 DIAGNOSIS — J44.1 CHRONIC OBSTRUCTIVE PULMONARY DISEASE WITH ACUTE EXACERBATION (HCC): ICD-10-CM

## 2023-02-01 DIAGNOSIS — Z23 NEED FOR VACCINATION: ICD-10-CM

## 2023-02-01 DIAGNOSIS — J96.21 ACUTE ON CHRONIC RESPIRATORY FAILURE WITH HYPOXIA (HCC): ICD-10-CM

## 2023-02-01 DIAGNOSIS — Z09 HOSPITAL DISCHARGE FOLLOW-UP: ICD-10-CM

## 2023-02-01 DIAGNOSIS — J44.1 COPD EXACERBATION (HCC): ICD-10-CM

## 2023-02-01 DIAGNOSIS — F41.9 ANXIETY: ICD-10-CM

## 2023-02-01 DIAGNOSIS — M45.3 ANKYLOSING SPONDYLITIS OF CERVICOTHORACIC REGION (HCC): ICD-10-CM

## 2023-02-01 PROCEDURE — G0008 ADMIN INFLUENZA VIRUS VAC: HCPCS | Performed by: FAMILY MEDICINE

## 2023-02-01 PROCEDURE — 99214 OFFICE O/P EST MOD 30 MIN: CPT | Mod: 25 | Performed by: FAMILY MEDICINE

## 2023-02-01 PROCEDURE — 90662 IIV NO PRSV INCREASED AG IM: CPT | Performed by: FAMILY MEDICINE

## 2023-02-01 RX ORDER — DOXYCYCLINE 100 MG/1
TABLET ORAL
COMMUNITY
Start: 2023-01-22 | End: 2023-02-01

## 2023-02-01 RX ORDER — SERTRALINE HYDROCHLORIDE 25 MG/1
25 TABLET, FILM COATED ORAL DAILY
Qty: 30 TABLET | Refills: 11 | Status: SHIPPED | OUTPATIENT
Start: 2023-02-01 | End: 2023-02-27

## 2023-02-01 RX ORDER — TIOTROPIUM BROMIDE INHALATION SPRAY 3.12 UG/1
SPRAY, METERED RESPIRATORY (INHALATION)
COMMUNITY
Start: 2023-01-27

## 2023-02-01 RX ORDER — HYDROXYZINE HYDROCHLORIDE 25 MG/1
25 TABLET, FILM COATED ORAL 3 TIMES DAILY PRN
Qty: 30 TABLET | Refills: 0 | Status: SHIPPED | OUTPATIENT
Start: 2023-02-01

## 2023-02-01 RX ORDER — AZITHROMYCIN 250 MG/1
TABLET, FILM COATED ORAL
COMMUNITY
Start: 2023-01-27 | End: 2023-02-01

## 2023-02-01 ASSESSMENT — FIBROSIS 4 INDEX: FIB4 SCORE: 1.94

## 2023-02-01 ASSESSMENT — PATIENT HEALTH QUESTIONNAIRE - PHQ9: CLINICAL INTERPRETATION OF PHQ2 SCORE: 0

## 2023-02-01 NOTE — PROGRESS NOTES
CC: Hospital discharge follow-up    HPI:   Richard presents today for posthospitalization follow-up    Patient was admitted to [facility] on 01/18 with diagnosis of acute on chronic hypoxic respiratory failure likely due to COPD exacerbation but differential diagnosis on admission were PE vs MI vs Heart Failure vs CAP/Atypical. MI was ruled out; Community acquired pneumonia was ruled out; PE was ruled out.  Patient has received steroid treatment , doxycycline 100 mg twice a day to be completed 01/23/22, has been on Symbicort twice a day, levalbuterol as needed, Spiriva daily.  Patient was also found to have a lung nodule/ right hilar nodule 17 x 21 mm of undetermined etiology for which he will require close follow-up in 3 months after discharge in outpatient setting.  Patient was discharged in stable condition on 1/22/2023.  Currently on follow-up.  Patient has been symptomatic with exertion, however his symptoms has improved.  He has been doing fine on Symbicort twice a day, Spiriva daily, and levalbuterol as needed.  Reported anxiety that he feels all the time especially when he gets short of breath, however has no history of depression.  Patient is due for flu shot    Patient Active Problem List    Diagnosis Date Noted    Hospital discharge follow-up 02/01/2023    Acute on chronic respiratory failure (Formerly [facility]) 11/14/2022    Cor pulmonale (chronic) (Formerly [facility]) 05/29/2019    Pulmonary hypertension ([facility]ly [facility]) 05/29/2019    Cellulitis 03/12/2019    Chronic obstructive pulmonary disease with acute exacerbation ([facility]ly [facility]) 03/12/2019    Bilateral lower extremity edema 03/12/2019    Acute on chronic respiratory failure with hypoxia, COPD, grade 1 diastolic dysfunction ([facility]) 03/12/2019    Ankylosing spondylitis of cervicothoracic region ([facility]) 11/15/2018    Tobacco use disorder, mild, in sustained remission, abuse 07/28/2016    COLD (chronic obstructive lung disease) (Formerly [facility]) 07/28/2016       Current Outpatient Medications   Medication Sig  Dispense Refill    azithromycin (ZITHROMAX) 250 MG Tab       doxycycline monohydrate (ADOXA) 100 MG tablet TAKE 1 TABLET BY MOUTH EVERY 12 HOURS FOR 1 DAY      SPIRIVA RESPIMAT 2.5 MCG/ACT Aero Soln       fluticasone-umeclidin-vilant (TRELEGY ELLIPTA) 100-62.5-25 MCG/ACT AEROSOL POWDER, BREATH ACTIVATED inhalation Inhale 1 Inhalation every day. 2 Each 0    budesonide-formoterol (SYMBICORT) 160-4.5 MCG/ACT Aerosol Inhale 2 Puffs 2 times a day. 10.2 g 3    budesonide-formoterol (SYMBICORT) 80-4.5 MCG/ACT Aerosol Inhale 2 Puffs 2 times a day. 1 Each 3    therapeutic multivitamin-minerals (THERAGRAN-M) Tab Take 1 Tablet by mouth every day. Per patient takes [place] multivitamin      acetaminophen (TYLENOL) 325 MG Tab Take 2 Tablets by mouth every 6 hours as needed for Mild Pain, Moderate Pain or Fever. 30 Tablet 0    ipratropium-albuterol (DUONEB) 0.5-2.5 (3) MG/3ML nebulizer solution Inhale 3 mL by nebulization every four hours as needed for Shortness of Breath. 90 mL 3    predniSONE (DELTASONE) 10 MG Tab Take 4 tablets (40 mg) by mouth daily for 3 days, then Take 3 tablets (30 mg) daily for 5 days, then Take 2 tablets (20 mg) daily for 5 days, then Take 1 tablet (10 mg) daily for 5 days, then take 1/2 tablet (5 mg) daily for 5 days, then Stop 45 Tablet 0    omeprazole (PRILOSEC) 20 MG delayed-release capsule Take 1 Capsule by mouth every day. 30 Capsule 0    naproxen (NAPROSYN) 500 MG Tab Take 1 Tablet by mouth 2 times daily with meals as needed (pain).      Tiotropium Bromide-Olodaterol (STIOLTO RESPIMAT) 2.5-2.5 MCG/ACT Aero Soln Inhale 1 Puff 2 times a day.      levalbuterol (XOPENEX HFA) 45 MCG/ACT inhaler INHALE 2 PUFFS EVERY 4 HOURS AS NEEDED FOR SHORTNESS OF BREATH (Patient taking differently: 2 Puffs every four hours as needed.) 15 g 11    furosemide (LASIX) 40 MG Tab Take 1 Tablet by mouth every day. 90 Tablet 3    Home Care Oxygen Inhale 3 L/min continuous.       No current facility-administered  "medications for this visit.         Allergies as of 02/01/2023    (No Known Allergies)        ROS: Denies any chest pain, Shortness of breath, Changes bowel or bladder, Lower extremity edema.    Physical Exam:  BP (!) 140/80 (BP Location: Right arm, Patient Position: Sitting, BP Cuff Size: Adult)   Pulse 83   Temp 37.3 °C (99.1 °F) (Temporal)   Resp 16   Ht 1.753 m (5' 9\")   Wt 64.9 kg (143 lb)   SpO2 97% Comment: with 02  BMI 21.12 kg/m²   Gen.: Well-developed, well-nourished, no apparent distress,pleasant and cooperative with the examination  Skin:  Warm and dry with good turgor. No rashes or suspicious lesions in visible areas  HEENT:Sinuses nontender with palpation, TMs clear, nares patent with pink mucosa and clear rhinorrhea,no septal deviation ,polyps or lesions. lips without lesions, oropharynx clear.  Neck: Trachea midline,no masses or adenopathy. No JVD.  Cor: Regular rate and rhythm without murmur, gallop or rub.  Lungs: Respirations unlabored.Clear to auscultation with equal breath sounds bilaterally. No wheezes, rhonchi.  Extremities: No cyanosis, clubbing or edema.      Assessment and Plan.   67 y.o. male     1. Hospital discharge follow-up  Hospital discharge summary reviewed.    2. Chronic obstructive pulmonary disease with acute exacerbation (HCC)  Resolved.  S/p treatment with steroids and oral antibiotics  Continue Symbicort twice daily, levalbuterol as needed, and Spiriva daily    3. Pulmonary hypertension (HCC)  Stable.  Continue Lasix 40 mg daily    4. Cor pulmonale (chronic) (HCC)  Due to COPD.  Continue on Lasix as mentioned above    5. Ankylosing spondylitis of cervicothoracic region (HCC)  Stable.  Asymptomatic however it has been causing restrictive and obstructive lung disease    6. Lung nodule  Patient was found to have lung nodule/ right hilar nodule 17 x 21 mm of undetermined etiology for which he will require close follow-up in 3 months    8. Anxiety  Patient's anxiety has been " getting worse.  However denies any history of depression  Advised to take hydroxyzine as needed  Take sertraline 25 mg daily    - hydrOXYzine HCl (ATARAX) 25 MG Tab; Take 1 Tablet by mouth 3 times a day as needed for Anxiety.  Dispense: 30 Tablet; Refill: 0  - sertraline (ZOLOFT) 25 MG tablet; Take 1 Tablet by mouth every day.  Dispense: 30 Tablet; Refill: 11    9. Need for vaccination  Due for flu shot.  Given today  - INFLUENZA VACCINE, HIGH DOSE (65+ ONLY)

## 2023-02-01 NOTE — PROGRESS NOTES
Chief Complaint   Patient presents with    COPD     HFV       HPI:  Nikolas Bob is a 67 y.o. year old male here today for follow-up on COPD and chronic respiratory failure with hypoxia.  Patient presents for hospital follow-up.  Has medical history includes ankylosing spondylitis, kyphoscoliosis, cor pulmonale, pulmonary hypertension followed by cardiology.  He requires O2 at 3 L ATC and prefers simple open mask.  Does not tolerate nasal cannula prongs in his nose for any period of time, Patient presented to the emergency room on 1/18/2023 for this of breath and chest pain. Admitted 01/18 with diagnosis of acute on chronic hypoxic respiratory failure likely due to COPD exacerbation but differential diagnosis on admission were PE vs MI vs Heart Failure vs CAP/Atypical. MI was ruled out; Community acquired pneumonia was ruled out; PE was ruled out.  There was evidence of an incidental finding on CTPA of right hilar nodule 17 x 21 mm.  Due to patient's chart not being merged previously, there is evidence of this nodule on past CT lung cancer screenings.  Allergy has addended the result.  Results as follows:    CTA chest (1/19/2023):   No CT evidence of acute pulmonary thromboembolism  Severe emphysema with bronchial thickening that most likely indicates bronchitis. Viral respiratory infection or reactive areas disease should be considered   Addendum:  Due to technical error, there were 2 medical record numbers generated for this patient and therefore no comparison was known to me at the time of initial interpretation. It turns out there is a noncontrast comparison from 1/21/2019.  In reviewing this the right hilar enlarged node was likely present, just not distinctly visualized in the absence of contrast. This is suggested on axial image 79 and is stable to slightly diminished in size on the new study.    Patient comes in today for follow-up.  Currently, he is taking Stiolto and Symbicort.  Patient does state that  "shortness of breath because anxiety and pounding heartbeat.  Currently, he has a wet cough with clear sputum that is occasionally brownish or yellow.  He does have chest congestion with rattling breathing sounds.  Also occasionally experiences wheezing.  He is currently using Xopenex along with his maintenance inhalers.  Previously he was trialed on albuterol but they caused him anxiety and shakiness.  Patient feels that his breathing is somewhat better, but still does have a productive cough.        ROS: As per HPI and otherwise negative if not stated.    History reviewed. No pertinent past medical history.    History reviewed. No pertinent surgical history.    History reviewed. No pertinent family history.    Allergies as of 01/27/2023    (No Known Allergies)        Vitals:  /52 (BP Location: Left arm, Patient Position: Sitting, BP Cuff Size: Adult)   Pulse 86   Resp 16   Ht 1.753 m (5' 9\")   Wt 63.5 kg (140 lb)   SpO2 96%     Current medications as of today   Current Outpatient Medications   Medication Sig Dispense Refill    SYMBICORT 160-4.5 MCG/ACT Aerosol USE 2 PUFFS BY MOUTH TWICE A DAY      predniSONE (DELTASONE) 10 MG Tab Take 40mg x 4 days, then take 30mg x 4 days, then take 20mg x 4 days, then 10mg x 4 days with food, then discontinue. 40 Tablet 1    azithromycin (ZITHROMAX) 250 MG Tab Take 2 tablets on day 1, then take 1 tablet a day for 4 days. 6 Tablet 0    tiotropium (SPIRIVA RESPIMAT) 2.5 mcg/Act Aero Soln Inhale 2 Inhalation every day. Assemble and prime. 2 Each 0    tiotropium (SPIRIVA RESPIMAT) 2.5 mcg/Act Aero Soln Inhale 2 Inhalation every day. Assemble and prime. 1 Each 5    ammonium lactate (LAC-HYDRIN) 12 % Lotion Apply 1 Application topically 2 times a day. 225 g 0    Home Care Oxygen Inhale 3 L/min continuous. Preferred is DME      furosemide (LASIX) 40 MG Tab Take 40 mg by mouth every morning.      Multiple Vitamins-Minerals (MULTIVITAMIN ADULT) Chew Tab Chew 1 Tablet every " evening.       No current facility-administered medications for this visit.         Physical Exam:   Gen:           Alert and oriented, No apparent distress. Mood and affect appropriate, normal interaction with examiner.  Eyes:          PERRL, EOM intact, sclere white, conjunctive moist.  Ears:          Not examined.   Hearing:     Grossly intact.  Nose:          Normal, no lesions or deformities.  Dentition:    Good dentition.  Oropharynx:   Tongue normal, posterior pharynx without erythema or exudate.  Neck:        Supple, trachea midline, no masses.  Respiratory Effort: No intercostal retractions or use of accessory muscles.   Lung Auscultation:      Rhonchorous breath sounds with wet cough, and diminished, but otherwise clear to auscultation bilaterally; no rales or wheezing.  CV:            Regular rate and rhythm. No murmurs, rubs or gallops.  Abd:           Not examined.   Lymphadenopathy: Not examined.  Gait and Station: Normal.  Digits and Nails: No clubbing, cyanosis, petechiae, or nodes.   Cranial Nerves: II-XII grossly intact.  Skin:        No rashes, lesions or ulcers noted.               Ext:           No cyanosis or edema.      Assessment:  1. Centrilobular emphysema (HCC)        2. Chronic respiratory failure with hypoxia (HCC)        3. Pulmonary hypertension (HCC)        4. Pulmonary nodules            Plan:  Patient comes in today for hospital follow-up.  Currently he is taking Stiolto and Symbicort.  Patient was advised against this.  Stiolto and Symbicort contain double dose of LABA.  Sample given for Spiriva and recommended continue using Symbicort.  Patient also uses Xopenex as needed for shortness of breath.  Patient does have rhonchorous breath sounds with wet cough with occasional brown or yellow sputum.  If cough worsens, patient was provided with emergency pack and instructed to start using.  Stable at 2 L/min continuous.  Stable.  Managed by cardiology.  4. the right hilar enlarged node  was likely present, just not distinctly visualized in the absence of contrast. This is suggested on axial image 79 and is stable to slightly diminished in size on the new study.  Can rescan in 1 year as per radiology recommendation.    Please note that this dictation was created using voice recognition software. I have made every reasonable attempt to correct obvious errors, but it is possible there are errors of grammar and possibly content that I did not discover before finalizing the note.

## 2023-02-01 NOTE — DOCUMENTATION QUERY
Pending sale to [facility]                                                                       Query Response Note      PATIENT:               IVY ROBERTS  ACCT #:                  [id number]  MRN:                     [id number]  :                      1955  ADMIT DATE:       2023 9:13 AM  DISCH DATE:        2023 5:05 PM  RESPONDING  PROVIDER #:        [id number]           QUERY TEXT:    Sepsis is documented in chart. Per discharge summary community acquired pneumonia ruled out.     Please clarify sepsis status, source of sepsis if ruled in.       The patient's Clinical Indicators include:  Findings:  DC Summary: Community acquired pneumonia was ruled out     PN: Sepsis Resolved Present on admission based on WBC > 12k, HR >90, RR > 20, and source like pulmonary; nonetheless no evident clinical deterioration of patient.     ED: /89   Pulse (!) 114   Temp 36.9 °C (98.5 °F) (Temporal)   Resp 20 WBC 15.4    Treatment:  ED:  treated with  empiric antibiotics     H&P: Sepsis protocol initiated    Risk Factors:  ED: Hazy interstitial opacities, greatest at the right upper lung favoring multifocal pneumonia.     H&P:  sepsis secondary to be acquired pneumonia with contributing factors from COPD      Joselyn Coates RN BSN  Clinical Documentation   [email or web address]  Connect via Voal[employer] Messenger  Options provided:   -- Sepsis is ruled in   -- Sepsis  ruled out   -- Other explanation, (please specify other explanation)   -- Unable to determine      Query created by: Joselyn Andrews on 2023 10:25 AM    RESPONSE TEXT:    Sepsis ruled out          Electronically signed by:  YOLI GOSS MD 2023 1:26 PM

## 2023-02-02 ENCOUNTER — PATIENT OUTREACH (OUTPATIENT)
Dept: HEALTH INFORMATION MANAGEMENT | Facility: OTHER | Age: 68
End: 2023-02-02
Payer: MEDICARE

## 2023-02-02 DIAGNOSIS — I27.20 PULMONARY HYPERTENSION (HCC): ICD-10-CM

## 2023-02-02 DIAGNOSIS — J44.9 CHRONIC OBSTRUCTIVE PULMONARY DISEASE, UNSPECIFIED COPD TYPE (HCC): ICD-10-CM

## 2023-02-02 NOTE — PROGRESS NOTES
CHW Nikolas called the pt for a follow up to,see how he is this week. Pt did not answer and this CHW left a VM and ask the pot to return my call. This CHW will put the pt 1 week out to follow up.

## 2023-02-07 ENCOUNTER — PATIENT OUTREACH (OUTPATIENT)
Dept: HEALTH INFORMATION MANAGEMENT | Facility: OTHER | Age: 68
End: 2023-02-07
Payer: MEDICARE

## 2023-02-07 DIAGNOSIS — J44.9 CHRONIC OBSTRUCTIVE PULMONARY DISEASE, UNSPECIFIED COPD TYPE (HCC): ICD-10-CM

## 2023-02-07 DIAGNOSIS — I27.20 PULMONARY HYPERTENSION (HCC): ICD-10-CM

## 2023-02-07 NOTE — PROGRESS NOTES
Pt returned this CHW call. CHW Nikolas called the pt to do the monthly follow up and to tell them that Alphonso the RN  is no longer with us. This CHW let the pt know that another RN (Dione) will take over March 1st and if the pt needs anything between now and then to please reach out. Pt stated they would be good until then.

## 2023-02-07 NOTE — PROGRESS NOTES
CHW Nikolas called the pt back because the pt called and left a message and asked me to call. Pt did not answer and this CHW left a VM requesting a return call.

## 2023-02-08 ENCOUNTER — APPOINTMENT (OUTPATIENT)
Dept: RADIOLOGY | Facility: MEDICAL CENTER | Age: 68
DRG: 208 | End: 2023-02-08
Attending: EMERGENCY MEDICINE
Payer: COMMERCIAL

## 2023-02-08 ENCOUNTER — APPOINTMENT (OUTPATIENT)
Dept: RADIOLOGY | Facility: MEDICAL CENTER | Age: 68
DRG: 208 | End: 2023-02-08
Attending: INTERNAL MEDICINE
Payer: COMMERCIAL

## 2023-02-08 ENCOUNTER — HOSPITAL ENCOUNTER (INPATIENT)
Facility: MEDICAL CENTER | Age: 68
LOS: 14 days | DRG: 208 | End: 2023-02-22
Attending: EMERGENCY MEDICINE | Admitting: INTERNAL MEDICINE
Payer: COMMERCIAL

## 2023-02-08 DIAGNOSIS — M45.9 ANKYLOSING SPONDYLITIS, UNSPECIFIED SITE OF SPINE (HCC): ICD-10-CM

## 2023-02-08 DIAGNOSIS — J96.01 ACUTE HYPOXEMIC RESPIRATORY FAILURE (HCC): ICD-10-CM

## 2023-02-08 DIAGNOSIS — Z51.5 HOSPICE CARE: ICD-10-CM

## 2023-02-08 DIAGNOSIS — J96.22 ACUTE ON CHRONIC RESPIRATORY FAILURE WITH HYPOXIA AND HYPERCAPNIA (HCC): ICD-10-CM

## 2023-02-08 DIAGNOSIS — R41.82 ALTERED MENTAL STATUS, UNSPECIFIED ALTERED MENTAL STATUS TYPE: ICD-10-CM

## 2023-02-08 DIAGNOSIS — J43.2 CENTRILOBULAR EMPHYSEMA (HCC): ICD-10-CM

## 2023-02-08 DIAGNOSIS — J96.01 ACUTE RESPIRATORY FAILURE WITH HYPOXIA AND HYPERCAPNIA (HCC): ICD-10-CM

## 2023-02-08 DIAGNOSIS — J44.1 CHRONIC OBSTRUCTIVE PULMONARY DISEASE WITH ACUTE EXACERBATION (HCC): ICD-10-CM

## 2023-02-08 DIAGNOSIS — J96.21 ACUTE ON CHRONIC RESPIRATORY FAILURE WITH HYPOXIA AND HYPERCAPNIA (HCC): ICD-10-CM

## 2023-02-08 DIAGNOSIS — F41.0 PANIC ATTACKS: ICD-10-CM

## 2023-02-08 DIAGNOSIS — J44.1 ACUTE EXACERBATION OF CHRONIC OBSTRUCTIVE PULMONARY DISEASE (COPD) (HCC): ICD-10-CM

## 2023-02-08 DIAGNOSIS — F41.9 ANXIETY: ICD-10-CM

## 2023-02-08 DIAGNOSIS — J96.02 ACUTE RESPIRATORY FAILURE WITH HYPOXIA AND HYPERCAPNIA (HCC): ICD-10-CM

## 2023-02-08 DIAGNOSIS — R09.89 AIR HUNGER: ICD-10-CM

## 2023-02-08 DIAGNOSIS — I10 PRIMARY HYPERTENSION: ICD-10-CM

## 2023-02-08 DIAGNOSIS — G89.4 CHRONIC PAIN SYNDROME: ICD-10-CM

## 2023-02-08 DIAGNOSIS — J96.21 ACUTE ON CHRONIC RESPIRATORY FAILURE WITH HYPOXEMIA (HCC): ICD-10-CM

## 2023-02-08 DIAGNOSIS — R79.89 ELEVATED TROPONIN: ICD-10-CM

## 2023-02-08 PROBLEM — J44.9 COPD (CHRONIC OBSTRUCTIVE PULMONARY DISEASE) (HCC): Status: ACTIVE | Noted: 2023-02-08

## 2023-02-08 PROBLEM — M79.89 SWELLING OF LOWER EXTREMITY: Status: ACTIVE | Noted: 2023-02-08

## 2023-02-08 LAB
ALBUMIN SERPL BCP-MCNC: 4.9 G/DL (ref 3.2–4.9)
ALBUMIN/GLOB SERPL: 1.4 G/DL
ALP SERPL-CCNC: 74 U/L (ref 30–99)
ALT SERPL-CCNC: 26 U/L (ref 2–50)
AMORPH CRY #/AREA URNS HPF: PRESENT /HPF
ANION GAP SERPL CALC-SCNC: 8 MMOL/L (ref 7–16)
APPEARANCE UR: CLEAR
AST SERPL-CCNC: 40 U/L (ref 12–45)
BACTERIA #/AREA URNS HPF: ABNORMAL /HPF
BASE EXCESS BLDA CALC-SCNC: -3 MMOL/L (ref -4–3)
BASE EXCESS BLDA CALC-SCNC: 1 MMOL/L (ref -4–3)
BASOPHILS # BLD AUTO: 0.8 % (ref 0–1.8)
BASOPHILS # BLD: 0.08 K/UL (ref 0–0.12)
BILIRUB SERPL-MCNC: 0.2 MG/DL (ref 0.1–1.5)
BILIRUB UR QL STRIP.AUTO: NEGATIVE
BODY TEMPERATURE: 37.1 CENTIGRADE
BODY TEMPERATURE: ABNORMAL DEGREES
BREATHS SETTING VENT: 24
BUN SERPL-MCNC: 21 MG/DL (ref 8–22)
CALCIUM ALBUM COR SERPL-MCNC: 8.7 MG/DL (ref 8.5–10.5)
CALCIUM SERPL-MCNC: 9.4 MG/DL (ref 8.5–10.5)
CHLORIDE SERPL-SCNC: 99 MMOL/L (ref 96–112)
CO2 BLDA-SCNC: 37 MMOL/L (ref 20–33)
CO2 SERPL-SCNC: 32 MMOL/L (ref 20–33)
COLOR UR: YELLOW
CREAT SERPL-MCNC: 1.17 MG/DL (ref 0.5–1.4)
DELSYS IDSYS: ABNORMAL
EKG IMPRESSION: NORMAL
EOSINOPHIL # BLD AUTO: 0.11 K/UL (ref 0–0.51)
EOSINOPHIL NFR BLD: 1.1 % (ref 0–6.9)
EPI CELLS #/AREA URNS HPF: ABNORMAL /HPF
ERYTHROCYTE [DISTWIDTH] IN BLOOD BY AUTOMATED COUNT: 46.8 FL (ref 35.9–50)
FLUAV RNA SPEC QL NAA+PROBE: NEGATIVE
FLUBV RNA SPEC QL NAA+PROBE: NEGATIVE
GFR SERPLBLD CREATININE-BSD FMLA CKD-EPI: 67 ML/MIN/1.73 M 2
GLOBULIN SER CALC-MCNC: 3.4 G/DL (ref 1.9–3.5)
GLUCOSE SERPL-MCNC: 165 MG/DL (ref 65–99)
GLUCOSE UR STRIP.AUTO-MCNC: NEGATIVE MG/DL
HCO3 BLDA-SCNC: 27 MMOL/L (ref 17–25)
HCO3 BLDA-SCNC: 34 MMOL/L (ref 17–25)
HCT VFR BLD AUTO: 46.9 % (ref 42–52)
HGB BLD-MCNC: 14.4 G/DL (ref 14–18)
HOROWITZ INDEX BLDA+IHG-RTO: 71 MM[HG]
HYALINE CASTS #/AREA URNS LPF: ABNORMAL /LPF
IMM GRANULOCYTES # BLD AUTO: 0.06 K/UL (ref 0–0.11)
IMM GRANULOCYTES NFR BLD AUTO: 0.6 % (ref 0–0.9)
INHALED O2 FLOW RATE: 7 L/MIN (ref 2–10)
KETONES UR STRIP.AUTO-MCNC: NEGATIVE MG/DL
LACTATE SERPL-SCNC: 1.2 MMOL/L (ref 0.5–2)
LACTATE SERPL-SCNC: 2.6 MMOL/L (ref 0.5–2)
LEUKOCYTE ESTERASE UR QL STRIP.AUTO: NEGATIVE
LYMPHOCYTES # BLD AUTO: 2.62 K/UL (ref 1–4.8)
LYMPHOCYTES NFR BLD: 26.6 % (ref 22–41)
MAGNESIUM SERPL-MCNC: 1.8 MG/DL (ref 1.5–2.5)
MCH RBC QN AUTO: 27.8 PG (ref 27–33)
MCHC RBC AUTO-ENTMCNC: 30.7 G/DL (ref 33.7–35.3)
MCV RBC AUTO: 90.5 FL (ref 81.4–97.8)
MICRO URNS: ABNORMAL
MODE IMODE: ABNORMAL
MONOCYTES # BLD AUTO: 1.27 K/UL (ref 0–0.85)
MONOCYTES NFR BLD AUTO: 12.9 % (ref 0–13.4)
NEUTROPHILS # BLD AUTO: 5.71 K/UL (ref 1.82–7.42)
NEUTROPHILS NFR BLD: 58 % (ref 44–72)
NITRITE UR QL STRIP.AUTO: NEGATIVE
NRBC # BLD AUTO: 0 K/UL
NRBC BLD-RTO: 0 /100 WBC
NT-PROBNP SERPL IA-MCNC: 1026 PG/ML (ref 0–125)
O2/TOTAL GAS SETTING VFR VENT: 100 %
PCO2 BLDA: 70 MMHG (ref 26–37)
PCO2 BLDA: >100 MMHG (ref 26–37)
PCO2 TEMP ADJ BLDA: 70.3 MMHG (ref 26–37)
PCO2 TEMP ADJ BLDA: >100 MMHG (ref 26–37)
PEEP END EXPIRATORY PRESSURE IPEEP: 8 CMH20
PH BLDA: 7.11 [PH] (ref 7.4–7.5)
PH BLDA: 7.2 [PH] (ref 7.4–7.5)
PH TEMP ADJ BLDA: 7.1 [PH] (ref 7.4–7.5)
PH TEMP ADJ BLDA: 7.2 [PH] (ref 7.4–7.5)
PH UR STRIP.AUTO: 7 [PH] (ref 5–8)
PHOSPHATE SERPL-MCNC: 3.8 MG/DL (ref 2.5–4.5)
PLATELET # BLD AUTO: 219 K/UL (ref 164–446)
PMV BLD AUTO: 10.8 FL (ref 9–12.9)
PO2 BLDA: 264.4 MMHG (ref 64–87)
PO2 BLDA: 71 MMHG (ref 64–87)
PO2 TEMP ADJ BLDA: 264.9 MMHG (ref 64–87)
PO2 TEMP ADJ BLDA: 71 MMHG (ref 64–87)
POTASSIUM SERPL-SCNC: 4.6 MMOL/L (ref 3.6–5.5)
PROCALCITONIN SERPL-MCNC: 0.7 NG/ML
PROT SERPL-MCNC: 8.3 G/DL (ref 6–8.2)
PROT UR QL STRIP: 100 MG/DL
RBC # BLD AUTO: 5.18 M/UL (ref 4.7–6.1)
RBC # URNS HPF: ABNORMAL /HPF
RBC UR QL AUTO: ABNORMAL
RSV RNA SPEC QL NAA+PROBE: NEGATIVE
SAO2 % BLDA: 85 % (ref 93–99)
SAO2 % BLDA: 99.1 % (ref 93–99)
SARS-COV-2 RNA RESP QL NAA+PROBE: NOTDETECTED
SODIUM SERPL-SCNC: 139 MMOL/L (ref 135–145)
SP GR UR STRIP.AUTO: 1.02
SPECIMEN DRAWN FROM PATIENT: ABNORMAL
SPECIMEN SOURCE: NORMAL
TIDAL VOLUME IVT: 420 ML
TROPONIN T SERPL-MCNC: 115 NG/L (ref 6–19)
TROPONIN T SERPL-MCNC: 208 NG/L (ref 6–19)
UROBILINOGEN UR STRIP.AUTO-MCNC: 0.2 MG/DL
WBC # BLD AUTO: 9.9 K/UL (ref 4.8–10.8)
WBC #/AREA URNS HPF: ABNORMAL /HPF

## 2023-02-08 PROCEDURE — 99291 CRITICAL CARE FIRST HOUR: CPT

## 2023-02-08 PROCEDURE — 84100 ASSAY OF PHOSPHORUS: CPT

## 2023-02-08 PROCEDURE — 94003 VENT MGMT INPAT SUBQ DAY: CPT

## 2023-02-08 PROCEDURE — 83880 ASSAY OF NATRIURETIC PEPTIDE: CPT

## 2023-02-08 PROCEDURE — 700111 HCHG RX REV CODE 636 W/ 250 OVERRIDE (IP): Performed by: INTERNAL MEDICINE

## 2023-02-08 PROCEDURE — 303105 HCHG CATHETER EXTRA

## 2023-02-08 PROCEDURE — 87086 URINE CULTURE/COLONY COUNT: CPT

## 2023-02-08 PROCEDURE — A9270 NON-COVERED ITEM OR SERVICE: HCPCS | Performed by: INTERNAL MEDICINE

## 2023-02-08 PROCEDURE — 96366 THER/PROPH/DIAG IV INF ADDON: CPT

## 2023-02-08 PROCEDURE — 94640 AIRWAY INHALATION TREATMENT: CPT

## 2023-02-08 PROCEDURE — C9803 HOPD COVID-19 SPEC COLLECT: HCPCS | Performed by: EMERGENCY MEDICINE

## 2023-02-08 PROCEDURE — 96365 THER/PROPH/DIAG IV INF INIT: CPT

## 2023-02-08 PROCEDURE — 770022 HCHG ROOM/CARE - ICU (200)

## 2023-02-08 PROCEDURE — 85025 COMPLETE CBC W/AUTO DIFF WBC: CPT

## 2023-02-08 PROCEDURE — 83735 ASSAY OF MAGNESIUM: CPT

## 2023-02-08 PROCEDURE — 0BH18EZ INSERTION OF ENDOTRACHEAL AIRWAY INTO TRACHEA, VIA NATURAL OR ARTIFICIAL OPENING ENDOSCOPIC: ICD-10-PCS | Performed by: EMERGENCY MEDICINE

## 2023-02-08 PROCEDURE — 94799 UNLISTED PULMONARY SVC/PX: CPT

## 2023-02-08 PROCEDURE — 94002 VENT MGMT INPAT INIT DAY: CPT

## 2023-02-08 PROCEDURE — 96368 THER/DIAG CONCURRENT INF: CPT

## 2023-02-08 PROCEDURE — 99291 CRITICAL CARE FIRST HOUR: CPT | Performed by: INTERNAL MEDICINE

## 2023-02-08 PROCEDURE — 99292 CRITICAL CARE ADDL 30 MIN: CPT | Performed by: INTERNAL MEDICINE

## 2023-02-08 PROCEDURE — 700111 HCHG RX REV CODE 636 W/ 250 OVERRIDE (IP): Performed by: EMERGENCY MEDICINE

## 2023-02-08 PROCEDURE — 96375 TX/PRO/DX INJ NEW DRUG ADDON: CPT

## 2023-02-08 PROCEDURE — 84145 PROCALCITONIN (PCT): CPT

## 2023-02-08 PROCEDURE — 31500 INSERT EMERGENCY AIRWAY: CPT

## 2023-02-08 PROCEDURE — 82803 BLOOD GASES ANY COMBINATION: CPT | Mod: 91

## 2023-02-08 PROCEDURE — 51702 INSERT TEMP BLADDER CATH: CPT

## 2023-02-08 PROCEDURE — 36415 COLL VENOUS BLD VENIPUNCTURE: CPT

## 2023-02-08 PROCEDURE — 94760 N-INVAS EAR/PLS OXIMETRY 1: CPT

## 2023-02-08 PROCEDURE — 700105 HCHG RX REV CODE 258

## 2023-02-08 PROCEDURE — 84484 ASSAY OF TROPONIN QUANT: CPT | Mod: 91

## 2023-02-08 PROCEDURE — 700101 HCHG RX REV CODE 250

## 2023-02-08 PROCEDURE — 87899 AGENT NOS ASSAY W/OPTIC: CPT

## 2023-02-08 PROCEDURE — 96376 TX/PRO/DX INJ SAME DRUG ADON: CPT

## 2023-02-08 PROCEDURE — 87040 BLOOD CULTURE FOR BACTERIA: CPT

## 2023-02-08 PROCEDURE — 36600 WITHDRAWAL OF ARTERIAL BLOOD: CPT

## 2023-02-08 PROCEDURE — 700101 HCHG RX REV CODE 250: Performed by: INTERNAL MEDICINE

## 2023-02-08 PROCEDURE — 700105 HCHG RX REV CODE 258: Performed by: INTERNAL MEDICINE

## 2023-02-08 PROCEDURE — 700102 HCHG RX REV CODE 250 W/ 637 OVERRIDE(OP): Performed by: INTERNAL MEDICINE

## 2023-02-08 PROCEDURE — 0241U HCHG SARS-COV-2 COVID-19 NFCT DS RESP RNA 4 TRGT MIC: CPT

## 2023-02-08 PROCEDURE — 700101 HCHG RX REV CODE 250: Performed by: EMERGENCY MEDICINE

## 2023-02-08 PROCEDURE — 83605 ASSAY OF LACTIC ACID: CPT | Mod: 91

## 2023-02-08 PROCEDURE — 93005 ELECTROCARDIOGRAM TRACING: CPT | Performed by: EMERGENCY MEDICINE

## 2023-02-08 PROCEDURE — 87449 NOS EACH ORGANISM AG IA: CPT

## 2023-02-08 PROCEDURE — 71045 X-RAY EXAM CHEST 1 VIEW: CPT

## 2023-02-08 PROCEDURE — 92950 HEART/LUNG RESUSCITATION CPR: CPT

## 2023-02-08 PROCEDURE — 80053 COMPREHEN METABOLIC PANEL: CPT

## 2023-02-08 PROCEDURE — 5A1945Z RESPIRATORY VENTILATION, 24-96 CONSECUTIVE HOURS: ICD-10-PCS | Performed by: EMERGENCY MEDICINE

## 2023-02-08 PROCEDURE — 81001 URINALYSIS AUTO W/SCOPE: CPT

## 2023-02-08 PROCEDURE — 700111 HCHG RX REV CODE 636 W/ 250 OVERRIDE (IP): Performed by: STUDENT IN AN ORGANIZED HEALTH CARE EDUCATION/TRAINING PROGRAM

## 2023-02-08 PROCEDURE — 700105 HCHG RX REV CODE 258: Performed by: EMERGENCY MEDICINE

## 2023-02-08 PROCEDURE — 700111 HCHG RX REV CODE 636 W/ 250 OVERRIDE (IP)

## 2023-02-08 RX ORDER — BISACODYL 10 MG
10 SUPPOSITORY, RECTAL RECTAL
Status: DISCONTINUED | OUTPATIENT
Start: 2023-02-08 | End: 2023-02-08

## 2023-02-08 RX ORDER — HYDROXYZINE HYDROCHLORIDE 25 MG/1
25 TABLET, FILM COATED ORAL 3 TIMES DAILY PRN
Status: ON HOLD | COMMUNITY
End: 2023-02-20

## 2023-02-08 RX ORDER — BUDESONIDE AND FORMOTEROL FUMARATE DIHYDRATE 160; 4.5 UG/1; UG/1
2 AEROSOL RESPIRATORY (INHALATION) 2 TIMES DAILY
Status: DISCONTINUED | OUTPATIENT
Start: 2023-02-08 | End: 2023-02-10

## 2023-02-08 RX ORDER — LEVALBUTEROL INHALATION SOLUTION 1.25 MG/3ML
1.25 SOLUTION RESPIRATORY (INHALATION)
Status: DISCONTINUED | OUTPATIENT
Start: 2023-02-08 | End: 2023-02-11

## 2023-02-08 RX ORDER — METHYLPREDNISOLONE SODIUM SUCCINATE 125 MG/2ML
62.5 INJECTION, POWDER, LYOPHILIZED, FOR SOLUTION INTRAMUSCULAR; INTRAVENOUS EVERY 8 HOURS
Status: DISCONTINUED | OUTPATIENT
Start: 2023-02-08 | End: 2023-02-09

## 2023-02-08 RX ORDER — AMOXICILLIN 250 MG
2 CAPSULE ORAL 2 TIMES DAILY
Status: DISCONTINUED | OUTPATIENT
Start: 2023-02-08 | End: 2023-02-12

## 2023-02-08 RX ORDER — SODIUM CHLORIDE 9 MG/ML
1000 INJECTION, SOLUTION INTRAVENOUS ONCE
Status: COMPLETED | OUTPATIENT
Start: 2023-02-08 | End: 2023-02-08

## 2023-02-08 RX ORDER — HYDRALAZINE HYDROCHLORIDE 20 MG/ML
10 INJECTION INTRAMUSCULAR; INTRAVENOUS EVERY 4 HOURS PRN
Status: DISCONTINUED | OUTPATIENT
Start: 2023-02-08 | End: 2023-02-10

## 2023-02-08 RX ORDER — IPRATROPIUM BROMIDE AND ALBUTEROL SULFATE 2.5; .5 MG/3ML; MG/3ML
3 SOLUTION RESPIRATORY (INHALATION)
Status: DISCONTINUED | OUTPATIENT
Start: 2023-02-08 | End: 2023-02-08 | Stop reason: SINTOL

## 2023-02-08 RX ORDER — IPRATROPIUM BROMIDE AND ALBUTEROL SULFATE 2.5; .5 MG/3ML; MG/3ML
3 SOLUTION RESPIRATORY (INHALATION)
Status: DISCONTINUED | OUTPATIENT
Start: 2023-02-08 | End: 2023-02-22 | Stop reason: HOSPADM

## 2023-02-08 RX ORDER — BISACODYL 10 MG
10 SUPPOSITORY, RECTAL RECTAL
Status: DISCONTINUED | OUTPATIENT
Start: 2023-02-08 | End: 2023-02-12

## 2023-02-08 RX ORDER — PROPOFOL 10 MG/ML
200 INJECTION, EMULSION INTRAVENOUS ONCE
Status: COMPLETED | OUTPATIENT
Start: 2023-02-08 | End: 2023-02-08

## 2023-02-08 RX ORDER — HYDROXYZINE HYDROCHLORIDE 25 MG/1
25 TABLET, FILM COATED ORAL 3 TIMES DAILY PRN
Status: DISCONTINUED | OUTPATIENT
Start: 2023-02-08 | End: 2023-02-12

## 2023-02-08 RX ORDER — FAMOTIDINE 20 MG/1
20 TABLET, FILM COATED ORAL EVERY 12 HOURS
Status: DISCONTINUED | OUTPATIENT
Start: 2023-02-08 | End: 2023-02-09

## 2023-02-08 RX ORDER — MAGNESIUM SULFATE HEPTAHYDRATE 40 MG/ML
2 INJECTION, SOLUTION INTRAVENOUS ONCE
Status: COMPLETED | OUTPATIENT
Start: 2023-02-08 | End: 2023-02-08

## 2023-02-08 RX ORDER — IPRATROPIUM BROMIDE AND ALBUTEROL SULFATE 2.5; .5 MG/3ML; MG/3ML
3 SOLUTION RESPIRATORY (INHALATION)
Status: DISCONTINUED | OUTPATIENT
Start: 2023-02-08 | End: 2023-02-08

## 2023-02-08 RX ORDER — FUROSEMIDE 10 MG/ML
20 INJECTION INTRAMUSCULAR; INTRAVENOUS
Status: DISCONTINUED | OUTPATIENT
Start: 2023-02-08 | End: 2023-02-10

## 2023-02-08 RX ORDER — SUCCINYLCHOLINE CHLORIDE 20 MG/ML
100 INJECTION INTRAMUSCULAR; INTRAVENOUS ONCE
Status: COMPLETED | OUTPATIENT
Start: 2023-02-08 | End: 2023-02-08

## 2023-02-08 RX ORDER — LEVALBUTEROL INHALATION SOLUTION 1.25 MG/3ML
SOLUTION RESPIRATORY (INHALATION)
Status: COMPLETED
Start: 2023-02-08 | End: 2023-02-08

## 2023-02-08 RX ORDER — ETOMIDATE 2 MG/ML
20 INJECTION INTRAVENOUS ONCE
Status: COMPLETED | OUTPATIENT
Start: 2023-02-08 | End: 2023-02-08

## 2023-02-08 RX ORDER — ENOXAPARIN SODIUM 100 MG/ML
40 INJECTION SUBCUTANEOUS DAILY
Status: DISCONTINUED | OUTPATIENT
Start: 2023-02-08 | End: 2023-02-13

## 2023-02-08 RX ORDER — AZITHROMYCIN 500 MG/5ML
500 INJECTION, POWDER, LYOPHILIZED, FOR SOLUTION INTRAVENOUS EVERY 24 HOURS
Status: DISCONTINUED | OUTPATIENT
Start: 2023-02-08 | End: 2023-02-08

## 2023-02-08 RX ORDER — LEVALBUTEROL TARTRATE 45 UG/1
1-2 AEROSOL, METERED ORAL EVERY 4 HOURS PRN
COMMUNITY

## 2023-02-08 RX ORDER — PROPOFOL 10 MG/ML
30 INJECTION, EMULSION INTRAVENOUS ONCE
Status: COMPLETED | OUTPATIENT
Start: 2023-02-08 | End: 2023-02-08

## 2023-02-08 RX ORDER — SERTRALINE HYDROCHLORIDE 25 MG/1
25 TABLET, FILM COATED ORAL DAILY
COMMUNITY

## 2023-02-08 RX ORDER — METHYLPREDNISOLONE SODIUM SUCCINATE 125 MG/2ML
62.5 INJECTION, POWDER, LYOPHILIZED, FOR SOLUTION INTRAMUSCULAR; INTRAVENOUS EVERY 8 HOURS
Status: DISCONTINUED | OUTPATIENT
Start: 2023-02-08 | End: 2023-02-08

## 2023-02-08 RX ORDER — POLYETHYLENE GLYCOL 3350 17 G/17G
1 POWDER, FOR SOLUTION ORAL
Status: DISCONTINUED | OUTPATIENT
Start: 2023-02-08 | End: 2023-02-08

## 2023-02-08 RX ORDER — AMOXICILLIN 250 MG
2 CAPSULE ORAL 2 TIMES DAILY
Status: DISCONTINUED | OUTPATIENT
Start: 2023-02-08 | End: 2023-02-08

## 2023-02-08 RX ORDER — TIOTROPIUM BROMIDE AND OLODATEROL 3.124; 2.736 UG/1; UG/1
2 SPRAY, METERED RESPIRATORY (INHALATION) DAILY
COMMUNITY

## 2023-02-08 RX ORDER — POLYETHYLENE GLYCOL 3350 17 G/17G
1 POWDER, FOR SOLUTION ORAL
Status: DISCONTINUED | OUTPATIENT
Start: 2023-02-08 | End: 2023-02-12

## 2023-02-08 RX ORDER — PROPOFOL 10 MG/ML
20 INJECTION, EMULSION INTRAVENOUS ONCE
Status: COMPLETED | OUTPATIENT
Start: 2023-02-08 | End: 2023-02-08

## 2023-02-08 RX ORDER — DEXMEDETOMIDINE HYDROCHLORIDE 4 UG/ML
0-1.5 INJECTION, SOLUTION INTRAVENOUS CONTINUOUS
Status: DISCONTINUED | OUTPATIENT
Start: 2023-02-08 | End: 2023-02-10

## 2023-02-08 RX ORDER — SODIUM CHLORIDE 9 MG/ML
INJECTION, SOLUTION INTRAVENOUS CONTINUOUS
Status: DISCONTINUED | OUTPATIENT
Start: 2023-02-08 | End: 2023-02-09

## 2023-02-08 RX ORDER — METHYLPREDNISOLONE SODIUM SUCCINATE 125 MG/2ML
125 INJECTION, POWDER, LYOPHILIZED, FOR SOLUTION INTRAMUSCULAR; INTRAVENOUS ONCE
Status: COMPLETED | OUTPATIENT
Start: 2023-02-08 | End: 2023-02-08

## 2023-02-08 RX ADMIN — PROPOFOL 20 MG: 10 INJECTION, EMULSION INTRAVENOUS at 08:45

## 2023-02-08 RX ADMIN — FENTANYL CITRATE 50 MCG: 50 INJECTION, SOLUTION INTRAMUSCULAR; INTRAVENOUS at 08:00

## 2023-02-08 RX ADMIN — FAMOTIDINE 20 MG: 10 INJECTION, SOLUTION INTRAVENOUS at 09:51

## 2023-02-08 RX ADMIN — FENTANYL CITRATE 50 MCG: 50 INJECTION, SOLUTION INTRAMUSCULAR; INTRAVENOUS at 08:15

## 2023-02-08 RX ADMIN — FENTANYL CITRATE 100 MCG: 50 INJECTION, SOLUTION INTRAMUSCULAR; INTRAVENOUS at 18:50

## 2023-02-08 RX ADMIN — ALBUTEROL SULFATE 15 MG/HR: 2.5 SOLUTION RESPIRATORY (INHALATION) at 07:01

## 2023-02-08 RX ADMIN — METHYLPREDNISOLONE SODIUM SUCCINATE 125 MG: 125 INJECTION, POWDER, FOR SOLUTION INTRAMUSCULAR; INTRAVENOUS at 07:11

## 2023-02-08 RX ADMIN — SODIUM CHLORIDE 1000 ML: 9 INJECTION, SOLUTION INTRAVENOUS at 06:53

## 2023-02-08 RX ADMIN — PROPOFOL 10 MCG/KG/MIN: 10 INJECTION, EMULSION INTRAVENOUS at 07:05

## 2023-02-08 RX ADMIN — IPRATROPIUM BROMIDE 0.5 MG: 0.5 SOLUTION RESPIRATORY (INHALATION) at 23:06

## 2023-02-08 RX ADMIN — LEVALBUTEROL 1.25 MG: 1.25 SOLUTION RESPIRATORY (INHALATION) at 23:06

## 2023-02-08 RX ADMIN — LEVALBUTEROL 1.25 MG: 1.25 SOLUTION RESPIRATORY (INHALATION) at 06:35

## 2023-02-08 RX ADMIN — SODIUM CHLORIDE: 9 INJECTION, SOLUTION INTRAVENOUS at 09:54

## 2023-02-08 RX ADMIN — FAMOTIDINE 20 MG: 10 INJECTION, SOLUTION INTRAVENOUS at 17:55

## 2023-02-08 RX ADMIN — IPRATROPIUM BROMIDE AND ALBUTEROL SULFATE 3 ML: 2.5; .5 SOLUTION RESPIRATORY (INHALATION) at 14:09

## 2023-02-08 RX ADMIN — ETOMIDATE INJECTION 20 MG: 2 SOLUTION INTRAVENOUS at 06:42

## 2023-02-08 RX ADMIN — IPRATROPIUM BROMIDE 0.5 MG: 0.5 SOLUTION RESPIRATORY (INHALATION) at 06:35

## 2023-02-08 RX ADMIN — FUROSEMIDE 20 MG: 10 INJECTION, SOLUTION INTRAMUSCULAR; INTRAVENOUS at 14:50

## 2023-02-08 RX ADMIN — FENTANYL CITRATE 100 MCG: 50 INJECTION, SOLUTION INTRAMUSCULAR; INTRAVENOUS at 08:45

## 2023-02-08 RX ADMIN — PROPOFOL 30 MG: 10 INJECTION, EMULSION INTRAVENOUS at 07:09

## 2023-02-08 RX ADMIN — DOXYCYCLINE 100 MG: 100 INJECTION, POWDER, LYOPHILIZED, FOR SOLUTION INTRAVENOUS at 11:09

## 2023-02-08 RX ADMIN — PROPOFOL 50 MG: 10 INJECTION, EMULSION INTRAVENOUS at 08:00

## 2023-02-08 RX ADMIN — ENOXAPARIN SODIUM 40 MG: 40 INJECTION SUBCUTANEOUS at 17:55

## 2023-02-08 RX ADMIN — Medication 100 MCG/HR: at 09:15

## 2023-02-08 RX ADMIN — LEVALBUTEROL 1.25 MG: 1.25 SOLUTION RESPIRATORY (INHALATION) at 19:06

## 2023-02-08 RX ADMIN — DOXYCYCLINE 100 MG: 100 INJECTION, POWDER, LYOPHILIZED, FOR SOLUTION INTRAVENOUS at 18:00

## 2023-02-08 RX ADMIN — PROPOFOL 20 MG: 10 INJECTION, EMULSION INTRAVENOUS at 08:15

## 2023-02-08 RX ADMIN — MAGNESIUM SULFATE HEPTAHYDRATE 2 G: 40 INJECTION, SOLUTION INTRAVENOUS at 14:52

## 2023-02-08 RX ADMIN — SODIUM CHLORIDE 1000 ML: 9 INJECTION, SOLUTION INTRAVENOUS at 07:46

## 2023-02-08 RX ADMIN — SUCCINYLCHOLINE CHLORIDE 100 MG: 20 INJECTION, SOLUTION INTRAMUSCULAR; INTRAVENOUS at 06:43

## 2023-02-08 RX ADMIN — PROPOFOL 20 MG: 10 INJECTION, EMULSION INTRAVENOUS at 07:04

## 2023-02-08 RX ADMIN — Medication 125 MCG/HR: at 23:16

## 2023-02-08 RX ADMIN — FENTANYL CITRATE 100 MCG: 50 INJECTION, SOLUTION INTRAMUSCULAR; INTRAVENOUS at 16:25

## 2023-02-08 RX ADMIN — METHYLPREDNISOLONE SODIUM SUCCINATE 62.5 MG: 125 INJECTION, POWDER, FOR SOLUTION INTRAMUSCULAR; INTRAVENOUS at 22:26

## 2023-02-08 RX ADMIN — PROPOFOL 20 MG: 10 INJECTION, EMULSION INTRAVENOUS at 09:00

## 2023-02-08 RX ADMIN — METHYLPREDNISOLONE SODIUM SUCCINATE 62.5 MG: 125 INJECTION, POWDER, FOR SOLUTION INTRAMUSCULAR; INTRAVENOUS at 14:50

## 2023-02-08 RX ADMIN — DEXMEDETOMIDINE 0.2 MCG/KG/HR: 200 INJECTION, SOLUTION INTRAVENOUS at 09:44

## 2023-02-08 RX ADMIN — DEXMEDETOMIDINE 0.9 MCG/KG/HR: 200 INJECTION, SOLUTION INTRAVENOUS at 18:46

## 2023-02-08 RX ADMIN — IPRATROPIUM BROMIDE AND ALBUTEROL SULFATE 3 ML: 2.5; .5 SOLUTION RESPIRATORY (INHALATION) at 09:07

## 2023-02-08 RX ADMIN — IPRATROPIUM BROMIDE 0.5 MG: 0.5 SOLUTION RESPIRATORY (INHALATION) at 18:59

## 2023-02-08 ASSESSMENT — FIBROSIS 4 INDEX
FIB4 SCORE: 2.47
FIB4 SCORE: 4.06

## 2023-02-08 ASSESSMENT — ENCOUNTER SYMPTOMS
FALLS: 0
SHORTNESS OF BREATH: 1
BLURRED VISION: 0
PALPITATIONS: 0
FEVER: 0
MYALGIAS: 0
CHILLS: 0
DOUBLE VISION: 0
HEARTBURN: 0
HEMOPTYSIS: 0
VOMITING: 0
COUGH: 1
HEADACHES: 0
NAUSEA: 0
ABDOMINAL PAIN: 0
SORE THROAT: 0
LOSS OF CONSCIOUSNESS: 0

## 2023-02-08 ASSESSMENT — PAIN DESCRIPTION - PAIN TYPE
TYPE: ACUTE PAIN

## 2023-02-08 NOTE — PALLIATIVE CARE
Palliative Care follow-up  Consult received and E[facility] reviewed [facility] Currently no ACP documents on file. [facility] for any AD[facility]POLST documents.       Plan: Formal consult to follow.    Thank you for allowing Palliative Care to support this patient and family. Contact [phone] for additional assistance, change in patient status, or with any questions/concerns.

## 2023-02-08 NOTE — H&P
"History & Physical Note    Date of Admission: 2023  Admission Status: Inpatient  Attending: Dr. Worthy  Senior Resident: Jarvis Talley M.D.    Contact Number: [phone]    Chief Complaint: Shortness of Breath    History of Present Illness (HPI):   Mr. Nikolas Bob is our 67 year old male patient with a past medical history significant for COPD (3L supplemental O2 baseline), HTN, Ankylosing Spondylitis, Kyphoscoliosis, colectomy for diverticulitis who presented  for chief complaint of shortness of breath x1 day. Associated with rhinorrhea and congestion. Known sick contacts are grandchildren; wife endorses \"they brought home a virus\" which their grandchildren and daughter recently got over. Of note, patient was recently admitted  for acute on chronic hypoxic respiratory failure due to COPD exacerbation. Workup for PE, MI, and HF was negative at that time. Wife endorses only medications patient regularly takes are Spiriva and Symbicort. Patient with >40 pack year smoking history, but has not smoked cigarettes in over an decade.    CBC without leukocytosis WBC 9.9  CMP unremarkable  Covid, Influenza, RSV panel (-)    CXR :  (1) Pulmonary edema and/or infiltrates  (2) Trace BL pleural effusions    EKG : Sinus tachycardia, borderline right axis deviation, abnormal T, consider ischemia, diffuse leads, prolonged QT interval  Troponin 115, in the context of chronically elevated Troponin    Prior admission CT : Severe emphysema with bronchial thickening that most likely indicate(d) bronchitis. Viral respiratory infection or reactive airway disease should be considered. (R hilar enlarged node, could be malignant related to lymphoma or mets)    AB.11/100/71/34    Patient found to be in hypoxic hypercapnic respiratory failure. Using accessory muscles in respiratory distress; altered mental status. Intubated in the ED.    Review of Systems:   Review of Systems   Constitutional:  Positive for " malaise/fatigue. Negative for chills and fever.   HENT:  Positive for congestion. Negative for sore throat.         Rhinorrhea   Eyes:  Negative for blurred vision and double vision.   Respiratory:  Positive for cough and shortness of breath. Negative for hemoptysis.    Cardiovascular:  Negative for chest pain and palpitations.   Gastrointestinal:  Negative for abdominal pain, heartburn, nausea and vomiting.   Genitourinary:  Negative for dysuria, frequency and urgency.   Musculoskeletal:  Negative for falls and myalgias.   Skin:  Negative for itching and rash.   Neurological:  Negative for loss of consciousness and headaches.     Past Medical History:   Past Medical History was reviewed with patient.   has no past medical history on file.    Past Surgical History:   Past Surgical History was reviewed with patient.   has no past surgical history on file.    Medications:   Medications have been reviewed with patient.  Prior to Admission Medications   Prescriptions Last Dose Informant Patient Reported? Taking?   Home Care Oxygen   Yes No   Sig: Inhale 3 L/min continuous. Preferred is DME   Multiple Vitamins-Minerals (MULTIVITAMIN ADULT) Chew Tab  Patient Yes No   Sig: Chew 1 Tablet every evening.   SYMBICORT 160-4.5 MCG/ACT Aerosol   Yes No   Sig: USE 2 PUFFS BY MOUTH TWICE A DAY   ammonium lactate (LAC-HYDRIN) 12 % Lotion   No No   Sig: Apply 1 Application topically 2 times a day.   azithromycin (ZITHROMAX) 250 MG Tab   No No   Sig: Take 2 tablets on day 1, then take 1 tablet a day for 4 days.   furosemide (LASIX) 40 MG Tab  Patient Yes No   Sig: Take 40 mg by mouth every morning.   predniSONE (DELTASONE) 10 MG Tab   No No   Sig: Take 40mg x 4 days, then take 30mg x 4 days, then take 20mg x 4 days, then 10mg x 4 days with food, then discontinue.   tiotropium (SPIRIVA RESPIMAT) 2.5 mcg/Act Aero Soln   No No   Sig: Inhale 2 Inhalation every day. Assemble and prime.   tiotropium (SPIRIVA RESPIMAT) 2.5 mcg/Act Aero Soln    No No   Sig: Inhale 2 Inhalation every day. Assemble and prime.      Facility-Administered Medications: None        Allergies:   Allergies have been reviewed with patient.  No Known Allergies    Primary Care Provider: reviewed Michael Green M.D.    Objective:  Physical Exam:  Temp:  [36.2 °C (97.1 °F)-37.2 °C (98.9 °F)] 36.2 °C (97.1 °F)  Pulse:  [] 92  Resp:  [0-64] 16  BP: ()/(36-99) 97/62  SpO2:  [86 %-100 %] 94 %    Physical Exam  General: ill-appearing, intubated on ventilator  Respiratory: diffusely diminished breath sounds upon auscultation, rhonchi, increased expiratory time  Cardiac: tachycardic, no murmurs/rubs/gallops  Abdomen: soft, nondistended  Extremities: 1+ left > right pitting edema    Labs:   Recent Labs     02/08/23  0634   WBC 9.9   RBC 5.18   HEMOGLOBIN 14.4   HEMATOCRIT 46.9   MCV 90.5   MCH 27.8   RDW 46.8   PLATELETCT 219   MPV 10.8   NEUTSPOLYS 58.00   LYMPHOCYTES 26.60   MONOCYTES 12.90   EOSINOPHILS 1.10   BASOPHILS 0.80     Recent Labs     02/08/23  0634   SODIUM 139   POTASSIUM 4.6   CHLORIDE 99   CO2 32   GLUCOSE 165*   BUN 21      Recent Labs     02/08/23  0634   ALBUMIN 4.9   TBILIRUBIN 0.2   ALKPHOSPHAT 74   TOTPROTEIN 8.3*   ALTSGPT 26   ASTSGOT 40   CREATININE 1.17        Imaging:   DX-CHEST-PORTABLE (1 VIEW)   Final Result         1.  Pulmonary edema and/or infiltrates.   2.  Trace bilateral pleural effusions   3.  Atherosclerosis      DX-ABDOMEN FOR TUBE PLACEMENT   Final Result         1.  Moderate stool in the colon suggests changes of constipation, otherwise nonspecific bowel gas pattern in the upper abdomen   2.  Nasogastric tube tip terminates overlying the expected location of the gastric fundus.   3.  Pulmonary edema and/or infiltrates.   4.  Trace bilateral pleural effusions      US-EXTREMITY VENOUS LOWER BILAT    (Results Pending)   EC-ECHOCARDIOGRAM COMPLETE W/O CONT    (Results Pending)       Previous Data Review: reviewed    Assessment and  Plan:  Mr. Nikolas Bob is our 67 year old male patient with a past medical history significant for COPD (3L supplemental O2 baseline), HTN, Ankylosing Spondylitis, Kyphoscoliosis, colectomy for diverticulitis who presented  for chief complaint of shortness of breath x1 day.    * Acute hypercapnic respiratory failure (HCC)- (present on admission)  Assessment & Plan  -Intubated in ED  for hypoxic hypercapnic respiratory failure  -Initial AB.11/100/71/34  -Known sick contacts at home (grandchildren and daughter w viral illness)  -Recent admission  for acute on chronic hypoxic respiratory failure due to COPD exacerbation  -CXR : (1) Pulmonary edema and/or infiltrates (2) Trace BL pleural effusions  -Covid, Influenza, RSV panel (-); CBC without leukocytosis WBC 9.9  -Follow up Respiratory Panel by PCR  -Follow up Sputum Culture  -Start with Azithromycin 500mg IV Q24 hour  -Start with Solumedrol 62.5mg Q8 hour  -Start with Q4 hour Duonebs  -Fentanyl and Precedex gtt for sedation  -Palliative Care Consult for advanced care planning  -Goal saturation 88-92%  -Monitor ventilator waveforms and titrate flow/peep and volumes accordingly  -Lung protective ventilation strategy w/ A-F bundle  -Head of bed > 30 degree  -GI prophylaxis  -Daily awakening and SBT trials unless contraindicated  -Monitor for liberation    Hypertension  Assessment & Plan  -Hydralazine PRN    Swelling of lower extremity  Assessment & Plan  -1+ pitting edema of bilateral lower extremities; left > right  -Follow up repeat ECHO  -Follow up bilateral lower extremity doppler US    COPD (chronic obstructive pulmonary disease) (HCC)- (present on admission)  Assessment & Plan  -Home medications include Spiriva and Symbicort  -Prior CT  with evidence of severe emphysema baseline  -Baseline supplemental 3L O2  -Not current smoker but >40 pack year history cigarettes  -Multiple admissions for acute on chronic COPD exacerbation    Pulmonary  nodule- (present on admission)  Assessment & Plan  -Prior CT 1/21 also with R hilar enlarged node, could be malignant related to lymphoma or mets  -Palliative Care consult for advanced care planning    Elevated troponin- (present on admission)  Assessment & Plan  -EKG 2/8: Sinus tachycardia, borderline right axis deviation, abnormal T, consider ischemia, diffuse leads, prolonged QT interval  -Troponin 115, in the context of chronically elevated Troponin  -Possible component of Cor Pulmonale  -Not complaining of chest pain, jaw pain, arm pain  -Most recent ECHO 11/15/22: LVEF 64%  -Follow up repeat ECHO        Code Status: Full Code  DVT ppx: Lovenox  Diet: NPO

## 2023-02-08 NOTE — DISCHARGE PLANNING
Medical Social Work    Referral: Acute Medical Patient    Intervention: Pt is a 67 year old male brought in by JANETT from home for SOB.  Pt is Nikolas Bob (: 1955).  Per medics pt's wife was at home and should be coming into the hospital.  Per chart pt's emergency contact is his spouse Kimi ([phone]).      Plan: SW will follow as needed.

## 2023-02-08 NOTE — ED NOTES
0640- ERP at bedside. Decision made to intubate due to respiratory distress and altered mental status. Three RNs, RT, and EDT at bedside with MD for rapid sequence intubation.    0642- 20mg Etomidate administered  0643-100mg succinylcholine administered  0643- Intubated with 8.0 ET tube by MD Delvalle. Positive color change noted on capnography  0644- ET tube taped at 24 @ teeth

## 2023-02-08 NOTE — ED NOTES
Late Entry Note:    Received bedside report on pt at 0705.  Pt intubated, with owens catheter and OGT in place.  Pt having continued episodes of agitation, despite receiving 50 mg propofol IVP and on a propofol gtt at 20mcg/kg/min.    Received another 50 mg IVP and had an episode of hypotension.  Recovered with 1 L NS.    Pt also received 100 mcg fentanyl for agitation episodes at 0800 and 0815.      ERP rounded on pt and assessed, decision to continue increasing propofol gtt more slowly and await Intensivist assessment.      Pt continued to have small episodes of agitation, but responding with stable vital signs to increasing propofol gtt.  By 0900, propofol at 55mcg/kg/min.  Seen by Dr. Worthy, who ordered fentanyl gtt.  Decreased propofol to 50 mcg/kg/min and started fentanyl gtt with Eric, SICU RN, who came to bedside to transfer pt.      Pt transferred to SICU with 2 RN and RT segun.

## 2023-02-08 NOTE — PROGRESS NOTES
Patient arrived to room S103 at 0935. Temperature 97.1f. Weight 65.9kg.    CHG bath complete    Belongings received upon admission to unit:  Black [employer] flip phone  Blue shirt, gray sweatshirt  Black slippers  Upper and lower dentures  Green camo sweat pants  White socks     4 Eyes Skin Assessment Completed by Eric RN and NAOMY Kirby.    Head WDL  Ears WDL  Nose WDL  Mouth WDL  Neck WDL  Breast/Chest WDL  Shoulder Blades WDL  Spine WDL  (R) Arm/Elbow/Hand WDL  (L) Arm/Elbow/Hand WDL  Abdomen WDL  Groin WDL  Scrotum/Coccyx/Buttocks Scar  (R) Leg discoloration, dry, scaly  (L) Leg discoloration, dry, scaly  (R) Heel/Foot/Toe dry, calloused, pink, blanching  (L) Heel/Foot/Toe dry, calloused, pink, blanching      Devices In Places ECG, Blood Pressure Cuff, Pulse Ox, Santos, and SCD's      Interventions In Place Sacral Mepilex, Q2 Turns, Low Air Loss Mattress, and ZFlo Pillow    Possible Skin Injury No    Pictures Uploaded Into Epic Yes  Wound Consult Placed Yes  RN Wound Prevention Protocol Ordered No

## 2023-02-08 NOTE — ASSESSMENT & PLAN NOTE
Lower ext doppler -> negative for dvt  Echo normal BiV, Likely cor pulmonale  Resume force diuresis as needed to achieve euvolemia

## 2023-02-08 NOTE — ED TRIAGE NOTES
Nikolas Lisbeth  67 y.o. male  Chief Complaint   Patient presents with    Shortness of Breath       Pt arrives via EMS with complaints of SOB. Per medics, pt has hx of COPD and CHF- wife and patient refusing albuterol tx during transport- per medics, family states albuterol makes HR too high. Pt unable to speak due to SOB. Intercostal and supraclavicular retractions present. Diaphoretic.

## 2023-02-08 NOTE — DISCHARGE PLANNING
SW met with patient's wife, (Kimi [phone]). This SW provided her with emotional support because she was indecisive if she should have her surgery today or not. The spouse is afraid that her  will be alone. She stated that she lost a son in December of 2022 due to having cancer. The medical team spoke with the spouse about her spouse's medical condition. The spouse has agreed to have the surgery. The spouse would like for her sister Jeffry Jameson ([phone]) to receive information about the patient's condition. SW escorted the spouse to the patient's room.    Addendum  0921  Per the spouse's request, this SW asked PAR to add Jeffry Gisell ([phone]) as an  to the chart.   Doxycycline Counseling:  Patient counseled regarding possible photosensitivity and increased risk for sunburn.  Patient instructed to avoid sunlight, if possible.  When exposed to sunlight, patients should wear protective clothing, sunglasses, and sunscreen.  The patient was instructed to call the office immediately if the following severe adverse effects occur:  hearing changes, easy bruising/bleeding, severe headache, or vision changes.  The patient verbalized understanding of the proper use and possible adverse effects of doxycycline.  All of the patient's questions and concerns were addressed.

## 2023-02-08 NOTE — CONSULTS
Critical Care Medicine Faculty Consult Note    Consulted by: Dr Eric Delvalle    Reason for consult: COPD exacerbation on mechanical ventilation    HPI: 67y M Hx of Centrilobular emphysema on 3l n/c, prior tobacco abuse, ankylosing spondylitis, kyphoscoliosis, pulmonary hypertension, cor pulmonale, right hilar lung nodule. That presents with multiple sick contacts with 2 grandchildren, daughter and wife. He has had 1-2 days of shortness of breath and presented to ER in extremis and was intubated on arrival found to have acute hypoxic hypercapnic respiratory failure with ph 7.1/>100/71. His wife was at bedside describes he did get flu shot and prior covid vaccines. Viral panel negative. She has not had advance care planning and prior was full code. I discussed and recommended DNR I okay.     Exam:chronic ill appearing male on ventilator moving all around while on ventilator, lungs diminished with increase expiratory time, ronchi, heart tachycardic without murmurs, abdomen flat soft, ext with some edema left > right, neuro awake moving all around while on ventilator.     A/P:  AECOPD: Systemic steroids, bronchodilators, doxy, sputum cx, wean oxygen 88-92%, COPD order set and pulmonary consult once out of ICU. Palliative care consultation. MRSA nares, strep and legionella antigen.     Acute hypoxic and acute on chronic hypercarbic respiratory failure:   Intubated date: 2/8  Goal saturation 88-92%  Monitor ventilator waveforms and titrate flow/peep and volumes according.   Lung protective ventilation strategy w/ A-F bundle  CXR: monitor lung volumes and tube/line placement  VAP bundle prevention, oral care, post pyloric feeding  Head of bed > 30 degree  GI prophylaxis  Daily awakening and SBT trials unless contraindicated  Monitor for liberation  Respiratory treatments: prn  Drop peep to 6 to prevent air trapping    Lower ext swelling: check lower ext doppler and repeat Echo.     Patient remains in critical condition  from acute COPD exacerbation and titration of ventilator and sedation. Critical care time provided was 76 minutes. This excludes all separate billable procedures.     Matheus Worthy MD  Critical Care Medicine    Please see [employer]/NP notes for additional documentation

## 2023-02-08 NOTE — ASSESSMENT & PLAN NOTE
-Intubated in ED  for hypoxic hypercapnic respiratory failure  -Initial AB.11/100/71/34  -Known sick contacts at home (grandchildren and daughter w viral illness)  -Recent admission  for acute on chronic hypoxic respiratory failure due to COPD exacerbation  -CXR : (1) Pulmonary edema and/or infiltrates (2) Trace BL pleural effusions  -Covid, Influenza, RSV panel (-); CBC without leukocytosis WBC 9.9  -Follow up Respiratory Panel by PCR  -Follow up Sputum Culture  -Start with Azithromycin 500mg IV Q24 hour  -Start with Solumedrol 62.5mg Q8 hour  -Start with Q4 hour Duonebs  -Fentanyl and Precedex gtt for sedation  -Palliative Care Consult for advanced care planning  -Goal saturation 88-92%  -Monitor ventilator waveforms and titrate flow/peep and volumes accordingly  -Lung protective ventilation strategy w/ A-F bundle  -Head of bed > 30 degree  -GI prophylaxis  -Daily awakening and SBT trials unless contraindicated  -Monitor for liberation

## 2023-02-08 NOTE — ED PROVIDER NOTES
ER Provider Note    Scribed for Eric Delvalle M.d. by Sheri Mccarty. 2/8/2023  6:30 AM    Primary Care Provider: Michael Green M.D.    CHIEF COMPLAINT  Chief Complaint   Patient presents with    Shortness of Breath     EXTERNAL RECORDS REVIEWED  Inpatient Notes  , Outpatient Notes  , and EMS run sheet      HPI/ROS  LIMITATION TO HISTORY   Select: critical state  OUTSIDE HISTORIAN(S):  EMS   wife    Nikolas Bob is a 67 y.o. male who presents to the ED complaining of shortness of breath. Patients onset of symptoms is unclear, but wife called EMS this morning for his difficulty breathing. Patient has a past medical history of CHF and COPD. [facility] tried to treat him with albuterol however he and his wife refused because it makes his heart rate too high. Patient was unable to speak for [facility]. Patient has no other known medical history. Endorses known sick contacts in his home.    PAST MEDICAL HISTORY  History of COPD and CHF    SURGICAL HISTORY  None known    FAMILY HISTORY  None known     SOCIAL HISTORY   reports that he quit smoking about 13 years ago. His smoking use included cigarettes. He has a 42.00 pack-year smoking history. He does not have any smokeless tobacco history on file. He reports that he does not currently use alcohol. He reports that he does not currently use drugs after having used the following drugs: Marijuana.    CURRENT MEDICATIONS  Previous Medications    AMMONIUM LACTATE (LAC-HYDRIN) 12 % LOTION    Apply 1 Application topically 2 times a day.    AZITHROMYCIN (ZITHROMAX) 250 MG TAB    Take 2 tablets on day 1, then take 1 tablet a day for 4 days.    FUROSEMIDE (LASIX) 40 MG TAB    Take 40 mg by mouth every morning.    HOME CARE OXYGEN    Inhale 3 L/min continuous. Preferred is DME    MULTIPLE VITAMINS-MINERALS (MULTIVITAMIN ADULT) CHEW TAB    Chew 1 Tablet every evening.    PREDNISONE (DELTASONE) 10 MG TAB    Take 40mg x 4 days, then take 30mg x 4 days, then take 20mg x 4 days,  "then 10mg x 4 days with food, then discontinue.    SYMBICORT 160-4.5 MCG/ACT AEROSOL    USE 2 PUFFS BY MOUTH TWICE A DAY    TIOTROPIUM (SPIRIVA RESPIMAT) 2.5 MCG/ACT AERO SOLN    Inhale 2 Inhalation every day. Assemble and prime.    TIOTROPIUM (SPIRIVA RESPIMAT) 2.5 MCG/ACT AERO SOLN    Inhale 2 Inhalation every day. Assemble and prime.       ALLERGIES  Patient has no known allergies.    PHYSICAL EXAM  /82   Pulse (!) 127   Temp 37.2 °C (98.9 °F) (Temporal)   Resp (!) 33   Ht 1.753 m (5' 9\")   Wt 63.5 kg (140 lb)   SpO2 98%   BMI 20.67 kg/m²   Nursing note and vitals reviewed.  Constitutional: Critically ill appearing, thin ,cachectic, diaphoretic   HENT: Head is normocephalic and atraumatic. Oropharynx is clear and moist without exudate or erythema.   Eyes: Pupils are equal, round, and reactive to light. Conjunctiva are normal.   Cardiovascular: Tachycardic and regular rhythm. No murmur heard. Normal radial pulses.  Pulmonary/Chest: Very diminished breath sound with poor respiratory effort.  Faint wheezing.  Almost no air movement.  Abdominal: Soft and non-tender. No distention    Musculoskeletal: Extremities exhibit normal range of motion without edema or tenderness.   Neurological: Awake, alert and oriented to person, place, and time. No focal deficits noted.  Skin: Skin is warm.  Very diaphoretic.  No rash.   Psychiatric: Unable to assess    DIAGNOSTIC STUDIES    Labs:   Results for orders placed or performed during the hospital encounter of 02/08/23   Lactic acid (lactate)   Result Value Ref Range    Lactic Acid 2.6 (H) 0.5 - 2.0 mmol/L   Lactic acid (lactate): Repeat if initial lactic acid result is greater than 2   Result Value Ref Range    Lactic Acid 1.2 0.5 - 2.0 mmol/L   CBC With Differential   Result Value Ref Range    WBC 9.9 4.8 - 10.8 K/uL    RBC 5.18 4.70 - 6.10 M/uL    Hemoglobin 14.4 14.0 - 18.0 g/dL    Hematocrit 46.9 42.0 - 52.0 %    MCV 90.5 81.4 - 97.8 fL    MCH 27.8 27.0 - 33.0 pg "    MCHC 30.7 (L) 33.7 - 35.3 g/dL    RDW 46.8 35.9 - 50.0 fL    Platelet Count 219 164 - 446 K/uL    MPV 10.8 9.0 - 12.9 fL    Neutrophils-Polys 58.00 44.00 - 72.00 %    Lymphocytes 26.60 22.00 - 41.00 %    Monocytes 12.90 0.00 - 13.40 %    Eosinophils 1.10 0.00 - 6.90 %    Basophils 0.80 0.00 - 1.80 %    Immature Granulocytes 0.60 0.00 - 0.90 %    Nucleated RBC 0.00 /100 WBC    Neutrophils (Absolute) 5.71 1.82 - 7.42 K/uL    Lymphs (Absolute) 2.62 1.00 - 4.80 K/uL    Monos (Absolute) 1.27 (H) 0.00 - 0.85 K/uL    Eos (Absolute) 0.11 0.00 - 0.51 K/uL    Baso (Absolute) 0.08 0.00 - 0.12 K/uL    Immature Granulocytes (abs) 0.06 0.00 - 0.11 K/uL    NRBC (Absolute) 0.00 K/uL   Comp Metabolic Panel   Result Value Ref Range    Sodium 139 135 - 145 mmol/L    Potassium 4.6 3.6 - 5.5 mmol/L    Chloride 99 96 - 112 mmol/L    Co2 32 20 - 33 mmol/L    Anion Gap 8.0 7.0 - 16.0    Glucose 165 (H) 65 - 99 mg/dL    Bun 21 8 - 22 mg/dL    Creatinine 1.17 0.50 - 1.40 mg/dL    Calcium 9.4 8.5 - 10.5 mg/dL    AST(SGOT) 40 12 - 45 U/L    ALT(SGPT) 26 2 - 50 U/L    Alkaline Phosphatase 74 30 - 99 U/L    Total Bilirubin 0.2 0.1 - 1.5 mg/dL    Albumin 4.9 3.2 - 4.9 g/dL    Total Protein 8.3 (H) 6.0 - 8.2 g/dL    Globulin 3.4 1.9 - 3.5 g/dL    A-G Ratio 1.4 g/dL   Urinalysis    Specimen: Urine   Result Value Ref Range    Color Yellow     Character Clear     Specific Gravity 1.025 <1.035    Ph 7.0 5.0 - 8.0    Glucose Negative Negative mg/dL    Ketones Negative Negative mg/dL    Protein 100 (A) Negative mg/dL    Bilirubin Negative Negative    Urobilinogen, Urine 0.2 Negative    Nitrite Negative Negative    Leukocyte Esterase Negative Negative    Occult Blood Small (A) Negative    Micro Urine Req Microscopic    TROPONIN   Result Value Ref Range    Troponin T 115 (H) 6 - 19 ng/L   proBrain Natriuretic Peptide, NT   Result Value Ref Range    NT-proBNP 1026 (H) 0 - 125 pg/mL   ARTERIAL BLOOD GAS w/ O2 (LAB)   Result Value Ref Range    Ph 7.20  (LL) 7.40 - 7.50    Pco2 70.0 (HH) 26.0 - 37.0 mmHg    Po2 264.4 (H) 64.0 - 87.0 mmHg    O2 Saturation 99.1 (H) 93.0 - 99.0 %    Hco3 27 (H) 17 - 25 mmol/L    Base Excess -3 -4 - 3 mmol/L    Body Temp 37.1 Centigrade    O2 Therapy 7.0 2.0 - 10.0 L/min    Ph -TC 7.20 (LL) 7.40 - 7.50    Pco2 -TC 70.3 (HH) 26.0 - 37.0 mmHg    Po2 -.9 (H) 64.0 - 87.0 mmHg   COV-2, FLU A/B, AND RSV BY PCR (2-4 HOURS CEPHEID): Collect NP swab in VTM    Specimen: Respirate   Result Value Ref Range    Influenza virus A RNA Negative Negative    Influenza virus B, PCR Negative Negative    RSV, PCR Negative Negative    SARS-CoV-2 by PCR NotDetected     SARS-CoV-2 Source NP Swab    CORRECTED CALCIUM   Result Value Ref Range    Correct Calcium 8.7 8.5 - 10.5 mg/dL   ESTIMATED GFR   Result Value Ref Range    GFR (CKD-EPI) 67 >60 mL/min/1.73 m 2   URINE MICROSCOPIC (W/UA)   Result Value Ref Range    WBC 0-2 (A) /hpf    RBC 2-5 (A) /hpf    Bacteria Few (A) None /hpf    Epithelial Cells Few /hpf    Amorphous Crystal Present /hpf    Hyaline Cast 3-5 (A) /lpf   Procalcitonin   Result Value Ref Range    Procalcitonin 0.70 (H) <0.25 ng/mL   TROPONIN   Result Value Ref Range    Troponin T 208 (H) 6 - 19 ng/L   MAGNESIUM   Result Value Ref Range    Magnesium 1.8 1.5 - 2.5 mg/dL   PHOSPHORUS   Result Value Ref Range    Phosphorus 3.8 2.5 - 4.5 mg/dL   EKG   Result Value Ref Range    Report       [facility] Emergency Dept.    Test Date:  2023  Pt Name:    IVY ROBERTS                 Department: ER  MRN:        [id number]                      Room:        21  Gender:     Male                         Technician:  :        1955                   Requested By:GOLDEN REID  Order #:    [id number]                    Reading MD: GOLDEN REID MD    Measurements  Intervals                                Axis  Rate:       117                          P:          73  DC:         158                          QRS:         83  QRSD:       104                          T:          204  QT:         383  QTc:        535    Interpretive Statements  Sinus tachycardia  Borderline right axis deviation  Abnormal T, consider ischemia, diffuse leads  Prolonged QT interval  Compared to ECG 2023 11:53:50  T-wave abnormality now present  Possible ischemia now present  Electronically Signed On 2023 8:56:26 PST by GOLDEN REID MD     POCT arterial blood gas device results   Result Value Ref Range    Ph 7.105 (LL) 7.400 - 7.500    Pco2 >100.0 (HH) 26.0 - 37.0 mmHg    Po2 71 64 - 87 mmHg    Tco2 37 (H) 20 - 33 mmol/L    S02 85 (L) 93 - 99 %    Hco3 34.0 (H) 17.0 - 25.0 mmol/L    BE 1 -4 - 3 mmol/L    Body Temp 98.9 F degrees    O2 Therapy 100 %    iPF Ratio 71     Ph Temp Krissy 7.103 (LL) 7.400 - 7.500    Pco2 Temp Co >100.0 (HH) 26.0 - 37.0 mmHg    Po2 Temp Cor 71 64 - 87 mmHg    Specimen Arterial     DelSys Vent     Tidal Volume 420 mL    Peep End Expiratory Pressure 8 cmh20    Set Rate 24     Mode APV-CMV          EKG:   I have independently interpreted this EKG  Results for orders placed or performed during the hospital encounter of 23   EKG   Result Value Ref Range    Report       [facility] Emergency Dept.    Test Date:  2023  Pt Name:    IVY ROBERTS                 Department: ER  MRN:        [id number]                      Room:       [facility]  Gender:     Male                         Technician:  :        1955                   Requested By:GOLDEN REID  Order #:    [id number]                    Reading MD: GOLDEN REID MD    Measurements  Intervals                                Axis  Rate:       117                          P:          73  NH:         158                          QRS:        83  QRSD:       104                          T:          204  QT:         383  QTc:        535    Interpretive Statements  Sinus tachycardia  Borderline right axis deviation  Abnormal T, consider  ischemia, diffuse leads  Prolonged QT interval  Compared to ECG 01/18/2023 11:53:50  T-wave abnormality now present  Possible ischemia now present  Electronically Signed On 2-8-2023 8:56:26 PST by GOLDEN REID MD            Radiology:   The attending emergency physician has independently interpreted the diagnostic imaging associated with this visit and am waiting the final reading from the radiologist.   Preliminary interpretation is a follows: Endotracheal tube in appropriate position  Radiologist interpretation:   DX-CHEST-PORTABLE (1 VIEW)   Final Result         1.  Pulmonary edema and/or infiltrates.   2.  Trace bilateral pleural effusions   3.  Atherosclerosis      DX-ABDOMEN FOR TUBE PLACEMENT   Final Result         1.  Moderate stool in the colon suggests changes of constipation, otherwise nonspecific bowel gas pattern in the upper abdomen   2.  Nasogastric tube tip terminates overlying the expected location of the gastric fundus.   3.  Pulmonary edema and/or infiltrates.   4.  Trace bilateral pleural effusions      US-EXTREMITY VENOUS LOWER BILAT    (Results Pending)   EC-ECHOCARDIOGRAM COMPLETE W/O CONT    (Results Pending)        Intubation Procedure Note    Indication: Respiratory failure    Consent: Unable to be obtained due to patient's condition.    Medications Used: etomidate intravenously and succinycholine intravenously    Procedure: The patient was placed in the appropriate position.  Cricoid pressure was not required.  Intubation was performed video laryngoscopy using a glidescope an 8.0 cuffed endotracheal tube.  The cuff was then inflated and the tube was secured appropriately at a distance of 23 cm to the dental ridge.  Initial confirmation of placement included bilateral breath sounds and adequate pulse oximetry reading.  A chest x-ray to verify correct placement of the tube showed appropriate tube position.    The patient tolerated the procedure well.     Complications: None      COURSE &  MEDICAL DECISION MAKING     ED Observation Status? No; Patient does not meet criteria for ED Observation.     INITIAL ASSESSMENT, COURSE AND PLAN  6:35 AM - Patient seen and examined at bedside. Called to the patients room due to his critical appearance. Discussed plan of care, including labs, imaging, and treatment with medication to attempt to improve his symptoms. Patient agrees to the plan of care. The patient will be medicated with levalbuterol. Ordered for chest x-ray, lactic acid, CBC w/ diff, CMP, UA, urine culture, blood culture, troponin, pro-BNP, and EKG to evaluate his symptoms. Respiratory at bedside.    6:38 AM - Respiratory started breathing treatment however the patient continues to decline. He is increasingly diaphoretic and respirations are unchanged with attempted breathing treatment. Will escalate care to intubation.      6:44 AM - intubation procedure preformed at this time (see procedure note above for details).  Ordered ABG, lactic acid, CoV-2, Flu, and RSV. He will be treated with IV fluids for sepsis, solumedrol, and albuterol.    7:30 AM I discussed the patient's case and the above findings with Dr. Khan (cardiology) who will consult on the patient.    7:54 AM - I discussed the patient's case and the above findings with Dr. Worthy (Intensivist) who will consult the patient for admission. Patient care will be transferred at this time.     HYDRATION: Based on the patient's presentation of Sepsis the patient was given IV fluids. IV Hydration was used because oral hydration was not adequate alone. Upon recheck following hydration, the patient was improved.     CRITICAL CARE  I provided critical care services, which included medication orders, frequent reevaluations of the patient's condition and response to treatment, ordering and reviewing test results, and discussing the case with various consultants.  The critical care time associated with the care of the patient was 35 minutes. Review chart  for interventions. This time is exclusive of any other billable procedures.      DISPOSITION AND DISCUSSIONS  I have discussed management of the patient with the following physicians and FADIA's:  Dr. Worthy (intensivist)    Discussion of management with other [facility] or appropriate source(s): RT   and Social Work    pharmacy    Barriers to care at this time, including but not limited to:  none .   DISPOSITION:  Patient will be hospitalized by Dr. Worthy in critical condition.     FINAL DIANGOSIS  1. Centrilobular emphysema (HCC)    2. Acute exacerbation of chronic obstructive pulmonary disease (COPD) (HCC)    3. Acute respiratory failure with hypoxia and hypercapnia (HCC)    4. Altered mental status, unspecified altered mental status type    5. Elevated troponin          I, Sheri Mccarty (Scribe), am scribing for, and in the presence of, Eric Delvalle M.D..    Electronically signed by: Sheri Mccarty (Scribe), 2/8/2023    IEric M.D. personally performed the services described in this documentation, as scribed by Sheri Mccarty in my presence, and it is both accurate and complete.   The note accurately reflects work and decisions made by me.  Eric Delvalle M.D.  2/8/2023  1:17 PM

## 2023-02-08 NOTE — CARE PLAN
Problem: Ventilation  Goal: Ability to achieve and maintain unassisted ventilation or tolerate decreased levels of ventilator support  Description: Target End Date:  4 days     Document on Vent flowsheet    1.  Support and monitor invasive and noninvasive mechanical ventilation  2.  Monitor ventilator weaning response  3.  Perform ventilator associated pneumonia prevention interventions  4.  Manage ventilation therapy by monitoring diagnostic test results  Outcome: Progressing   Vent Day 1  APVCMV  28  420  6  30%

## 2023-02-08 NOTE — ASSESSMENT & PLAN NOTE
Intubated date: 2/8 extubated 2/9 then re-intubated 2/10-> 2/11 extubated successfully  Encourage incentive spirometry, mobilization  RT/O2 protocol titrating oxygen therapy to goal SPO2 greater than 88%  Limit sedatives other than as needed anxiolytics

## 2023-02-08 NOTE — ASSESSMENT & PLAN NOTE
Triggered by human metapneumo virus - improving  Transition from IV to oral steroids  Scheduled and as needed bronchodilators, Symbicort  Outpatient pulmonary follow-up/rehab

## 2023-02-08 NOTE — ASSESSMENT & PLAN NOTE
Remains difficult to control, goal SBP less than 160  Increase dose of scheduled metoprolol to 75 mg BID, resume Lasix  As needed hydralazine/Vasotec/labetalol

## 2023-02-08 NOTE — ED NOTES
Pt continues to have episodes of agitation, wife at bedside.  Wife met with intensivist, updated about plan of care.

## 2023-02-09 ENCOUNTER — APPOINTMENT (OUTPATIENT)
Dept: RADIOLOGY | Facility: MEDICAL CENTER | Age: 68
DRG: 208 | End: 2023-02-09
Attending: INTERNAL MEDICINE
Payer: COMMERCIAL

## 2023-02-09 ENCOUNTER — APPOINTMENT (OUTPATIENT)
Dept: CARDIOLOGY | Facility: MEDICAL CENTER | Age: 68
DRG: 208 | End: 2023-02-09
Attending: INTERNAL MEDICINE
Payer: COMMERCIAL

## 2023-02-09 LAB
ANION GAP SERPL CALC-SCNC: 13 MMOL/L (ref 7–16)
BASE EXCESS BLDA CALC-SCNC: 2 MMOL/L (ref -4–3)
BASOPHILS # BLD AUTO: 0 % (ref 0–1.8)
BASOPHILS # BLD: 0 K/UL (ref 0–0.12)
BODY TEMPERATURE: ABNORMAL DEGREES
BREATHS SETTING VENT: 28
BUN SERPL-MCNC: 19 MG/DL (ref 8–22)
CALCIUM SERPL-MCNC: 8.1 MG/DL (ref 8.5–10.5)
CHLORIDE SERPL-SCNC: 106 MMOL/L (ref 96–112)
CO2 BLDA-SCNC: 28 MMOL/L (ref 20–33)
CO2 SERPL-SCNC: 23 MMOL/L (ref 20–33)
CREAT SERPL-MCNC: 0.96 MG/DL (ref 0.5–1.4)
DELSYS IDSYS: ABNORMAL
EKG IMPRESSION: NORMAL
EOSINOPHIL # BLD AUTO: 0 K/UL (ref 0–0.51)
EOSINOPHIL NFR BLD: 0 % (ref 0–6.9)
ERYTHROCYTE [DISTWIDTH] IN BLOOD BY AUTOMATED COUNT: 44.8 FL (ref 35.9–50)
GFR SERPLBLD CREATININE-BSD FMLA CKD-EPI: 85 ML/MIN/1.73 M 2
GLUCOSE SERPL-MCNC: 151 MG/DL (ref 65–99)
HCO3 BLDA-SCNC: 26.8 MMOL/L (ref 17–25)
HCT VFR BLD AUTO: 35.6 % (ref 42–52)
HGB BLD-MCNC: 11.5 G/DL (ref 14–18)
HOROWITZ INDEX BLDA+IHG-RTO: 230 MM[HG]
IMM GRANULOCYTES # BLD AUTO: 0.05 K/UL (ref 0–0.11)
IMM GRANULOCYTES NFR BLD AUTO: 0.7 % (ref 0–0.9)
L PNEUMO AG UR QL IA: NEGATIVE
LYMPHOCYTES # BLD AUTO: 0.27 K/UL (ref 1–4.8)
LYMPHOCYTES NFR BLD: 3.6 % (ref 22–41)
MAGNESIUM SERPL-MCNC: 2.2 MG/DL (ref 1.5–2.5)
MCH RBC QN AUTO: 28.2 PG (ref 27–33)
MCHC RBC AUTO-ENTMCNC: 32.3 G/DL (ref 33.7–35.3)
MCV RBC AUTO: 87.3 FL (ref 81.4–97.8)
MODE IMODE: ABNORMAL
MONOCYTES # BLD AUTO: 0.25 K/UL (ref 0–0.85)
MONOCYTES NFR BLD AUTO: 3.3 % (ref 0–13.4)
NEUTROPHILS # BLD AUTO: 6.97 K/UL (ref 1.82–7.42)
NEUTROPHILS NFR BLD: 92.4 % (ref 44–72)
NRBC # BLD AUTO: 0 K/UL
NRBC BLD-RTO: 0 /100 WBC
O2/TOTAL GAS SETTING VFR VENT: 30 %
PCO2 BLDA: 41.5 MMHG (ref 26–37)
PCO2 TEMP ADJ BLDA: 41.5 MMHG (ref 26–37)
PEEP END EXPIRATORY PRESSURE IPEEP: 6 CMH20
PH BLDA: 7.42 [PH] (ref 7.4–7.5)
PH TEMP ADJ BLDA: 7.42 [PH] (ref 7.4–7.5)
PHOSPHATE SERPL-MCNC: 2.5 MG/DL (ref 2.5–4.5)
PLATELET # BLD AUTO: 129 K/UL (ref 164–446)
PMV BLD AUTO: 11.4 FL (ref 9–12.9)
PO2 BLDA: 69 MMHG (ref 64–87)
PO2 TEMP ADJ BLDA: 69 MMHG (ref 64–87)
POTASSIUM SERPL-SCNC: 3.7 MMOL/L (ref 3.6–5.5)
RBC # BLD AUTO: 4.08 M/UL (ref 4.7–6.1)
S PNEUM AG UR QL: NEGATIVE
SAO2 % BLDA: 94 % (ref 93–99)
SODIUM SERPL-SCNC: 142 MMOL/L (ref 135–145)
SPECIMEN DRAWN FROM PATIENT: ABNORMAL
TIDAL VOLUME IVT: 420 ML
TROPONIN T SERPL-MCNC: 117 NG/L (ref 6–19)
WBC # BLD AUTO: 7.5 K/UL (ref 4.8–10.8)

## 2023-02-09 PROCEDURE — 700111 HCHG RX REV CODE 636 W/ 250 OVERRIDE (IP): Performed by: STUDENT IN AN ORGANIZED HEALTH CARE EDUCATION/TRAINING PROGRAM

## 2023-02-09 PROCEDURE — 770022 HCHG ROOM/CARE - ICU (200)

## 2023-02-09 PROCEDURE — A9270 NON-COVERED ITEM OR SERVICE: HCPCS | Performed by: INTERNAL MEDICINE

## 2023-02-09 PROCEDURE — 700111 HCHG RX REV CODE 636 W/ 250 OVERRIDE (IP): Performed by: INTERNAL MEDICINE

## 2023-02-09 PROCEDURE — 94640 AIRWAY INHALATION TREATMENT: CPT

## 2023-02-09 PROCEDURE — 87798 DETECT AGENT NOS DNA AMP: CPT | Mod: 91

## 2023-02-09 PROCEDURE — 36600 WITHDRAWAL OF ARTERIAL BLOOD: CPT

## 2023-02-09 PROCEDURE — 700101 HCHG RX REV CODE 250: Performed by: INTERNAL MEDICINE

## 2023-02-09 PROCEDURE — 84100 ASSAY OF PHOSPHORUS: CPT

## 2023-02-09 PROCEDURE — 94003 VENT MGMT INPAT SUBQ DAY: CPT

## 2023-02-09 PROCEDURE — 82803 BLOOD GASES ANY COMBINATION: CPT

## 2023-02-09 PROCEDURE — 93005 ELECTROCARDIOGRAM TRACING: CPT | Performed by: INTERNAL MEDICINE

## 2023-02-09 PROCEDURE — 96368 THER/DIAG CONCURRENT INF: CPT

## 2023-02-09 PROCEDURE — 94799 UNLISTED PULMONARY SVC/PX: CPT

## 2023-02-09 PROCEDURE — 83735 ASSAY OF MAGNESIUM: CPT

## 2023-02-09 PROCEDURE — 71045 X-RAY EXAM CHEST 1 VIEW: CPT

## 2023-02-09 PROCEDURE — 87581 M.PNEUMON DNA AMP PROBE: CPT

## 2023-02-09 PROCEDURE — 94150 VITAL CAPACITY TEST: CPT

## 2023-02-09 PROCEDURE — 94669 MECHANICAL CHEST WALL OSCILL: CPT

## 2023-02-09 PROCEDURE — 87486 CHLMYD PNEUM DNA AMP PROBE: CPT

## 2023-02-09 PROCEDURE — 85025 COMPLETE CBC W/AUTO DIFF WBC: CPT

## 2023-02-09 PROCEDURE — 80048 BASIC METABOLIC PNL TOTAL CA: CPT

## 2023-02-09 PROCEDURE — 700105 HCHG RX REV CODE 258: Performed by: INTERNAL MEDICINE

## 2023-02-09 PROCEDURE — 99291 CRITICAL CARE FIRST HOUR: CPT | Performed by: INTERNAL MEDICINE

## 2023-02-09 PROCEDURE — 93010 ELECTROCARDIOGRAM REPORT: CPT | Performed by: INTERNAL MEDICINE

## 2023-02-09 PROCEDURE — 84484 ASSAY OF TROPONIN QUANT: CPT

## 2023-02-09 PROCEDURE — 700102 HCHG RX REV CODE 250 W/ 637 OVERRIDE(OP): Performed by: INTERNAL MEDICINE

## 2023-02-09 PROCEDURE — 87633 RESP VIRUS 12-25 TARGETS: CPT

## 2023-02-09 PROCEDURE — 93970 EXTREMITY STUDY: CPT

## 2023-02-09 RX ORDER — ASPIRIN 81 MG/1
81 TABLET, CHEWABLE ORAL DAILY
Status: DISCONTINUED | OUTPATIENT
Start: 2023-02-09 | End: 2023-02-12

## 2023-02-09 RX ORDER — OXYCODONE HYDROCHLORIDE 10 MG/1
10 TABLET ORAL EVERY 4 HOURS PRN
Status: DISCONTINUED | OUTPATIENT
Start: 2023-02-09 | End: 2023-02-16

## 2023-02-09 RX ORDER — PREDNISONE 20 MG/1
40 TABLET ORAL DAILY
Status: DISCONTINUED | OUTPATIENT
Start: 2023-02-09 | End: 2023-02-11

## 2023-02-09 RX ORDER — ALPRAZOLAM 0.5 MG/1
0.5 TABLET ORAL 3 TIMES DAILY PRN
Status: DISCONTINUED | OUTPATIENT
Start: 2023-02-09 | End: 2023-02-22 | Stop reason: HOSPADM

## 2023-02-09 RX ORDER — POTASSIUM CHLORIDE 20 MEQ/1
40 TABLET, EXTENDED RELEASE ORAL ONCE
Status: ACTIVE | OUTPATIENT
Start: 2023-02-09 | End: 2023-02-10

## 2023-02-09 RX ORDER — LORAZEPAM 2 MG/ML
.5-2 INJECTION INTRAMUSCULAR EVERY 4 HOURS PRN
Status: DISCONTINUED | OUTPATIENT
Start: 2023-02-09 | End: 2023-02-13

## 2023-02-09 RX ORDER — METOPROLOL TARTRATE 1 MG/ML
5 INJECTION, SOLUTION INTRAVENOUS ONCE
Status: COMPLETED | OUTPATIENT
Start: 2023-02-09 | End: 2023-02-09

## 2023-02-09 RX ORDER — OXYCODONE HYDROCHLORIDE 10 MG/1
10 TABLET ORAL EVERY 4 HOURS PRN
Status: DISCONTINUED | OUTPATIENT
Start: 2023-02-09 | End: 2023-02-09

## 2023-02-09 RX ORDER — ACETAMINOPHEN 500 MG
1000 TABLET ORAL EVERY 8 HOURS PRN
Status: DISCONTINUED | OUTPATIENT
Start: 2023-02-09 | End: 2023-02-22 | Stop reason: HOSPADM

## 2023-02-09 RX ORDER — OXYCODONE HYDROCHLORIDE 5 MG/1
5 TABLET ORAL EVERY 4 HOURS PRN
Status: DISCONTINUED | OUTPATIENT
Start: 2023-02-09 | End: 2023-02-09

## 2023-02-09 RX ORDER — LABETALOL HYDROCHLORIDE 5 MG/ML
20 INJECTION, SOLUTION INTRAVENOUS EVERY 4 HOURS PRN
Status: DISCONTINUED | OUTPATIENT
Start: 2023-02-09 | End: 2023-02-10

## 2023-02-09 RX ORDER — OXYCODONE HYDROCHLORIDE 5 MG/1
5 TABLET ORAL EVERY 4 HOURS PRN
Status: DISCONTINUED | OUTPATIENT
Start: 2023-02-09 | End: 2023-02-16

## 2023-02-09 RX ADMIN — FENTANYL CITRATE 100 MCG: 50 INJECTION, SOLUTION INTRAMUSCULAR; INTRAVENOUS at 22:16

## 2023-02-09 RX ADMIN — LEVALBUTEROL 1.25 MG: 1.25 SOLUTION RESPIRATORY (INHALATION) at 02:57

## 2023-02-09 RX ADMIN — LEVALBUTEROL 1.25 MG: 1.25 SOLUTION RESPIRATORY (INHALATION) at 11:27

## 2023-02-09 RX ADMIN — METOPROLOL TARTRATE 5 MG: 1 INJECTION, SOLUTION INTRAVENOUS at 18:43

## 2023-02-09 RX ADMIN — PREDNISONE 40 MG: 20 TABLET ORAL at 14:19

## 2023-02-09 RX ADMIN — FENTANYL CITRATE 100 MCG: 50 INJECTION, SOLUTION INTRAMUSCULAR; INTRAVENOUS at 18:25

## 2023-02-09 RX ADMIN — IPRATROPIUM BROMIDE 0.5 MG: 0.5 SOLUTION RESPIRATORY (INHALATION) at 22:24

## 2023-02-09 RX ADMIN — FENTANYL CITRATE 100 MCG: 50 INJECTION, SOLUTION INTRAMUSCULAR; INTRAVENOUS at 09:30

## 2023-02-09 RX ADMIN — LEVALBUTEROL 1.25 MG: 1.25 SOLUTION RESPIRATORY (INHALATION) at 22:24

## 2023-02-09 RX ADMIN — OXYCODONE 5 MG: 5 TABLET ORAL at 11:40

## 2023-02-09 RX ADMIN — DOXYCYCLINE 100 MG: 100 INJECTION, POWDER, LYOPHILIZED, FOR SOLUTION INTRAVENOUS at 05:07

## 2023-02-09 RX ADMIN — DOXYCYCLINE 100 MG: 100 INJECTION, POWDER, LYOPHILIZED, FOR SOLUTION INTRAVENOUS at 17:48

## 2023-02-09 RX ADMIN — LEVALBUTEROL 1.25 MG: 1.25 SOLUTION RESPIRATORY (INHALATION) at 20:30

## 2023-02-09 RX ADMIN — FUROSEMIDE 20 MG: 10 INJECTION, SOLUTION INTRAMUSCULAR; INTRAVENOUS at 15:48

## 2023-02-09 RX ADMIN — LABETALOL HYDROCHLORIDE 20 MG: 5 INJECTION, SOLUTION INTRAVENOUS at 20:12

## 2023-02-09 RX ADMIN — BUDESONIDE AND FORMOTEROL FUMARATE DIHYDRATE 2 PUFF: 160; 4.5 AEROSOL RESPIRATORY (INHALATION) at 20:41

## 2023-02-09 RX ADMIN — LEVALBUTEROL 1.25 MG: 1.25 SOLUTION RESPIRATORY (INHALATION) at 06:58

## 2023-02-09 RX ADMIN — DEXMEDETOMIDINE 1.1 MCG/KG/HR: 200 INJECTION, SOLUTION INTRAVENOUS at 00:14

## 2023-02-09 RX ADMIN — IPRATROPIUM BROMIDE 0.5 MG: 0.5 SOLUTION RESPIRATORY (INHALATION) at 02:58

## 2023-02-09 RX ADMIN — FENTANYL CITRATE 100 MCG: 50 INJECTION, SOLUTION INTRAMUSCULAR; INTRAVENOUS at 23:28

## 2023-02-09 RX ADMIN — IPRATROPIUM BROMIDE 0.5 MG: 0.5 SOLUTION RESPIRATORY (INHALATION) at 20:29

## 2023-02-09 RX ADMIN — IPRATROPIUM BROMIDE 0.5 MG: 0.5 SOLUTION RESPIRATORY (INHALATION) at 11:29

## 2023-02-09 RX ADMIN — IPRATROPIUM BROMIDE 0.5 MG: 0.5 SOLUTION RESPIRATORY (INHALATION) at 07:00

## 2023-02-09 RX ADMIN — FUROSEMIDE 20 MG: 10 INJECTION, SOLUTION INTRAMUSCULAR; INTRAVENOUS at 05:05

## 2023-02-09 RX ADMIN — FENTANYL CITRATE 100 MCG: 50 INJECTION, SOLUTION INTRAMUSCULAR; INTRAVENOUS at 14:19

## 2023-02-09 RX ADMIN — LORAZEPAM 1 MG: 2 INJECTION INTRAMUSCULAR; INTRAVENOUS at 18:07

## 2023-02-09 RX ADMIN — BUDESONIDE AND FORMOTEROL FUMARATE DIHYDRATE 2 PUFF: 160; 4.5 AEROSOL RESPIRATORY (INHALATION) at 13:20

## 2023-02-09 RX ADMIN — FAMOTIDINE 20 MG: 10 INJECTION, SOLUTION INTRAVENOUS at 05:05

## 2023-02-09 RX ADMIN — HYDRALAZINE HYDROCHLORIDE 10 MG: 20 INJECTION INTRAMUSCULAR; INTRAVENOUS at 23:05

## 2023-02-09 RX ADMIN — OXYCODONE HYDROCHLORIDE 10 MG: 10 TABLET ORAL at 15:47

## 2023-02-09 RX ADMIN — HYDRALAZINE HYDROCHLORIDE 10 MG: 20 INJECTION INTRAMUSCULAR; INTRAVENOUS at 17:41

## 2023-02-09 RX ADMIN — TIOTROPIUM BROMIDE INHALATION SPRAY 5 MCG: 3.12 SPRAY, METERED RESPIRATORY (INHALATION) at 13:14

## 2023-02-09 RX ADMIN — METHYLPREDNISOLONE SODIUM SUCCINATE 62.5 MG: 125 INJECTION, POWDER, FOR SOLUTION INTRAMUSCULAR; INTRAVENOUS at 05:05

## 2023-02-09 RX ADMIN — FENTANYL CITRATE 100 MCG: 50 INJECTION, SOLUTION INTRAMUSCULAR; INTRAVENOUS at 07:45

## 2023-02-09 RX ADMIN — OXYCODONE HYDROCHLORIDE 10 MG: 10 TABLET ORAL at 19:58

## 2023-02-09 ASSESSMENT — PULMONARY FUNCTION TESTS: FVC: 1.3

## 2023-02-09 ASSESSMENT — PAIN DESCRIPTION - PAIN TYPE
TYPE: ACUTE PAIN
TYPE: CHRONIC PAIN
TYPE: ACUTE PAIN
TYPE: CHRONIC PAIN
TYPE: CHRONIC PAIN
TYPE: ACUTE PAIN
TYPE: CHRONIC PAIN
TYPE: CHRONIC PAIN
TYPE: ACUTE PAIN
TYPE: CHRONIC PAIN
TYPE: CHRONIC PAIN

## 2023-02-09 ASSESSMENT — FIBROSIS 4 INDEX: FIB4 SCORE: 4.2

## 2023-02-09 NOTE — PROGRESS NOTES
Critical Care Progress Note    Date of admission  2/8/2023    Chief Complaint  67 y.o. male admitted 2/8/2023 with Hx of Centrilobular emphysema on 3l n/c, prior tobacco abuse, ankylosing spondylitis, kyphoscoliosis, pulmonary hypertension, cor pulmonale, right hilar lung nodule. That presents with multiple sick contacts with 2 grandchildren, daughter and wife. He has had 1-2 days of shortness of breath and presented to ER in extremis and was intubated on arrival found to have acute hypoxic hypercapnic respiratory failure with ph 7.1/>100/71. His wife was at bedside describes he did get flu shot and prior covid vaccines. Viral panel negative. She has not had advance care planning and prior was full code. I discussed and recommended DNR I okay.     Hospital Course  Amitted to ICU on ventilator    Interval Problem Update  Reviewed last 24 hour events:  Neuro: awake writing notes on dex gtt while on ventialtor  HR: 70-80  SBP: 120-150's  Tmax: afebrile  GI: NPO  UOP: good  Lines: peripheral IV  Resp: Vent day 2 SBT/SAT with plan to extubate   Vte: lovenox  PPI/H2:pepcid  Antibx: doxy    +5L  Stop IVF force diuresis  Follow up bmp and trop, echo and lower ext doppler  SBT and extubation trial  Goal sat 88-92%      Review of Systems  Review of Systems   Unable to perform ROS: Intubated      Vital Signs for last 24 hours   Temp:  [36.2 °C (97.1 °F)] 36.2 °C (97.1 °F)  Pulse:  [] 89  Resp:  [0-64] 32  BP: ()/(36-99) 133/71  SpO2:  [86 %-100 %] 95 %    Hemodynamic parameters for last 24 hours       Respiratory Information for the last 24 hours  Vent Mode: APVCMV  Rate (breaths/min): 28  Vt Target (mL): 420  PEEP/CPAP: 6  MAP: 10  Control VTE (exp VT): 504    Physical Exam   Physical Exam  Vitals and nursing note reviewed.   Constitutional:       General: He is not in acute distress.     Appearance: He is not ill-appearing.      Comments: Awake writing notes on ventilator   HENT:      Head: Normocephalic.       Mouth/Throat:      Mouth: Mucous membranes are moist.      Comments: ET in place  Eyes:      Pupils: Pupils are equal, round, and reactive to light.   Cardiovascular:      Rate and Rhythm: Normal rate.      Heart sounds: No murmur heard.  Pulmonary:      Effort: No respiratory distress.      Breath sounds: No stridor. No wheezing or rhonchi.      Comments: Good air movement mechanical breath sounds  Abdominal:      General: There is no distension.      Palpations: There is no mass.      Tenderness: There is no abdominal tenderness.      Hernia: No hernia is present.   Musculoskeletal:         General: No swelling or tenderness.      Cervical back: No rigidity.      Comments: Right arm swelling around IV   Skin:     Coloration: Skin is not jaundiced or pale.   Neurological:      Mental Status: He is alert.      Comments: He is awake on ventilator moving all extremities and writing notes on paper while on ventilator   Psychiatric:         Mood and Affect: Mood normal.       Medications  Current Facility-Administered Medications   Medication Dose Route Frequency Provider Last Rate Last Admin    Respiratory Therapy Consult   Nebulization Continuous RT Matheus Worthy M.D.        senna-docusate (PERICOLACE or SENOKOT S) 8.6-50 MG per tablet 2 Tablet  2 Tablet Enteral Tube BID Matheus Worthy M.D.        And    polyethylene glycol/lytes (MIRALAX) PACKET 1 Packet  1 Packet Enteral Tube QDAY PRN Matheus Worthy M.D.        And    magnesium hydroxide (MILK OF MAGNESIA) suspension 30 mL  30 mL Enteral Tube QDAY PRN Matheus Worthy M.D.        And    bisacodyl (DULCOLAX) suppository 10 mg  10 mg Rectal QDAY PRN Matheus Worthy M.D. MD Alert...ICU Electrolyte Replacement per Pharmacy   Other PHARMACY TO DOSE Matheus Worthy M.D.        lidocaine (XYLOCAINE) 1 % injection 2 mL  2 mL Tracheal Tube Q30 MIN PRN Matheus Worthy M.D.        dexmedetomidine (PRECEDEX) 400 mcg/100mL NS premix infusion  0-1.5  mcg/kg/hr (Ideal) Intravenous Continuous Matheus Worthy M.D.   Stopped at 02/09/23 0604    ipratropium-albuterol (DUONEB) nebulizer solution  3 mL Nebulization Q2HRS PRN (RT) Matheus Worthy M.D.        methylPREDNISolone sod succ (SOLU-MEDROL) 125 MG injection 62.5 mg  62.5 mg Intravenous Q8HRS Matheus Worthy M.D.   62.5 mg at 02/09/23 0505    enoxaparin (Lovenox) inj 40 mg  40 mg Subcutaneous DAILY AT 1800 Jarvis Talley M.D.   40 mg at 02/08/23 1755    hydrALAZINE (APRESOLINE) injection 10 mg  10 mg Intravenous Q4HRS PRN Jarvis Talley M.D.        doxycycline (VIBRAMYCIN) 100 mg in  mL IVPB  100 mg Intravenous Q12HRS Matheus Worthy M.D. 100 mL/hr at 02/09/23 0507 100 mg at 02/09/23 0507    furosemide (LASIX) injection 20 mg  20 mg Intravenous BID DIURETIC Matheus Worthy M.D.   20 mg at 02/09/23 0505    hydrOXYzine HCl (ATARAX) tablet 25 mg  25 mg Enteral Tube TID PRN Matheus Worthy M.D.        sertraline (Zoloft) tablet 25 mg  25 mg Oral DAILY Matheus Worthy M.D.        budesonide-formoterol (SYMBICORT) 160-4.5 MCG/ACT inhaler 2 Puff  2 Puff Inhalation BID Matheus Worthy M.D.        tiotropium (Spiriva Respimat) 2.5 mcg/Act inhalation spray 5 mcg  5 mcg Inhalation DAILY Matheus Worthy M.D.        umeclidinium-vilanterol (Anoro Ellipta) inhaler 1 Puff  1 Puff Inhalation DAILY Matheus Worthy M.D.        levalbuterol (XOPENEX) 1.25 MG/3ML nebulizer solution 1.25 mg  1.25 mg Nebulization Q4HRS (RT) Matheus Worthy M.D.   1.25 mg at 02/09/23 0658    ipratropium (ATROVENT) 0.02 % nebulizer solution 0.5 mg  0.5 mg Nebulization Q4HRS (RT) Matheus Worthy M.D.   0.5 mg at 02/09/23 0700    levalbuterol (XOPENEX) 1.25 MG/3ML nebulizer solution 1.25 mg  1.25 mg Nebulization Q2HRS PRN (RT) Matheus Worthy M.D.        ipratropium (ATROVENT) 0.02 % nebulizer solution 0.5 mg  0.5 mg Nebulization Q2HRS PRN (RT) Matheus Worthy M.D.           Fluids    Intake/Output Summary  (Last 24 hours) at 2/9/2023 0712  Last data filed at 2/9/2023 0614  Gross per 24 hour   Intake 7501.58 ml   Output 1770 ml   Net 5731.58 ml       Laboratory  Recent Labs     02/08/23  0659 02/08/23  0716 02/09/23  0421   DTNYW77T 7.20*  --   --    XNGRSP780P 70.0*  --   --    TGHSL237U 264.4*  --   --    AYUX8QAD 99.1*  --   --    ARTHCO3 27*  --   --    T5BXFLTWE 7.0  --   --    ARTBE -3  --   --    ISTATAPH  --  7.105* 7.418   ISTATAPCO2  --  >100.0* 41.5*   ISTATAPO2  --  71 69   ISTATATCO2  --  37* 28   [id number]  --  85* 94   ISTATARTHCO3  --  34.0* 26.8*   ISTATARTBE  --  1 2   ISTATTEMP  --  98.9 F 98.6 F   ISTATFIO2  --  100 30   ISTATSPEC  --  Arterial Arterial   ISTATAPHTC  --  7.103* 7.418   [id number]  --  71 69         Recent Labs     02/08/23  0634 02/08/23  1047 02/09/23  0440   SODIUM 139  --  142   POTASSIUM 4.6  --  3.7   CHLORIDE 99  --  106   CO2 32  --  23   BUN 21  --  19   CREATININE 1.17  --  0.96   MAGNESIUM  --  1.8 2.2   PHOSPHORUS  --  3.8 2.5   CALCIUM 9.4  --  8.1*     Recent Labs     02/08/23  0634 02/09/23  0440   ALTSGPT 26  --    ASTSGOT 40  --    ALKPHOSPHAT 74  --    TBILIRUBIN 0.2  --    GLUCOSE 165* 151*     Recent Labs     02/08/23  0634 02/09/23  0440   WBC 9.9 7.5   NEUTSPOLYS 58.00 92.40*   LYMPHOCYTES 26.60 3.60*   MONOCYTES 12.90 3.30   EOSINOPHILS 1.10 0.00   BASOPHILS 0.80 0.00   ASTSGOT 40  --    ALTSGPT 26  --    ALKPHOSPHAT 74  --    TBILIRUBIN 0.2  --      Recent Labs     02/08/23  0634 02/09/23  0440   RBC 5.18 4.08*   HEMOGLOBIN 14.4 11.5*   HEMATOCRIT 46.9 35.6*   PLATELETCT 219 129*       Imaging  X-Ray:  I have personally reviewed the images and compared with prior images.  EKG:  I have personally reviewed the images and compared with prior images.    Assessment/Plan  * Acute hypercapnic respiratory failure (HCC)- (present on admission)  Assessment & Plan  Intubated date: 2/8  Goal saturation > 88-92%  Monitor ventilator waveforms and titrate flow/peep and  volumes according.   Lung protective ventilation strategy w/ A-F bundle  CXR: monitor lung volumes and tube/line placement  VAP bundle prevention, oral care, post pyloric feeding  Head of bed > 30 degree  GI prophylaxis  Daily awakening and SBT trials unless contraindicated  Monitor for liberation  Respiratory treatments: prn    Plan for extubation trial today      Hypertension  Assessment & Plan  Hydralazine PRN    Swelling of lower extremity  Assessment & Plan  Check lower ext doppler and echo  Force diuresis  Likely cor pulmonale    COPD (chronic obstructive pulmonary disease) (HCC)- (present on admission)  Assessment & Plan  Systemic steroids, bronchodilators, doxy, sputum cx, wean oxygen 88-92%, COPD order set and pulmonary consult once out of ICU. Palliative care consultation. MRSA nares, strep and legionella antigen.     Pulmonary nodule- (present on admission)  Assessment & Plan  Stable on serial CT follow up with pulmonary as appropriate    Elevated troponin- (present on admission)  Assessment & Plan  Likely demand ischemia follow up echo  Aspirin and statin therapy           VTE:  Lovenox  Ulcer: H2 Antagonist  Lines: Santos Catheter  Ongoing indication addressed    I have performed a physical exam and reviewed and updated ROS and Plan today (2/9/2023). In review of yesterday's note (2/8/2023), there are no changes except as documented above.     Discussed patient condition and risk of morbidity and/or mortality with RN, RT, Pharmacy, Charge nurse / hot rounds, and Patient    The patient remains critically ill ventilator titration and extubation trial.  Critical care time = 55 minutes in directly providing and coordinating critical care and extensive data review.  No time overlap and excludes procedures.

## 2023-02-09 NOTE — DISCHARGE PLANNING
Care Transition Team Assessment    LSW met with pt at bedside to complete assessment. Pt communicated via writing as he was just extubated and not allowed to talk yet. Pt confirmed the information on the face sheet. Pt lives with his wife, 24 yo granddaughter, and 2 and 4 year old great grandkids in a single story house that has no steps to enter. Prior to this hospitalization pt reports having difficulty with ADLs and IADLs as he is often short of breath and has panic attacks. Pt's wife is his main support and assists him as needed at home. Pt uses 3L O2 at baseline supplied by [employer]. They also have a walker and wheelchair that used to be his mother in laws. Pt does not use the walker, despite his wife asking him to. Pt is able to drive, however typically his wife provides transportation for him. When traveling community distances pt does use a wheelchair. Pt is retired and no financial concerns at this time.    Information Source  Orientation Level: Oriented to place (intubated, communicates/writes notes appropriately)  Information Given By: Patient  Informant's Name: Nikolas Bob  Who is responsible for making decisions for patient? : Patient    Readmission Evaluation  Is this a readmission?: No    Elopement Risk  Legal Hold: No  Ambulatory or Self Mobile in Wheelchair: No-Not an Elopement Risk  Elopement Risk: Not at Risk for Elopement    Interdisciplinary Discharge Planning  Lives with - Patient's Self Care Capacity: Spouse  Support Systems: Home Care Staff, Spouse / Significant Other, Friends / Neighbors, Family Member(s)  Housing / Facility: Unable To Determine At This Time  Durable Medical Equipment: Walker, Other - Specify, Home Oxygen (wheelchair)    Discharge Preparedness  What is your plan after discharge?: Skilled nursing facility  What are your discharge supports?: Spouse  Prior Functional Level: Ambulatory, Needs Assist with Activities of Daily Living, Independent with Medication Management,  Drives Self, Uses Wheelchair  Difficulity with ADLs: Bathing, Dressing, Walking  Difficulity with IADLs: Cooking, Laundry, Shopping    Functional Assesment  Prior Functional Level: Ambulatory, Needs Assist with Activities of Daily Living, Independent with Medication Management, Drives Self, Uses Wheelchair    Finances  Financial Barriers to Discharge: No  Prescription Coverage: Yes    Vision / Hearing Impairment  Vision Impairment : No  Hearing Impairment : No    Advance Directive  Advance Directive?: None    Domestic Abuse  Have you ever been the victim of abuse or violence?: No  Physical Abuse or Sexual Abuse: No  Verbal Abuse or Emotional Abuse: No  Possible Abuse/Neglect Reported to:: Not Applicable    Psychological Assessment  History of Substance Abuse: None  History of Psychiatric Problems: No  Non-compliant with Treatment: No  Newly Diagnosed Illness: No    Discharge Risks or Barriers  Discharge risks or barriers?: Mental health  Patient risk factors: Mental health    Anticipated Discharge Information  Discharge Disposition: D/T to SNF with Medicare cert in anticipation of skilled care (03)

## 2023-02-09 NOTE — CARE PLAN
Problem: Ventilation  Goal: Ability to achieve and maintain unassisted ventilation or tolerate decreased levels of ventilator support  Description: Target End Date:  4 days     Document on Vent flowsheet    1.  Support and monitor invasive and noninvasive mechanical ventilation  2.  Monitor ventilator weaning response  3.  Perform ventilator associated pneumonia prevention interventions  4.  Manage ventilation therapy by monitoring diagnostic test results  Outcome: Met   Extubated 2082  Cuff leak present  Stable upon RT departure

## 2023-02-09 NOTE — CARE PLAN
The patient is Stable - Low risk of patient condition declining or worsening    Shift Goals  Clinical Goals: Pulmonary hygiene  Patient Goals: Rest for the night  Family Goals: Updates    Progress made toward(s) clinical / shift goals:    Problem: Knowledge Deficit - COPD  Goal: Patient/significant other demonstrates understanding of disease process, utilization of the Action Plan, medications and discharge instruction  Outcome: Progressing     Problem: Self Care  Goal: Patient will have the ability to perform ADLs independently or with assistance (bathe, groom, dress, toilet and feed)  Outcome: Progressing     Problem: Safety - Medical Restraint  Goal: Free from restraint(s) (Restraint for Interference with Medical Device)  Outcome: Progressing  Flowsheets (Taken 2/9/2023 7521)  Addressed this shift: Free from restraint(s) (restraint for interference with medical device):   ONCE/SHIFT or MINIMUM Every 12 hours: Assess and document the continuing need for restraints   Every 24 hours: Continued use of restraint requires Licensed Independent Practitioner to perform face to face examination and written order   Identify and implement measures to help patient regain control     Problem: Pain - Standard  Goal: Alleviation of pain or a reduction in pain to the patient’s comfort goal  Outcome: Progressing       Patient is not progressing towards the following goals:

## 2023-02-09 NOTE — CARE PLAN
Problem: Knowledge Deficit - COPD  Goal: Patient/significant other demonstrates understanding of disease process, utilization of the Action Plan, medications and discharge instruction  Outcome: Progressing     Problem: Skin Integrity  Goal: Skin integrity is maintained or improved  Outcome: Progressing     Problem: Pain - Standard  Goal: Alleviation of pain or a reduction in pain to the patient’s comfort goal  Outcome: Progressing   The patient is Watcher - Medium risk of patient condition declining or worsening    Shift Goals  Clinical Goals: Pulmonary hygiene  Patient Goals: Rest for the night  Family Goals: Updates    Progress made toward(s) clinical / shift goals:      Patient is not progressing towards the following goals:

## 2023-02-10 ENCOUNTER — APPOINTMENT (OUTPATIENT)
Dept: RADIOLOGY | Facility: MEDICAL CENTER | Age: 68
DRG: 208 | End: 2023-02-10
Attending: INTERNAL MEDICINE
Payer: COMMERCIAL

## 2023-02-10 ENCOUNTER — PATIENT OUTREACH (OUTPATIENT)
Dept: HEALTH INFORMATION MANAGEMENT | Facility: OTHER | Age: 68
End: 2023-02-10
Payer: COMMERCIAL

## 2023-02-10 ENCOUNTER — APPOINTMENT (OUTPATIENT)
Dept: CARDIOLOGY | Facility: MEDICAL CENTER | Age: 68
DRG: 208 | End: 2023-02-10
Attending: INTERNAL MEDICINE
Payer: COMMERCIAL

## 2023-02-10 PROBLEM — J81.0 ACUTE PULMONARY EDEMA (HCC): Status: ACTIVE | Noted: 2023-02-10

## 2023-02-10 PROBLEM — Z91.199 MEDICALLY NONCOMPLIANT: Status: ACTIVE | Noted: 2023-02-10

## 2023-02-10 LAB
ANION GAP SERPL CALC-SCNC: 12 MMOL/L (ref 7–16)
B PARAP IS1001 DNA NPH QL NAA+NON-PROBE: NOT DETECTED
B PERT.PT PRMT NPH QL NAA+NON-PROBE: NOT DETECTED
BACTERIA UR CULT: NORMAL
BASE EXCESS BLDA CALC-SCNC: 3 MMOL/L (ref -4–3)
BASE EXCESS BLDA CALC-SCNC: 5 MMOL/L (ref -4–3)
BASOPHILS # BLD AUTO: 0.2 % (ref 0–1.8)
BASOPHILS # BLD: 0.03 K/UL (ref 0–0.12)
BODY TEMPERATURE: ABNORMAL DEGREES
BODY TEMPERATURE: ABNORMAL DEGREES
BREATHS SETTING VENT: 22
BUN SERPL-MCNC: 27 MG/DL (ref 8–22)
C PNEUM DNA NPH QL NAA+NON-PROBE: NOT DETECTED
CALCIUM SERPL-MCNC: 9 MG/DL (ref 8.5–10.5)
CHLORIDE SERPL-SCNC: 101 MMOL/L (ref 96–112)
CO2 BLDA-SCNC: 32 MMOL/L (ref 20–33)
CO2 BLDA-SCNC: 33 MMOL/L (ref 20–33)
CO2 SERPL-SCNC: 29 MMOL/L (ref 20–33)
CREAT SERPL-MCNC: 0.98 MG/DL (ref 0.5–1.4)
DELSYS IDSYS: ABNORMAL
DELSYS IDSYS: ABNORMAL
EOSINOPHIL # BLD AUTO: 0 K/UL (ref 0–0.51)
EOSINOPHIL NFR BLD: 0 % (ref 0–6.9)
ERYTHROCYTE [DISTWIDTH] IN BLOOD BY AUTOMATED COUNT: 46.9 FL (ref 35.9–50)
FLUAV RNA NPH QL NAA+NON-PROBE: NOT DETECTED
FLUBV RNA NPH QL NAA+NON-PROBE: NOT DETECTED
GFR SERPLBLD CREATININE-BSD FMLA CKD-EPI: 83 ML/MIN/1.73 M 2
GLUCOSE SERPL-MCNC: 100 MG/DL (ref 65–99)
GRAM STN SPEC: NORMAL
HADV DNA NPH QL NAA+NON-PROBE: NOT DETECTED
HCO3 BLDA-SCNC: 30.5 MMOL/L (ref 17–25)
HCO3 BLDA-SCNC: 31.3 MMOL/L (ref 17–25)
HCOV 229E RNA NPH QL NAA+NON-PROBE: NOT DETECTED
HCOV HKU1 RNA NPH QL NAA+NON-PROBE: NOT DETECTED
HCOV NL63 RNA NPH QL NAA+NON-PROBE: NOT DETECTED
HCOV OC43 RNA NPH QL NAA+NON-PROBE: NOT DETECTED
HCT VFR BLD AUTO: 39.9 % (ref 42–52)
HGB BLD-MCNC: 12.7 G/DL (ref 14–18)
HMPV RNA NPH QL NAA+NON-PROBE: DETECTED
HOROWITZ INDEX BLDA+IHG-RTO: 214 MM[HG]
HOROWITZ INDEX BLDA+IHG-RTO: 243 MM[HG]
HPIV1 RNA NPH QL NAA+NON-PROBE: NOT DETECTED
HPIV2 RNA NPH QL NAA+NON-PROBE: NOT DETECTED
HPIV3 RNA NPH QL NAA+NON-PROBE: NOT DETECTED
HPIV4 RNA NPH QL NAA+NON-PROBE: NOT DETECTED
IMM GRANULOCYTES # BLD AUTO: 0.11 K/UL (ref 0–0.11)
IMM GRANULOCYTES NFR BLD AUTO: 0.6 % (ref 0–0.9)
LPM ILPM: 7 LPM
LV EJECT FRACT  99904: 50
LYMPHOCYTES # BLD AUTO: 0.76 K/UL (ref 1–4.8)
LYMPHOCYTES NFR BLD: 4.2 % (ref 22–41)
M PNEUMO DNA NPH QL NAA+NON-PROBE: NOT DETECTED
MAGNESIUM SERPL-MCNC: 2.3 MG/DL (ref 1.5–2.5)
MCH RBC QN AUTO: 27.9 PG (ref 27–33)
MCHC RBC AUTO-ENTMCNC: 31.8 G/DL (ref 33.7–35.3)
MCV RBC AUTO: 87.7 FL (ref 81.4–97.8)
MODE IMODE: ABNORMAL
MONOCYTES # BLD AUTO: 1.62 K/UL (ref 0–0.85)
MONOCYTES NFR BLD AUTO: 8.9 % (ref 0–13.4)
NEUTROPHILS # BLD AUTO: 15.75 K/UL (ref 1.82–7.42)
NEUTROPHILS NFR BLD: 86.1 % (ref 44–72)
NRBC # BLD AUTO: 0 K/UL
NRBC BLD-RTO: 0 /100 WBC
O2/TOTAL GAS SETTING VFR VENT: 30 %
O2/TOTAL GAS SETTING VFR VENT: 50 %
PCO2 BLDA: 49.2 MMHG (ref 26–37)
PCO2 BLDA: 60.3 MMHG (ref 26–37)
PCO2 TEMP ADJ BLDA: 48.5 MMHG (ref 26–37)
PCO2 TEMP ADJ BLDA: 61.4 MMHG (ref 26–37)
PEEP END EXPIRATORY PRESSURE IPEEP: 6 CMH20
PH BLDA: 7.32 [PH] (ref 7.4–7.5)
PH BLDA: 7.4 [PH] (ref 7.4–7.5)
PH TEMP ADJ BLDA: 7.32 [PH] (ref 7.4–7.5)
PH TEMP ADJ BLDA: 7.41 [PH] (ref 7.4–7.5)
PHOSPHATE SERPL-MCNC: 3.9 MG/DL (ref 2.5–4.5)
PLATELET # BLD AUTO: 215 K/UL (ref 164–446)
PMV BLD AUTO: 11.2 FL (ref 9–12.9)
PO2 BLDA: 107 MMHG (ref 64–87)
PO2 BLDA: 73 MMHG (ref 64–87)
PO2 TEMP ADJ BLDA: 110 MMHG (ref 64–87)
PO2 TEMP ADJ BLDA: 71 MMHG (ref 64–87)
POTASSIUM SERPL-SCNC: 4 MMOL/L (ref 3.6–5.5)
RBC # BLD AUTO: 4.55 M/UL (ref 4.7–6.1)
RSV RNA NPH QL NAA+NON-PROBE: NOT DETECTED
RV+EV RNA NPH QL NAA+NON-PROBE: NOT DETECTED
SAO2 % BLDA: 94 % (ref 93–99)
SAO2 % BLDA: 98 % (ref 93–99)
SARS-COV-2 RNA NPH QL NAA+NON-PROBE: NOTDETECTED
SIGNIFICANT IND 70042: NORMAL
SIGNIFICANT IND 70042: NORMAL
SITE SITE: NORMAL
SITE SITE: NORMAL
SODIUM SERPL-SCNC: 142 MMOL/L (ref 135–145)
SOURCE SOURCE: NORMAL
SOURCE SOURCE: NORMAL
SPECIMEN DRAWN FROM PATIENT: ABNORMAL
SPECIMEN DRAWN FROM PATIENT: ABNORMAL
TIDAL VOLUME IVT: 430 ML
WBC # BLD AUTO: 18.3 K/UL (ref 4.8–10.8)

## 2023-02-10 PROCEDURE — 770022 HCHG ROOM/CARE - ICU (200)

## 2023-02-10 PROCEDURE — 85025 COMPLETE CBC W/AUTO DIFF WBC: CPT

## 2023-02-10 PROCEDURE — 71045 X-RAY EXAM CHEST 1 VIEW: CPT

## 2023-02-10 PROCEDURE — 93306 TTE W/DOPPLER COMPLETE: CPT | Mod: 26 | Performed by: INTERNAL MEDICINE

## 2023-02-10 PROCEDURE — 94669 MECHANICAL CHEST WALL OSCILL: CPT

## 2023-02-10 PROCEDURE — 94640 AIRWAY INHALATION TREATMENT: CPT

## 2023-02-10 PROCEDURE — 87205 SMEAR GRAM STAIN: CPT

## 2023-02-10 PROCEDURE — 5A1945Z RESPIRATORY VENTILATION, 24-96 CONSECUTIVE HOURS: ICD-10-PCS | Performed by: INTERNAL MEDICINE

## 2023-02-10 PROCEDURE — 700101 HCHG RX REV CODE 250: Performed by: INTERNAL MEDICINE

## 2023-02-10 PROCEDURE — 700111 HCHG RX REV CODE 636 W/ 250 OVERRIDE (IP): Performed by: INTERNAL MEDICINE

## 2023-02-10 PROCEDURE — 700111 HCHG RX REV CODE 636 W/ 250 OVERRIDE (IP): Performed by: STUDENT IN AN ORGANIZED HEALTH CARE EDUCATION/TRAINING PROGRAM

## 2023-02-10 PROCEDURE — 700102 HCHG RX REV CODE 250 W/ 637 OVERRIDE(OP): Performed by: INTERNAL MEDICINE

## 2023-02-10 PROCEDURE — 94002 VENT MGMT INPAT INIT DAY: CPT

## 2023-02-10 PROCEDURE — 80048 BASIC METABOLIC PNL TOTAL CA: CPT

## 2023-02-10 PROCEDURE — 84100 ASSAY OF PHOSPHORUS: CPT

## 2023-02-10 PROCEDURE — 31500 INSERT EMERGENCY AIRWAY: CPT

## 2023-02-10 PROCEDURE — 36600 WITHDRAWAL OF ARTERIAL BLOOD: CPT

## 2023-02-10 PROCEDURE — 83735 ASSAY OF MAGNESIUM: CPT

## 2023-02-10 PROCEDURE — A9270 NON-COVERED ITEM OR SERVICE: HCPCS | Performed by: INTERNAL MEDICINE

## 2023-02-10 PROCEDURE — 0BH18EZ INSERTION OF ENDOTRACHEAL AIRWAY INTO TRACHEA, VIA NATURAL OR ARTIFICIAL OPENING ENDOSCOPIC: ICD-10-PCS | Performed by: INTERNAL MEDICINE

## 2023-02-10 PROCEDURE — 99292 CRITICAL CARE ADDL 30 MIN: CPT | Mod: 25 | Performed by: INTERNAL MEDICINE

## 2023-02-10 PROCEDURE — 700105 HCHG RX REV CODE 258: Performed by: INTERNAL MEDICINE

## 2023-02-10 PROCEDURE — 87070 CULTURE OTHR SPECIMN AEROBIC: CPT

## 2023-02-10 PROCEDURE — 99152 MOD SED SAME PHYS/QHP 5/>YRS: CPT

## 2023-02-10 PROCEDURE — 94003 VENT MGMT INPAT SUBQ DAY: CPT

## 2023-02-10 PROCEDURE — 31500 INSERT EMERGENCY AIRWAY: CPT | Performed by: INTERNAL MEDICINE

## 2023-02-10 PROCEDURE — 99291 CRITICAL CARE FIRST HOUR: CPT | Mod: 25 | Performed by: INTERNAL MEDICINE

## 2023-02-10 PROCEDURE — 94660 CPAP INITIATION&MGMT: CPT

## 2023-02-10 PROCEDURE — 82803 BLOOD GASES ANY COMBINATION: CPT

## 2023-02-10 PROCEDURE — 93306 TTE W/DOPPLER COMPLETE: CPT

## 2023-02-10 RX ORDER — FAMOTIDINE 20 MG/1
20 TABLET, FILM COATED ORAL EVERY 12 HOURS
Status: DISCONTINUED | OUTPATIENT
Start: 2023-02-10 | End: 2023-02-11

## 2023-02-10 RX ORDER — ROCURONIUM BROMIDE 10 MG/ML
100 INJECTION, SOLUTION INTRAVENOUS ONCE
Status: COMPLETED | OUTPATIENT
Start: 2023-02-10 | End: 2023-02-10

## 2023-02-10 RX ORDER — ETOMIDATE 2 MG/ML
20 INJECTION INTRAVENOUS ONCE
Status: COMPLETED | OUTPATIENT
Start: 2023-02-10 | End: 2023-02-10

## 2023-02-10 RX ORDER — FUROSEMIDE 10 MG/ML
40 INJECTION INTRAMUSCULAR; INTRAVENOUS ONCE
Status: COMPLETED | OUTPATIENT
Start: 2023-02-10 | End: 2023-02-10

## 2023-02-10 RX ORDER — FUROSEMIDE 10 MG/ML
20 INJECTION INTRAMUSCULAR; INTRAVENOUS ONCE
Status: COMPLETED | OUTPATIENT
Start: 2023-02-10 | End: 2023-02-10

## 2023-02-10 RX ORDER — DEXMEDETOMIDINE HYDROCHLORIDE 4 UG/ML
.1-1.5 INJECTION, SOLUTION INTRAVENOUS CONTINUOUS
Status: DISCONTINUED | OUTPATIENT
Start: 2023-02-10 | End: 2023-02-13

## 2023-02-10 RX ORDER — LISINOPRIL 5 MG/1
10 TABLET ORAL
Status: DISCONTINUED | OUTPATIENT
Start: 2023-02-10 | End: 2023-02-11

## 2023-02-10 RX ORDER — ENALAPRILAT 1.25 MG/ML
1.25 INJECTION INTRAVENOUS EVERY 6 HOURS PRN
Status: DISCONTINUED | OUTPATIENT
Start: 2023-02-10 | End: 2023-02-22 | Stop reason: HOSPADM

## 2023-02-10 RX ORDER — HYDRALAZINE HYDROCHLORIDE 20 MG/ML
10-20 INJECTION INTRAMUSCULAR; INTRAVENOUS EVERY 4 HOURS PRN
Status: DISCONTINUED | OUTPATIENT
Start: 2023-02-10 | End: 2023-02-22 | Stop reason: HOSPADM

## 2023-02-10 RX ORDER — FUROSEMIDE 10 MG/ML
40 INJECTION INTRAMUSCULAR; INTRAVENOUS
Status: DISCONTINUED | OUTPATIENT
Start: 2023-02-10 | End: 2023-02-11

## 2023-02-10 RX ORDER — LABETALOL HYDROCHLORIDE 5 MG/ML
20 INJECTION, SOLUTION INTRAVENOUS EVERY 4 HOURS PRN
Status: DISCONTINUED | OUTPATIENT
Start: 2023-02-10 | End: 2023-02-22 | Stop reason: HOSPADM

## 2023-02-10 RX ADMIN — OXYCODONE HYDROCHLORIDE 10 MG: 10 TABLET ORAL at 05:30

## 2023-02-10 RX ADMIN — ENOXAPARIN SODIUM 40 MG: 40 INJECTION SUBCUTANEOUS at 17:37

## 2023-02-10 RX ADMIN — FUROSEMIDE 40 MG: 10 INJECTION, SOLUTION INTRAMUSCULAR; INTRAVENOUS at 09:51

## 2023-02-10 RX ADMIN — LEVALBUTEROL 1.25 MG: 1.25 SOLUTION RESPIRATORY (INHALATION) at 01:30

## 2023-02-10 RX ADMIN — DEXMEDETOMIDINE 0.2 MCG/KG/HR: 200 INJECTION, SOLUTION INTRAVENOUS at 08:01

## 2023-02-10 RX ADMIN — FUROSEMIDE 40 MG: 10 INJECTION, SOLUTION INTRAMUSCULAR; INTRAVENOUS at 17:37

## 2023-02-10 RX ADMIN — DOXYCYCLINE 100 MG: 100 INJECTION, POWDER, LYOPHILIZED, FOR SOLUTION INTRAVENOUS at 17:40

## 2023-02-10 RX ADMIN — PREDNISONE 40 MG: 20 TABLET ORAL at 05:30

## 2023-02-10 RX ADMIN — IPRATROPIUM BROMIDE 0.5 MG: 0.5 SOLUTION RESPIRATORY (INHALATION) at 06:39

## 2023-02-10 RX ADMIN — IPRATROPIUM BROMIDE 0.5 MG: 0.5 SOLUTION RESPIRATORY (INHALATION) at 22:14

## 2023-02-10 RX ADMIN — BUDESONIDE AND FORMOTEROL FUMARATE DIHYDRATE 2 PUFF: 160; 4.5 AEROSOL RESPIRATORY (INHALATION) at 05:30

## 2023-02-10 RX ADMIN — OXYCODONE HYDROCHLORIDE 10 MG: 10 TABLET ORAL at 21:36

## 2023-02-10 RX ADMIN — ALPRAZOLAM 0.5 MG: 0.5 TABLET ORAL at 21:36

## 2023-02-10 RX ADMIN — FAMOTIDINE 20 MG: 10 INJECTION, SOLUTION INTRAVENOUS at 13:23

## 2023-02-10 RX ADMIN — ENALAPRILAT 1.25 MG: 1.25 INJECTION INTRAVENOUS at 10:06

## 2023-02-10 RX ADMIN — ASPIRIN 81 MG 81 MG: 81 TABLET ORAL at 05:30

## 2023-02-10 RX ADMIN — DOXYCYCLINE 100 MG: 100 INJECTION, POWDER, LYOPHILIZED, FOR SOLUTION INTRAVENOUS at 05:35

## 2023-02-10 RX ADMIN — FENTANYL CITRATE 50 MCG: 50 INJECTION, SOLUTION INTRAMUSCULAR; INTRAVENOUS at 20:11

## 2023-02-10 RX ADMIN — LORAZEPAM 2 MG: 2 INJECTION INTRAMUSCULAR; INTRAVENOUS at 00:27

## 2023-02-10 RX ADMIN — IPRATROPIUM BROMIDE 0.5 MG: 0.5 SOLUTION RESPIRATORY (INHALATION) at 18:38

## 2023-02-10 RX ADMIN — ROCURONIUM BROMIDE 100 MG: 50 INJECTION INTRAVENOUS at 07:50

## 2023-02-10 RX ADMIN — LEVALBUTEROL 1.25 MG: 1.25 SOLUTION RESPIRATORY (INHALATION) at 10:20

## 2023-02-10 RX ADMIN — SERTRALINE 25 MG: 50 TABLET, FILM COATED ORAL at 05:30

## 2023-02-10 RX ADMIN — FUROSEMIDE 20 MG: 10 INJECTION, SOLUTION INTRAMUSCULAR; INTRAVENOUS at 05:31

## 2023-02-10 RX ADMIN — IPRATROPIUM BROMIDE 0.5 MG: 0.5 SOLUTION RESPIRATORY (INHALATION) at 13:13

## 2023-02-10 RX ADMIN — LEVALBUTEROL 1.25 MG: 1.25 SOLUTION RESPIRATORY (INHALATION) at 14:09

## 2023-02-10 RX ADMIN — LEVALBUTEROL 1.25 MG: 1.25 SOLUTION RESPIRATORY (INHALATION) at 18:38

## 2023-02-10 RX ADMIN — ETOMIDATE INJECTION 20 MG: 2 SOLUTION INTRAVENOUS at 07:50

## 2023-02-10 RX ADMIN — OXYCODONE HYDROCHLORIDE 10 MG: 10 TABLET ORAL at 00:08

## 2023-02-10 RX ADMIN — FENTANYL CITRATE 100 MCG: 50 INJECTION, SOLUTION INTRAMUSCULAR; INTRAVENOUS at 11:56

## 2023-02-10 RX ADMIN — LEVALBUTEROL 1.25 MG: 1.25 SOLUTION RESPIRATORY (INHALATION) at 06:39

## 2023-02-10 RX ADMIN — IPRATROPIUM BROMIDE 0.5 MG: 0.5 SOLUTION RESPIRATORY (INHALATION) at 01:30

## 2023-02-10 RX ADMIN — FUROSEMIDE 20 MG: 10 INJECTION, SOLUTION INTRAMUSCULAR; INTRAVENOUS at 07:13

## 2023-02-10 RX ADMIN — LEVALBUTEROL 1.25 MG: 1.25 SOLUTION RESPIRATORY (INHALATION) at 22:14

## 2023-02-10 RX ADMIN — DEXMEDETOMIDINE 0.6 MCG/KG/HR: 200 INJECTION, SOLUTION INTRAVENOUS at 18:35

## 2023-02-10 RX ADMIN — LABETALOL HYDROCHLORIDE 20 MG: 5 INJECTION, SOLUTION INTRAVENOUS at 07:15

## 2023-02-10 RX ADMIN — TIOTROPIUM BROMIDE INHALATION SPRAY 5 MCG: 3.12 SPRAY, METERED RESPIRATORY (INHALATION) at 05:32

## 2023-02-10 RX ADMIN — SENNOSIDES AND DOCUSATE SODIUM 2 TABLET: 50; 8.6 TABLET ORAL at 05:30

## 2023-02-10 RX ADMIN — FENTANYL CITRATE 100 MCG: 50 INJECTION, SOLUTION INTRAMUSCULAR; INTRAVENOUS at 09:54

## 2023-02-10 RX ADMIN — LABETALOL HYDROCHLORIDE 20 MG: 5 INJECTION, SOLUTION INTRAVENOUS at 00:08

## 2023-02-10 ASSESSMENT — PAIN DESCRIPTION - PAIN TYPE
TYPE: ACUTE PAIN;CHRONIC PAIN
TYPE: ACUTE PAIN
TYPE: ACUTE PAIN;CHRONIC PAIN
TYPE: CHRONIC PAIN
TYPE: CHRONIC PAIN

## 2023-02-10 NOTE — DIETARY
Nutrition Services:  Pt with minimal PO intake x 3 days. Pt was extubated and diet advanced 2/9, but recorded PO intake was poor at 0% to <25%. Pt re-intubated today and is NPO. Recommend consider nutrition support when appropriate. RD following

## 2023-02-10 NOTE — PROGRESS NOTES
Patient continues to have SPB 170s, refusing high blood pressure medications. Dr. Daniel updated. Patient continues to refuses to eat and will not eat until diet is changed to regular, Dr. Daniel ordered regular diet.

## 2023-02-10 NOTE — CARE PLAN
Problem: Bronchoconstriction  Goal: Improve in air movement and diminished wheezing  Description: Target End Date:  2 to 3 days    1.  Implement inhaled treatments  2.  Evaluate and manage medication effects  Outcome: Progressing  Patient is satting 5-7 L Oxymask     Problem: Bronchopulmonary Hygiene  Goal: Increase mobilization of retained secretions  Description: Target End Date:  2 to 3 days    1.  Perform bronchopulmonary therapy as indicated by assessment  2.  Perform airway suctioning  3.  Perform actions to maintain patient airway  Outcome: Progressing   Patient is coughing on his own and using flutter with RT instruction.     BiPAP 12/5 35 % at night    Xopenex/Atrovent Q4  Flutter/MDI/DPI

## 2023-02-10 NOTE — CARE PLAN
Problem: Bronchoconstriction  Goal: Improve in air movement and diminished wheezing  Description: Target End Date:  2 to 3 days    1.  Implement inhaled treatments  2.  Evaluate and manage medication effects  Outcome: Progressing  Patient is satting well on 3L NC     Problem: Bronchopulmonary Hygiene  Goal: Increase mobilization of retained secretions  Description: Target End Date:  2 to 3 days    1.  Perform bronchopulmonary therapy as indicated by assessment  2.  Perform airway suctioning  3.  Perform actions to maintain patient airway  Outcome: Progressing   Patient is coughing on his own and using flutter with RT instruction.

## 2023-02-10 NOTE — PROGRESS NOTES
0745: Dr. Worthy at the bedside for intubation.    0750: Procedure medications administered as per MD orders (see MAR).     0751: Procedure complete. 22 at the teeth. Positive CO2 color change noted, breath sounds heard bilaterally, X-ray ordered.

## 2023-02-10 NOTE — PROCEDURES
Intubation    Date/Time: 2/10/2023 7:58 AM  Performed by: Matheus Worthy M.D.  Authorized by: Matheus Worthy M.D.     Consent:     Consent obtained:  Emergent situation    Risks discussed:  Aspiration, brain injury, death, hypoxia, laryngeal injury and bleeding    Alternatives discussed:  No treatment  Pre-procedure details:     Patient status:  Unresponsive    Mallampati score:  I    Pretreatment meds: Etomidate.    Paralytics:  Rocuronium  Procedure details:     Preoxygenation:  Bag valve mask    CPR in progress: no      Intubation method:  Oral    Oral intubation technique:  Video-assisted    Laryngoscope type:  GlideScope    Laryngoscope blade:  Mac 4    Cormack-Lehane Classification:  Grade 2a    Tube size (mm):  7.5    Tube type:  Cuffed    Number of attempts:  1    Ventilation between attempts: no      Cricoid pressure: no      Tube visualized through cords: yes    Placement assessment:     ETT to teeth:  22    Tube secured with:  ETT marcus    Breath sounds:  Equal    Placement verification: chest rise, condensation and direct visualization      Chest x-ray findings: pending stat CXR.  Post-procedure details:     Patient tolerance of procedure:  Tolerated well, no immediate complications

## 2023-02-10 NOTE — PROGRESS NOTES
Pt noted to be A/O 1, very dyspneic, not forming coherent sentences. Patient NT suctioned for thick tan secretions sent to lab. Dr. Worthy notified. Will notify RT for BiPAP and ABG.

## 2023-02-10 NOTE — PROGRESS NOTES
2050 Pt noted to be repetitive and disoriented to place and time at time of medication administration. Fentanyl held. RT contacted to place patient on BiPAP for evening. Pt educated on BiPAP.     2030 Pt refusing to get back to bed to wear BiPAP. Pt states he is too tired to get up and would like to recover his breath in chair before standing. Pt educated on importance of mobility and repositioning in bed. Pt still refusing. Pt agreeable to wearing BiPAP in chair after he is seen by his  who is at bedside.

## 2023-02-10 NOTE — PALLIATIVE CARE
Palliative Care follow-up  Per chart review, pt was intubated this morning.    Called wife Kimi, no answer, left msg to return call.      Plan:   Discuss GOC with wife when available.      Thank you for allowing Palliative Care to participate in this patient's care. Please feel free to call [phone] with any questions or concerns.

## 2023-02-10 NOTE — PROGRESS NOTES
CHW Nikolas called the pt to see how he is doing because the pt is in the hosp and wanted to check in with him. This CHW called and left the pt a message to please return the call. This CHW will continue to follow and try to see the pt bedside.

## 2023-02-10 NOTE — PROGRESS NOTES
"BiPAP attempted but pt states \"This never works for me, I don't want to wear it.\"    Pt reeducated but continues to refuse.     Mental status improved, back to being oriented to person, place, time, and event, but pt is forgetful and does not remember receiving recently administered medications.     EMILY Guy notified, ABG ordered for midnight.    "

## 2023-02-10 NOTE — PROGRESS NOTES
Critical Care Progress Note    Date of admission  2/8/2023    Chief Complaint  67 y.o. male admitted 2/8/2023 with Hx of Centrilobular emphysema on 3l n/c, prior tobacco abuse, ankylosing spondylitis, kyphoscoliosis, pulmonary hypertension, cor pulmonale, right hilar lung nodule. That presents with multiple sick contacts with 2 grandchildren, daughter and wife. He has had 1-2 days of shortness of breath and presented to ER in extremis and was intubated on arrival found to have acute hypoxic hypercapnic respiratory failure with ph 7.1/>100/71. His wife was at bedside describes he did get flu shot and prior covid vaccines. Viral panel negative. She has not had advance care planning and prior was full code. I discussed and recommended DNR I okay.     Hospital Course  2/8 Amitted to ICU on ventilator  2/9 extubated early am patient refusing rx and bipap    Interval Problem Update  Reviewed last 24 hour events:  Neuro: ubtoned this am, intubated  HR:   SBP: 130-200  Tmax: afebrile  GI: NPO, NG, BM pta  UOP: lasix 1700ml  Lines: peripheral IV, owens  Resp: Vent day 1 7.5 22    Vte: lovenox  PPI/H2:pepcid  Antibx: doxy  +1.2L Net 5.8L  Increase lasix 40mg BID  Metoprolol 25mg BID and lisinopril 10mg PO  Prn hydralazine 10-20mg IV Q4  Vasotec 1.25mg Q6 prn  Lower ext doppler neg  Prednisone 40mg Qd  Follow up echo  Strict SBP < 160 PRN in place  Follow up post intubation ABG    Review of Systems  Review of Systems   Unable to perform ROS: Mental status change      Vital Signs for last 24 hours   Temp:  [36.7 °C (98 °F)-37.2 °C (98.9 °F)] 37.2 °C (98.9 °F)  Pulse:  [] 86  Resp:  [12-51] 22  BP: ()/() 95/57  SpO2:  [89 %-100 %] 99 %    Hemodynamic parameters for last 24 hours       Respiratory Information for the last 24 hours  Vent Mode: APVCMV  Rate (breaths/min): 22  Vt Target (mL): 380  PEEP/CPAP: 8    Physical Exam   Physical Exam  Vitals and nursing note reviewed.   Constitutional:       General:  He is not in acute distress.     Appearance: He is ill-appearing.      Comments: Ill appearing sitting up in chair with accessory muscle use   HENT:      Head: Normocephalic.      Mouth/Throat:      Mouth: Mucous membranes are moist.   Eyes:      Pupils: Pupils are equal, round, and reactive to light.   Cardiovascular:      Rate and Rhythm: Normal rate.      Heart sounds: No murmur heard.  Pulmonary:      Effort: Respiratory distress present.      Breath sounds: No stridor. Wheezing and rales present. No rhonchi.      Comments: Using accessory muscles wheezing bilateral  Abdominal:      General: There is no distension.      Palpations: There is no mass.      Tenderness: There is no abdominal tenderness.      Hernia: No hernia is present.   Musculoskeletal:         General: No swelling or tenderness.      Cervical back: No rigidity.      Comments: Right arm swelling around IV  Cold knees   Skin:     Coloration: Skin is not jaundiced or pale.   Neurological:      Mental Status: He is alert.      Comments: Somnolent does awake move all extremities and answer appropriate   Psychiatric:         Mood and Affect: Mood normal.       Medications  Current Facility-Administered Medications   Medication Dose Route Frequency Provider Last Rate Last Admin    metoprolol tartrate (LOPRESSOR) tablet 25 mg  25 mg Oral TWICE DAILY Matheus Worthy M.D.        furosemide (LASIX) injection 40 mg  40 mg Intravenous BID DIURETIC Matheus Worthy M.D.        hydrALAZINE (APRESOLINE) injection 10-20 mg  10-20 mg Intravenous Q4HRS PRN Matheus Worthy M.D.        enalaprilat (Vasotec) injection 1.25 mg 1 mL  1.25 mg Intravenous Q6HRS PRN Matheus Worthy M.D.        lisinopril (PRINIVIL) tablet 10 mg  10 mg Oral Q DAY Matheus Worthy M.D.        furosemide (LASIX) injection 40 mg  40 mg Intravenous Once Matheus Worthy M.D.        dexmedetomidine (PRECEDEX) 400 mcg/100mL NS premix infusion  0.1-1.5 mcg/kg/hr Intravenous Continuous  Matheus Worthy M.D. 3.3 mL/hr at 02/10/23 0801 0.2 mcg/kg/hr at 02/10/23 0801    fentaNYL (SUBLIMAZE) injection  mcg   mcg Intravenous Q HOUR PRN Matheus Worthy M.D.        famotidine (PEPCID) tablet 20 mg  20 mg Enteral Tube Q12HRS Matheus Worthy M.D.        Or    famotidine (PEPCID) injection 20 mg  20 mg Intravenous Q12HRS Matheus Worthy M.D.        Respiratory Therapy Consult   Nebulization Continuous RT Matheus Worthy M.D.        aspirin (ASA) chewable tab 81 mg  81 mg Enteral Tube DAILY Matheus Worthy M.D.   81 mg at 02/10/23 0530    predniSONE (DELTASONE) tablet 40 mg  40 mg Oral DAILY Matheus Worthy M.D.   40 mg at 02/10/23 0530    acetaminophen (TYLENOL) tablet 1,000 mg  1,000 mg Oral Q8HRS PRN Matheus Worthy M.D.        fentaNYL (SUBLIMAZE) injection  mcg   mcg Intravenous Q HOUR PRN Matheus Worthy M.D.   100 mcg at 02/09/23 2328    oxyCODONE immediate-release (ROXICODONE) tablet 5 mg  5 mg Oral Q4HRS PRN Matheus Worthy M.D.        Or    oxyCODONE immediate release (ROXICODONE) tablet 10 mg  10 mg Oral Q4HRS PRN Matheus Worthy M.D.   10 mg at 02/10/23 0530    LORazepam (ATIVAN) injection 0.5-2 mg  0.5-2 mg Intravenous Q4HRS PRN Antwon Daniel M.D.   2 mg at 02/10/23 0027    ALPRAZolam (XANAX) tablet 0.5 mg  0.5 mg Oral TID PRN Antwon Daniel M.D.        labetalol (NORMODYNE/TRANDATE) injection 20 mg  20 mg Intravenous Q4HRS PRN Antwon Daniel M.D.   20 mg at 02/10/23 0715    senna-docusate (PERICOLACE or SENOKOT S) 8.6-50 MG per tablet 2 Tablet  2 Tablet Enteral Tube BID Matheus Worthy M.D.   2 Tablet at 02/10/23 0530    And    polyethylene glycol/lytes (MIRALAX) PACKET 1 Packet  1 Packet Enteral Tube QDAY PRGABBI Worthy M.D.        And    magnesium hydroxide (MILK OF MAGNESIA) suspension 30 mL  30 mL Enteral Tube QDAY PRGABBI Worthy M.D.        And    bisacodyl (DULCOLAX) suppository 10 mg  10 mg Rectal QDAY PRN Matheus Worthy,  MAGDA MIRANDA Alert...ICU Electrolyte Replacement per Pharmacy   Other PHARMACY TO DOSE Matheus Worthy M.D.        lidocaine (XYLOCAINE) 1 % injection 2 mL  2 mL Tracheal Tube Q30 MIN PRN Matheus Worthy M.D.        ipratropium-albuterol (DUONEB) nebulizer solution  3 mL Nebulization Q2HRS PRN (RT) Matheus Worthy M.D.        enoxaparin (Lovenox) inj 40 mg  40 mg Subcutaneous DAILY AT 1800 Jarvis Talley M.D.   40 mg at 02/08/23 1755    doxycycline (VIBRAMYCIN) 100 mg in  mL IVPB  100 mg Intravenous Q12HRS Matheus Worthy M.D.   Stopped at 02/10/23 0635    hydrOXYzine HCl (ATARAX) tablet 25 mg  25 mg Enteral Tube TID PRN Matheus Worthy M.D.        sertraline (Zoloft) tablet 25 mg  25 mg Oral DAILY Matheus Worthy M.D.   25 mg at 02/10/23 0530    budesonide-formoterol (SYMBICORT) 160-4.5 MCG/ACT inhaler 2 Puff  2 Puff Inhalation BID Matheus Worthy M.D.   2 Puff at 02/10/23 0530    tiotropium (Spiriva Respimat) 2.5 mcg/Act inhalation spray 5 mcg  5 mcg Inhalation DAILY Matheus Worthy M.D.   5 mcg at 02/10/23 0532    levalbuterol (XOPENEX) 1.25 MG/3ML nebulizer solution 1.25 mg  1.25 mg Nebulization Q4HRS (RT) Matheus Worthy M.D.   1.25 mg at 02/10/23 0639    ipratropium (ATROVENT) 0.02 % nebulizer solution 0.5 mg  0.5 mg Nebulization Q4HRS (RT) Matheus Worthy M.D.   0.5 mg at 02/10/23 0639    levalbuterol (XOPENEX) 1.25 MG/3ML nebulizer solution 1.25 mg  1.25 mg Nebulization Q2HRS PRN (RT) Matheus Worthy M.D.        ipratropium (ATROVENT) 0.02 % nebulizer solution 0.5 mg  0.5 mg Nebulization Q2HRS PRN (RT) Matheus Worthy M.D.   0.5 mg at 02/10/23 0130       Fluids    Intake/Output Summary (Last 24 hours) at 2/10/2023 0936  Last data filed at 2/10/2023 0600  Gross per 24 hour   Intake 540 ml   Output 4000 ml   Net -3460 ml       Laboratory  Recent Labs     02/08/23  0659 02/08/23  0716 02/09/23  0421 02/10/23  0025   RPJRE16X 7.20*  --   --   --    CSZOSV118B 70.0*  --   --    --    [id number] 264.4*  --   --   --    MPWX2DSX 99.1*  --   --   --    ARTHCO3 27*  --   --   --    F9OHGFVFQ 7.0  --   --   --    ARTBE -3  --   --   --    ISTATAPH  --  7.105* 7.418 7.323*   ISTATAPCO2  --  >100.0* 41.5* 60.3*   ISTATAPO2  --  71 69 107*   ISTATATCO2  --  37* 28 33   ACCZNKR3KKF  --  85* 94 98   ISTATARTHCO3  --  34.0* 26.8* 31.3*   ISTATARTBE  --  1 2 3   ISTATTEMP  --  98.9 F 98.6 F 99.3 F   ISTATFIO2  --  100 30 50   ISTATSPEC  --  Arterial Arterial Arterial   ISTATAPHTC  --  7.103* 7.418 7.318*   CMAIOENX8LA  --  71 69 110*         Recent Labs     02/08/23  0634 02/08/23  1047 02/09/23  0440 02/10/23  0414   SODIUM 139  --  142 142   POTASSIUM 4.6  --  3.7 4.0   CHLORIDE 99  --  106 101   CO2 32  --  23 29   BUN 21  --  19 27*   CREATININE 1.17  --  0.96 0.98   MAGNESIUM  --  1.8 2.2 2.3   PHOSPHORUS  --  3.8 2.5 3.9   CALCIUM 9.4  --  8.1* 9.0     Recent Labs     02/08/23  0634 02/09/23  0440 02/10/23  0414   ALTSGPT 26  --   --    ASTSGOT 40  --   --    ALKPHOSPHAT 74  --   --    TBILIRUBIN 0.2  --   --    GLUCOSE 165* 151* 100*     Recent Labs     02/08/23  0634 02/09/23  0440 02/10/23  0414   WBC 9.9 7.5 18.3*   NEUTSPOLYS 58.00 92.40* 86.10*   LYMPHOCYTES 26.60 3.60* 4.20*   MONOCYTES 12.90 3.30 8.90   EOSINOPHILS 1.10 0.00 0.00   BASOPHILS 0.80 0.00 0.20   ASTSGOT 40  --   --    ALTSGPT 26  --   --    ALKPHOSPHAT 74  --   --    TBILIRUBIN 0.2  --   --      Recent Labs     02/08/23  0634 02/09/23  0440 02/10/23  0414   RBC 5.18 4.08* 4.55*   HEMOGLOBIN 14.4 11.5* 12.7*   HEMATOCRIT 46.9 35.6* 39.9*   PLATELETCT 219 129* 215       Imaging  X-Ray:  I have personally reviewed the images and compared with prior images.  EKG:  I have personally reviewed the images and compared with prior images.    Assessment/Plan  * Acute hypercapnic respiratory failure (HCC)- (present on admission)  Assessment & Plan  Intubated date: 2/8 extubated 2/9 then re-intubated 2/10  Goal saturation >  88-92%  Monitor ventilator waveforms and titrate flow/peep and volumes according.   Lung protective ventilation strategy w/ A-F bundle  CXR: monitor lung volumes and tube/line placement  VAP bundle prevention, oral care, post pyloric feeding  Head of bed > 30 degree  GI prophylaxis  Daily awakening and SBT trials unless contraindicated  Monitor for liberation  Respiratory treatments: prn      Medically noncompliant  Assessment & Plan  Patient has good understanding of his disease but refuses medication and medical staff assistance this has lead to patient re-intubation. Continue medical education for patient and hopefully his wife can assist us with endeavor.     Acute pulmonary edema (HCC)  Assessment & Plan  Patient re-intubated for hypertensive urgency and acute pulmonary edema 2/10  Force diuresis this am  Follow up BMP later today    Hypertension  Assessment & Plan  Labetalol prn, vasotec and Hydralazine PRN for goal < 160  Start metoprolol 25mg BID and lisinopril 10mg daily  Follow up echo    Swelling of lower extremity  Assessment & Plan  Lower ext doppler -> negative for dvt  Echo pending  Force diuresis  Likely cor pulmonale    COPD (chronic obstructive pulmonary disease) ([facility])- (present on admission)  Assessment & Plan  Systemic steroids, bronchodilators, doxy, sputum cx, wean oxygen 88-92%, COPD order set and pulmonary consult once out of ICU. Palliative care consultation. MRSA nares, strep and legionella antigen.     Pulmonary nodule- (present on admission)  Assessment & Plan  Stable on serial CT follow up with pulmonary as appropriate    Elevated troponin- (present on admission)  Assessment & Plan  Likely demand ischemia follow up echo  Aspirin and statin therapy           VTE:  Lovenox  Ulcer: H2 Antagonist  Lines: Santos Catheter  Ongoing indication addressed    I have performed a physical exam and reviewed and updated ROS and Plan today (2/10/2023). In review of yesterday's note (2/9/2023), there are  no changes except as documented above.     Discussed patient condition and risk of morbidity and/or mortality with RN, RT, Pharmacy, Charge nurse / hot rounds, and Patient    The patient remains critically ill with hypertensive urgency and need for re-intubation.  Critical care time = 82 minutes in directly providing and coordinating critical care and extensive data review.  No time overlap and excludes procedures.

## 2023-02-10 NOTE — RESPIRATORY CARE
COPD EDUCATION by COPD CLINICAL EDUCATOR  2/9/2023 at 4:09 PM by Gabby Cobian RRT     Patient seen for COPD readmission education. Patient completed our program upon last admission, but needed a refresher on certain topics. Discussion included: determination of the factors that led to their current hospital admission, reiteration of the Action Plan and steps they can take to avoid an exacerbation, proper medication technique, smoking cessation (if applicable), and importance of obtaining a doctors appointment when they start feeling ill. Question and answer session followed.      COPD Screen  COPD Risk Screening  Do you have a history of COPD?: Yes  Do you have a Pulmonologist?: Yes    COPD Assessment  COPD Clinical Specialists ONLY  COPD Education Initiated: Yes--30 Day Readmission  DME Company: [employer]  DME Equipment Type: oxygen 3L and nebulizer  Physician Name: Michael Green  Pulmonary Follow Up Appointment: 02/17/23  Appt Time: 1040  Pulmonologist Name: Lakshmi Christine  Referrals Initiated: Yes  Pulmonary Rehab: Yes (placed on admit by MD)  Smoking Cessation: N/A (quit 2009)  Palliative Care: Yes (ordered and seen 02/08/2023)  Hospice: N/A  Home Health Care:  (TBD)  [facility] Outreach: N/A  [facility]: Yes (established)  [facility]: N/A  Private In-Home Care Agency: N/A  Is this a COPD exacerbation patient?: Yes (order set used, started full program on last admit, per H&P)  (OP) Pulmonary Function Testing: Yes (03/27/2019 FEV1 17% Ratio 30)    PFT Results    No results found for: PFT    Meds to Beds  Would the patient like to opt in for Bedside Medication Delivery at Discharge?: Yes, interested     MY COPD ACTION PLAN     It is recommended that patients and physicians /healthcare providers complete this action plan together. This plan should be discussed at each physician visit and updated as needed.    The green, yellow and red zones show groups of symptoms of COPD. This  "list of symptoms is not comprehensive, and you may experience other symptoms. In the \"Actions\" column, your healthcare provider has recommended actions for you to take based on your symptoms.    Patient Name: Nikolas Bob   YOB: 1955   Last Updated on:     Green Zone:  I am doing well today Actions     Usual activitiy and exercise level   Take daily medications     Usual amounts of cough and phlegm/mucus   Use oxygen as prescribed     Sleep well at night   Continue regular exercise/diet plan     Appetite is good   At all times avoid cigarette smoke, inhaled irritants     Daily Medications (these medications are taken every day):   Tiotropium Bromide Monohydrate (Spiriva)  Budesonide-Formoterol Fumarate (Symbicort) 1 Puff  2 Puffs Twice daily  Twice daily     Additional Information:  Use the spacer with the Symbicort and rinse mouth after taking.    Follow doctors recommendations     Yellow Zone:  I am having a bad day or a COPD flare Actions     More breathless than usual   Continue daily medications     I have less energy for my daily activities   Use quick relief inhaler as ordered     Increased or thicker phlegm/mucus   Use oxygen as prescribed     Using quick relief inhaler/nebulizer more often   Get plenty of rest     Swelling of ankles more than usual   Use pursed lip breathing     More coughing than usual   At all times avoid cigarette smoke, inhaled irritants     I feel like I have a \"chest cold\"     Poor sleep and my symptoms woke me up     My appetite is not good     My medicine is not helping      Call provider immediately if symptoms don’t improve     Continue daily medications, add rescue medications:   Albuterol  Albuterol/Ipratropium (Combivent, Duoneb) 2 Puffs  3mL via nebulizer Every 4 hours PRN  Every 4 hours PRN       Medications to be used during a flare up, (as Discussed with Provider):           Additional Information:  Use the spacer with your rescue inhaler when nebulizer is " not available.     Keep your nebulizer clean to reduce the risk of lung infections.     Red Zone:  I need urgent medical care Actions     Severe shortness of breath even at rest   Call 911 or seek medical care immediately     Not able to do any activity because of breathing      Fever or shaking chills      Feeling confused or very drowsy       Chest pains      Coughing up blood

## 2023-02-10 NOTE — PROGRESS NOTES
Patient agreed to take blood pressure medication, given and patient began to panic after given. SBP continued in the 180s-190s, patient feeling increased SOB and HR 100s to 130s. Patient reports chest pain, RN ordered STAT EKG and updated Dr. Daniel.

## 2023-02-11 ENCOUNTER — APPOINTMENT (OUTPATIENT)
Dept: RADIOLOGY | Facility: MEDICAL CENTER | Age: 68
DRG: 208 | End: 2023-02-11
Attending: INTERNAL MEDICINE
Payer: COMMERCIAL

## 2023-02-11 LAB
ANION GAP SERPL CALC-SCNC: 13 MMOL/L (ref 7–16)
BASE EXCESS BLDA CALC-SCNC: 3 MMOL/L (ref -4–3)
BASOPHILS # BLD AUTO: 0 % (ref 0–1.8)
BASOPHILS # BLD: 0 K/UL (ref 0–0.12)
BODY TEMPERATURE: ABNORMAL DEGREES
BUN SERPL-MCNC: 51 MG/DL (ref 8–22)
CALCIUM SERPL-MCNC: 8.5 MG/DL (ref 8.5–10.5)
CHLORIDE SERPL-SCNC: 101 MMOL/L (ref 96–112)
CO2 BLDA-SCNC: 36 MMOL/L (ref 20–33)
CO2 SERPL-SCNC: 27 MMOL/L (ref 20–33)
CREAT SERPL-MCNC: 1.86 MG/DL (ref 0.5–1.4)
DELSYS IDSYS: ABNORMAL
EOSINOPHIL # BLD AUTO: 0 K/UL (ref 0–0.51)
EOSINOPHIL NFR BLD: 0 % (ref 0–6.9)
ERYTHROCYTE [DISTWIDTH] IN BLOOD BY AUTOMATED COUNT: 47 FL (ref 35.9–50)
GFR SERPLBLD CREATININE-BSD FMLA CKD-EPI: 39 ML/MIN/1.73 M 2
GLUCOSE SERPL-MCNC: 91 MG/DL (ref 65–99)
HCO3 BLDA-SCNC: 33.9 MMOL/L (ref 17–25)
HCT VFR BLD AUTO: 33.5 % (ref 42–52)
HGB BLD-MCNC: 10.7 G/DL (ref 14–18)
HOROWITZ INDEX BLDA+IHG-RTO: 244 MM[HG]
IMM GRANULOCYTES # BLD AUTO: 0.04 K/UL (ref 0–0.11)
IMM GRANULOCYTES NFR BLD AUTO: 0.5 % (ref 0–0.9)
LPM ILPM: 10 LPM
LYMPHOCYTES # BLD AUTO: 0.42 K/UL (ref 1–4.8)
LYMPHOCYTES NFR BLD: 5.8 % (ref 22–41)
MAGNESIUM SERPL-MCNC: 2.3 MG/DL (ref 1.5–2.5)
MCH RBC QN AUTO: 28 PG (ref 27–33)
MCHC RBC AUTO-ENTMCNC: 31.9 G/DL (ref 33.7–35.3)
MCV RBC AUTO: 87.7 FL (ref 81.4–97.8)
MONOCYTES # BLD AUTO: 0.56 K/UL (ref 0–0.85)
MONOCYTES NFR BLD AUTO: 7.7 % (ref 0–13.4)
NEUTROPHILS # BLD AUTO: 6.27 K/UL (ref 1.82–7.42)
NEUTROPHILS NFR BLD: 86 % (ref 44–72)
NRBC # BLD AUTO: 0 K/UL
NRBC BLD-RTO: 0 /100 WBC
O2/TOTAL GAS SETTING VFR VENT: 50 %
PCO2 BLDA: 80.6 MMHG (ref 26–37)
PCO2 TEMP ADJ BLDA: 80.4 MMHG (ref 26–37)
PH BLDA: 7.23 [PH] (ref 7.4–7.5)
PH TEMP ADJ BLDA: 7.23 [PH] (ref 7.4–7.5)
PHOSPHATE SERPL-MCNC: 4.2 MG/DL (ref 2.5–4.5)
PLATELET # BLD AUTO: 142 K/UL (ref 164–446)
PMV BLD AUTO: 11.3 FL (ref 9–12.9)
PO2 BLDA: 122 MMHG (ref 64–87)
PO2 TEMP ADJ BLDA: 122 MMHG (ref 64–87)
POTASSIUM SERPL-SCNC: 3.6 MMOL/L (ref 3.6–5.5)
RBC # BLD AUTO: 3.82 M/UL (ref 4.7–6.1)
SAO2 % BLDA: 98 % (ref 93–99)
SODIUM SERPL-SCNC: 141 MMOL/L (ref 135–145)
SPECIMEN DRAWN FROM PATIENT: ABNORMAL
WBC # BLD AUTO: 7.3 K/UL (ref 4.8–10.8)

## 2023-02-11 PROCEDURE — A9270 NON-COVERED ITEM OR SERVICE: HCPCS | Performed by: INTERNAL MEDICINE

## 2023-02-11 PROCEDURE — 94640 AIRWAY INHALATION TREATMENT: CPT

## 2023-02-11 PROCEDURE — 82803 BLOOD GASES ANY COMBINATION: CPT

## 2023-02-11 PROCEDURE — 700101 HCHG RX REV CODE 250: Performed by: INTERNAL MEDICINE

## 2023-02-11 PROCEDURE — 700111 HCHG RX REV CODE 636 W/ 250 OVERRIDE (IP): Performed by: STUDENT IN AN ORGANIZED HEALTH CARE EDUCATION/TRAINING PROGRAM

## 2023-02-11 PROCEDURE — 770022 HCHG ROOM/CARE - ICU (200)

## 2023-02-11 PROCEDURE — 700111 HCHG RX REV CODE 636 W/ 250 OVERRIDE (IP): Performed by: INTERNAL MEDICINE

## 2023-02-11 PROCEDURE — 36600 WITHDRAWAL OF ARTERIAL BLOOD: CPT

## 2023-02-11 PROCEDURE — 700102 HCHG RX REV CODE 250 W/ 637 OVERRIDE(OP): Performed by: INTERNAL MEDICINE

## 2023-02-11 PROCEDURE — 31500 INSERT EMERGENCY AIRWAY: CPT

## 2023-02-11 PROCEDURE — 80048 BASIC METABOLIC PNL TOTAL CA: CPT

## 2023-02-11 PROCEDURE — 83735 ASSAY OF MAGNESIUM: CPT

## 2023-02-11 PROCEDURE — 71045 X-RAY EXAM CHEST 1 VIEW: CPT

## 2023-02-11 PROCEDURE — 84100 ASSAY OF PHOSPHORUS: CPT

## 2023-02-11 PROCEDURE — 85025 COMPLETE CBC W/AUTO DIFF WBC: CPT

## 2023-02-11 PROCEDURE — 94150 VITAL CAPACITY TEST: CPT

## 2023-02-11 PROCEDURE — 99291 CRITICAL CARE FIRST HOUR: CPT | Performed by: INTERNAL MEDICINE

## 2023-02-11 PROCEDURE — 700105 HCHG RX REV CODE 258: Performed by: INTERNAL MEDICINE

## 2023-02-11 PROCEDURE — 94660 CPAP INITIATION&MGMT: CPT

## 2023-02-11 PROCEDURE — 94003 VENT MGMT INPAT SUBQ DAY: CPT

## 2023-02-11 PROCEDURE — 99152 MOD SED SAME PHYS/QHP 5/>YRS: CPT

## 2023-02-11 RX ORDER — BUDESONIDE AND FORMOTEROL FUMARATE DIHYDRATE 160; 4.5 UG/1; UG/1
2 AEROSOL RESPIRATORY (INHALATION)
Status: DISCONTINUED | OUTPATIENT
Start: 2023-02-11 | End: 2023-02-19

## 2023-02-11 RX ORDER — DOXYCYCLINE 100 MG/1
100 TABLET ORAL EVERY 12 HOURS
Status: COMPLETED | OUTPATIENT
Start: 2023-02-11 | End: 2023-02-12

## 2023-02-11 RX ORDER — METHYLPREDNISOLONE SODIUM SUCCINATE 40 MG/ML
40 INJECTION, POWDER, LYOPHILIZED, FOR SOLUTION INTRAMUSCULAR; INTRAVENOUS EVERY 8 HOURS
Status: DISCONTINUED | OUTPATIENT
Start: 2023-02-11 | End: 2023-02-13

## 2023-02-11 RX ORDER — FLUTICASONE PROPIONATE 110 UG/1
2 AEROSOL, METERED RESPIRATORY (INHALATION)
Status: DISCONTINUED | OUTPATIENT
Start: 2023-02-11 | End: 2023-02-11

## 2023-02-11 RX ORDER — IPRATROPIUM BROMIDE AND ALBUTEROL SULFATE 2.5; .5 MG/3ML; MG/3ML
3 SOLUTION RESPIRATORY (INHALATION)
Status: DISCONTINUED | OUTPATIENT
Start: 2023-02-11 | End: 2023-02-13

## 2023-02-11 RX ADMIN — ALPRAZOLAM 0.5 MG: 0.5 TABLET ORAL at 11:24

## 2023-02-11 RX ADMIN — DOXYCYCLINE 100 MG: 100 INJECTION, POWDER, LYOPHILIZED, FOR SOLUTION INTRAVENOUS at 06:00

## 2023-02-11 RX ADMIN — METOPROLOL TARTRATE 25 MG: 25 TABLET, FILM COATED ORAL at 18:11

## 2023-02-11 RX ADMIN — Medication 1 APPLICATOR: at 06:00

## 2023-02-11 RX ADMIN — IPRATROPIUM BROMIDE 0.5 MG: 0.5 SOLUTION RESPIRATORY (INHALATION) at 06:24

## 2023-02-11 RX ADMIN — IPRATROPIUM BROMIDE AND ALBUTEROL SULFATE 3 ML: .5; 2.5 SOLUTION RESPIRATORY (INHALATION) at 22:20

## 2023-02-11 RX ADMIN — OXYCODONE HYDROCHLORIDE 10 MG: 10 TABLET ORAL at 05:42

## 2023-02-11 RX ADMIN — SENNOSIDES AND DOCUSATE SODIUM 2 TABLET: 50; 8.6 TABLET ORAL at 18:11

## 2023-02-11 RX ADMIN — METHYLPREDNISOLONE SODIUM SUCCINATE 40 MG: 40 INJECTION, POWDER, FOR SOLUTION INTRAMUSCULAR; INTRAVENOUS at 21:39

## 2023-02-11 RX ADMIN — BUDESONIDE AND FORMOTEROL FUMARATE DIHYDRATE 2 PUFF: 160; 4.5 AEROSOL RESPIRATORY (INHALATION) at 18:54

## 2023-02-11 RX ADMIN — FENTANYL CITRATE 50 MCG: 50 INJECTION, SOLUTION INTRAMUSCULAR; INTRAVENOUS at 00:47

## 2023-02-11 RX ADMIN — SERTRALINE 25 MG: 50 TABLET, FILM COATED ORAL at 06:00

## 2023-02-11 RX ADMIN — METHYLPREDNISOLONE SODIUM SUCCINATE 40 MG: 40 INJECTION, POWDER, FOR SOLUTION INTRAMUSCULAR; INTRAVENOUS at 09:24

## 2023-02-11 RX ADMIN — ASPIRIN 81 MG 81 MG: 81 TABLET ORAL at 06:00

## 2023-02-11 RX ADMIN — DEXMEDETOMIDINE 0.8 MCG/KG/HR: 200 INJECTION, SOLUTION INTRAVENOUS at 03:27

## 2023-02-11 RX ADMIN — IPRATROPIUM BROMIDE 0.5 MG: 0.5 SOLUTION RESPIRATORY (INHALATION) at 02:29

## 2023-02-11 RX ADMIN — LABETALOL HYDROCHLORIDE 20 MG: 5 INJECTION, SOLUTION INTRAVENOUS at 15:52

## 2023-02-11 RX ADMIN — LEVALBUTEROL 1.25 MG: 1.25 SOLUTION RESPIRATORY (INHALATION) at 06:25

## 2023-02-11 RX ADMIN — ALPRAZOLAM 0.5 MG: 0.5 TABLET ORAL at 15:52

## 2023-02-11 RX ADMIN — SENNOSIDES AND DOCUSATE SODIUM 2 TABLET: 50; 8.6 TABLET ORAL at 06:00

## 2023-02-11 RX ADMIN — FAMOTIDINE 20 MG: 20 TABLET, FILM COATED ORAL at 06:00

## 2023-02-11 RX ADMIN — METOPROLOL TARTRATE 25 MG: 25 TABLET, FILM COATED ORAL at 10:03

## 2023-02-11 RX ADMIN — FENTANYL CITRATE 100 MCG: 50 INJECTION, SOLUTION INTRAMUSCULAR; INTRAVENOUS at 04:15

## 2023-02-11 RX ADMIN — POTASSIUM BICARBONATE 50 MEQ: 978 TABLET, EFFERVESCENT ORAL at 07:52

## 2023-02-11 RX ADMIN — FENTANYL CITRATE 50 MCG: 50 INJECTION, SOLUTION INTRAMUSCULAR; INTRAVENOUS at 19:47

## 2023-02-11 RX ADMIN — LEVALBUTEROL 1.25 MG: 1.25 SOLUTION RESPIRATORY (INHALATION) at 02:29

## 2023-02-11 RX ADMIN — DOXYCYCLINE 100 MG: 100 TABLET, FILM COATED ORAL at 18:11

## 2023-02-11 RX ADMIN — OXYCODONE HYDROCHLORIDE 10 MG: 10 TABLET ORAL at 01:42

## 2023-02-11 RX ADMIN — METHYLPREDNISOLONE SODIUM SUCCINATE 40 MG: 40 INJECTION, POWDER, FOR SOLUTION INTRAMUSCULAR; INTRAVENOUS at 15:28

## 2023-02-11 RX ADMIN — PREDNISONE 40 MG: 20 TABLET ORAL at 06:00

## 2023-02-11 RX ADMIN — HYDRALAZINE HYDROCHLORIDE 20 MG: 20 INJECTION INTRAMUSCULAR; INTRAVENOUS at 19:16

## 2023-02-11 RX ADMIN — OXYCODONE HYDROCHLORIDE 10 MG: 10 TABLET ORAL at 14:59

## 2023-02-11 RX ADMIN — IPRATROPIUM BROMIDE AND ALBUTEROL SULFATE 3 ML: .5; 2.5 SOLUTION RESPIRATORY (INHALATION) at 18:53

## 2023-02-11 RX ADMIN — ENOXAPARIN SODIUM 40 MG: 40 INJECTION SUBCUTANEOUS at 18:12

## 2023-02-11 ASSESSMENT — COGNITIVE AND FUNCTIONAL STATUS - GENERAL
WALKING IN HOSPITAL ROOM: A LOT
DAILY ACTIVITIY SCORE: 14
MOBILITY SCORE: 12
EATING MEALS: A LITTLE
TOILETING: A LOT
SUGGESTED CMS G CODE MODIFIER DAILY ACTIVITY: CK
DRESSING REGULAR LOWER BODY CLOTHING: A LOT
MOVING FROM LYING ON BACK TO SITTING ON SIDE OF FLAT BED: A LOT
CLIMB 3 TO 5 STEPS WITH RAILING: TOTAL
DRESSING REGULAR UPPER BODY CLOTHING: A LOT
SUGGESTED CMS G CODE MODIFIER MOBILITY: CL
PERSONAL GROOMING: A LITTLE
MOVING TO AND FROM BED TO CHAIR: A LOT
TURNING FROM BACK TO SIDE WHILE IN FLAT BAD: A LITTLE
STANDING UP FROM CHAIR USING ARMS: A LOT
HELP NEEDED FOR BATHING: A LOT

## 2023-02-11 ASSESSMENT — PULMONARY FUNCTION TESTS
EPAP_CMH2O: 8
FVC: 1.1

## 2023-02-11 ASSESSMENT — PAIN DESCRIPTION - PAIN TYPE
TYPE: ACUTE PAIN

## 2023-02-11 NOTE — CARE PLAN
Problem: Ventilation  Goal: Ability to achieve and maintain unassisted ventilation or tolerate decreased levels of ventilator support  Description: Target End Date:  4 days     Document on Vent flowsheet    1.  Support and monitor invasive and noninvasive mechanical ventilation  2.  Monitor ventilator weaning response  3.  Perform ventilator associated pneumonia prevention interventions  4.  Manage ventilation therapy by monitoring diagnostic test results  Outcome: Progressing      Ventilator Daily Summary    Vent Day # 2    ETT 7.5 @ 22    Ventilator settings changed this shift:no    APVcmv 22 430 6 40%    Weaning trials:no    Respiratory Procedures:no    Plan: Continue current ventilator settings and wean mechanical ventilation as tolerated per physician orders.

## 2023-02-11 NOTE — CARE PLAN
Problem: Ventilation  Goal: Ability to achieve and maintain unassisted ventilation or tolerate decreased levels of ventilator support  Description: Target End Date:  4 days     Document on Vent flowsheet    1.  Support and monitor invasive and noninvasive mechanical ventilation  2.  Monitor ventilator weaning response  3.  Perform ventilator associated pneumonia prevention interventions  4.  Manage ventilation therapy by monitoring diagnostic test results  Outcome: Not Met      Ventilator Daily Summary    Vent Day #  1  Ventilator settings changed this shift:  no  Weaning trials:  no  Respiratory Procedures:  no  Plan: Continue current ventilator settings and wean mechanical ventilation as tolerated per physician orders.   [phone]

## 2023-02-11 NOTE — CONSULTS
Reason for PC Consult: Advance Care Planning    Consulted by: Dr. Worthy.     Assessment:  General: Per H&P 67 y.o. male admitted 2/8/2023 with Hx of Centrilobular emphysema on 3l n/c, prior tobacco abuse, ankylosing spondylitis, kyphoscoliosis, pulmonary hypertension, cor pulmonale, right hilar lung nodule. That presents with multiple sick contacts with 2 grandchildren, daughter and wife. He has had 1-2 days of shortness of breath and presented to ER intubated on arrival found to have acute hypoxic hypercapnic respiratory failure. Extubated early am on 2/9 and pt was refusing Bipap and treatments needed to be reinitiated 2/10/23 extubated 2/11/2023 and using oxymask. PMH: Emphysema 4L o2, ankylosing spondylitis, Kyphoscoliosis, pulmonary HTN, Corpulmonale, right lung nodule    Social: Pt lives with his wife and great grandchildren 2 and 4 yr old and and granddaughter who is 25. He has been together with his wife Kimi for 40 yrs. He has a strong evelyn [employer]. 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 His wife has thyroid cancer and was suppose to have surgery but needed to cancel due to his hospital admission for fear of his decision.  He lost a son to cancer Nov 2022.     Consults: Critical care    Dyspnea: No-    Last BM:  (PTA)-    Pain: No-    Depression: No-    Dementia: No;       Spiritual:  Is Scientologist or spirituality important for coping with this illness? Yes-    Has a  or spiritual provider visit been requested? No ([employer], wants to think about asking a preacher to come in for prayer.)    Palliative Performance Scale: 40%    Advance Directive: None-    DPOA: No-    POLST: No-      Code Status: Full-  Lengthy discussion regarding code status, described the measures employed in a full code including CPR, medications, and possibly defibrillation.  Further explained that a DNR would not preclude any treatments/interventions offered. Also  reiterated Dr. Worthy's recommendation for DNR. With intubation ok.     Outcome:  Introduced self and role of Palliative care. Assessed pt's understanding of pt's current medical status, overall april picture, and options for future care. Assessed pt's understanding of current medical status and overall health picture and future options of care. Pt had a good understanding of what events that lead him to the hospital and the events that have taken place while he has been in the ICU. Talked about his functional status at home. He states that he sleeps part time in his recliner at home from 230 am to 7 am then wakes up and transfers to his bed and may stay asleep until 1130 or 1 pm. He likes to go out in his garage and luis eduardo with projects. He has felt that he is progressively getting worse with his chronic lung disease. More difficult engaging in the things that brings him heriberto, he does not leave the house much. He does smoke marijuana to help with appetite and anxiety related to his COPD. He states that it helps with relaxing his muscles so he is not so tense. He may change to edibles knowing smoking is a irritant to his lungs, but likes it triggers him to cough and bring up mucous.    Explored pt's expressed values, beliefs and preferences in order to identigy [facility]. PC RN asked if pt ever completed a health care DPOA with his statement of desires. He has not and would be interested but would like his wife involved with that discussion. He was very open to talking about his chronic lung issues, the need for future intubation and whether or not he would want chest compressions. He stated that it was a good conversation to have, that being intubated was awful and is unsure if he would want to experience that again. He stated that in his bible it does talk about prayer for a peaceful comfortable passing, and that he has a strong evelyn. He would prefer to talk with his wife prior to making any decisions b/c he does not  want to concern her. Also educated pt about future options for support in the community with out pt palliative care and he seemed very interested in that for addition support around symptom mgnt. He was provided a Pamphlet for [facility] care program. Also educated pt briefly on the benefits of hospice care in the home if pt decides that he does not want agressive treatment for his end stage lung disease and wants to remain comfortable in the home setting. Pt asked appropriate questions and appreciated the conversation even though a difficult one to have. He expressed that it was good b/c it has been all the things that have been on his mind.  Offered to have a palliative RN round back tomorrow to follow up after he is able to speak with his wife. He is open to that. He will also consider completing a AD.     Active listening, reflection, reminiscing, validation and normalization and empathic support provided throughout the encounter. All questions answered and PC contact information provided.     Updated: Md and RN    Plan: Round back to continue [facility] conversation about pt's wishes related to code status and AD as well as desire for out pt palliative care     Thank you for allowing Palliative Care to participate in this patient's care. Please feel free to call [phone] with any questions or concerns.

## 2023-02-11 NOTE — PROGRESS NOTES
Critical Care Progress Note    Date of admission  2/8/2023    Chief Complaint  67 y.o. male admitted 2/8/2023 with Hx of Centrilobular emphysema on 3l n/c, prior tobacco abuse, ankylosing spondylitis, kyphoscoliosis, pulmonary hypertension, cor pulmonale, right hilar lung nodule. That presents with multiple sick contacts with 2 grandchildren, daughter and wife. He has had 1-2 days of shortness of breath and presented to ER in extremis and was intubated on arrival found to have acute hypoxic hypercapnic respiratory failure with ph 7.1/>100/71. His wife was at bedside describes he did get flu shot and prior covid vaccines. Viral panel negative. She has not had advance care planning and prior was full code. I discussed and recommended DNR I okay.     Hospital Course  2/8 Amitted to ICU on ventilator  2/9 extubated early am patient refusing rx and bipap  2/10 re-intubate at start of shift, hypertensive, volume overload    Interval Problem Update  Reviewed last 24 hour events:  Neuro: neuro intact writing a little anxious  HR: 80-90's  SBP: 80-90's now 130's  Tmax: afebrile  GI: NPO will re-evaluate later today, BM pta  UOP: good   Lines: peripheral IV  Resp: extubation Q4 patricia, Q2 prn    Vte: lovenox  PPI/H2:n/a  Antibx: doxy 4/5    Stop ace inhibitor hold diuresis  -2.3L net + 4.7 for hospital stay  Echo normal BiV function  Swallow eval later today  Monitor with bipap low threshold to start  Fluticasone  Prednisone to solumederol 40mg IV Q8    Review of Systems  Review of Systems   Unable to perform ROS: Mental status change      Vital Signs for last 24 hours   Pulse:  [] 92  Resp:  [0-41] 8  BP: ()/() 109/56  SpO2:  [90 %-100 %] 97 %    Hemodynamic parameters for last 24 hours       Respiratory Information for the last 24 hours  Vent Mode: Spont  Rate (breaths/min): 22  Vt Target (mL): 430  PEEP/CPAP: 6  MAP: 10  Control VTE (exp VT): 429    Physical Exam   Physical Exam  Vitals and nursing note  reviewed.   Constitutional:       General: He is not in acute distress.     Appearance: He is ill-appearing.      Comments: Sitting up in bed on ventilator writing notes getting SBT/SAT   HENT:      Head: Normocephalic.      Mouth/Throat:      Mouth: Mucous membranes are moist.      Comments: ET in place  Eyes:      Pupils: Pupils are equal, round, and reactive to light.   Cardiovascular:      Rate and Rhythm: Normal rate.      Heart sounds: No murmur heard.  Pulmonary:      Effort: No respiratory distress.      Breath sounds: No stridor. Wheezing present. No rhonchi or rales.      Comments: Mechanical breath on ventilator  Abdominal:      General: There is no distension.      Palpations: There is no mass.      Tenderness: There is no abdominal tenderness.      Hernia: No hernia is present.   Musculoskeletal:         General: No swelling or tenderness.      Cervical back: No rigidity.      Comments: Right arm swelling around IV  Cold knees   Skin:     Coloration: Skin is not jaundiced or pale.   Neurological:      Mental Status: He is alert.      Comments: Awake writing notes on white board, appropriate, moving all extremities   Psychiatric:         Mood and Affect: Mood normal.      Comments: Mild anxious       Medications  Current Facility-Administered Medications   Medication Dose Route Frequency Provider Last Rate Last Admin    Nozin nasal  swab  1 Applicator Each Nostril BID Matheus Worthy M.D.   1 Applicator at 02/11/23 0600    ipratropium-albuterol (DUONEB) nebulizer solution  3 mL Nebulization Q4HRS (RT) Matheus Worthy M.D.        methylPREDNISolone (SOLU-MEDROL) 40 MG injection 40 mg  40 mg Intravenous Q8HRS Matheus Worthy M.D.        fluticasone (FLOVENT HFA) 110 MCG/ACT inhaler 220 mcg  2 Puff Inhalation Q12HRS (RT) Matheus Worthy M.D.        metoprolol tartrate (LOPRESSOR) tablet 25 mg  25 mg Oral TWICE DAILY Matheus Worthy M.D.        hydrALAZINE (APRESOLINE) injection 10-20 mg   10-20 mg Intravenous Q4HRS PRN Matheus Worthy M.D.        enalaprilat (Vasotec) injection 1.25 mg 1 mL  1.25 mg Intravenous Q6HRS PRN Matheus Worthy M.D.   1.25 mg at 02/10/23 1006    dexmedetomidine (PRECEDEX) 400 mcg/100mL NS premix infusion  0.1-1.5 mcg/kg/hr Intravenous Continuous Matheus Worthy M.D.   Stopped at 02/11/23 0600    fentaNYL (SUBLIMAZE) injection  mcg   mcg Intravenous Q HOUR PRN Matheus Worthy M.D.        Respiratory Therapy Consult   Nebulization Continuous RT Matheus Worthy M.D.        labetalol (NORMODYNE/TRANDATE) injection 20 mg  20 mg Intravenous Q4HRS PRN Matheus Worthy M.D.        aspirin (ASA) chewable tab 81 mg  81 mg Enteral Tube DAILY Matheus Worthy M.D.   81 mg at 02/11/23 0600    acetaminophen (TYLENOL) tablet 1,000 mg  1,000 mg Oral Q8HRS PRN Matheus Worthy M.D.        fentaNYL (SUBLIMAZE) injection  mcg   mcg Intravenous Q HOUR PRN Matheus Worthy M.D.   100 mcg at 02/11/23 0415    oxyCODONE immediate-release (ROXICODONE) tablet 5 mg  5 mg Oral Q4HRS PRN Matheus Worthy M.D.        Or    oxyCODONE immediate release (ROXICODONE) tablet 10 mg  10 mg Oral Q4HRS PRN Matheus Worthy M.D.   10 mg at 02/11/23 0542    LORazepam (ATIVAN) injection 0.5-2 mg  0.5-2 mg Intravenous Q4HRS PRN Antwon Daniel M.D.   2 mg at 02/10/23 0027    ALPRAZolam (XANAX) tablet 0.5 mg  0.5 mg Oral TID PRN Antwon Daniel M.D.   0.5 mg at 02/10/23 2136    senna-docusate (PERICOLACE or SENOKOT S) 8.6-50 MG per tablet 2 Tablet  2 Tablet Enteral Tube BID Matheus Worthy M.D.   2 Tablet at 02/11/23 0600    And    polyethylene glycol/lytes (MIRALAX) PACKET 1 Packet  1 Packet Enteral Tube QDAY PRN Matheus Worthy M.D.        And    magnesium hydroxide (MILK OF MAGNESIA) suspension 30 mL  30 mL Enteral Tube QDAY PRN Matheus Worthy M.D.        And    bisacodyl (DULCOLAX) suppository 10 mg  10 mg Rectal QDAY PRN Matheus Worthy M.D.        MD Alert...ICU  Electrolyte Replacement per Pharmacy   Other PHARMACY TO DOSE Matheus Worthy M.D.        lidocaine (XYLOCAINE) 1 % injection 2 mL  2 mL Tracheal Tube Q30 MIN PRN Matheus Worthy M.D.        ipratropium-albuterol (DUONEB) nebulizer solution  3 mL Nebulization Q2HRS PRN (RT) Matheus Worthy M.D.        enoxaparin (Lovenox) inj 40 mg  40 mg Subcutaneous DAILY AT 1800 Jarvis Talley M.D.   40 mg at 02/10/23 1737    doxycycline (VIBRAMYCIN) 100 mg in  mL IVPB  100 mg Intravenous Q12HRS Matheus Worthy M.D.   Stopped at 02/11/23 0700    hydrOXYzine HCl (ATARAX) tablet 25 mg  25 mg Enteral Tube TID PRN Matheus Worthy M.D.        sertraline (Zoloft) tablet 25 mg  25 mg Oral DAILY Matheus Worthy M.D.   25 mg at 02/11/23 0600       Fluids    Intake/Output Summary (Last 24 hours) at 2/11/2023 0822  Last data filed at 2/11/2023 0600  Gross per 24 hour   Intake 91.26 ml   Output 1300 ml   Net -1208.74 ml       Laboratory  Recent Labs     02/09/23  0421 02/10/23  0025 02/10/23  0956   ISTATAPH 7.418 7.323* 7.401   ISTATAPCO2 41.5* 60.3* 49.2*   ISTATAPO2 69 107* 73   ISTATATCO2 28 33 32   [id number] 94 98 94   ISTATARTHCO3 26.8* 31.3* 30.5*   ISTATARTBE 2 3 5*   ISTATTEMP 98.6 F 99.3 F 98.0 F   ISTATFIO2 30 50 30   ISTATSPEC Arterial Arterial Arterial   ISTATAPHTC 7.418 7.318* 7.406   [id number] 69 110* 71         Recent Labs     02/09/23  0440 02/10/23  0414 02/11/23  0338   SODIUM 142 142 141   POTASSIUM 3.7 4.0 3.6   CHLORIDE 106 101 101   CO2 23 29 27   BUN 19 27* 51*   CREATININE 0.96 0.98 1.86*   MAGNESIUM 2.2 2.3 2.3   PHOSPHORUS 2.5 3.9 4.2   CALCIUM 8.1* 9.0 8.5     Recent Labs     02/09/23  0440 02/10/23  0414 02/11/23  0338   GLUCOSE 151* 100* 91     Recent Labs     02/09/23  0440 02/10/23  0414 02/11/23  0338   WBC 7.5 18.3* 7.3   NEUTSPOLYS 92.40* 86.10* 86.00*   LYMPHOCYTES 3.60* 4.20* 5.80*   MONOCYTES 3.30 8.90 7.70   EOSINOPHILS 0.00 0.00 0.00   BASOPHILS 0.00 0.20 0.00     Recent  Labs     02/09/23  0440 02/10/23  0414 02/11/23  0338   RBC 4.08* 4.55* 3.82*   HEMOGLOBIN 11.5* 12.7* 10.7*   HEMATOCRIT 35.6* 39.9* 33.5*   PLATELETCT 129* 215 142*       Imaging  X-Ray:  I have personally reviewed the images and compared with prior images.  EKG:  I have personally reviewed the images and compared with prior images.    Assessment/Plan  * Acute hypercapnic respiratory failure (HCC)- (present on admission)  Assessment & Plan  Intubated date: 2/8 extubated 2/9 then re-intubated 2/10-> 2/11 extubation trial  Goal saturation > 88-92%  Monitor ventilator waveforms and titrate flow/peep and volumes according.   Lung protective ventilation strategy w/ A-F bundle  CXR: monitor lung volumes and tube/line placement  VAP bundle prevention, oral care, post pyloric feeding  Head of bed > 30 degree  GI prophylaxis  Daily awakening and SBT trials unless contraindicated  Monitor for liberation  Respiratory treatments: prn    Watch close off mechanical ventilation for bipap prior to failure      Medically noncompliant  Assessment & Plan  Patient has good understanding of his disease but refuses medication and medical staff assistance this has lead to patient re-intubation. Continue medical education for patient and hopefully his wife can assist us with endeavor.     Acute pulmonary edema (HCC)  Assessment & Plan  Patient re-intubated for hypertensive urgency and acute pulmonary edema 2/10  S/p force diuresis    Hypertension  Assessment & Plan  Labetalol prn, vasotec and Hydralazine PRN for goal < 160  Start metoprolol 25mg BID and lisinopril 10mg daily  Follow up echo -> normal BiV  Stop ace-inhibitor 2/11 with low BP continue with metoprolol    Swelling of lower extremity  Assessment & Plan  Lower ext doppler -> negative for dvt  Echo normal BiV  Force diuresis as needed to achieve euvolemia  Likely cor pulmonale    COPD (chronic obstructive pulmonary disease) (HCC)- (present on admission)  Assessment &  Plan  Systemic steroids, bronchodilators, doxy, sputum cx, wean oxygen 88-92%, COPD order set and pulmonary consult once out of ICU. Palliative care consultation. MRSA nares, strep and legionella antigen.     Pulmonary nodule- (present on admission)  Assessment & Plan  Stable on serial CT follow up with pulmonary as appropriate    Elevated troponin- (present on admission)  Assessment & Plan  Likely demand ischemia follow up echo -> normal BiV function  Aspirin and statin therapy  Demand ischemia           VTE:  Lovenox  Ulcer: H2 Antagonist  Lines: Santos Catheter  Ongoing indication addressed    I have performed a physical exam and reviewed and updated ROS and Plan today (2/11/2023). In review of yesterday's note (2/10/2023), there are no changes except as documented above.     Discussed patient condition and risk of morbidity and/or mortality with RN, RT, Pharmacy, Charge nurse / hot rounds, and Patient    The patient remains critically ill with extubation trial.  Critical care time = 50 minutes in directly providing and coordinating critical care and extensive data review.  No time overlap and excludes procedures.

## 2023-02-11 NOTE — CARE PLAN
The patient is Stable - Low risk of patient condition declining or worsening    Shift Goals  Clinical Goals: patient will remain vitally stable  Patient Goals: pain & anxiety control  Family Goals: diogenes    Progress made toward(s) clinical / shift goals:      Problem: Safety - Medical Restraint  Goal: Remains free of injury from restraints (Restraint for Interference with Medical Device)  Outcome: Progressing  Flowsheets (Taken 2/11/2023 0407)  Addressed this shift: Remains free of injury from restraints (restraint for interference with medical device):   Determine that other, less restrictive measures have been tried or would not be effective before applying the restraint   Evaluate the patient's condition at the time of restraint application   Inform patient/family regarding the reason for restraint   Every 2 hours: Monitor safety, psychosocial status, comfort, nutrition and hydration  Note: Patient remains free of injury while restrained      Problem: Skin Integrity  Goal: Skin integrity is maintained or improved  Outcome: Progressing  Note: Patient remains free of new injury      Problem: Pain - Standard  Goal: Alleviation of pain or a reduction in pain to the patient’s comfort goal  Outcome: Progressing  Flowsheets  Taken 2/11/2023 0400 by Minh Patterson RLASHONDA  Non Verbal Scale: Sleeping  Taken 2/11/2023 0140 by Minh Patterson R.N.  Pain Rating Scale (NPRS): 8  Taken 2/10/2023 0953 by Yolie Stovall RLupeNLupe  Critical-Care Pain Observation Score: 6  Note: Patient reports high level of pain, but able to sleep adequately in between PRN's pain doses        Patient is not progressing towards the following goals:

## 2023-02-11 NOTE — CARE PLAN
Problem: Bronchoconstriction  Goal: Improve in air movement and diminished wheezing  Description: Target End Date:  2 to 3 days    1.  Implement inhaled treatments  2.  Evaluate and manage medication effects  Outcome: Not Met   Duo   Symbicort

## 2023-02-12 ENCOUNTER — APPOINTMENT (OUTPATIENT)
Dept: RADIOLOGY | Facility: MEDICAL CENTER | Age: 68
DRG: 208 | End: 2023-02-12
Attending: INTERNAL MEDICINE
Payer: COMMERCIAL

## 2023-02-12 LAB
ANION GAP SERPL CALC-SCNC: 12 MMOL/L (ref 7–16)
BACTERIA SPEC RESP CULT: NORMAL
BASE EXCESS BLDV CALC-SCNC: 6 MMOL/L (ref -4–3)
BASOPHILS # BLD AUTO: 0.1 % (ref 0–1.8)
BASOPHILS # BLD: 0.01 K/UL (ref 0–0.12)
BODY TEMPERATURE: ABNORMAL DEGREES
BUN SERPL-MCNC: 50 MG/DL (ref 8–22)
CALCIUM SERPL-MCNC: 8.7 MG/DL (ref 8.5–10.5)
CHLORIDE SERPL-SCNC: 98 MMOL/L (ref 96–112)
CO2 BLDV-SCNC: 34 MMOL/L (ref 20–33)
CO2 SERPL-SCNC: 28 MMOL/L (ref 20–33)
CREAT SERPL-MCNC: 1.35 MG/DL (ref 0.5–1.4)
DELSYS IDSYS: ABNORMAL
EOSINOPHIL # BLD AUTO: 0 K/UL (ref 0–0.51)
EOSINOPHIL NFR BLD: 0 % (ref 0–6.9)
ERYTHROCYTE [DISTWIDTH] IN BLOOD BY AUTOMATED COUNT: 46.2 FL (ref 35.9–50)
GFR SERPLBLD CREATININE-BSD FMLA CKD-EPI: 57 ML/MIN/1.73 M 2
GLUCOSE SERPL-MCNC: 103 MG/DL (ref 65–99)
GRAM STN SPEC: NORMAL
HCO3 BLDV-SCNC: 32.6 MMOL/L (ref 24–28)
HCT VFR BLD AUTO: 33.7 % (ref 42–52)
HGB BLD-MCNC: 10.8 G/DL (ref 14–18)
HOROWITZ INDEX BLDV+IHG-RTO: 85 MM[HG]
IMM GRANULOCYTES # BLD AUTO: 0.05 K/UL (ref 0–0.11)
IMM GRANULOCYTES NFR BLD AUTO: 0.6 % (ref 0–0.9)
LPM ILPM: 6 LPM
LYMPHOCYTES # BLD AUTO: 0.43 K/UL (ref 1–4.8)
LYMPHOCYTES NFR BLD: 5.2 % (ref 22–41)
MAGNESIUM SERPL-MCNC: 2.5 MG/DL (ref 1.5–2.5)
MCH RBC QN AUTO: 28.2 PG (ref 27–33)
MCHC RBC AUTO-ENTMCNC: 32 G/DL (ref 33.7–35.3)
MCV RBC AUTO: 88 FL (ref 81.4–97.8)
MONOCYTES # BLD AUTO: 0.36 K/UL (ref 0–0.85)
MONOCYTES NFR BLD AUTO: 4.3 % (ref 0–13.4)
NEUTROPHILS # BLD AUTO: 7.49 K/UL (ref 1.82–7.42)
NEUTROPHILS NFR BLD: 89.8 % (ref 44–72)
NRBC # BLD AUTO: 0 K/UL
NRBC BLD-RTO: 0 /100 WBC
O2/TOTAL GAS SETTING VFR VENT: 40 %
PCO2 BLDV: 56.1 MMHG (ref 41–51)
PCO2 TEMP ADJ BLDV: 55.8 MMHG (ref 41–51)
PH BLDV: 7.37 [PH] (ref 7.31–7.45)
PH TEMP ADJ BLDV: 7.37 [PH] (ref 7.31–7.45)
PHOSPHATE SERPL-MCNC: 3.6 MG/DL (ref 2.5–4.5)
PLATELET # BLD AUTO: 147 K/UL (ref 164–446)
PMV BLD AUTO: 10.6 FL (ref 9–12.9)
PO2 BLDV: 34 MMHG (ref 25–40)
PO2 TEMP ADJ BLDV: 33 MMHG (ref 25–40)
POTASSIUM SERPL-SCNC: 4.2 MMOL/L (ref 3.6–5.5)
PROCALCITONIN SERPL-MCNC: 0.63 NG/ML
RBC # BLD AUTO: 3.83 M/UL (ref 4.7–6.1)
SAO2 % BLDV: 61 %
SIGNIFICANT IND 70042: NORMAL
SITE SITE: NORMAL
SODIUM SERPL-SCNC: 138 MMOL/L (ref 135–145)
SOURCE SOURCE: NORMAL
SPECIMEN DRAWN FROM PATIENT: ABNORMAL
WBC # BLD AUTO: 8.3 K/UL (ref 4.8–10.8)

## 2023-02-12 PROCEDURE — A9270 NON-COVERED ITEM OR SERVICE: HCPCS | Performed by: INTERNAL MEDICINE

## 2023-02-12 PROCEDURE — 94640 AIRWAY INHALATION TREATMENT: CPT

## 2023-02-12 PROCEDURE — 94660 CPAP INITIATION&MGMT: CPT

## 2023-02-12 PROCEDURE — 700102 HCHG RX REV CODE 250 W/ 637 OVERRIDE(OP): Performed by: INTERNAL MEDICINE

## 2023-02-12 PROCEDURE — 700111 HCHG RX REV CODE 636 W/ 250 OVERRIDE (IP): Performed by: INTERNAL MEDICINE

## 2023-02-12 PROCEDURE — 99291 CRITICAL CARE FIRST HOUR: CPT | Performed by: INTERNAL MEDICINE

## 2023-02-12 PROCEDURE — 85025 COMPLETE CBC W/AUTO DIFF WBC: CPT

## 2023-02-12 PROCEDURE — 84100 ASSAY OF PHOSPHORUS: CPT

## 2023-02-12 PROCEDURE — 770022 HCHG ROOM/CARE - ICU (200)

## 2023-02-12 PROCEDURE — 84145 PROCALCITONIN (PCT): CPT

## 2023-02-12 PROCEDURE — 700101 HCHG RX REV CODE 250: Performed by: INTERNAL MEDICINE

## 2023-02-12 PROCEDURE — 700111 HCHG RX REV CODE 636 W/ 250 OVERRIDE (IP): Performed by: STUDENT IN AN ORGANIZED HEALTH CARE EDUCATION/TRAINING PROGRAM

## 2023-02-12 PROCEDURE — 83735 ASSAY OF MAGNESIUM: CPT

## 2023-02-12 PROCEDURE — 80048 BASIC METABOLIC PNL TOTAL CA: CPT

## 2023-02-12 PROCEDURE — 71045 X-RAY EXAM CHEST 1 VIEW: CPT

## 2023-02-12 PROCEDURE — 700105 HCHG RX REV CODE 258: Performed by: INTERNAL MEDICINE

## 2023-02-12 PROCEDURE — 82803 BLOOD GASES ANY COMBINATION: CPT

## 2023-02-12 RX ORDER — METOPROLOL TARTRATE 50 MG/1
50 TABLET, FILM COATED ORAL TWICE DAILY
Status: DISCONTINUED | OUTPATIENT
Start: 2023-02-12 | End: 2023-02-13

## 2023-02-12 RX ORDER — ASPIRIN 81 MG/1
81 TABLET, CHEWABLE ORAL DAILY
Status: DISCONTINUED | OUTPATIENT
Start: 2023-02-13 | End: 2023-02-22 | Stop reason: HOSPADM

## 2023-02-12 RX ORDER — AMOXICILLIN 250 MG
2 CAPSULE ORAL 2 TIMES DAILY
Status: DISCONTINUED | OUTPATIENT
Start: 2023-02-12 | End: 2023-02-17

## 2023-02-12 RX ORDER — HYDROXYZINE 50 MG/1
25 TABLET, FILM COATED ORAL 3 TIMES DAILY PRN
Status: DISCONTINUED | OUTPATIENT
Start: 2023-02-12 | End: 2023-02-14

## 2023-02-12 RX ORDER — POLYETHYLENE GLYCOL 3350 17 G/17G
1 POWDER, FOR SOLUTION ORAL
Status: DISCONTINUED | OUTPATIENT
Start: 2023-02-12 | End: 2023-02-17

## 2023-02-12 RX ORDER — FUROSEMIDE 10 MG/ML
40 INJECTION INTRAMUSCULAR; INTRAVENOUS ONCE
Status: COMPLETED | OUTPATIENT
Start: 2023-02-12 | End: 2023-02-12

## 2023-02-12 RX ORDER — BISACODYL 10 MG
10 SUPPOSITORY, RECTAL RECTAL
Status: DISCONTINUED | OUTPATIENT
Start: 2023-02-12 | End: 2023-02-17

## 2023-02-12 RX ADMIN — BUDESONIDE AND FORMOTEROL FUMARATE DIHYDRATE 2 PUFF: 160; 4.5 AEROSOL RESPIRATORY (INHALATION) at 03:27

## 2023-02-12 RX ADMIN — OXYCODONE HYDROCHLORIDE 10 MG: 10 TABLET ORAL at 15:38

## 2023-02-12 RX ADMIN — DEXMEDETOMIDINE 0.4 MCG/KG/HR: 200 INJECTION, SOLUTION INTRAVENOUS at 19:57

## 2023-02-12 RX ADMIN — Medication 1 APPLICATOR: at 17:36

## 2023-02-12 RX ADMIN — ENOXAPARIN SODIUM 40 MG: 40 INJECTION SUBCUTANEOUS at 17:38

## 2023-02-12 RX ADMIN — METOPROLOL TARTRATE 50 MG: 50 TABLET, FILM COATED ORAL at 17:36

## 2023-02-12 RX ADMIN — METHYLPREDNISOLONE SODIUM SUCCINATE 40 MG: 40 INJECTION, POWDER, FOR SOLUTION INTRAMUSCULAR; INTRAVENOUS at 22:32

## 2023-02-12 RX ADMIN — ASPIRIN 81 MG 81 MG: 81 TABLET ORAL at 05:17

## 2023-02-12 RX ADMIN — FENTANYL CITRATE 50 MCG: 50 INJECTION, SOLUTION INTRAMUSCULAR; INTRAVENOUS at 08:40

## 2023-02-12 RX ADMIN — ALPRAZOLAM 0.5 MG: 0.5 TABLET ORAL at 09:14

## 2023-02-12 RX ADMIN — IPRATROPIUM BROMIDE AND ALBUTEROL SULFATE 3 ML: .5; 2.5 SOLUTION RESPIRATORY (INHALATION) at 06:16

## 2023-02-12 RX ADMIN — IPRATROPIUM BROMIDE AND ALBUTEROL SULFATE 3 ML: .5; 2.5 SOLUTION RESPIRATORY (INHALATION) at 15:26

## 2023-02-12 RX ADMIN — METHYLPREDNISOLONE SODIUM SUCCINATE 40 MG: 40 INJECTION, POWDER, FOR SOLUTION INTRAMUSCULAR; INTRAVENOUS at 14:12

## 2023-02-12 RX ADMIN — Medication 1 APPLICATOR: at 05:18

## 2023-02-12 RX ADMIN — LABETALOL HYDROCHLORIDE 20 MG: 5 INJECTION, SOLUTION INTRAVENOUS at 11:13

## 2023-02-12 RX ADMIN — METHYLPREDNISOLONE SODIUM SUCCINATE 40 MG: 40 INJECTION, POWDER, FOR SOLUTION INTRAMUSCULAR; INTRAVENOUS at 05:18

## 2023-02-12 RX ADMIN — FUROSEMIDE 40 MG: 10 INJECTION, SOLUTION INTRAMUSCULAR; INTRAVENOUS at 07:26

## 2023-02-12 RX ADMIN — IPRATROPIUM BROMIDE AND ALBUTEROL SULFATE 3 ML: .5; 2.5 SOLUTION RESPIRATORY (INHALATION) at 02:11

## 2023-02-12 RX ADMIN — SERTRALINE 25 MG: 50 TABLET, FILM COATED ORAL at 05:17

## 2023-02-12 RX ADMIN — IPRATROPIUM BROMIDE AND ALBUTEROL SULFATE 3 ML: .5; 2.5 SOLUTION RESPIRATORY (INHALATION) at 21:15

## 2023-02-12 RX ADMIN — OXYCODONE HYDROCHLORIDE 10 MG: 10 TABLET ORAL at 07:25

## 2023-02-12 RX ADMIN — LABETALOL HYDROCHLORIDE 20 MG: 5 INJECTION, SOLUTION INTRAVENOUS at 15:29

## 2023-02-12 RX ADMIN — FENTANYL CITRATE 50 MCG: 50 INJECTION, SOLUTION INTRAMUSCULAR; INTRAVENOUS at 17:44

## 2023-02-12 RX ADMIN — ALPRAZOLAM 0.5 MG: 0.5 TABLET ORAL at 15:39

## 2023-02-12 RX ADMIN — DOXYCYCLINE 100 MG: 100 TABLET, FILM COATED ORAL at 05:17

## 2023-02-12 RX ADMIN — METOPROLOL TARTRATE 25 MG: 25 TABLET, FILM COATED ORAL at 09:30

## 2023-02-12 RX ADMIN — METOPROLOL TARTRATE 25 MG: 25 TABLET, FILM COATED ORAL at 05:17

## 2023-02-12 RX ADMIN — DOXYCYCLINE 100 MG: 100 TABLET, FILM COATED ORAL at 17:36

## 2023-02-12 ASSESSMENT — PAIN DESCRIPTION - PAIN TYPE
TYPE: ACUTE PAIN

## 2023-02-12 ASSESSMENT — ENCOUNTER SYMPTOMS
SEIZURES: 0
WEAKNESS: 0
SORE THROAT: 0
MYALGIAS: 1
SHORTNESS OF BREATH: 1
LOSS OF CONSCIOUSNESS: 1
NAUSEA: 0
VOMITING: 0
COUGH: 0
FOCAL WEAKNESS: 0
SPUTUM PRODUCTION: 0
DEPRESSION: 0
NERVOUS/ANXIOUS: 1
WHEEZING: 1
ABDOMINAL PAIN: 0
HEADACHES: 0
BACK PAIN: 1
WEIGHT LOSS: 0
FEVER: 0
SENSORY CHANGE: 0
ORTHOPNEA: 0

## 2023-02-12 NOTE — CARE PLAN
The patient is Watcher - Medium risk of patient condition declining or worsening    Shift Goals  Clinical Goals: improved mentation, maintain respiratory status,BP and HR control  Patient Goals: pain, breathing  Family Goals: get better    Progress made toward(s) clinical / shift goals:    Problem: Pain - Standard  Goal: Alleviation of pain or a reduction in pain to the patient’s comfort goal  Outcome: Progressing     Problem: Fall Risk  Goal: Patient will remain free from falls  Outcome: Progressing     Problem: Pain - Standard  Goal: Alleviation of pain or a reduction in pain to the patient’s comfort goal  Outcome: Progressing     Problem: Fall Risk  Goal: Patient will remain free from falls  Outcome: Progressing       Patient is not progressing towards the following goals:      Problem: Knowledge Deficit - COPD  Goal: Patient/significant other demonstrates understanding of disease process, utilization of the Action Plan, medications and discharge instruction  Outcome: Not Progressing     Problem: Risk for Infection - COPD  Goal: Patient will remain free from signs and symptoms of infection  Outcome: Not Progressing     Problem: Nutrition - Advanced  Goal: Patient will display progressive weight gain toward goal have adequate food and fluid intake  Outcome: Not Progressing     Problem: Ineffective Airway Clearance  Goal: Patient will maintain patent airway with clear/clearing breath sounds  Outcome: Not Progressing     Problem: Impaired Gas Exchange  Goal: Patient will demonstrate improved ventilation and adequate oxygenation and participate in treatment regimen within the level of ability/situation.  Outcome: Not Progressing     Problem: Risk for Aspiration  Goal: Patient's risk for aspiration will be absent or decrease  Outcome: Not Progressing     Problem: Self Care  Goal: Patient will have the ability to perform ADLs independently or with assistance (bathe, groom, dress, toilet and feed)  Outcome: Not  Progressing     Problem: Knowledge Deficit - Standard  Goal: Patient and family/care givers will demonstrate understanding of plan of care, disease process/condition, diagnostic tests and medications  Outcome: Not Progressing     Problem: Skin Integrity  Goal: Skin integrity is maintained or improved  Outcome: Not Progressing

## 2023-02-12 NOTE — CARE PLAN
Problem: Knowledge Deficit - COPD  Goal: Patient/significant other demonstrates understanding of disease process, utilization of the Action Plan, medications and discharge instruction  Outcome: Progressing     Problem: Risk for Infection - COPD  Goal: Patient will remain free from signs and symptoms of infection  Outcome: Progressing     Problem: Ineffective Airway Clearance  Goal: Patient will maintain patent airway with clear/clearing breath sounds  Outcome: Progressing     Problem: Impaired Gas Exchange  Goal: Patient will demonstrate improved ventilation and adequate oxygenation and participate in treatment regimen within the level of ability/situation.  Outcome: Progressing     Problem: Pain - Standard  Goal: Alleviation of pain or a reduction in pain to the patient’s comfort goal  Outcome: Progressing   The patient is Watcher - Medium risk of patient condition declining or worsening    Shift Goals  Clinical Goals: Maintain BP, HR, Resp parameters  Patient Goals: Pain and anxiety control  Family Goals: SHELLY    Progress made toward(s) clinical / shift goals:  Met      Patient is not progressing towards the following goals:

## 2023-02-12 NOTE — PROGRESS NOTES
Critical Care Progress Note    Date of admission  2/8/2023    Chief Complaint  67 y.o. male admitted 2/8/2023 with Hx of Centrilobular emphysema on 3l n/c, prior tobacco abuse, ankylosing spondylitis, kyphoscoliosis, pulmonary hypertension, cor pulmonale, right hilar lung nodule. That presents with multiple sick contacts with 2 grandchildren, daughter and wife. He has had 1-2 days of shortness of breath and presented to ER in extremis and was intubated on arrival found to have acute hypoxic hypercapnic respiratory failure with ph 7.1/>100/71. His wife was at bedside describes he did get flu shot and prior covid vaccines. Viral panel negative. She has not had advance care planning and prior was full code. I discussed and recommended DNR I okay.     Hospital Course  2/8 Amitted to ICU on ventilator  2/9 extubated early am patient refusing rx and bipap  2/10 re-intubate at start of shift, hypertensive, volume overload  2/11 another extubation attempt    Interval Problem Update  Reviewed last 24 hour events:  Neuro: neuro intact, pain continue prn can use dex for anxiety  HR: 70-90  SBP: 120-186  Tmax: afebrile  GI: regular diet, BM  UOP: good  Lines: peripheral IV  Resp: Bipap last night, currently on 4l oxy mask goal sat 88-92%, continue nebs low threshold for bipap   Vte: lovenox  PPI/H2:n/a  Antibx: doxy 5/5, viral panel with human metapneumo virus.     -1L net +3.7L  CXR reviewed lasix 40mg IV x1    Review of Systems  Review of Systems   Constitutional:  Positive for malaise/fatigue. Negative for fever and weight loss.   HENT:  Negative for sore throat.    Respiratory:  Positive for shortness of breath and wheezing. Negative for cough and sputum production.    Cardiovascular:  Negative for chest pain, orthopnea and leg swelling.   Gastrointestinal:  Negative for abdominal pain, nausea and vomiting.   Genitourinary:  Negative for dysuria.   Musculoskeletal:  Positive for back pain and myalgias.   Neurological:   Positive for loss of consciousness. Negative for sensory change, focal weakness, seizures, weakness and headaches.   Psychiatric/Behavioral:  Negative for depression. The patient is nervous/anxious.       Vital Signs for last 24 hours   Pulse:  [] 87  Resp:  [10-71] 21  BP: (114-196)/() 149/74  SpO2:  [87 %-100 %] 92 %    Hemodynamic parameters for last 24 hours       Respiratory Information for the last 24 hours       Physical Exam   Physical Exam  Vitals and nursing note reviewed.   Constitutional:       General: He is not in acute distress.     Appearance: He is ill-appearing.      Comments: Sitting up in bed on oxy mask at 4l    HENT:      Head: Normocephalic.      Mouth/Throat:      Mouth: Mucous membranes are moist.   Eyes:      Pupils: Pupils are equal, round, and reactive to light.   Cardiovascular:      Rate and Rhythm: Normal rate.      Heart sounds: No murmur heard.  Pulmonary:      Effort: No respiratory distress.      Breath sounds: No stridor. Wheezing present. No rhonchi or rales.      Comments: Hypertrophy to trapezium and accessory muscle use, thin and wasting  Abdominal:      General: There is no distension.      Palpations: There is no mass.      Tenderness: There is no abdominal tenderness.      Hernia: No hernia is present.   Musculoskeletal:         General: No swelling or tenderness.      Cervical back: No rigidity.      Comments: Right arm swelling around IV  Cold knees   Skin:     Coloration: Skin is not jaundiced or pale.   Neurological:      Mental Status: He is alert.      Comments: Awake answering appropriately moving all ext, writing notes, anxious, chronic pain to shoulders back and coccyx   Psychiatric:         Mood and Affect: Mood normal.      Comments: Mild anxious       Medications  Current Facility-Administered Medications   Medication Dose Route Frequency Provider Last Rate Last Admin    furosemide (LASIX) injection 40 mg  40 mg Intravenous Once Matheus Worthy,  M.D.        senna-docusate (PERICOLACE or SENOKOT S) 8.6-50 MG per tablet 2 Tablet  2 Tablet Oral BID Matheus Worthy M.D.        And    polyethylene glycol/lytes (MIRALAX) PACKET 1 Packet  1 Packet Oral QDAY PRN Matheus Worthy M.D.        And    magnesium hydroxide (MILK OF MAGNESIA) suspension 30 mL  30 mL Oral QDAY PRN Matheus Worthy M.D.        And    bisacodyl (DULCOLAX) suppository 10 mg  10 mg Rectal QDAY PRN Matheus Worthy M.D.        [START ON 2/13/2023] aspirin (ASA) chewable tab 81 mg  81 mg Oral DAILY Matheus Worthy M.D.        hydrOXYzine HCl (ATARAX) tablet 25 mg  25 mg Oral TID PRN Matheus Worthy M.D.        Nozin nasal  swab  1 Applicator Each Nostril BID Matheus Worthy M.D.   1 Applicator at 02/12/23 0518    ipratropium-albuterol (DUONEB) nebulizer solution  3 mL Nebulization Q4HRS (RT) Matheus Worthy M.D.   3 mL at 02/12/23 0616    methylPREDNISolone (SOLU-MEDROL) 40 MG injection 40 mg  40 mg Intravenous Q8HRS Matheus Worthy M.D.   40 mg at 02/12/23 0518    doxycycline monohydrate (ADOXA) tablet 100 mg  100 mg Oral Q12HRS Matheus Worthy M.D.   100 mg at 02/12/23 0517    budesonide-formoterol (SYMBICORT) 160-4.5 MCG/ACT inhaler 2 Puff  2 Puff Inhalation BID (RT) Matheus Worthy M.D.   2 Puff at 02/12/23 0327    metoprolol tartrate (LOPRESSOR) tablet 25 mg  25 mg Oral TWICE DAILY Matheus Worthy M.D.   25 mg at 02/12/23 0517    hydrALAZINE (APRESOLINE) injection 10-20 mg  10-20 mg Intravenous Q4HRS PRN Matheus Worthy M.D.   20 mg at 02/11/23 1916    enalaprilat (Vasotec) injection 1.25 mg 1 mL  1.25 mg Intravenous Q6HRS PRN Matheus Worthy M.D.   1.25 mg at 02/10/23 1006    dexmedetomidine (PRECEDEX) 400 mcg/100mL NS premix infusion  0.1-1.5 mcg/kg/hr Intravenous Continuous Matheus Worhty M.D.   Stopped at 02/12/23 0340    fentaNYL (SUBLIMAZE) injection  mcg   mcg Intravenous Q HOUR PRGABBI Worthy M.D.        Respiratory Therapy  Consult   Nebulization Continuous RT Matheus Worthy M.D.        labetalol (NORMODYNE/TRANDATE) injection 20 mg  20 mg Intravenous Q4HRS PRN Matheus Worthy M.D.   20 mg at 02/11/23 1552    acetaminophen (TYLENOL) tablet 1,000 mg  1,000 mg Oral Q8HRS PRN Matheus Worhty M.D.        fentaNYL (SUBLIMAZE) injection  mcg   mcg Intravenous Q HOUR PRN Matheus Worthy M.D.   50 mcg at 02/11/23 1947    oxyCODONE immediate-release (ROXICODONE) tablet 5 mg  5 mg Oral Q4HRS PRN Matheus Worthy M.D.        Or    oxyCODONE immediate release (ROXICODONE) tablet 10 mg  10 mg Oral Q4HRS PRN Matheus Worthy M.D.   10 mg at 02/11/23 1459    LORazepam (ATIVAN) injection 0.5-2 mg  0.5-2 mg Intravenous Q4HRS PRN Antwon Daniel M.D.   2 mg at 02/10/23 0027    ALPRAZolam (XANAX) tablet 0.5 mg  0.5 mg Oral TID PRN Antwon Daniel M.D.   0.5 mg at 02/11/23 1552    MD Alert...ICU Electrolyte Replacement per Pharmacy   Other PHARMACY TO DOSE Matheus Worthy M.D.        lidocaine (XYLOCAINE) 1 % injection 2 mL  2 mL Tracheal Tube Q30 MIN PRN Matheus Worthy M.D.        ipratropium-albuterol (DUONEB) nebulizer solution  3 mL Nebulization Q2HRS PRN (RT) Matheus Worthy M.D.        enoxaparin (Lovenox) inj 40 mg  40 mg Subcutaneous DAILY AT 1800 Jarvis Talley M.D.   40 mg at 02/11/23 1812    sertraline (Zoloft) tablet 25 mg  25 mg Oral DAILY Matheus Worthy M.D.   25 mg at 02/12/23 0517       Fluids    Intake/Output Summary (Last 24 hours) at 2/12/2023 0716  Last data filed at 2/12/2023 0600  Gross per 24 hour   Intake 757.72 ml   Output 1225 ml   Net -467.28 ml       Laboratory  Recent Labs     02/10/23  0025 02/10/23  0956 02/11/23  1934 02/12/23  0340   ISTATAPH 7.323* 7.401 7.231*  --    ISTATAPCO2 60.3* 49.2* 80.6*  --    ISTATAPO2 107* 73 122*  --    ISTATATCO2 33 32 36*  --    [id number] 98 94 98  --    ISTATARTHCO3 31.3* 30.5* 33.9*  --    ISTATARTBE 3 5* 3  --    ISTATTEMP 99.3 F 98.0 F 98.5 F  98.4 F   ISTATFIO2 50 30 50 40   ISTATSPEC Arterial Arterial Arterial Venous   ISTATAPHTC 7.318* 7.406 7.232*  --    [id number] 110* 71 122*  --          Recent Labs     02/10/23  0414 02/11/23  0338 02/12/23  0337   SODIUM 142 141 138   POTASSIUM 4.0 3.6 4.2   CHLORIDE 101 101 98   CO2 29 27 28   BUN 27* 51* 50*   CREATININE 0.98 1.86* 1.35   MAGNESIUM 2.3 2.3 2.5   PHOSPHORUS 3.9 4.2 3.6   CALCIUM 9.0 8.5 8.7     Recent Labs     02/10/23  0414 02/11/23  0338 02/12/23  0337   GLUCOSE 100* 91 103*     Recent Labs     02/10/23  0414 02/11/23  0338 02/12/23  0337   WBC 18.3* 7.3 8.3   NEUTSPOLYS 86.10* 86.00* 89.80*   LYMPHOCYTES 4.20* 5.80* 5.20*   MONOCYTES 8.90 7.70 4.30   EOSINOPHILS 0.00 0.00 0.00   BASOPHILS 0.20 0.00 0.10     Recent Labs     02/10/23  0414 02/11/23  0338 02/12/23  0337   RBC 4.55* 3.82* 3.83*   HEMOGLOBIN 12.7* 10.7* 10.8*   HEMATOCRIT 39.9* 33.5* 33.7*   PLATELETCT 215 142* 147*       Imaging  X-Ray:  I have personally reviewed the images and compared with prior images.  EKG:  I have personally reviewed the images and compared with prior images.    Assessment/Plan  * Acute hypercapnic respiratory failure (HCC)- (present on admission)  Assessment & Plan  Intubated date: 2/8 extubated 2/9 then re-intubated 2/10-> 2/11 extubation trial  Goal saturation > 88-92%  Watch close off mechanical ventilation and low threshold for bipap prior to complete failure  Palliative care following  Still full code      Medically noncompliant  Assessment & Plan  Patient has good understanding of his disease but refuses medication and medical staff assistance this has lead to patient re-intubation. Continue medical education for patient and hopefully his wife can assist us with endeavor.     Acute pulmonary edema (HCC)  Assessment & Plan  Patient re-intubated for hypertensive urgency and acute pulmonary edema 2/10  S/p force diuresis  Daily spot dosing to achieve euvolemia    Hypertension  Assessment &  Plan  Labetalol prn, vasotec and Hydralazine PRN for goal < 160  Start metoprolol 25mg BID and lisinopril 10mg daily  Follow up echo -> normal BiV  Stop ace-inhibitor 2/11 with low BP continue with metoprolol    Swelling of lower extremity  Assessment & Plan  Lower ext doppler -> negative for dvt  Echo normal BiV  Force diuresis as needed to achieve euvolemia  Likely cor pulmonale    COPD (chronic obstructive pulmonary disease) (HCC)- (present on admission)  Assessment & Plan  Systemic steroids and inhaled steroids, bronchodilators, doxy, sputum cx, wean oxygen 88-92%, COPD order set and pulmonary consult once out of ICU. Palliative care consultation. MRSA nares, strep and legionella antigen.   Trigger by human metapneumo virus  Force diuresis as needed for euvolemia, anxiety rx    Pulmonary nodule- (present on admission)  Assessment & Plan  Stable on serial CT follow up with pulmonary as appropriate    Elevated troponin- (present on admission)  Assessment & Plan  Echo normal BiV function with no wall motion abnormalities  Aspirin and statin therapy  Demand ischemia  BP control and betablocker           VTE:  Lovenox  Ulcer: Not Indicated  Lines: Santos Catheter  Ongoing indication addressed    I have performed a physical exam and reviewed and updated ROS and Plan today (2/12/2023). In review of yesterday's note (2/11/2023), there are no changes except as documented above.     Discussed patient condition and risk of morbidity and/or mortality with RN, RT, Pharmacy, Charge nurse / hot rounds, and Patient    The patient remains critically ill with titration of bipap and dex gtt.  Critical care time = 40 minutes in directly providing and coordinating critical care and extensive data review.  No time overlap and excludes procedures.

## 2023-02-12 NOTE — CARE PLAN
Problem: Safety - Medical Restraint  Goal: Remains free of injury from restraints (Restraint for Interference with Medical Device)  Outcome: Progressing  Goal: Free from restraint(s) (Restraint for Interference with Medical Device)  Outcome: Progressing     Problem: Pain - Standard  Goal: Alleviation of pain or a reduction in pain to the patient’s comfort goal  Outcome: Progressing   The patient is Watcher - Medium risk of patient condition declining or worsening    Shift Goals  Clinical Goals: Extubate, stable vitals  Patient Goals: extubate, anxiety control  Family Goals: diogenes    Progress made toward(s) clinical / shift goals:  Met    Patient is not progressing towards the following goals:

## 2023-02-12 NOTE — PROGRESS NOTES
Upon initial assessment pt found to HTN with /102, PRN medications administered. PT with audible wheezing heard throughout all lung fields, tachypnea and increased work of breathing. PT with decreased mentation and confusion oriented x1. Call placed to RT, ABG obtained and plan for pt to be placed on BIPAP. Page placed to Dr. Aguirre regarding pts decline in status. Received orders for BIPAP at night, and okay to use precedex low dose for patient comfort on BIPAP.

## 2023-02-12 NOTE — CARE PLAN
Problem: Bronchoconstriction  Goal: Improve in air movement and diminished wheezing  Description: Target End Date:  2 to 3 days    1.  Implement inhaled treatments  2.  Evaluate and manage medication effects  Outcome: Not Met     Duo Q4  Symbicort BID

## 2023-02-13 ENCOUNTER — PATIENT OUTREACH (OUTPATIENT)
Dept: HEALTH INFORMATION MANAGEMENT | Facility: OTHER | Age: 68
End: 2023-02-13
Payer: COMMERCIAL

## 2023-02-13 PROBLEM — N17.0 ACUTE RENAL FAILURE WITH TUBULAR NECROSIS (HCC): Status: ACTIVE | Noted: 2023-02-13

## 2023-02-13 PROBLEM — J96.21 ACUTE ON CHRONIC RESPIRATORY FAILURE WITH HYPOXIA AND HYPERCAPNIA (HCC): Status: ACTIVE | Noted: 2023-02-08

## 2023-02-13 PROBLEM — D64.9 ANEMIA: Status: ACTIVE | Noted: 2023-02-13

## 2023-02-13 PROBLEM — J44.1 CHRONIC OBSTRUCTIVE PULMONARY DISEASE WITH ACUTE EXACERBATION (HCC): Status: ACTIVE | Noted: 2023-02-08

## 2023-02-13 PROBLEM — J96.22 ACUTE ON CHRONIC RESPIRATORY FAILURE WITH HYPOXIA AND HYPERCAPNIA (HCC): Status: ACTIVE | Noted: 2023-02-08

## 2023-02-13 LAB
ANION GAP SERPL CALC-SCNC: 9 MMOL/L (ref 7–16)
BACTERIA BLD CULT: NORMAL
BACTERIA BLD CULT: NORMAL
BASOPHILS # BLD AUTO: 0 % (ref 0–1.8)
BASOPHILS # BLD: 0 K/UL (ref 0–0.12)
BUN SERPL-MCNC: 50 MG/DL (ref 8–22)
CALCIUM SERPL-MCNC: 8.5 MG/DL (ref 8.5–10.5)
CHLORIDE SERPL-SCNC: 97 MMOL/L (ref 96–112)
CO2 SERPL-SCNC: 30 MMOL/L (ref 20–33)
CREAT SERPL-MCNC: 1.17 MG/DL (ref 0.5–1.4)
EOSINOPHIL # BLD AUTO: 0 K/UL (ref 0–0.51)
EOSINOPHIL NFR BLD: 0 % (ref 0–6.9)
ERYTHROCYTE [DISTWIDTH] IN BLOOD BY AUTOMATED COUNT: 44.7 FL (ref 35.9–50)
GFR SERPLBLD CREATININE-BSD FMLA CKD-EPI: 67 ML/MIN/1.73 M 2
GLUCOSE SERPL-MCNC: 124 MG/DL (ref 65–99)
HCT VFR BLD AUTO: 31.5 % (ref 42–52)
HGB BLD-MCNC: 9.8 G/DL (ref 14–18)
IMM GRANULOCYTES # BLD AUTO: 0.03 K/UL (ref 0–0.11)
IMM GRANULOCYTES NFR BLD AUTO: 0.5 % (ref 0–0.9)
LYMPHOCYTES # BLD AUTO: 0.35 K/UL (ref 1–4.8)
LYMPHOCYTES NFR BLD: 5.5 % (ref 22–41)
MAGNESIUM SERPL-MCNC: 2.3 MG/DL (ref 1.5–2.5)
MCH RBC QN AUTO: 27.5 PG (ref 27–33)
MCHC RBC AUTO-ENTMCNC: 31.1 G/DL (ref 33.7–35.3)
MCV RBC AUTO: 88.5 FL (ref 81.4–97.8)
MONOCYTES # BLD AUTO: 0.38 K/UL (ref 0–0.85)
MONOCYTES NFR BLD AUTO: 6 % (ref 0–13.4)
NEUTROPHILS # BLD AUTO: 5.59 K/UL (ref 1.82–7.42)
NEUTROPHILS NFR BLD: 88 % (ref 44–72)
NRBC # BLD AUTO: 0 K/UL
NRBC BLD-RTO: 0 /100 WBC
PHOSPHATE SERPL-MCNC: 3.5 MG/DL (ref 2.5–4.5)
PLATELET # BLD AUTO: 144 K/UL (ref 164–446)
PMV BLD AUTO: 10.9 FL (ref 9–12.9)
POTASSIUM SERPL-SCNC: 4.4 MMOL/L (ref 3.6–5.5)
RBC # BLD AUTO: 3.56 M/UL (ref 4.7–6.1)
SIGNIFICANT IND 70042: NORMAL
SIGNIFICANT IND 70042: NORMAL
SITE SITE: NORMAL
SITE SITE: NORMAL
SODIUM SERPL-SCNC: 136 MMOL/L (ref 135–145)
SOURCE SOURCE: NORMAL
SOURCE SOURCE: NORMAL
WBC # BLD AUTO: 6.4 K/UL (ref 4.8–10.8)

## 2023-02-13 PROCEDURE — 85025 COMPLETE CBC W/AUTO DIFF WBC: CPT

## 2023-02-13 PROCEDURE — 94640 AIRWAY INHALATION TREATMENT: CPT

## 2023-02-13 PROCEDURE — 99223 1ST HOSP IP/OBS HIGH 75: CPT | Performed by: HOSPITALIST

## 2023-02-13 PROCEDURE — 99233 SBSQ HOSP IP/OBS HIGH 50: CPT | Performed by: INTERNAL MEDICINE

## 2023-02-13 PROCEDURE — 700102 HCHG RX REV CODE 250 W/ 637 OVERRIDE(OP): Performed by: INTERNAL MEDICINE

## 2023-02-13 PROCEDURE — 84100 ASSAY OF PHOSPHORUS: CPT

## 2023-02-13 PROCEDURE — 83735 ASSAY OF MAGNESIUM: CPT

## 2023-02-13 PROCEDURE — 770006 HCHG ROOM/CARE - MED/SURG/GYN SEMI*

## 2023-02-13 PROCEDURE — 80048 BASIC METABOLIC PNL TOTAL CA: CPT

## 2023-02-13 PROCEDURE — A9270 NON-COVERED ITEM OR SERVICE: HCPCS | Performed by: INTERNAL MEDICINE

## 2023-02-13 PROCEDURE — 700101 HCHG RX REV CODE 250: Performed by: INTERNAL MEDICINE

## 2023-02-13 PROCEDURE — A9270 NON-COVERED ITEM OR SERVICE: HCPCS | Performed by: HOSPITALIST

## 2023-02-13 PROCEDURE — 700102 HCHG RX REV CODE 250 W/ 637 OVERRIDE(OP): Performed by: HOSPITALIST

## 2023-02-13 PROCEDURE — 94660 CPAP INITIATION&MGMT: CPT

## 2023-02-13 PROCEDURE — 700111 HCHG RX REV CODE 636 W/ 250 OVERRIDE (IP): Performed by: INTERNAL MEDICINE

## 2023-02-13 PROCEDURE — 94760 N-INVAS EAR/PLS OXIMETRY 1: CPT

## 2023-02-13 RX ORDER — LEVALBUTEROL INHALATION SOLUTION 1.25 MG/3ML
1.25 SOLUTION RESPIRATORY (INHALATION)
Status: DISCONTINUED | OUTPATIENT
Start: 2023-02-13 | End: 2023-02-22 | Stop reason: HOSPADM

## 2023-02-13 RX ORDER — PREDNISONE 20 MG/1
20 TABLET ORAL DAILY
Status: DISCONTINUED | OUTPATIENT
Start: 2023-02-13 | End: 2023-02-14

## 2023-02-13 RX ORDER — LEVALBUTEROL INHALATION SOLUTION 1.25 MG/3ML
1.25 SOLUTION RESPIRATORY (INHALATION)
Status: DISCONTINUED | OUTPATIENT
Start: 2023-02-13 | End: 2023-02-14

## 2023-02-13 RX ORDER — FUROSEMIDE 10 MG/ML
20 INJECTION INTRAMUSCULAR; INTRAVENOUS
Status: DISCONTINUED | OUTPATIENT
Start: 2023-02-13 | End: 2023-02-13

## 2023-02-13 RX ADMIN — BUDESONIDE AND FORMOTEROL FUMARATE DIHYDRATE 2 PUFF: 160; 4.5 AEROSOL RESPIRATORY (INHALATION) at 01:25

## 2023-02-13 RX ADMIN — FENTANYL CITRATE 50 MCG: 50 INJECTION, SOLUTION INTRAMUSCULAR; INTRAVENOUS at 08:14

## 2023-02-13 RX ADMIN — SERTRALINE 25 MG: 50 TABLET, FILM COATED ORAL at 05:21

## 2023-02-13 RX ADMIN — OXYCODONE HYDROCHLORIDE 10 MG: 10 TABLET ORAL at 12:30

## 2023-02-13 RX ADMIN — LEVALBUTEROL 1.25 MG: 1.25 SOLUTION RESPIRATORY (INHALATION) at 15:08

## 2023-02-13 RX ADMIN — HYDROXYZINE HYDROCHLORIDE 25 MG: 50 TABLET, FILM COATED ORAL at 22:08

## 2023-02-13 RX ADMIN — Medication 1 APPLICATOR: at 05:13

## 2023-02-13 RX ADMIN — METOPROLOL TARTRATE 50 MG: 50 TABLET, FILM COATED ORAL at 08:22

## 2023-02-13 RX ADMIN — METHYLPREDNISOLONE SODIUM SUCCINATE 40 MG: 40 INJECTION, POWDER, FOR SOLUTION INTRAMUSCULAR; INTRAVENOUS at 05:07

## 2023-02-13 RX ADMIN — BUDESONIDE AND FORMOTEROL FUMARATE DIHYDRATE 2 PUFF: 160; 4.5 AEROSOL RESPIRATORY (INHALATION) at 12:32

## 2023-02-13 RX ADMIN — RIVAROXABAN 10 MG: 10 TABLET, FILM COATED ORAL at 17:36

## 2023-02-13 RX ADMIN — OXYCODONE HYDROCHLORIDE 10 MG: 10 TABLET ORAL at 22:06

## 2023-02-13 RX ADMIN — LEVALBUTEROL 1.25 MG: 1.25 SOLUTION RESPIRATORY (INHALATION) at 19:34

## 2023-02-13 RX ADMIN — IPRATROPIUM BROMIDE AND ALBUTEROL SULFATE 3 ML: .5; 2.5 SOLUTION RESPIRATORY (INHALATION) at 06:10

## 2023-02-13 RX ADMIN — SENNOSIDES AND DOCUSATE SODIUM 2 TABLET: 50; 8.6 TABLET ORAL at 08:15

## 2023-02-13 RX ADMIN — ALPRAZOLAM 0.5 MG: 0.5 TABLET ORAL at 19:22

## 2023-02-13 RX ADMIN — OXYCODONE HYDROCHLORIDE 10 MG: 10 TABLET ORAL at 05:14

## 2023-02-13 RX ADMIN — OXYCODONE HYDROCHLORIDE 10 MG: 10 TABLET ORAL at 17:47

## 2023-02-13 RX ADMIN — METOPROLOL TARTRATE 75 MG: 50 TABLET, FILM COATED ORAL at 17:36

## 2023-02-13 RX ADMIN — SENNOSIDES AND DOCUSATE SODIUM 2 TABLET: 50; 8.6 TABLET ORAL at 17:36

## 2023-02-13 RX ADMIN — FUROSEMIDE 20 MG: 10 INJECTION, SOLUTION INTRAMUSCULAR; INTRAVENOUS at 08:14

## 2023-02-13 RX ADMIN — FENTANYL CITRATE 50 MCG: 50 INJECTION, SOLUTION INTRAMUSCULAR; INTRAVENOUS at 02:06

## 2023-02-13 RX ADMIN — FUROSEMIDE 20 MG: 10 INJECTION, SOLUTION INTRAMUSCULAR; INTRAVENOUS at 15:18

## 2023-02-13 RX ADMIN — PREDNISONE 20 MG: 20 TABLET ORAL at 08:15

## 2023-02-13 RX ADMIN — METOPROLOL TARTRATE 25 MG: 25 TABLET, FILM COATED ORAL at 08:20

## 2023-02-13 ASSESSMENT — ENCOUNTER SYMPTOMS
SHORTNESS OF BREATH: 0
MYALGIAS: 0
COUGH: 0
CHILLS: 0
BLURRED VISION: 0
SPEECH CHANGE: 0
VOMITING: 0
SHORTNESS OF BREATH: 1
ABDOMINAL PAIN: 0
DEPRESSION: 0
BACK PAIN: 1
FEVER: 0
NAUSEA: 0
SENSORY CHANGE: 0
NERVOUS/ANXIOUS: 0
BRUISES/BLEEDS EASILY: 0
SORE THROAT: 0
HEMOPTYSIS: 0
ROS GI COMMENTS: TOLERATING A DIET
SPUTUM PRODUCTION: 0
PALPITATIONS: 0
HEADACHES: 0
DIZZINESS: 0
WHEEZING: 0

## 2023-02-13 ASSESSMENT — LIFESTYLE VARIABLES
TOTAL SCORE: 0
EVER HAD A DRINK FIRST THING IN THE MORNING TO STEADY YOUR NERVES TO GET RID OF A HANGOVER: NO
HAVE PEOPLE ANNOYED YOU BY CRITICIZING YOUR DRINKING: NO
HAVE YOU EVER FELT YOU SHOULD CUT DOWN ON YOUR DRINKING: NO
ON A TYPICAL DAY WHEN YOU DRINK ALCOHOL HOW MANY DRINKS DO YOU HAVE: 0
TOTAL SCORE: 0
ALCOHOL_USE: NO
CONSUMPTION TOTAL: NEGATIVE
EVER FELT BAD OR GUILTY ABOUT YOUR DRINKING: NO
TOTAL SCORE: 0
DOES PATIENT WANT TO STOP DRINKING: NO
HOW MANY TIMES IN THE PAST YEAR HAVE YOU HAD 5 OR MORE DRINKS IN A DAY: 0
AVERAGE NUMBER OF DAYS PER WEEK YOU HAVE A DRINK CONTAINING ALCOHOL: 0

## 2023-02-13 ASSESSMENT — PAIN DESCRIPTION - PAIN TYPE
TYPE: ACUTE PAIN
TYPE: ACUTE PAIN;CHRONIC PAIN
TYPE: ACUTE PAIN

## 2023-02-13 NOTE — ASSESSMENT & PLAN NOTE
- Due to COPD exacerbation.  - Required intubation in the ICU, so far doing well postextubation and remained stable at baseline 4 to 5 L of supplemental oxygen.  -Continue bronchodilators, RT support, supplemental oxygen.

## 2023-02-13 NOTE — CARE PLAN
The patient is Stable - Low risk of patient condition declining or worsening    Shift Goals  Clinical Goals: bp mgmt, resp mgmt, improve mentaion, inc mobility  Patient Goals: pain < 4, anx  Family Goals: SHELLY    Progress made toward(s) clinical / shift goals:  pt did not sustain injuries, pt meds passed per MAR      Problem: Knowledge Deficit - COPD  Goal: Patient/significant other demonstrates understanding of disease process, utilization of the Action Plan, medications and discharge instruction  Outcome: Progressing     Problem: Risk for Infection - COPD  Goal: Patient will remain free from signs and symptoms of infection  Outcome: Progressing     Problem: Nutrition - Advanced  Goal: Patient will display progressive weight gain toward goal have adequate food and fluid intake  Outcome: Progressing     Problem: Ineffective Airway Clearance  Goal: Patient will maintain patent airway with clear/clearing breath sounds  Outcome: Progressing     Problem: Impaired Gas Exchange  Goal: Patient will demonstrate improved ventilation and adequate oxygenation and participate in treatment regimen within the level of ability/situation.  Outcome: Progressing     Problem: Risk for Aspiration  Goal: Patient's risk for aspiration will be absent or decrease  Outcome: Progressing     Problem: Self Care  Goal: Patient will have the ability to perform ADLs independently or with assistance (bathe, groom, dress, toilet and feed)  Outcome: Progressing     Problem: Knowledge Deficit - Standard  Goal: Patient and family/care givers will demonstrate understanding of plan of care, disease process/condition, diagnostic tests and medications  Outcome: Progressing     Problem: Skin Integrity  Goal: Skin integrity is maintained or improved  Outcome: Progressing     Problem: Pain - Standard  Goal: Alleviation of pain or a reduction in pain to the patient’s comfort goal  Outcome: Progressing     Problem: Fall Risk  Goal: Patient will remain free from  falls  Outcome: Progressing       Patient is not progressing towards the following goals:

## 2023-02-13 NOTE — ASSESSMENT & PLAN NOTE
-Upon review of the records his hemoglobin has fluctuated.   -Needs continued outpatient follow-up.

## 2023-02-13 NOTE — ASSESSMENT & PLAN NOTE
- Good to fair control.  Continue metoprolol and lisinopril.  Monitor blood pressure trend closely with as needed IV hydralazine, Vasotec and labetalol for significant hypertension.

## 2023-02-13 NOTE — CARE PLAN
The patient is Stable - Low risk of patient condition declining or worsening    Shift Goals  Clinical Goals: bp mgmt, resp mgmt, improve mentaion, inc mobility  Patient Goals: pain < 4, anx  Family Goals: SHELLY    Progress made toward(s) clinical / shift goals:    Problem: Skin Integrity  Goal: Skin integrity is maintained or improved  Outcome: Progressing     Problem: Pain - Standard  Goal: Alleviation of pain or a reduction in pain to the patient’s comfort goal  Outcome: Progressing     Problem: Fall Risk  Goal: Patient will remain free from falls  Outcome: Progressing

## 2023-02-13 NOTE — CARE PLAN
The patient is Stable - Low risk of patient condition declining or worsening    Shift Goals  Clinical Goals: BP control, Improved resp status, safety, improved mentation, increased mobility  Patient Goals: pain, anxiety  Family Goals: rest    Progress made toward(s) clinical / shift goals:    Problem: Knowledge Deficit - COPD  Goal: Patient/significant other demonstrates understanding of disease process, utilization of the Action Plan, medications and discharge instruction  Outcome: Progressing     Problem: Risk for Infection - COPD  Goal: Patient will remain free from signs and symptoms of infection  Outcome: Progressing     Problem: Nutrition - Advanced  Goal: Patient will display progressive weight gain toward goal have adequate food and fluid intake  Outcome: Progressing     Problem: Ineffective Airway Clearance  Goal: Patient will maintain patent airway with clear/clearing breath sounds  Outcome: Progressing     Problem: Impaired Gas Exchange  Goal: Patient will demonstrate improved ventilation and adequate oxygenation and participate in treatment regimen within the level of ability/situation.  Outcome: Progressing     Problem: Risk for Aspiration  Goal: Patient's risk for aspiration will be absent or decrease  Outcome: Progressing     Problem: Self Care  Goal: Patient will have the ability to perform ADLs independently or with assistance (bathe, groom, dress, toilet and feed)  Outcome: Progressing     Problem: Knowledge Deficit - Standard  Goal: Patient and family/care givers will demonstrate understanding of plan of care, disease process/condition, diagnostic tests and medications  Outcome: Progressing     Problem: Skin Integrity  Goal: Skin integrity is maintained or improved  Outcome: Progressing     Problem: Pain - Standard  Goal: Alleviation of pain or a reduction in pain to the patient’s comfort goal  Outcome: Progressing     Problem: Fall Risk  Goal: Patient will remain free from falls  Outcome:  Progressing       Patient is not progressing towards the following goals:

## 2023-02-13 NOTE — PROGRESS NOTES
Report given to Rick MORALEZ, all questions answered.  Patient transferred to [facility] with transport oxygen tank and all personal belongings.

## 2023-02-13 NOTE — CONSULTS
Hospital Medicine Consultation    Date of Service  2/13/2023    Referring Physician  Jeremy Gonda, M.D.    Consulting Physician  Hector Stern M.D.    Reason for Consultation  COPD exacerbation    History of Presenting Illness  67 y.o. male who presented 2/8/2023 with shortness of breath.  Mr. Bob has a past medical history of ankylosing spondylitis with chronic back pain as well as oxygen dependent COPD recently on 4 to 5 L though recently was admitted for COPD exacerbation January 18, 2023 and presented on February 8 with worsening shortness of breath for the prior 2 days and required endotracheal intubation and ICU admission.  In the emergency room a viral swab was negative for influenza, RSV, and COVID.  Procalcitonin was less than 0.5 though chest x-ray revealed hazy interstitial opacities favoring multifocal pneumonia.  He was admitted to the intensive care unit on mechanical ventilation was treated with IV Solu-Medrol as well as azithromycin.  He was extubated on the ninth to rescue BiPAP but he refused it and required intubation and was successfully extubated on 2/11/2023.  In the intensive care unit he was placed on nocturnal BiPAP after extubation.  A viral panel was positive for human metapneumovirus by PCR.  Review of Systems  Review of Systems   Constitutional:  Positive for malaise/fatigue. Negative for chills and fever.   Respiratory:  Positive for shortness of breath. Negative for cough, hemoptysis and sputum production.    Cardiovascular:  Negative for chest pain and leg swelling.   Gastrointestinal:         Tolerating a diet   Musculoskeletal:  Positive for back pain.   All other systems reviewed and are negative.    Past Medical History  Ankylosing spondylitis, chronic back pain, oxygen dependent COPD recently on 4 to 5 L    Surgical History   has a past surgical history that includes sigmoid colectomy (12/6/2008); colostomy (12/6/2008); colostomy closure (4/2/2009); ventral hernia repair  (6/11/2009); umbilical hernia repair; exploratory laparotomy (12/6/2008); bowel resection (12/6/2008); and ventral hernia repair (10/23/2009).      Family History  family history includes Alzheimer's Disease in his mother.   Family history reviewed with patient. There is no family history that is pertinent to the chief complaint.      Social History   reports that he quit smoking about 13 years ago. His smoking use included cigarettes. He started smoking about 55 years ago. He has a 42.00 pack-year smoking history. He has never used smokeless tobacco. He reports that he does not drink alcohol and does not use drugs.  Lives with his wife, granddaughter, and great-grandchildren       Medications  Prior to Admission Medications   Prescriptions Last Dose Informant Patient Reported? Taking?   Multiple Vitamins-Minerals (MULTIVITAMIN ADULT) Chew Tab UNK at [facility] Patient's Home Pharmacy Yes No   Sig: Chew 1 Tablet every evening.   SYMBICORT 160-4.5 MCG/ACT Aerosol UNK at [facility] Patient's Home Pharmacy Yes No   Sig: Inhale 2 Puffs 2 times a day.   Tiotropium Bromide-Olodaterol (STIOLTO RESPIMAT) 2.5-2.5 MCG/ACT Aero Soln UNK at [facility] Patient's Home Pharmacy Yes Yes   Sig: Inhale 2 Puffs every day.   ammonium lactate (LAC-HYDRIN) 12 % Lotion UNK at [facility] Patient's Home Pharmacy No No   Sig: Apply 1 Application topically 2 times a day.   azithromycin (ZITHROMAX) 250 MG Tab UNK at [facility] Patient's Home Pharmacy No No   Sig: Take 2 tablets on day 1, then take 1 tablet a day for 4 days.   furosemide (LASIX) 40 MG Tab UNK at [facility] Patient's Home Pharmacy Yes No   Sig: Take 40 mg by mouth every morning.   hydrOXYzine HCl (ATARAX) 25 MG Tab UNK at [facility] Patient's Home Pharmacy Yes Yes   Sig: Take 25 mg by mouth 3 times a day as needed for Anxiety.   levalbuterol (XOPENEX HFA) 45 MCG/ACT inhaler UNK at [facility] Patient's Home Pharmacy Yes Yes   Sig: Inhale 1-2 Puffs every four hours as needed for Shortness of Breath.   predniSONE (DELTASONE) 10 MG Tab  UNK at [facility] Patient's Home Pharmacy No No   Sig: Take 40mg x 4 days, then take 30mg x 4 days, then take 20mg x 4 days, then 10mg x 4 days with food, then discontinue.   sertraline (ZOLOFT) 25 MG tablet UNK at [facility] Patient's Home Pharmacy Yes Yes   Sig: Take 25 mg by mouth every day.   tiotropium (SPIRIVA RESPIMAT) 2.5 mcg/Act Aero Soln UNK at [facility] Patient's Home Pharmacy No No   Sig: Inhale 2 Inhalation every day. Assemble and prime.      Facility-Administered Medications: None       Allergies  No Known Allergies    Physical Exam  Temp:  [36.7 °C (98 °F)] 36.7 °C (98 °F)  Pulse:  [] 80  Resp:  [7-39] 18  BP: (116-178)/(58-99) 148/87  SpO2:  [91 %-100 %] 100 %    Physical Exam  Vitals and nursing note reviewed.   Constitutional:       General: He is not in acute distress.     Appearance: He is ill-appearing. He is not toxic-appearing.      Comments: thin   HENT:      Head: Normocephalic and atraumatic.      Nose: Nose normal.      Mouth/Throat:      Mouth: Mucous membranes are dry.      Pharynx: Oropharynx is clear.   Eyes:      General: No scleral icterus.     Conjunctiva/sclera: Conjunctivae normal.   Neck:      Comments: kyphotic  Cardiovascular:      Rate and Rhythm: Normal rate and regular rhythm.   Pulmonary:      Comments: 5 liters nasal cannula oxygen  Decreased air movement  Prolonged expiratory phase  No wheezing and no crackles  Abdominal:      General: There is no distension.      Tenderness: There is no abdominal tenderness.   Musculoskeletal:      Cervical back: No tenderness.      Right lower leg: No edema.      Left lower leg: No edema.   Skin:     General: Skin is warm and dry.      Comments: Severe xerosis of the skin with chronic venous stasis changes   Neurological:      General: No focal deficit present.      Mental Status: He is alert and oriented to person, place, and time.   Psychiatric:         Mood and Affect: Mood normal.         Behavior: Behavior normal.         Thought Content:  Thought content normal.         Judgment: Judgment normal.       Fluids  Date 02/13/23 0700 - 02/14/23 0659   Shift 2246-1212 6667-0523 7374-9407 24 Hour Total   INTAKE   P.O. 360   360   Shift Total 360   360   OUTPUT   Urine 500   500   Shift Total 500   500   Weight (kg) 65.9 65.9 65.9 65.9       Laboratory  Recent Labs     02/11/23 0338 02/12/23 0337 02/13/23 0344   WBC 7.3 8.3 6.4   RBC 3.82* 3.83* 3.56*   HEMOGLOBIN 10.7* 10.8* 9.8*   HEMATOCRIT 33.5* 33.7* 31.5*   MCV 87.7 88.0 88.5   MCH 28.0 28.2 27.5   MCHC 31.9* 32.0* 31.1*   RDW 47.0 46.2 44.7   PLATELETCT 142* 147* 144*   MPV 11.3 10.6 10.9     Recent Labs     02/11/23 0338 02/12/23 0337 02/13/23 0344   SODIUM 141 138 136   POTASSIUM 3.6 4.2 4.4   CHLORIDE 101 98 97   CO2 27 28 30   GLUCOSE 91 103* 124*   BUN 51* 50* 50*   CREATININE 1.86* 1.35 1.17   CALCIUM 8.5 8.7 8.5                     Imaging  DX-CHEST-PORTABLE (1 VIEW)   Final Result         1.  Pulmonary edema and/or infiltrates are identified, which appear somewhat increased since the prior exam.   2.  Small bilateral pleural effusions, increased since prior.   3.  Atherosclerosis      DX-CHEST-PORTABLE (1 VIEW)   Final Result         1.  Pulmonary edema and/or infiltrates are identified, which are somewhat decreased since the prior exam.   2.  Trace bilateral pleural effusions   3.  Atherosclerosis      DX-ABDOMEN FOR TUBE PLACEMENT   Final Result      Enteric tube has been placed and the tip projects over the stomach.      EC-ECHOCARDIOGRAM COMPLETE W/O CONT   Final Result      DX-CHEST-PORTABLE (1 VIEW)   Final Result      1.  Interval placement of an endotracheal tube which terminates in satisfactory position at the level of the aortic arch.   2.  Similar appearance of patchy bilateral interstitial opacities which could be related to edema and/or infection.      DX-CHEST-PORTABLE (1 VIEW)   Final Result         1.  Pulmonary edema and/or infiltrates are identified, which appear  somewhat increased since the prior exam.   2.  Trace bilateral pleural effusions   3.  Atherosclerosis      US-EXTREMITY VENOUS LOWER BILAT   Final Result      DX-CHEST-PORTABLE (1 VIEW)   Final Result         1.  Right pulmonary infiltrate, overall infiltrates are decreased since prior study.   2.  Trace bilateral pleural effusions   3.  Atherosclerosis      DX-CHEST-PORTABLE (1 VIEW)   Final Result         1.  Pulmonary edema and/or infiltrates.   2.  Trace bilateral pleural effusions   3.  Atherosclerosis      DX-ABDOMEN FOR TUBE PLACEMENT   Final Result         1.  Moderate stool in the colon suggests changes of constipation, otherwise nonspecific bowel gas pattern in the upper abdomen   2.  Nasogastric tube tip terminates overlying the expected location of the gastric fundus.   3.  Pulmonary edema and/or infiltrates.   4.  Trace bilateral pleural effusions          Assessment/Plan  * Chronic obstructive pulmonary disease with acute exacerbation (HCC)- (present on admission)  Assessment & Plan  Severe acute on chronic respiratory failure secondary to COPD exacerbation.  Was admitted to the intensive care unit on mechanical ventilation and IV Solu-Medrol  This appears to be prompted by human metapneumovirus and antibiotics are not indicated  He will be placed on a prednisone taper  Pulmonology to consult  He is a non-smoker  Symbicort as well as DuoNeb's as needed      Acute hypoxemic respiratory failure (HCC)- (present on admission)  Assessment & Plan  Acute on chronic respiratory failure.  His baseline oxygen has been between 4 and 5 L.  He required endotracheal intubation    Anemia- (present on admission)  Assessment & Plan  Upon review of the records his hemoglobin has fluctuated though now is lower than previous at 9.8.  This is appropriate to follow-up as an outpatient    Acute renal failure with tubular necrosis (HCC)- (present on admission)  Assessment & Plan  This has improved    Acute pulmonary edema  ([facility])- (present on admission)  Assessment & Plan  Treated with IV Lasix    Hypertension  Assessment & Plan  He has been started on metoprolol    Pulmonary nodule- (present on admission)  Assessment & Plan  Appropriate to follow-up outpatient    Ankylosing spondylitis ([facility])- (present on admission)  Assessment & Plan  Continue oxycodone for pain.    Elevated troponin- (present on admission)  Assessment & Plan  Troponin went up to 208 consistent with stress response due to respiratory failure  Echo reveals an EF 50% without wall motion abnormalities

## 2023-02-13 NOTE — PROGRESS NOTES
"Critical Care Progress Note    Date of admission  2/8/2023    Chief Complaint  67 y.o. male admitted 2/8/2023 with Hx of Centrilobular emphysema on 3l n/c, prior tobacco abuse, ankylosing spondylitis, kyphoscoliosis, pulmonary hypertension, cor pulmonale, right hilar lung nodule. That presents with multiple sick contacts with 2 grandchildren, daughter and wife. He has had 1-2 days of shortness of breath and presented to ER in extremis and was intubated on arrival found to have acute hypoxic hypercapnic respiratory failure with ph 7.1/>100/71. His wife was at bedside describes he did get flu shot and prior covid vaccines. Viral panel negative. She has not had advance care planning and prior was full code. I discussed and recommended DNR I okay.     Hospital Course  2/8 Amitted to ICU on ventilator  2/9 extubated early am patient refusing rx and bipap  2/10 re-intubate at start of shift, hypertensive, volume overload  2/11 another extubation attempt --> BiPAP  2/12 precedex gtt for anxiety, noc BiPAP    Interval Problem Update  Reviewed last 24 hour events:   - weaned off BiPAP   - weaned off precedex gtt, c/o mild anxiety and pain and \"looking forward to the next fentanyl\"   - AF, nl WBC   - sinus tach, -170   - Hgb down to 10, plts down to 144   - improving JOSE M, good UOP   - sputum cult neg    Review of Systems  Review of Systems   Constitutional:  Positive for malaise/fatigue. Negative for chills and fever.   HENT:  Negative for congestion and sore throat.    Eyes:  Negative for blurred vision.   Respiratory:  Negative for cough, sputum production, shortness of breath and wheezing.    Cardiovascular:  Negative for chest pain, palpitations and leg swelling.   Gastrointestinal:  Negative for abdominal pain, nausea and vomiting.   Genitourinary:  Negative for dysuria.   Musculoskeletal:  Positive for back pain. Negative for myalgias.   Skin:  Negative for rash.   Neurological:  Negative for dizziness, sensory " change, speech change and headaches.   Endo/Heme/Allergies:  Does not bruise/bleed easily.   Psychiatric/Behavioral:  Negative for depression. The patient is not nervous/anxious.    All other systems reviewed and are negative.     Vital Signs for last 24 hours   Pulse:  [] 87  Resp:  [7-39] 18  BP: (116-178)/(58-98) 116/58  SpO2:  [91 %-100 %] 98 %    Respiratory Information for the last 24 hours   4 lpm FM (baseline 3 to 4 L/min 24 hours a day)    Physical Exam   Physical Exam  Vitals and nursing note reviewed.   Constitutional:       General: He is awake. He is not in acute distress.     Appearance: He is well-developed.      Interventions: Face mask in place.   HENT:      Head: Normocephalic and atraumatic.      Nose: Nose normal. No congestion.      Mouth/Throat:      Mouth: Mucous membranes are moist.      Pharynx: Oropharynx is clear. No oropharyngeal exudate.   Eyes:      General: No scleral icterus.     Conjunctiva/sclera: Conjunctivae normal.      Pupils: Pupils are equal, round, and reactive to light.   Neck:      Vascular: No JVD.   Cardiovascular:      Rate and Rhythm: Regular rhythm. Tachycardia present.      Pulses: Normal pulses.      Heart sounds: Normal heart sounds. No murmur heard.     Comments: Hypertensive at times  Pulmonary:      Effort: Pulmonary effort is normal. No tachypnea or respiratory distress.      Breath sounds: No stridor. Examination of the right-lower field reveals rhonchi. Examination of the left-lower field reveals rhonchi. Rhonchi present. No wheezing or rales.      Comments: Speaking in full sentences without difficulty  Abdominal:      General: Bowel sounds are normal. There is no distension.      Palpations: Abdomen is soft.      Tenderness: There is no abdominal tenderness. There is no guarding or rebound.   Genitourinary:     Comments: Santos catheter in place  Musculoskeletal:         General: No tenderness.      Cervical back: Neck supple. No tenderness.      Right  lower leg: No edema.      Left lower leg: No edema.      Comments: Mild clubbing   Skin:     General: Skin is warm and dry.      Capillary Refill: Capillary refill takes less than 2 seconds.      Coloration: Skin is not pale.   Neurological:      General: No focal deficit present.      Mental Status: He is alert and oriented to person, place, and time.      Cranial Nerves: No cranial nerve deficit.      Sensory: No sensory deficit.   Psychiatric:         Mood and Affect: Mood normal.         Behavior: Behavior normal. Behavior is cooperative.         Thought Content: Thought content normal.       Medications  Current Facility-Administered Medications   Medication Dose Route Frequency Provider Last Rate Last Admin    levalbuterol (XOPENEX) 1.25 MG/3ML nebulizer solution 1.25 mg  1.25 mg Nebulization 4X/DAY (RT) Matheus Worthy M.D.        levalbuterol (XOPENEX) 1.25 MG/3ML nebulizer solution 1.25 mg  1.25 mg Nebulization Q2HRS PRN (RT) Matheus Worthy M.D.        senna-docusate (PERICOLACE or SENOKOT S) 8.6-50 MG per tablet 2 Tablet  2 Tablet Oral BID Matheus Worthy M.D.        And    polyethylene glycol/lytes (MIRALAX) PACKET 1 Packet  1 Packet Oral QDAY PRN Matheus Worthy M.D.        And    magnesium hydroxide (MILK OF MAGNESIA) suspension 30 mL  30 mL Oral QDAY PRN Matheus Worthy M.D.        And    bisacodyl (DULCOLAX) suppository 10 mg  10 mg Rectal QDAY PRN Matheus Worthy M.D.        aspirin (ASA) chewable tab 81 mg  81 mg Oral DAILY Matheus Worthy M.D.        hydrOXYzine HCl (ATARAX) tablet 25 mg  25 mg Oral TID PRN Matheus Worthy M.D.        metoprolol tartrate (LOPRESSOR) tablet 50 mg  50 mg Oral TWICE DAILY Matheus Worthy M.D.   50 mg at 02/12/23 1736    Nozin nasal  swab  1 Applicator Each Nostril BID Matheus Worthy M.D.   1 Applicator at 02/13/23 0598    methylPREDNISolone (SOLU-MEDROL) 40 MG injection 40 mg  40 mg Intravenous Q8HRS Matheus Worthy M.D.   40 mg at  02/13/23 0507    budesonide-formoterol (SYMBICORT) 160-4.5 MCG/ACT inhaler 2 Puff  2 Puff Inhalation BID (RT) Matheus Worthy M.D.   2 Puff at 02/13/23 0125    hydrALAZINE (APRESOLINE) injection 10-20 mg  10-20 mg Intravenous Q4HRS PRN Matheus Worthy M.D.   20 mg at 02/11/23 1916    enalaprilat (Vasotec) injection 1.25 mg 1 mL  1.25 mg Intravenous Q6HRS PRN Matheus Worthy M.D.   1.25 mg at 02/10/23 1006    dexmedetomidine (PRECEDEX) 400 mcg/100mL NS premix infusion  0.1-1.5 mcg/kg/hr Intravenous Continuous Matheus Worthy M.D. 6.6 mL/hr at 02/12/23 1957 0.4 mcg/kg/hr at 02/12/23 1957    fentaNYL (SUBLIMAZE) injection  mcg   mcg Intravenous Q HOUR PRN Matheus Worthy M.D.        Respiratory Therapy Consult   Nebulization Continuous RT Matheus Wrothy M.D.        labetalol (NORMODYNE/TRANDATE) injection 20 mg  20 mg Intravenous Q4HRS PRGABBI Worthy M.D.   20 mg at 02/12/23 1529    acetaminophen (TYLENOL) tablet 1,000 mg  1,000 mg Oral Q8HRS PRN Matheus Worthy M.D.        fentaNYL (SUBLIMAZE) injection  mcg   mcg Intravenous Q HOUR PRN Matheus Worthy M.D.   50 mcg at 02/13/23 0206    oxyCODONE immediate-release (ROXICODONE) tablet 5 mg  5 mg Oral Q4HRS PRGABBI Worthy M.D.        Or    oxyCODONE immediate release (ROXICODONE) tablet 10 mg  10 mg Oral Q4HRS PRN Matheus Worthy M.D.   10 mg at 02/13/23 0514    LORazepam (ATIVAN) injection 0.5-2 mg  0.5-2 mg Intravenous Q4HRS PRN Antwon Daniel M.D.   2 mg at 02/10/23 0027    ALPRAZolam (XANAX) tablet 0.5 mg  0.5 mg Oral TID PRN Antwon Daniel M.D.   0.5 mg at 02/12/23 1538    MD Alert...ICU Electrolyte Replacement per Pharmacy   Other PHARMACY TO DOSE Matheus Worthy M.D.        lidocaine (XYLOCAINE) 1 % injection 2 mL  2 mL Tracheal Tube Q30 MIN PRN Matheus Worthy M.D.        ipratropium-albuterol (DUONEB) nebulizer solution  3 mL Nebulization Q2HRS PRN (RT) Matheus Worthy M.D.   3 mL at 02/13/23 0610     enoxaparin (Lovenox) inj 40 mg  40 mg Subcutaneous DAILY AT 1800 Jarvis Talley M.D.   40 mg at 02/12/23 1738    sertraline (Zoloft) tablet 25 mg  25 mg Oral DAILY Matheus Worthy M.D.   25 mg at 02/13/23 0521       Fluids    Intake/Output Summary (Last 24 hours) at 2/13/2023 0631  Last data filed at 2/13/2023 0200  Gross per 24 hour   Intake 39.3 ml   Output 1475 ml   Net -1435.7 ml         Laboratory  Recent Labs     02/10/23  0956 02/11/23  1934 02/12/23  0340   ISTATAPH 7.401 7.231*  --    ISTATAPCO2 49.2* 80.6*  --    ISTATAPO2 73 122*  --    ISTATATCO2 32 36*  --    [id number] 94 98  --    ISTATARTHCO3 30.5* 33.9*  --    ISTATARTBE 5* 3  --    ISTATTEMP 98.0 F 98.5 F 98.4 F   ISTATFIO2 30 50 40   ISTATSPEC Arterial Arterial Venous   ISTATAPHTC 7.406 7.232*  --    [id number] 71 122*  --            Recent Labs     02/11/23 0338 02/12/23 0337 02/13/23  0344   SODIUM 141 138 136   POTASSIUM 3.6 4.2 4.4   CHLORIDE 101 98 97   CO2 27 28 30   BUN 51* 50* 50*   CREATININE 1.86* 1.35 1.17   MAGNESIUM 2.3 2.5 2.3   PHOSPHORUS 4.2 3.6 3.5   CALCIUM 8.5 8.7 8.5       Recent Labs     02/11/23  0338 02/12/23 0337 02/13/23  0344   GLUCOSE 91 103* 124*       Recent Labs     02/11/23 0338 02/12/23 0337 02/13/23  0344   WBC 7.3 8.3 6.4   NEUTSPOLYS 86.00* 89.80* 88.00*   LYMPHOCYTES 5.80* 5.20* 5.50*   MONOCYTES 7.70 4.30 6.00   EOSINOPHILS 0.00 0.00 0.00   BASOPHILS 0.00 0.10 0.00       Recent Labs     02/11/23  0338 02/12/23  0337 02/13/23  0344   RBC 3.82* 3.83* 3.56*   HEMOGLOBIN 10.7* 10.8* 9.8*   HEMATOCRIT 33.5* 33.7* 31.5*   PLATELETCT 142* 147* 144*         Imaging  X-Ray:  I have personally reviewed the images and compared with prior images. and My impression is: 2/12 showing increasing edema/infiltrates with small bilateral effusions, underlying COPD changes, no tubes or lines    Assessment/Plan  * Acute on chronic respiratory failure with hypoxia and hypercapnia (HCC)- (present on  admission)  Assessment & Plan  Intubated date: 2/8 extubated 2/9 then re-intubated 2/10-> 2/11 extubated successfully  Encourage incentive spirometry, mobilization  RT/O2 protocol titrating oxygen therapy to goal SPO2 greater than 88%  Limit sedatives other than as needed anxiolytics    Chronic obstructive pulmonary disease with acute exacerbation (HCC)- (present on admission)  Assessment & Plan  Triggered by human metapneumo virus - improving  Transition from IV to oral steroids  Scheduled and as needed bronchodilators, Symbicort  Outpatient pulmonary follow-up/rehab    Acute renal failure with tubular necrosis (HCC)  Assessment & Plan  Improving, nonoliguric  Avoid nephrotoxins  Monitor creatinine, urine output, electrolytes closely    Acute pulmonary edema (HCC)  Assessment & Plan  Resume forced diuresis with goal in/out even to negative    Hypertension  Assessment & Plan  Remains difficult to control, goal SBP less than 160  Increase dose of scheduled metoprolol to 75 mg BID, resume Lasix  As needed hydralazine/Vasotec/labetalol    Swelling of lower extremity  Assessment & Plan  Lower ext doppler -> negative for dvt  Echo normal BiV, Likely cor pulmonale  Resume force diuresis as needed to achieve euvolemia    Ankylosing spondylitis (HCC)- (present on admission)  Assessment & Plan  With chronic back pain  Transition off of fentanyl and use as needed oxycodone  Mobilization    Elevated troponin- (present on admission)  Assessment & Plan  Secondary to demand ischemia  Continue aspirin and beta-blocker    Anemia  Assessment & Plan  Without signs of acute blood loss, worsening  Daily CBC with conservative transfusion strategy    Medically noncompliant  Assessment & Plan  Continue to educate on importance of compliance    Pulmonary nodule- (present on admission)  Assessment & Plan  Stable on serial CT follow up with pulmonary as appropriate         VTE:  Lovenox  Ulcer: Not Indicated  Lines: Santos Catheter  to be  removed today    I have performed a physical exam and reviewed and updated ROS and Plan today (2/13/2023). In review of yesterday's note (2/12/2023), there are no changes except as documented above.     Discussed patient condition and risk of morbidity and/or mortality with Hospitalist, RN, RT, Pharmacy, , Charge nurse / hot rounds, and Patient. Critical care services will sign off at this time.  Please call with any questions or concerns.    Please note that this dictation was created using voice recognition software. I have made every reasonable attempt to correct obvious errors, but there may be errors of grammar and possibly content that I did not discover before finalizing the note.

## 2023-02-13 NOTE — ASSESSMENT & PLAN NOTE
Improving, nonoliguric  Avoid nephrotoxins  Monitor creatinine, urine output, electrolytes closely

## 2023-02-13 NOTE — CARE PLAN
Problem: Bronchoconstriction  Goal: Improve in air movement and diminished wheezing  Description: Target End Date:  2 to 3 days    1.  Implement inhaled treatments  2.  Evaluate and manage medication effects  Outcome: Progressing     Problem: Ventilation  Goal: Ability to achieve and maintain unassisted ventilation or tolerate decreased levels of ventilator support  Description: Target End Date:  4 days     Document on Vent flowsheet    1.  Support and monitor invasive and noninvasive mechanical ventilation  2.  Monitor ventilator weaning response  3.  Perform ventilator associated pneumonia prevention interventions  4.  Manage ventilation therapy by monitoring diagnostic test results  Outcome: Progressing  NOC BiPAP  Duoneb Q4  Receiving 4L via oxymask

## 2023-02-13 NOTE — ASSESSMENT & PLAN NOTE
-Severe acute on chronic respiratory failure secondary to COPD exacerbation.  Respiratory failure needing intubation in the ICU. This appears to be caused by human metapneumovirus infection.    -Improved.  Stable at baseline home oxygen requirement (4 to 5 L by nasal cannula).  Currently, anxiety causing majority of symptoms.    -Continue short burst prednisone course.  -Continue Symbicort, and as needed DuoNeb.  Continue respiratory support with RT protocol.  -Planning on DC home with hospice.

## 2023-02-14 ENCOUNTER — PATIENT OUTREACH (OUTPATIENT)
Dept: HEALTH INFORMATION MANAGEMENT | Facility: OTHER | Age: 68
End: 2023-02-14
Payer: COMMERCIAL

## 2023-02-14 PROBLEM — F41.9 ANXIETY: Status: ACTIVE | Noted: 2023-02-14

## 2023-02-14 PROCEDURE — A9270 NON-COVERED ITEM OR SERVICE: HCPCS | Performed by: HOSPITALIST

## 2023-02-14 PROCEDURE — A9270 NON-COVERED ITEM OR SERVICE: HCPCS | Performed by: INTERNAL MEDICINE

## 2023-02-14 PROCEDURE — 94760 N-INVAS EAR/PLS OXIMETRY 1: CPT

## 2023-02-14 PROCEDURE — 700111 HCHG RX REV CODE 636 W/ 250 OVERRIDE (IP): Performed by: INTERNAL MEDICINE

## 2023-02-14 PROCEDURE — 700102 HCHG RX REV CODE 250 W/ 637 OVERRIDE(OP): Performed by: STUDENT IN AN ORGANIZED HEALTH CARE EDUCATION/TRAINING PROGRAM

## 2023-02-14 PROCEDURE — 700102 HCHG RX REV CODE 250 W/ 637 OVERRIDE(OP): Performed by: INTERNAL MEDICINE

## 2023-02-14 PROCEDURE — 99233 SBSQ HOSP IP/OBS HIGH 50: CPT | Performed by: STUDENT IN AN ORGANIZED HEALTH CARE EDUCATION/TRAINING PROGRAM

## 2023-02-14 PROCEDURE — 700101 HCHG RX REV CODE 250: Performed by: STUDENT IN AN ORGANIZED HEALTH CARE EDUCATION/TRAINING PROGRAM

## 2023-02-14 PROCEDURE — 99232 SBSQ HOSP IP/OBS MODERATE 35: CPT | Mod: GC | Performed by: INTERNAL MEDICINE

## 2023-02-14 PROCEDURE — 94640 AIRWAY INHALATION TREATMENT: CPT

## 2023-02-14 PROCEDURE — 700101 HCHG RX REV CODE 250: Performed by: INTERNAL MEDICINE

## 2023-02-14 PROCEDURE — 770006 HCHG ROOM/CARE - MED/SURG/GYN SEMI*

## 2023-02-14 PROCEDURE — 700102 HCHG RX REV CODE 250 W/ 637 OVERRIDE(OP): Performed by: HOSPITALIST

## 2023-02-14 PROCEDURE — A9270 NON-COVERED ITEM OR SERVICE: HCPCS | Performed by: STUDENT IN AN ORGANIZED HEALTH CARE EDUCATION/TRAINING PROGRAM

## 2023-02-14 RX ORDER — PREDNISONE 20 MG/1
20 TABLET ORAL DAILY
Status: COMPLETED | OUTPATIENT
Start: 2023-02-15 | End: 2023-02-19

## 2023-02-14 RX ORDER — LIDOCAINE 50 MG/G
1 PATCH TOPICAL EVERY 24 HOURS
Status: DISCONTINUED | OUTPATIENT
Start: 2023-02-14 | End: 2023-02-22 | Stop reason: HOSPADM

## 2023-02-14 RX ORDER — LISINOPRIL 5 MG/1
2.5 TABLET ORAL
Status: DISCONTINUED | OUTPATIENT
Start: 2023-02-14 | End: 2023-02-22 | Stop reason: HOSPADM

## 2023-02-14 RX ORDER — BUSPIRONE HYDROCHLORIDE 10 MG/1
5 TABLET ORAL 2 TIMES DAILY
Status: DISCONTINUED | OUTPATIENT
Start: 2023-02-14 | End: 2023-02-22 | Stop reason: HOSPADM

## 2023-02-14 RX ORDER — LEVALBUTEROL INHALATION SOLUTION 1.25 MG/3ML
1.25 SOLUTION RESPIRATORY (INHALATION)
Status: DISCONTINUED | OUTPATIENT
Start: 2023-02-14 | End: 2023-02-22 | Stop reason: HOSPADM

## 2023-02-14 RX ORDER — PREDNISONE 10 MG/1
10 TABLET ORAL DAILY
Status: COMPLETED | OUTPATIENT
Start: 2023-02-20 | End: 2023-02-22

## 2023-02-14 RX ADMIN — PREDNISONE 20 MG: 20 TABLET ORAL at 04:09

## 2023-02-14 RX ADMIN — METOPROLOL TARTRATE 75 MG: 50 TABLET, FILM COATED ORAL at 04:08

## 2023-02-14 RX ADMIN — SERTRALINE 25 MG: 50 TABLET, FILM COATED ORAL at 04:08

## 2023-02-14 RX ADMIN — BUDESONIDE AND FORMOTEROL FUMARATE DIHYDRATE 2 PUFF: 160; 4.5 AEROSOL RESPIRATORY (INHALATION) at 13:50

## 2023-02-14 RX ADMIN — OXYCODONE HYDROCHLORIDE 10 MG: 10 TABLET ORAL at 04:15

## 2023-02-14 RX ADMIN — ALPRAZOLAM 0.5 MG: 0.5 TABLET ORAL at 14:52

## 2023-02-14 RX ADMIN — METOPROLOL TARTRATE 75 MG: 50 TABLET, FILM COATED ORAL at 17:09

## 2023-02-14 RX ADMIN — LEVALBUTEROL 1.25 MG: 1.25 SOLUTION RESPIRATORY (INHALATION) at 21:46

## 2023-02-14 RX ADMIN — LISINOPRIL 2.5 MG: 5 TABLET ORAL at 16:32

## 2023-02-14 RX ADMIN — HYDRALAZINE HYDROCHLORIDE 10 MG: 20 INJECTION INTRAMUSCULAR; INTRAVENOUS at 02:40

## 2023-02-14 RX ADMIN — ASPIRIN 81 MG 81 MG: 81 TABLET ORAL at 04:08

## 2023-02-14 RX ADMIN — BUSPIRONE HYDROCHLORIDE 5 MG: 10 TABLET ORAL at 17:09

## 2023-02-14 RX ADMIN — SENNOSIDES AND DOCUSATE SODIUM 2 TABLET: 50; 8.6 TABLET ORAL at 17:09

## 2023-02-14 RX ADMIN — OXYCODONE HYDROCHLORIDE 10 MG: 10 TABLET ORAL at 10:14

## 2023-02-14 RX ADMIN — LEVALBUTEROL 1.25 MG: 1.25 SOLUTION RESPIRATORY (INHALATION) at 06:20

## 2023-02-14 RX ADMIN — ALPRAZOLAM 0.5 MG: 0.5 TABLET ORAL at 04:09

## 2023-02-14 RX ADMIN — ALPRAZOLAM 0.5 MG: 0.5 TABLET ORAL at 21:53

## 2023-02-14 RX ADMIN — RIVAROXABAN 10 MG: 10 TABLET, FILM COATED ORAL at 17:09

## 2023-02-14 RX ADMIN — OXYCODONE HYDROCHLORIDE 10 MG: 10 TABLET ORAL at 17:12

## 2023-02-14 RX ADMIN — OXYCODONE HYDROCHLORIDE 10 MG: 10 TABLET ORAL at 21:53

## 2023-02-14 RX ADMIN — LIDOCAINE 1 PATCH: 50 PATCH TOPICAL at 16:32

## 2023-02-14 RX ADMIN — HYDROXYZINE HYDROCHLORIDE 25 MG: 50 TABLET, FILM COATED ORAL at 08:43

## 2023-02-14 ASSESSMENT — ENCOUNTER SYMPTOMS
BRUISES/BLEEDS EASILY: 0
WHEEZING: 0
NERVOUS/ANXIOUS: 1
MYALGIAS: 0
WEAKNESS: 1
POLYDIPSIA: 0
CONSTIPATION: 0
SPUTUM PRODUCTION: 0
INSOMNIA: 0
STRIDOR: 0
BACK PAIN: 0
PALPITATIONS: 0
CHILLS: 0
COUGH: 0
BACK PAIN: 1
DIZZINESS: 0
SHORTNESS OF BREATH: 1
SORE THROAT: 0
DEPRESSION: 1
FEVER: 0
BLURRED VISION: 0
NAUSEA: 0
VOMITING: 0
ABDOMINAL PAIN: 0
DOUBLE VISION: 0
HEADACHES: 0
DIARRHEA: 0

## 2023-02-14 ASSESSMENT — PAIN DESCRIPTION - PAIN TYPE
TYPE: ACUTE PAIN
TYPE: ACUTE PAIN;SURGICAL PAIN
TYPE: ACUTE PAIN
TYPE: ACUTE PAIN;CHRONIC PAIN
TYPE: ACUTE PAIN;CHRONIC PAIN

## 2023-02-14 ASSESSMENT — LIFESTYLE VARIABLES: SUBSTANCE_ABUSE: 0

## 2023-02-14 NOTE — CARE PLAN
The patient is Watcher - Medium risk of patient condition declining or worsening    Shift Goals  Clinical Goals: bp mgmt, anxiety reduction, mobility, safety  Patient Goals: sleep, anxiety  Family Goals: SHELLY    Progress made toward(s) clinical / shift goals:    Problem: Knowledge Deficit - COPD  Goal: Patient/significant other demonstrates understanding of disease process, utilization of the Action Plan, medications and discharge instruction  Outcome: Progressing     Problem: Risk for Infection - COPD  Goal: Patient will remain free from signs and symptoms of infection  Outcome: Progressing     Problem: Self Care  Goal: Patient will have the ability to perform ADLs independently or with assistance (bathe, groom, dress, toilet and feed)  Outcome: Progressing     Problem: Knowledge Deficit - Standard  Goal: Patient and family/care givers will demonstrate understanding of plan of care, disease process/condition, diagnostic tests and medications  Outcome: Progressing       Patient is not progressing towards the following goals:

## 2023-02-14 NOTE — PROGRESS NOTES
Rec'd report from Geetha RN - pt ao x 4, is on 4 L oxy mask.  Pt c/o of 7/10 pain @ his back and was medicated with prn pain meds.  Pt is anxious at baseline - prn xanax in MAR.  Pt meds passed per MAR, see flowsheets for assessment details.  Pt call light in reach, bed at lowest position, family at bed side, no needs at this time.

## 2023-02-14 NOTE — DIETARY
Nutrition Services: Update   Pt extubated 2/11 and diet advanced to Cardiac. Recorded PO intake <50% on 2/12, but diet adjusted to Regular this morning and pt ate % of breakfast. Spoke with pt. Appetite is good if he gets foods he likes.    Recommendations/Plan:   Encourage continued intake of meals >50%  Document intake of all meals as % taken in ADL's to provide interdisciplinary communication across all shifts.   Monitor weight.  Nutrition rep will continue to see patient for ongoing meal and snack preferences.  RD to monitor per department policy.

## 2023-02-14 NOTE — PROGRESS NOTES
MICHEAL Connor called the pt to check in with him. This CHW left a VM for the pt to call back. If he is still in the hosp. On Wednesday This CHW will go bedside to see him.

## 2023-02-14 NOTE — DISCHARGE PLANNING
Case Management Discharge Planning    Admission Date: 2/8/2023  GMLOS: 5  ALOS: 6    6-Clicks ADL Score: 14  6-Clicks Mobility Score: 12  PT and/or OT Eval ordered: Yes  Post-acute Referrals Ordered: No  Post-acute Choice Obtained: No  Has referral(s) been sent to post-acute provider:  No      Anticipated Discharge Dispo: Discharge Disposition: D/T to SNF with Medicare cert in anticipation of skilled care (03)    DME Needed: No    Action(s) Taken: OTHER    Escalations Completed: None    Medically Clear: No    Next Steps: Await PT/OT evals for dc planning needs recommendations then will review with pt and wife. Pt's wife is requesting if can get a hospital bed for home.     Barriers to Discharge: Medical clearance, Pending Placement, Pending PT Evaluation, Pending Insurance Authorization, DME, Outpatient referrals pending, and Transportation    Is the patient up for discharge tomorrow: No

## 2023-02-14 NOTE — PROGRESS NOTES
Assumed care of patient   Pt AAOx4, extremely anxious  Medicated per MAR  Complaining of back pain- prn meds given  5L Oxy mask  Tolerating PO diet  No BM, stool softeners encouraged.    POC reviewed, education provided  Frequent rounding in place    0100- Pt resting comfortably with BIPAP in place.    Pt tolerated about 3.5 hours BIPAP, placed on oxymask 5L after.     0615- Santos removed

## 2023-02-14 NOTE — ASSESSMENT & PLAN NOTE
Human Metapneumovirus PCR+  Last PFT in 2019.  See HPI.  Smoking status: quit in 2010; 42 pack years    Inhaler/nebs:  Continue Symbicort.  Restart tiotropium inhaler prior to discharge  Please start roflumilast upon discharge if affordable for patient  Antibiotics: Finished 5 days of doxycycline on 2/12  Steroids: Prednisone 20 mg for 5 days followed by 10 mg for 3 days  Goal SpO2 88-92%  Incentive spirometry  Referral to Pulmonary rehab  Follow up with Pulmonary clinic for outpatient PFTs.  Will also consider starting roflumilast (if not started this admission), NIV, or azithromycin.  Follow up with PCP for dementia evaluation

## 2023-02-14 NOTE — CONSULTS
"Pulmonary Consult Note    Date of consult: 2/14/2023  Resident: Bon Jones M.D.  Attending:  Dr. Castrejon    Referring Physician  Nimisha Arreola M.D.    Reason for Consultation  COPD    History of Present Illness  Nikolas Bob is a 67 y.o. male former smoker of 42 pack years with a PMHx significant for COPD with chronic hypoxemic respiratory failure on 4-5 L O2, ankylosing spondylitis who presented on 2/8/2023 with worsening dyspnea for the past few days.  Admitted initially for COPD exacerbation to the ICU requiring intubation and mechanical ventilation.  Flu/RSV/COVID PCR negative.  He was extubated on 2/9/2023 with plan for BiPAP however refused it and required reintubation 2/10/2023.  Since then he was extubated on 2/11/2023 and started on nocturnal BiPAP.  Now downgraded to the medical floor.      PFT 3/27/2019  From duplicate chart marked for merge (MRN [id number]):  \"1. Baseline spirometry demonstrates a severe reduction in FEV1 and FVC. FEV1/FVC ratio is reduced at 30%.  FEV1 is 0.55 L which is 17% of predicted.  2. After administration of an inhaled bronchodilator there is 10% improvement in FEV1.  3. Lung volumes demonstrate hyperinflation.  4. Gas exchange as estimated by DLCO is severely reduced at 34% of predicted.  5. Airway resistance is severely increased.\"    Code Status  Full    Past Medical History   has no past medical history on file.    Past Surgical History   has no past surgical history on file.    Medications  Home Medications       Reviewed by Allison Hubbard (Pharmacy Tech) on 02/08/23 at 1119  Med List Status: Complete     Medication Last Dose Status   ammonium lactate (LAC-HYDRIN) 12 % Lotion UNK Active   azithromycin (ZITHROMAX) 250 MG Tab UNK Active   furosemide (LASIX) 40 MG Tab UNK Active   hydrOXYzine HCl (ATARAX) 25 MG Tab UNK Active   levalbuterol (XOPENEX HFA) 45 MCG/ACT inhaler UNK Active   Multiple Vitamins-Minerals (MULTIVITAMIN ADULT) Chew Tab UNK Active "   predniSONE (DELTASONE) 10 MG Tab UNK Active   sertraline (ZOLOFT) 25 MG tablet UNK Active   SYMBICORT 160-4.5 MCG/ACT Aerosol UNK Active   tiotropium (SPIRIVA RESPIMAT) 2.5 mcg/Act Aero Soln UNK Active   Tiotropium Bromide-Olodaterol (STIOLTO RESPIMAT) 2.5-2.5 MCG/ACT Aero Soln UNK Active                    Allergies  No Known Allergies    Social History   reports that he quit smoking about 13 years ago. His smoking use included cigarettes. He has a 42.00 pack-year smoking history. He does not have any smokeless tobacco history on file. He reports that he does not currently use alcohol. He reports that he does not currently use drugs after having used the following drugs: Marijuana.     Family History  family history is not on file.    Review of Systems  Review of Systems   Constitutional:  Positive for malaise/fatigue. Negative for chills and fever.   HENT:  Negative for congestion and hearing loss.    Eyes:  Negative for blurred vision and double vision.   Respiratory:  Positive for shortness of breath. Negative for cough and stridor.    Cardiovascular:  Negative for chest pain and palpitations.   Gastrointestinal:  Negative for abdominal pain, constipation, diarrhea, nausea and vomiting.   Genitourinary:  Negative for dysuria and urgency.   Musculoskeletal:  Negative for back pain and myalgias.   Skin:  Negative for itching and rash.   Neurological:  Negative for dizziness and headaches.   Endo/Heme/Allergies:  Negative for polydipsia. Does not bruise/bleed easily.   Psychiatric/Behavioral:  Negative for substance abuse. The patient does not have insomnia.      Vital Signs last 24 hours  Temp:  [36.7 °C (98.1 °F)-37.1 °C (98.7 °F)] 36.7 °C (98.1 °F)  Pulse:  [75-96] 75  Resp:  [18-20] 19  BP: (142-164)/() 142/86  SpO2:  [92 %-100 %] 99 %  O2 therapy: Pulse Oximetry: 99 %, O2 (LPM): 4, O2 Delivery Device: Oxymask          Fluids  Intake/Output                               02/12/23 0700 - 02/13/23 0659  02/13/23 0700 - 02/14/23 0659 02/14/23 0700 - 02/15/23 0659     0700-1859 9272-9256 Total 5555-2023 7384-4926 Total 0640-8529 0077-9266 Total                    Intake    P.O.  --  -- --  720  -- 720  --  -- --    P.O. -- -- -- 720 -- 720 -- -- --    I.V.  --  39.3 39.3  --  -- --  --  -- --    Precedex Volume -- 39.3 39.3 -- -- -- -- -- --    Total Intake -- 39.3 39.3 720 -- 720 -- -- --       Output    Urine  1475  -- 1475  500  750 1250  --  -- --    Output (mL) ([REMOVED] Urethral Catheter Temperature probe 02/14/23 0615) 1475 -- 1475  -- -- --    Stool  --  -- --  --  -- --  --  -- --    Number of Times Stooled 0 x -- 0 x -- -- -- -- -- --    Total Output 1475 -- 1475  -- -- --       Net I/O     -1475 39.3 -1435.7 220 -750 -530 -- -- --             Physical Exam  Physical Exam  Constitutional:       General: He is not in acute distress.     Appearance: He is ill-appearing.   HENT:      Head: Normocephalic and atraumatic.      Right Ear: External ear normal.      Left Ear: External ear normal.      Mouth/Throat:      Mouth: Mucous membranes are moist.   Eyes:      General: No scleral icterus.     Conjunctiva/sclera: Conjunctivae normal.   Cardiovascular:      Rate and Rhythm: Normal rate and regular rhythm.   Pulmonary:      Effort: No respiratory distress.      Breath sounds: No wheezing or rales.      Comments: Prolonged expiratory phase  Abdominal:      Palpations: Abdomen is soft.      Tenderness: There is no abdominal tenderness.   Musculoskeletal:      Right lower leg: No edema.      Left lower leg: No edema.   Skin:     General: Skin is warm and dry.   Neurological:      Mental Status: He is alert and oriented to person, place, and time.      Coordination: Coordination normal.   Psychiatric:         Behavior: Behavior normal.       Laboratory  Recent Labs     02/12/23  0337 02/13/23  0344   WBC 8.3 6.4   NEUTSPOLYS 89.80* 88.00*   LYMPHOCYTES 5.20* 5.50*   MONOCYTES 4.30 6.00    EOSINOPHILS 0.00 0.00   BASOPHILS 0.10 0.00     Recent Labs     02/12/23  0337 02/13/23  0344   SODIUM 138 136   POTASSIUM 4.2 4.4   CHLORIDE 98 97   CO2 28 30   BUN 50* 50*   CREATININE 1.35 1.17   MAGNESIUM 2.5 2.3   PHOSPHORUS 3.6 3.5   CALCIUM 8.7 8.5     Recent Labs     02/12/23  0337 02/13/23  0344   GLUCOSE 103* 124*     Recent Labs     02/11/23  1934 02/12/23  0340   ISTATAPH 7.231*  --    ISTATAPCO2 80.6*  --    ISTATAPO2 122*  --    ISTATATCO2 36*  --    NAAZIXS5RLH 98  --    ISTATARTHCO3 33.9*  --    ISTATARTBE 3  --    ISTATTEMP 98.5 F 98.4 F   ISTATFIO2 50 40   ISTATSPEC Arterial Venous   ISTATAPHTC 7.232*  --    [id number] 122*  --        Imaging  DX-CHEST-PORTABLE (1 VIEW)   Final Result         1.  Pulmonary edema and/or infiltrates are identified, which appear somewhat increased since the prior exam.   2.  Small bilateral pleural effusions, increased since prior.   3.  Atherosclerosis      DX-CHEST-PORTABLE (1 VIEW)   Final Result         1.  Pulmonary edema and/or infiltrates are identified, which are somewhat decreased since the prior exam.   2.  Trace bilateral pleural effusions   3.  Atherosclerosis      DX-ABDOMEN FOR TUBE PLACEMENT   Final Result      Enteric tube has been placed and the tip projects over the stomach.      EC-ECHOCARDIOGRAM COMPLETE W/O CONT   Final Result      DX-CHEST-PORTABLE (1 VIEW)   Final Result      1.  Interval placement of an endotracheal tube which terminates in satisfactory position at the level of the aortic arch.   2.  Similar appearance of patchy bilateral interstitial opacities which could be related to edema and/or infection.      DX-CHEST-PORTABLE (1 VIEW)   Final Result         1.  Pulmonary edema and/or infiltrates are identified, which appear somewhat increased since the prior exam.   2.  Trace bilateral pleural effusions   3.  Atherosclerosis      US-EXTREMITY VENOUS LOWER BILAT   Final Result      DX-CHEST-PORTABLE (1 VIEW)   Final Result         1.   Right pulmonary infiltrate, overall infiltrates are decreased since prior study.   2.  Trace bilateral pleural effusions   3.  Atherosclerosis      DX-CHEST-PORTABLE (1 VIEW)   Final Result         1.  Pulmonary edema and/or infiltrates.   2.  Trace bilateral pleural effusions   3.  Atherosclerosis      DX-ABDOMEN FOR TUBE PLACEMENT   Final Result         1.  Moderate stool in the colon suggests changes of constipation, otherwise nonspecific bowel gas pattern in the upper abdomen   2.  Nasogastric tube tip terminates overlying the expected location of the gastric fundus.   3.  Pulmonary edema and/or infiltrates.   4.  Trace bilateral pleural effusions          Problem List  Principal Problem:    Chronic obstructive pulmonary disease with acute exacerbation (HCC) POA: Yes  Active Problems:    Acute hypoxemic respiratory failure (HCC) POA: Yes    Elevated troponin POA: Yes    Ankylosing spondylitis (HCC) POA: Yes    Pulmonary nodule POA: Yes    Acute on chronic respiratory failure with hypoxia and hypercapnia (HCC) POA: Yes    Swelling of lower extremity POA: Unknown    Hypertension POA: Unknown    Acute pulmonary edema (HCC) POA: Yes    Medically noncompliant POA: Unknown    Acute renal failure with tubular necrosis (HCC) POA: Yes    Anemia POA: Yes  Resolved Problems:    * No resolved hospital problems. *      Assessment and Plan  * Chronic obstructive pulmonary disease with acute exacerbation (HCC)- (present on admission)  Assessment & Plan  Human Metapneumovirus PCR+  Last PFT in 2019.  See HPI.  Smoking status: quit in 2010; 42 pack years    Inhaler/nebs:  Continue Symbicort.  Restart tiotropium inhaler prior to discharge  Please start roflumilast upon discharge if affordable for patient  Antibiotics: Finished 5 days of doxycycline on 2/12  Steroids: Prednisone 20 mg for 5 days followed by 10 mg for 3 days  Goal SpO2 88-92%  Incentive spirometry  Referral to Pulmonary rehab  Follow up with Pulmonary clinic for  outpatient PFTs.  Will also consider starting roflumilast (if not started this admission), NIV, or azithromycin.  Follow up with PCP for dementia evaluation      Pulmonary nodule- (present on admission)  Assessment & Plan  Outpatient follow up.            Thanks for the consult.  I'm available on Voalte.  Discussed with my attending, Dr. Castrejon.      Bon Jones M.D.  Internal Medicine, PGY-3

## 2023-02-14 NOTE — PROGRESS NOTES
CHW Nikolas called the pt to check in with him because he is still in the hospital. Pt states he is feeling better and this CHW let him know that He would stop by and see him tomorrow if he is still inpatient.

## 2023-02-14 NOTE — HOSPITAL COURSE
67 y.o. male with a past medical history of ankylosing spondylitis, kyphoscoliosis, pulmonary hypertension, cor pulmonale, right hilar lung nodule, chronic back pain and history of severe anxiety as well as oxygen dependent COPD most recently on 4 to 5 L after  recent admission for COPD exacerbation in Jan 2023 , who presented on  2/8/2023 with worsening shortness of breath for the prior 2 days, pt had multiple sick contacts the week prior. On arrival patient was urgently intubated due to acute hypoxic/hypercapnic respiratory failure.  He was started on aggressive pulmonary measures and admitted to ICU. In the emergency room a viral swab was negative for influenza, RSV, and COVID.  Procalcitonin was less than 0.5 though chest x-ray revealed hazy interstitial opacities favoring multifocal pneumonia.      He was extubated on 2/19 to rescue BiPAP but he refused it and required reintubation. He was successfully extubated on 2/11/2023 and has been on nocturnal BiPAP post extubation. Extended viral panel was positive for human metapneumovirus by PCR.  He has since been transferred to the medical floor

## 2023-02-14 NOTE — CARE PLAN
The patient is Stable - Low risk of patient condition declining or worsening    Shift Goals  Clinical Goals: bp mgmt, anxiety reduction, mobility, safety  Patient Goals: sleep, anxiety  Family Goals: SHELLY    Progress made toward(s) clinical / shift goals:  pt did not sustain injuries this shift, pt meds passed per MAR, anxiety and pain managed with prn meds      Problem: Knowledge Deficit - COPD  Goal: Patient/significant other demonstrates understanding of disease process, utilization of the Action Plan, medications and discharge instruction  Outcome: Progressing     Problem: Risk for Infection - COPD  Goal: Patient will remain free from signs and symptoms of infection  Outcome: Progressing     Problem: Nutrition - Advanced  Goal: Patient will display progressive weight gain toward goal have adequate food and fluid intake  Outcome: Progressing     Problem: Ineffective Airway Clearance  Goal: Patient will maintain patent airway with clear/clearing breath sounds  Outcome: Progressing     Problem: Impaired Gas Exchange  Goal: Patient will demonstrate improved ventilation and adequate oxygenation and participate in treatment regimen within the level of ability/situation.  Outcome: Progressing     Problem: Risk for Aspiration  Goal: Patient's risk for aspiration will be absent or decrease  Outcome: Progressing     Problem: Self Care  Goal: Patient will have the ability to perform ADLs independently or with assistance (bathe, groom, dress, toilet and feed)  Outcome: Progressing     Problem: Knowledge Deficit - Standard  Goal: Patient and family/care givers will demonstrate understanding of plan of care, disease process/condition, diagnostic tests and medications  Outcome: Progressing     Problem: Skin Integrity  Goal: Skin integrity is maintained or improved  Outcome: Progressing     Problem: Pain - Standard  Goal: Alleviation of pain or a reduction in pain to the patient’s comfort goal  Outcome: Progressing     Problem:  Fall Risk  Goal: Patient will remain free from falls  Outcome: Progressing       Patient is not progressing towards the following goals:

## 2023-02-14 NOTE — PROGRESS NOTES
[facility] Medicine Daily Progress Note    Date of Service  2/14/2023    Chief Complaint  Nikolas Bob is a 67 y.o. male admitted 2/8/2023 with acute on chronic respiratory failure due to COPD exacerbation     Hospital Course  67 y.o. male with a past medical history of ankylosing spondylitis, kyphoscoliosis, pulmonary hypertension, cor pulmonale, right hilar lung nodule, chronic back pain and history of severe anxiety as well as oxygen dependent COPD most recently on 4 to 5 L after  recent admission for COPD exacerbation in Jan 2023 , who presented on  2/8/2023 with worsening shortness of breath for the prior 2 days, pt had multiple sick contacts the week prior. On arrival patient was urgently intubated due to acute hypoxic/hypercapnic respiratory failure.  He was started on aggressive pulmonary measures and admitted to ICU. In the emergency room a viral swab was negative for influenza, RSV, and COVID.  Procalcitonin was less than 0.5 though chest x-ray revealed hazy interstitial opacities favoring multifocal pneumonia.      He was extubated on 2/19 to rescue BiPAP but he refused it and required reintubation. He was successfully extubated on 2/11/2023 and has been on nocturnal BiPAP post extubation. Extended viral panel was positive for human metapneumovirus by PCR.  He has since been transferred to the medical floor     Interval Problem Update  Pt seen and examined, he appears comfortable, although remains anxious   - saturating well on 4L oxymask   - continue nebs, inhaler therapy, IS, prednisone   - low 6-clicks, PT/OT ordered to evaluate dispo needs   - palliative care following   - BP mildly elevated, start low dose lisinopril 2.5 mg   - pt highly anxious, continue ativan prn, start low dose buspar and monitor response   - lidocaine patches for pain ordered     I have discussed this patient's plan of care and discharge plan at IDT rounds today with Case Management, Nursing, Nursing leadership, and other members  of the IDT team.    Consultants/Specialty  critical care and palliative care    Code Status  Full Code    Disposition  Patient is not medically cleared for discharge.   Anticipate discharge to to home with close outpatient follow-up.  I have placed the appropriate orders for post-discharge needs.    Review of Systems  Review of Systems   Constitutional:  Positive for malaise/fatigue. Negative for chills and fever.   HENT:  Negative for sore throat.    Eyes:  Negative for blurred vision.   Respiratory:  Positive for shortness of breath. Negative for sputum production and wheezing.    Cardiovascular:  Negative for chest pain.   Gastrointestinal:  Negative for nausea and vomiting.   Genitourinary:  Negative for dysuria.   Musculoskeletal:  Positive for back pain and joint pain.   Neurological:  Positive for weakness. Negative for dizziness.   Psychiatric/Behavioral:  Positive for depression. Negative for substance abuse. The patient is nervous/anxious.       Physical Exam  Temp:  [36.7 °C (98.1 °F)-36.9 °C (98.5 °F)] 36.7 °C (98.1 °F)  Pulse:  [75-96] 75  Resp:  [18-20] 18  BP: (142-164)/(85-90) 142/86  SpO2:  [99 %-100 %] 99 %    Physical Exam  Vitals and nursing note reviewed.   Constitutional:       Appearance: He is ill-appearing. He is not toxic-appearing.      Comments: Frail appearing male   HENT:      Head: Normocephalic and atraumatic.      Mouth/Throat:      Mouth: Mucous membranes are moist.      Pharynx: Oropharynx is clear.   Eyes:      Extraocular Movements: Extraocular movements intact.      Conjunctiva/sclera: Conjunctivae normal.   Cardiovascular:      Rate and Rhythm: Normal rate and regular rhythm.      Heart sounds: Normal heart sounds.   Pulmonary:      Effort: No respiratory distress.      Breath sounds: Wheezing present. No rhonchi.      Comments: Diminished In bases, mild expiratory wheezing   Abdominal:      General: Bowel sounds are normal.      Palpations: Abdomen is soft.   Musculoskeletal:          General: Normal range of motion.      Cervical back: Neck supple.      Right lower leg: No edema.      Left lower leg: No edema.   Skin:     General: Skin is warm and dry.   Neurological:      General: No focal deficit present.      Mental Status: He is alert and oriented to person, place, and time. Mental status is at baseline.   Psychiatric:         Mood and Affect: Mood normal.         Thought Content: Thought content normal.         Judgment: Judgment normal.      Comments: Anxious        Fluids    Intake/Output Summary (Last 24 hours) at 2/14/2023 1626  Last data filed at 2/14/2023 1300  Gross per 24 hour   Intake 120 ml   Output 850 ml   Net -730 ml       Laboratory  Recent Labs     02/12/23 0337 02/13/23 0344   WBC 8.3 6.4   RBC 3.83* 3.56*   HEMOGLOBIN 10.8* 9.8*   HEMATOCRIT 33.7* 31.5*   MCV 88.0 88.5   MCH 28.2 27.5   MCHC 32.0* 31.1*   RDW 46.2 44.7   PLATELETCT 147* 144*   MPV 10.6 10.9     Recent Labs     02/12/23 0337 02/13/23 0344   SODIUM 138 136   POTASSIUM 4.2 4.4   CHLORIDE 98 97   CO2 28 30   GLUCOSE 103* 124*   BUN 50* 50*   CREATININE 1.35 1.17   CALCIUM 8.7 8.5                   Imaging  DX-CHEST-PORTABLE (1 VIEW)   Final Result         1.  Pulmonary edema and/or infiltrates are identified, which appear somewhat increased since the prior exam.   2.  Small bilateral pleural effusions, increased since prior.   3.  Atherosclerosis      DX-CHEST-PORTABLE (1 VIEW)   Final Result         1.  Pulmonary edema and/or infiltrates are identified, which are somewhat decreased since the prior exam.   2.  Trace bilateral pleural effusions   3.  Atherosclerosis      DX-ABDOMEN FOR TUBE PLACEMENT   Final Result      Enteric tube has been placed and the tip projects over the stomach.      EC-ECHOCARDIOGRAM COMPLETE W/O CONT   Final Result      DX-CHEST-PORTABLE (1 VIEW)   Final Result      1.  Interval placement of an endotracheal tube which terminates in satisfactory position at the level of the  aortic arch.   2.  Similar appearance of patchy bilateral interstitial opacities which could be related to edema and/or infection.      DX-CHEST-PORTABLE (1 VIEW)   Final Result         1.  Pulmonary edema and/or infiltrates are identified, which appear somewhat increased since the prior exam.   2.  Trace bilateral pleural effusions   3.  Atherosclerosis      US-EXTREMITY VENOUS LOWER BILAT   Final Result      DX-CHEST-PORTABLE (1 VIEW)   Final Result         1.  Right pulmonary infiltrate, overall infiltrates are decreased since prior study.   2.  Trace bilateral pleural effusions   3.  Atherosclerosis      DX-CHEST-PORTABLE (1 VIEW)   Final Result         1.  Pulmonary edema and/or infiltrates.   2.  Trace bilateral pleural effusions   3.  Atherosclerosis      DX-ABDOMEN FOR TUBE PLACEMENT   Final Result         1.  Moderate stool in the colon suggests changes of constipation, otherwise nonspecific bowel gas pattern in the upper abdomen   2.  Nasogastric tube tip terminates overlying the expected location of the gastric fundus.   3.  Pulmonary edema and/or infiltrates.   4.  Trace bilateral pleural effusions           Assessment/Plan  * Chronic obstructive pulmonary disease with acute exacerbation (HCC)- (present on admission)  Assessment & Plan  Severe acute on chronic respiratory failure secondary to COPD exacerbation.  Was admitted to the intensive care unit on mechanical ventilation and IV Solu-Medrol  This appears to be prompted by human metapneumovirus and antibiotics are not indicated  He will be placed on a prednisone taper  Pulmonology following   He is a non-smoker  Symbicort as well as DuoNeb's as needed  Currently at baseline   Palliative following       Anxiety- (present on admission)  Assessment & Plan  Hx of moderate/sevre anxiety and panic attacks   - on zoloft at baseline   - will add Buspar 5 mg BID and titrate up to help with symptoms   - ativan prn, dc atarax     Anemia- (present on  admission)  Assessment & Plan  Upon review of the records his hemoglobin has fluctuated though now is lower than previous at 9.8.  This is appropriate to follow-up as an outpatient    Acute renal failure with tubular necrosis (HCC)- (present on admission)  Assessment & Plan  This has improved    Acute pulmonary edema (HCC)- (present on admission)  Assessment & Plan  Treated with IV Lasix  Appears resolved, pt close to baseline O2     Hypertension  Assessment & Plan  He has been started on metoprolol  Low dose lisinopril started 2/14, 2.5 mg, adjust as needed     Pulmonary nodule- (present on admission)  Assessment & Plan  Appropriate to follow-up outpatient    Ankylosing spondylitis (HCC)- (present on admission)  Assessment & Plan  Continue oxycodone for pain.    Elevated troponin- (present on admission)  Assessment & Plan  Troponin went up to 208 consistent with stress response due to respiratory failure  Echo reveals an EF 50% without wall motion abnormalities    Acute hypoxemic respiratory failure (HCC)- (present on admission)  Assessment & Plan  Acute on chronic respiratory failure.  His baseline oxygen has been between 4 and 5 L.  He required endotracheal intubation  Since stabilized back to home oxygen   Treatment as above         VTE prophylaxis: Xarelto 10 mg daily as prophylaxis    I have performed a physical exam and reviewed and updated ROS and Plan today (2/14/2023). In review of yesterday's note (2/13/2023), there are no changes except as documented above.

## 2023-02-15 ENCOUNTER — APPOINTMENT (OUTPATIENT)
Dept: MEDICAL GROUP | Facility: MEDICAL CENTER | Age: 68
End: 2023-02-15
Payer: COMMERCIAL

## 2023-02-15 ENCOUNTER — PATIENT OUTREACH (OUTPATIENT)
Dept: HEALTH INFORMATION MANAGEMENT | Facility: OTHER | Age: 68
End: 2023-02-15

## 2023-02-15 LAB
ANION GAP SERPL CALC-SCNC: 4 MMOL/L (ref 7–16)
BUN SERPL-MCNC: 40 MG/DL (ref 8–22)
CALCIUM SERPL-MCNC: 9 MG/DL (ref 8.5–10.5)
CHLORIDE SERPL-SCNC: 95 MMOL/L (ref 96–112)
CO2 SERPL-SCNC: 38 MMOL/L (ref 20–33)
CREAT SERPL-MCNC: 1.13 MG/DL (ref 0.5–1.4)
GFR SERPLBLD CREATININE-BSD FMLA CKD-EPI: 70 ML/MIN/1.73 M 2
GLUCOSE SERPL-MCNC: 91 MG/DL (ref 65–99)
POTASSIUM SERPL-SCNC: 4.4 MMOL/L (ref 3.6–5.5)
SODIUM SERPL-SCNC: 137 MMOL/L (ref 135–145)

## 2023-02-15 PROCEDURE — 94640 AIRWAY INHALATION TREATMENT: CPT

## 2023-02-15 PROCEDURE — 94760 N-INVAS EAR/PLS OXIMETRY 1: CPT

## 2023-02-15 PROCEDURE — 700102 HCHG RX REV CODE 250 W/ 637 OVERRIDE(OP): Performed by: INTERNAL MEDICINE

## 2023-02-15 PROCEDURE — A9270 NON-COVERED ITEM OR SERVICE: HCPCS | Performed by: INTERNAL MEDICINE

## 2023-02-15 PROCEDURE — A9270 NON-COVERED ITEM OR SERVICE: HCPCS | Performed by: HOSPITALIST

## 2023-02-15 PROCEDURE — 94660 CPAP INITIATION&MGMT: CPT

## 2023-02-15 PROCEDURE — 97162 PT EVAL MOD COMPLEX 30 MIN: CPT

## 2023-02-15 PROCEDURE — 99233 SBSQ HOSP IP/OBS HIGH 50: CPT | Performed by: STUDENT IN AN ORGANIZED HEALTH CARE EDUCATION/TRAINING PROGRAM

## 2023-02-15 PROCEDURE — 700101 HCHG RX REV CODE 250: Performed by: STUDENT IN AN ORGANIZED HEALTH CARE EDUCATION/TRAINING PROGRAM

## 2023-02-15 PROCEDURE — 97166 OT EVAL MOD COMPLEX 45 MIN: CPT

## 2023-02-15 PROCEDURE — 700102 HCHG RX REV CODE 250 W/ 637 OVERRIDE(OP): Performed by: HOSPITALIST

## 2023-02-15 PROCEDURE — A9270 NON-COVERED ITEM OR SERVICE: HCPCS | Performed by: STUDENT IN AN ORGANIZED HEALTH CARE EDUCATION/TRAINING PROGRAM

## 2023-02-15 PROCEDURE — 36415 COLL VENOUS BLD VENIPUNCTURE: CPT

## 2023-02-15 PROCEDURE — 80048 BASIC METABOLIC PNL TOTAL CA: CPT

## 2023-02-15 PROCEDURE — 770006 HCHG ROOM/CARE - MED/SURG/GYN SEMI*

## 2023-02-15 PROCEDURE — 700111 HCHG RX REV CODE 636 W/ 250 OVERRIDE (IP): Performed by: STUDENT IN AN ORGANIZED HEALTH CARE EDUCATION/TRAINING PROGRAM

## 2023-02-15 PROCEDURE — 700102 HCHG RX REV CODE 250 W/ 637 OVERRIDE(OP): Performed by: STUDENT IN AN ORGANIZED HEALTH CARE EDUCATION/TRAINING PROGRAM

## 2023-02-15 RX ADMIN — METOPROLOL TARTRATE 75 MG: 50 TABLET, FILM COATED ORAL at 04:02

## 2023-02-15 RX ADMIN — SERTRALINE 25 MG: 50 TABLET, FILM COATED ORAL at 04:02

## 2023-02-15 RX ADMIN — ASPIRIN 81 MG 81 MG: 81 TABLET ORAL at 04:01

## 2023-02-15 RX ADMIN — OXYCODONE HYDROCHLORIDE 10 MG: 10 TABLET ORAL at 05:06

## 2023-02-15 RX ADMIN — OXYCODONE HYDROCHLORIDE 10 MG: 10 TABLET ORAL at 20:34

## 2023-02-15 RX ADMIN — BUSPIRONE HYDROCHLORIDE 5 MG: 10 TABLET ORAL at 04:02

## 2023-02-15 RX ADMIN — ALPRAZOLAM 0.5 MG: 0.5 TABLET ORAL at 04:02

## 2023-02-15 RX ADMIN — OXYCODONE HYDROCHLORIDE 10 MG: 10 TABLET ORAL at 12:12

## 2023-02-15 RX ADMIN — LEVALBUTEROL 1.25 MG: 1.25 SOLUTION RESPIRATORY (INHALATION) at 06:53

## 2023-02-15 RX ADMIN — LIDOCAINE 1 PATCH: 50 PATCH TOPICAL at 11:13

## 2023-02-15 RX ADMIN — PREDNISONE 20 MG: 20 TABLET ORAL at 05:06

## 2023-02-15 RX ADMIN — METOPROLOL TARTRATE 75 MG: 50 TABLET, FILM COATED ORAL at 16:43

## 2023-02-15 RX ADMIN — ALPRAZOLAM 0.5 MG: 0.5 TABLET ORAL at 21:27

## 2023-02-15 RX ADMIN — RIVAROXABAN 10 MG: 10 TABLET, FILM COATED ORAL at 16:44

## 2023-02-15 RX ADMIN — BUSPIRONE HYDROCHLORIDE 5 MG: 10 TABLET ORAL at 16:43

## 2023-02-15 RX ADMIN — LEVALBUTEROL 1.25 MG: 1.25 SOLUTION RESPIRATORY (INHALATION) at 20:48

## 2023-02-15 RX ADMIN — LISINOPRIL 2.5 MG: 5 TABLET ORAL at 04:02

## 2023-02-15 RX ADMIN — SENNOSIDES AND DOCUSATE SODIUM 2 TABLET: 50; 8.6 TABLET ORAL at 04:02

## 2023-02-15 RX ADMIN — POLYETHYLENE GLYCOL 3350 1 PACKET: 17 POWDER, FOR SOLUTION ORAL at 05:05

## 2023-02-15 RX ADMIN — OXYCODONE HYDROCHLORIDE 10 MG: 10 TABLET ORAL at 16:34

## 2023-02-15 RX ADMIN — SENNOSIDES AND DOCUSATE SODIUM 2 TABLET: 50; 8.6 TABLET ORAL at 16:44

## 2023-02-15 RX ADMIN — LIDOCAINE 1 PATCH: 50 PATCH TOPICAL at 16:36

## 2023-02-15 ASSESSMENT — ACTIVITIES OF DAILY LIVING (ADL): TOILETING: INDEPENDENT

## 2023-02-15 ASSESSMENT — PAIN DESCRIPTION - PAIN TYPE
TYPE: ACUTE PAIN
TYPE: CHRONIC PAIN
TYPE: ACUTE PAIN

## 2023-02-15 ASSESSMENT — ENCOUNTER SYMPTOMS
SORE THROAT: 0
SHORTNESS OF BREATH: 1
WEAKNESS: 1
NAUSEA: 0
DIZZINESS: 0
BACK PAIN: 1
DEPRESSION: 1
NERVOUS/ANXIOUS: 1
VOMITING: 0
SPUTUM PRODUCTION: 0
FEVER: 0
BLURRED VISION: 0
CHILLS: 0
WHEEZING: 0

## 2023-02-15 ASSESSMENT — COGNITIVE AND FUNCTIONAL STATUS - GENERAL
STANDING UP FROM CHAIR USING ARMS: A LITTLE
TOILETING: A LITTLE
HELP NEEDED FOR BATHING: A LITTLE
MOVING FROM LYING ON BACK TO SITTING ON SIDE OF FLAT BED: A LITTLE
DAILY ACTIVITIY SCORE: 20
TURNING FROM BACK TO SIDE WHILE IN FLAT BAD: A LITTLE
DRESSING REGULAR LOWER BODY CLOTHING: A LITTLE
SUGGESTED CMS G CODE MODIFIER DAILY ACTIVITY: CJ
DRESSING REGULAR UPPER BODY CLOTHING: A LITTLE
SUGGESTED CMS G CODE MODIFIER MOBILITY: CK
MOBILITY SCORE: 15
WALKING IN HOSPITAL ROOM: A LOT
MOVING TO AND FROM BED TO CHAIR: A LITTLE
CLIMB 3 TO 5 STEPS WITH RAILING: TOTAL

## 2023-02-15 ASSESSMENT — LIFESTYLE VARIABLES: SUBSTANCE_ABUSE: 0

## 2023-02-15 NOTE — PROGRESS NOTES
"Pt aaox4, anxious  5L oxymask/bipap  Placed on BIPAP at 0400. Pt extremely anxious about wearing mask. Education provided on importance. Pt states he will wear as long as he can tolerate.   0445- Pt tolerated 45 minutes, then said \"its getting rough, my mouth is too dry.\" RT notified to come check mask fit d/t pt complaining of discomfort. Pt on oxymask until RT will come and apply BIPAP.    +void in urinal  No bm. Senna given, education provided on importance of bowel medications with pain meds. Pt agreeable to taking miralax this AM    Pain managed with prn oxycodone    POC reviewed, education provided.  "

## 2023-02-15 NOTE — PROGRESS NOTES
CHW Went to see the pt bed side to introduce myself and to put a name with the face. Pt states he is not ready to go until he feels better with his breathing. This CHW will continue to follow and encouraged the pt to reach out if he needs anything.

## 2023-02-15 NOTE — PROGRESS NOTES
Pt is A&Ox4, c/o shoulder pain. PRN pain med administered with effectiveness. On  5L oxymask, no SOB reported. VSS. Reinforced the use of call light when needed assistance, verbalized understanding. Safety measures in place, call  light in reach.

## 2023-02-15 NOTE — PALLIATIVE CARE
"Palliative Care follow-up  Per MD, pt hs capacity to complete legal documents today. PC RN met with patient at bedside. Discussed completion of an AD with patient. He states that he is \"not quite ready\" to complete an AD or have further GOC discussion. He requests that his wife is present for this conversation. PC RN offered to set up a time to meet with pt and spouse at bedside later this week. Pt thanked PC RN for the patience and states\"it is no fun being rushed.\" Empathetic support provided and discussed that we are aware that these are difficult conversations. Pt acknowledges the importance of having GOC conversation but states \"it is pretty scary.\"    Patient endorses dyspnea with any physical activity, even getting into bed. He states \"its not comfortable just sitting around, but it is better than suffocating.\" Patient is hoping for a hospital bed to improve his QoL at home. Discussed the option of outpatient palliative care for further GOC discussions and/or symptom management. Pt is agreeable to this and brochure was left at bedside.    Patient states that he has been thinking about changing his code status. He states \"I just don't know, I can't wrap my head around it.\" Empathetic support and reassurance provided. Discussed that no decision needs to be made today.     PC RN placed phone call to patient's wife per his request. LVM requesting a call back to schedule a time for GOC conversation.    Updated: Primary RN    Plan: GOC conversation/AD completion when able.     Thank you for allowing Palliative Care to support this patient and family. Contact [phone] for additional assistance, change in patient status, or with any questions/concerns.    "

## 2023-02-15 NOTE — THERAPY
"Physical Therapy   Initial Evaluation     Patient Name: Nikolas Bob  Age:  67 y.o., Sex:  male  Medical Record #: [id number]  Today's Date: 2/15/2023          Assessment  Patient is 67 y.o. male with a diagnosis of COPD exacerbation.  Pt is evaluated today by PT. Upon entering the room, pt was on 4L O2 via NC, with RN staff present in room. Pt was initially hesitant to participate in session due to wanting breakfast however agreeable.     Pt presents with impaired activity tolerance at this time. His O2 fluctuated between 82-88% on 4L via NC throughout session. He denied shortness of breath. Ambulation not attempted due to decreased O2 saturation. He required CGA for sit<>stand and stand pivot transfers today.     Pt will benefit from acute PT services to improve his cardiovascular endurance, activity tolerance, LE strength, and functional independence.     Plan    Physical Therapy Initial Treatment Plan   Treatment Plan : (P) Bed Mobility, Equipment, Group Therapy, Gait Training, Manual Therapy, Neuro Re-Education / Balance, Self Care / Home Evaluation, Stair Training, Therapeutic Activities, Therapeutic Exercise  Treatment Frequency: (P) 3 Times per Week  Duration: (P) Until Therapy Goals Met    DC Equipment Recommendations: (P) Unable to determine at this time  Discharge Recommendations: (P) Recommend home health for continued physical therapy services       Subjective    \"I just got breakfast\". Pt was agreeable to therapy after initial hesitation.      Objective       02/15/23 0900   Vitals   Pulse 72   Patient BP Position Sitting   Pulse Oximetry (!) 86 %   O2 (LPM) 4   O2 Delivery Device Nasal Cannula   Prior Living Situation   Housing / Facility 1 Story House   Steps Into Home 0   Steps In Home 0   Equipment Owned None   Prior Level of Functional Mobility   Bed Mobility Independent   Transfer Status Independent   Ambulation Independent   Distance Ambulation (Feet)   (Household distances, limited by enduance and " O2 needs.)   Strength Lower Body   Lower Body Strength  X   Rt Hip Flexion Strength 3- (F-)   Rt Knee Extension Strength 4 (G)   Rt Ankle Dorsiflexion Strength 4 (G)   Lt Hip Flexion Strength 3 (F)   Lt Knee Extension Strength 4 (G)   Lt Ankle Dorsiflexion Strength 4 (G)   Balance Assessment   Sitting Balance (Static) Fair   Standing Balance (Static) Fair -   Bed Mobility    Supine to Sit Supervised   Sit to Supine Supervised   Gait Analysis   Comments Not attempted due to decreased O2.   Functional Mobility   Sit to Stand Contact Guard Assist   Bed, Chair, Wheelchair Transfer Contact Guard Assist   How much difficulty does the patient currently have...   Turning over in bed (including adjusting bedclothes, sheets and blankets)? 3   Sitting down on and standing up from a chair with arms (e.g., wheelchair, bedside commode, etc.) 3   Moving from lying on back to sitting on the side of the bed? 3   How much help from another person does the patient currently need...   Moving to and from a bed to a chair (including a wheelchair)? 3   Need to walk in a hospital room? 2   Climbing 3-5 steps with a railing? 1   6 clicks Mobility Score 15   Activity Tolerance   Sitting Edge of Bed Pt remained static sitting edge of bed for approximately 5 mins. During this time his O2 was constantly monitored on 4L via NC. Pt's O2 fluctuating between 82%-88%. Pt denies shortness of breath/lightheadedness throughout.   Standing Pt maintained static standing for approximately 90s during whic wound care performed skin inspection. He was provided with CGA throughout. O2 fluctuating between 82-87% while standing. Again, pt denies shortness of breath/lightheadedness. Uncontrolled sit to chair.   Short Term Goals    Short Term Goal # 1 Pt will perform sit<>stand and stand pivot transfers with supervision w/in 6 visits.   Short Term Goal # 2 Pt will ambulate 75' with supervision while maintaining O2 > 88% with supervision within 6 visits.    Physical Therapy Initial Treatment Plan    Treatment Plan  Bed Mobility;Equipment;Group Therapy;Gait Training;Manual Therapy;Neuro Re-Education / Balance;Self Care / Home Evaluation;Stair Training;Therapeutic Activities;Therapeutic Exercise   Treatment Frequency 3 Times per Week   Duration Until Therapy Goals Met   Problem List    Problems Impaired Bed Mobility;Impaired Transfers;Impaired Ambulation;Functional Strength Deficit;Impaired Balance;Decreased Activity Tolerance;Safety Awareness Deficits / Cognition   Anticipated Discharge Equipment and Recommendations   DC Equipment Recommendations Unable to determine at this time   Discharge Recommendations Recommend home health for continued physical therapy services   Interdisciplinary Plan of Care Collaboration   IDT Collaboration with  Occupational Therapist;Nursing   Patient Position at End of Therapy Seated;Chair Alarm On;Call Light within Reach;Tray Table within Reach;Phone within Reach   Session Information   Date / Session Number  2/15, 1 (1/3 2/21)

## 2023-02-15 NOTE — CARE PLAN
The patient is Watcher - Medium risk of patient condition declining or worsening    Shift Goals  Clinical Goals: bp mgmt, anxiety reduction  Patient Goals: sleep, anxiety  Family Goals: SHELLY    Progress made toward(s) clinical / shift goals:    Problem: Knowledge Deficit - COPD  Goal: Patient/significant other demonstrates understanding of disease process, utilization of the Action Plan, medications and discharge instruction  Outcome: Progressing     Problem: Self Care  Goal: Patient will have the ability to perform ADLs independently or with assistance (bathe, groom, dress, toilet and feed)  Outcome: Progressing     Problem: Knowledge Deficit - Standard  Goal: Patient and family/care givers will demonstrate understanding of plan of care, disease process/condition, diagnostic tests and medications  Outcome: Progressing       Patient is not progressing towards the following goals:

## 2023-02-15 NOTE — THERAPY
Occupational Therapy   Initial Evaluation     Patient Name: Nikolas Bob  Age:  67 y.o., Sex:  male  Medical Record #: [id number]  Today's Date: 2/15/2023     Precautions  Precautions: Fall Risk    Assessment    Patient is 67 y.o. male admitted for COPD exacerbation, acute on chronic respiratory failure, anxiety, anemia. Pt normally independent with functional mobility and ADLs living in a [facility] with spouse who is able to assist as needed. Pt required [place] for functional mobility and lower body ADLs, pt requiring increase in supplemental O2 with activity. Will continue to see for skilled therapy while admitted as well as recommend home health at discharge.      Plan    Occupational Therapy Initial Treatment Plan   Treatment Interventions: Self Care / Activities of Daily Living, Adaptive Equipment, Therapeutic Activity, Therapeutic Exercises, Neuro Re-Education / Balance  Treatment Frequency: 3 Times per Week  Duration: Until Therapy Goals Met    DC Equipment Recommendations: Unable to determine at this time  Discharge Recommendations: Recommend home health for continued occupational therapy services     Objective       02/15/23 0902   Prior Living Situation   Prior Services None   Housing / Facility 1 Story House   Steps Into Home 0   Steps In Home 0   Bathroom Set up Bathtub / Shower Combination   Equipment Owned None   Lives with - Patient's Self Care Capacity Spouse   Prior Level of ADL Function   Self Feeding Independent   Grooming / Hygiene Independent   Bathing Independent   Dressing Independent   Toileting Independent   Prior Level of IADL Function   Prior Level Of Mobility Independent Without Device in Home   Precautions   Precautions Fall Risk   Pain 0 - 10 Group   Therapist Pain Assessment Post Activity Pain Same as Prior to Activity;Nurse Notified   Cognition    Cognition / Consciousness WDL   Level of Consciousness Alert   Active ROM Upper Body   Active ROM Upper Body  WDL   Strength Upper Body   Upper Body  Strength  X   Gross Strength Generalized Weakness, Equal Bilaterally.    Sensation Upper Body   Upper Extremity Sensation  WDL   Upper Body Muscle Tone   Upper Body Muscle Tone  WDL   Balance Assessment   Sitting Balance (Static) Fair   Sitting Balance (Dynamic) Fair   Standing Balance (Static) Fair -   Standing Balance (Dynamic) Fair -   Weight Shift Sitting Fair   Weight Shift Standing Fair   Comments w/ HHA   Bed Mobility    Supine to Sit Supervised   Sit to Supine Supervised   ADL Assessment   Grooming Supervision;Seated   Upper Body Dressing Supervision   Lower Body Dressing Minimal Assist   Toileting Supervision   How much help from another person does the patient currently need...   Putting on and taking off regular lower body clothing? 3   Bathing (including washing, rinsing, and drying)? 3   Toileting, which includes using a toilet, bedpan, or urinal? 3   Putting on and taking off regular upper body clothing? 3   Taking care of personal grooming such as brushing teeth? 4   Eating meals? 4   6 Clicks Daily Activity Score 20   Functional Mobility   Sit to Stand Minimal Assist   Bed, Chair, Wheelchair Transfer Minimal Assist   Transfer Method Stand Step   Mobility bed mobility, pivot to bedside chair due to O2, left seated in chair   Activity Tolerance   Sitting in Chair left seated in chair   Sitting Edge of Bed 5 min   Standing 5 min   Short Term Goals   Short Term Goal # 1 Pt will complete ADL transfers with supervision   Short Term Goal # 2 Pt will complete LB dressing with supervision   Short Term Goal # 3 Pt will complete standing G/H with supervision   Education Group   Education Provided Role of Occupational Therapist   Role of Occupational Therapist Patient Response Patient;Acceptance;Explanation   Occupational Therapy Initial Treatment Plan    Treatment Interventions Self Care / Activities of Daily Living;Adaptive Equipment;Therapeutic Activity;Therapeutic Exercises;Neuro Re-Education / Balance    Treatment Frequency 3 Times per Week   Duration Until Therapy Goals Met   Problem List   Problem List Decreased Active Daily Living Skills;Decreased Homemaking Skills;Decreased Upper Extremity Strength Right;Decreased Upper Extremity Strength Left;Decreased Functional Mobility;Decreased Activity Tolerance;Impaired Postural Control / Balance   Anticipated Discharge Equipment and Recommendations   DC Equipment Recommendations Unable to determine at this time   Discharge Recommendations Recommend home health for continued occupational therapy services   Interdisciplinary Plan of Care Collaboration   IDT Collaboration with  Nursing;Physical Therapist   Patient Position at End of Therapy Seated;Chair Alarm On;Call Light within Reach;Tray Table within Reach;Phone within Reach   Collaboration Comments RN updated

## 2023-02-15 NOTE — PROGRESS NOTES
Rec'd report from NOC RN.  Pt ao x 4, cooperative but anxious.  Pt on oxymask @ 4L and converted to NC when eating.  Anxiety and pain managed with prn meds.  Meds passed per MAR, please see flowsheets for assessment details.  Pt call light in reach, bed at lowest position, no needs at this time.

## 2023-02-15 NOTE — CARE PLAN
Problem: Pain - Standard  Goal: Alleviation of pain or a reduction in pain to the patient’s comfort goal  Description: Target End Date:  Prior to discharge or change in level of care    Document on Vitals flowsheet    1.  Document pain using the appropriate pain scale per order or unit policy  2.  Educate and implement non-pharmacologic comfort measures (i.e. relaxation, distraction, massage, cold/heat therapy, etc.)  3.  Pain management medications as ordered  4.  Reassess pain after pain med administration per policy  5.  If opiods administered assess patient's response to pain medication is appropriate per POSS sedation scale  6.  Follow pain management plan developed in collaboration with patient and interdisciplinary team (including palliative care or pain specialists if applicable)  Outcome: Not Met   The patient is Stable - Low risk of patient condition declining or worsening    Shift Goals  Clinical Goals: bp mgmt, anxiety reduction  Patient Goals: sleep, anxiety  Family Goals: SHELLY    Progress made toward(s) clinical / shift goals:  PRN Oxy 10 mg  administered for L/R shoulder pain. Pillows in use for support/comfort. At the end of shift pt will be able to participate in ADL's care with no discomfort.          Problem: Pain - Standard  Goal: Alleviation of pain or a reduction in pain to the patient’s comfort goal  Description: Target End Date:  Prior to discharge or change in level of care    Document on Vitals flowsheet    1.  Document pain using the appropriate pain scale per order or unit policy  2.  Educate and implement non-pharmacologic comfort measures (i.e. relaxation, distraction, massage, cold/heat therapy, etc.)  3.  Pain management medications as ordered  4.  Reassess pain after pain med administration per policy  5.  If opiods administered assess patient's response to pain medication is appropriate per POSS sedation scale  6.  Follow pain management plan developed in collaboration with patient  and interdisciplinary team (including palliative care or pain specialists if applicable)  Outcome: Not Met

## 2023-02-15 NOTE — ASSESSMENT & PLAN NOTE
-Hx of moderate/severe anxiety and panic attacks.  Remains anxious.  -Continue PRN Xanax, BuSpar, and Zoloft.

## 2023-02-16 ENCOUNTER — APPOINTMENT (OUTPATIENT)
Dept: RADIOLOGY | Facility: MEDICAL CENTER | Age: 68
DRG: 208 | End: 2023-02-16
Attending: STUDENT IN AN ORGANIZED HEALTH CARE EDUCATION/TRAINING PROGRAM
Payer: COMMERCIAL

## 2023-02-16 ENCOUNTER — PATIENT OUTREACH (OUTPATIENT)
Dept: HEALTH INFORMATION MANAGEMENT | Facility: OTHER | Age: 68
End: 2023-02-16
Payer: COMMERCIAL

## 2023-02-16 LAB
ANION GAP SERPL CALC-SCNC: 8 MMOL/L (ref 7–16)
BUN SERPL-MCNC: 30 MG/DL (ref 8–22)
CALCIUM SERPL-MCNC: 9.1 MG/DL (ref 8.5–10.5)
CHLORIDE SERPL-SCNC: 95 MMOL/L (ref 96–112)
CO2 SERPL-SCNC: 36 MMOL/L (ref 20–33)
CREAT SERPL-MCNC: 1.02 MG/DL (ref 0.5–1.4)
GFR SERPLBLD CREATININE-BSD FMLA CKD-EPI: 79 ML/MIN/1.73 M 2
GLUCOSE SERPL-MCNC: 95 MG/DL (ref 65–99)
POTASSIUM SERPL-SCNC: 4.4 MMOL/L (ref 3.6–5.5)
SODIUM SERPL-SCNC: 139 MMOL/L (ref 135–145)

## 2023-02-16 PROCEDURE — 700102 HCHG RX REV CODE 250 W/ 637 OVERRIDE(OP): Performed by: HOSPITALIST

## 2023-02-16 PROCEDURE — 700102 HCHG RX REV CODE 250 W/ 637 OVERRIDE(OP): Performed by: INTERNAL MEDICINE

## 2023-02-16 PROCEDURE — 94660 CPAP INITIATION&MGMT: CPT

## 2023-02-16 PROCEDURE — 770006 HCHG ROOM/CARE - MED/SURG/GYN SEMI*

## 2023-02-16 PROCEDURE — 36415 COLL VENOUS BLD VENIPUNCTURE: CPT

## 2023-02-16 PROCEDURE — A9270 NON-COVERED ITEM OR SERVICE: HCPCS | Performed by: STUDENT IN AN ORGANIZED HEALTH CARE EDUCATION/TRAINING PROGRAM

## 2023-02-16 PROCEDURE — 94640 AIRWAY INHALATION TREATMENT: CPT

## 2023-02-16 PROCEDURE — 700102 HCHG RX REV CODE 250 W/ 637 OVERRIDE(OP): Performed by: STUDENT IN AN ORGANIZED HEALTH CARE EDUCATION/TRAINING PROGRAM

## 2023-02-16 PROCEDURE — 700111 HCHG RX REV CODE 636 W/ 250 OVERRIDE (IP): Performed by: STUDENT IN AN ORGANIZED HEALTH CARE EDUCATION/TRAINING PROGRAM

## 2023-02-16 PROCEDURE — 700101 HCHG RX REV CODE 250: Performed by: STUDENT IN AN ORGANIZED HEALTH CARE EDUCATION/TRAINING PROGRAM

## 2023-02-16 PROCEDURE — 80048 BASIC METABOLIC PNL TOTAL CA: CPT

## 2023-02-16 PROCEDURE — 99233 SBSQ HOSP IP/OBS HIGH 50: CPT | Performed by: STUDENT IN AN ORGANIZED HEALTH CARE EDUCATION/TRAINING PROGRAM

## 2023-02-16 PROCEDURE — 94760 N-INVAS EAR/PLS OXIMETRY 1: CPT

## 2023-02-16 PROCEDURE — A9270 NON-COVERED ITEM OR SERVICE: HCPCS | Performed by: INTERNAL MEDICINE

## 2023-02-16 PROCEDURE — A9270 NON-COVERED ITEM OR SERVICE: HCPCS | Performed by: HOSPITALIST

## 2023-02-16 PROCEDURE — 74018 RADEX ABDOMEN 1 VIEW: CPT

## 2023-02-16 PROCEDURE — 92610 EVALUATE SWALLOWING FUNCTION: CPT

## 2023-02-16 RX ORDER — ONDANSETRON 2 MG/ML
4 INJECTION INTRAMUSCULAR; INTRAVENOUS EVERY 4 HOURS PRN
Status: DISCONTINUED | OUTPATIENT
Start: 2023-02-16 | End: 2023-02-22 | Stop reason: HOSPADM

## 2023-02-16 RX ORDER — MORPHINE SULFATE 4 MG/ML
2-4 INJECTION INTRAVENOUS EVERY 4 HOURS PRN
Status: DISCONTINUED | OUTPATIENT
Start: 2023-02-16 | End: 2023-02-19

## 2023-02-16 RX ADMIN — RIVAROXABAN 10 MG: 10 TABLET, FILM COATED ORAL at 17:36

## 2023-02-16 RX ADMIN — LISINOPRIL 2.5 MG: 5 TABLET ORAL at 04:12

## 2023-02-16 RX ADMIN — OXYCODONE HYDROCHLORIDE 10 MG: 10 TABLET ORAL at 06:31

## 2023-02-16 RX ADMIN — PREDNISONE 20 MG: 20 TABLET ORAL at 04:12

## 2023-02-16 RX ADMIN — SERTRALINE 25 MG: 50 TABLET, FILM COATED ORAL at 04:12

## 2023-02-16 RX ADMIN — ALPRAZOLAM 0.5 MG: 0.5 TABLET ORAL at 09:19

## 2023-02-16 RX ADMIN — BUSPIRONE HYDROCHLORIDE 5 MG: 10 TABLET ORAL at 17:36

## 2023-02-16 RX ADMIN — LEVALBUTEROL 1.25 MG: 1.25 SOLUTION RESPIRATORY (INHALATION) at 06:48

## 2023-02-16 RX ADMIN — MORPHINE SULFATE 4 MG: 4 INJECTION INTRAVENOUS at 16:05

## 2023-02-16 RX ADMIN — BUSPIRONE HYDROCHLORIDE 5 MG: 10 TABLET ORAL at 04:11

## 2023-02-16 RX ADMIN — MORPHINE SULFATE 4 MG: 4 INJECTION INTRAVENOUS at 20:14

## 2023-02-16 RX ADMIN — ONDANSETRON 4 MG: 2 INJECTION INTRAMUSCULAR; INTRAVENOUS at 16:05

## 2023-02-16 RX ADMIN — LEVALBUTEROL 1.25 MG: 1.25 SOLUTION RESPIRATORY (INHALATION) at 20:00

## 2023-02-16 RX ADMIN — ASPIRIN 81 MG 81 MG: 81 TABLET ORAL at 04:11

## 2023-02-16 RX ADMIN — SENNOSIDES AND DOCUSATE SODIUM 2 TABLET: 50; 8.6 TABLET ORAL at 17:36

## 2023-02-16 RX ADMIN — BUDESONIDE AND FORMOTEROL FUMARATE DIHYDRATE 2 PUFF: 160; 4.5 AEROSOL RESPIRATORY (INHALATION) at 06:48

## 2023-02-16 RX ADMIN — METOPROLOL TARTRATE 75 MG: 50 TABLET, FILM COATED ORAL at 17:36

## 2023-02-16 RX ADMIN — METOPROLOL TARTRATE 75 MG: 50 TABLET, FILM COATED ORAL at 04:11

## 2023-02-16 RX ADMIN — ONDANSETRON 4 MG: 2 INJECTION INTRAMUSCULAR; INTRAVENOUS at 20:14

## 2023-02-16 RX ADMIN — OXYCODONE HYDROCHLORIDE 10 MG: 10 TABLET ORAL at 02:33

## 2023-02-16 ASSESSMENT — ENCOUNTER SYMPTOMS
WEAKNESS: 1
CONSTIPATION: 1
BLURRED VISION: 0
CHILLS: 0
WHEEZING: 0
SHORTNESS OF BREATH: 1
DEPRESSION: 1
NAUSEA: 1
SORE THROAT: 0
BACK PAIN: 1
FEVER: 0
SPUTUM PRODUCTION: 0
VOMITING: 0
DIZZINESS: 0
NERVOUS/ANXIOUS: 1

## 2023-02-16 ASSESSMENT — PATIENT HEALTH QUESTIONNAIRE - PHQ9
1. LITTLE INTEREST OR PLEASURE IN DOING THINGS: NOT AT ALL
SUM OF ALL RESPONSES TO PHQ9 QUESTIONS 1 AND 2: 0
2. FEELING DOWN, DEPRESSED, IRRITABLE, OR HOPELESS: NOT AT ALL

## 2023-02-16 ASSESSMENT — PAIN DESCRIPTION - PAIN TYPE
TYPE: ACUTE PAIN

## 2023-02-16 ASSESSMENT — LIFESTYLE VARIABLES: SUBSTANCE_ABUSE: 0

## 2023-02-16 NOTE — DISCHARGE PLANNING
Received Choice form at 1255  Agency/Facility Name: [facility]  Referral sent per Choice form at 1300    1540- Received Choice form at 1550  Agency/Facility Name: [facility]  Referral sent per Choice form at 1552

## 2023-02-16 NOTE — PROGRESS NOTES
Pt is A&Ox4, on 5L oxymask. Denies pain at this time. Pt c/o swallowing problems, MD Arreola notified. Pt 's wife at bedside. All needs met at this moment. Call/personal belongings within reach.

## 2023-02-16 NOTE — WOUND TEAM
Case Management Discharge Planning    Admission Date: 2/8/2023  GMLOS: 5  ALOS: 8    6-Clicks ADL Score: 20  6-Clicks Mobility Score: 15  PT and/or OT Eval ordered: Yes  Post-acute Referrals Ordered: Yes  Post-acute Choice Obtained: No  Has referral(s) been sent to post-acute provider:  No      Anticipated Discharge Dispo: Discharge Disposition: Discharged to home/self care (01)    DME Needed: No    Action(s) Taken: OTHER, JAMES RN met with pt at bedside to discuss SNF choice. Per pt would like CM RN to call wife Kimi at [phone]. CM RN called Kimi, she is currently grocery shopping and stated will call CM RN when she is home to discuss pts discharge plan.     1300: CM RN received call back from Kimi, choice given for SNF.    1500: JAMES RN received notification pt and wife are wanting to go home with hospice. Palliative at bedside at this time.     Escalations Completed: None    Medically Clear: No    Next Steps: JAMES RN to follow up with Kimi about discharge plan     Barriers to Discharge: Medical clearance and Pending Placement    Is the patient up for discharge tomorrow: No

## 2023-02-16 NOTE — PROGRESS NOTES
[facility] Daily Progress Note    Date of Service  2/15/2023    Chief Complaint  Nikolas Bob is a 67 y.o. male admitted 2/8/2023 with acute on chronic respiratory failure due to COPD exacerbation     Hospital Course  67 y.o. male with a past medical history of ankylosing spondylitis, kyphoscoliosis, pulmonary hypertension, cor pulmonale, right hilar lung nodule, chronic back pain and history of severe anxiety as well as oxygen dependent COPD most recently on 4 to 5 L after  recent admission for COPD exacerbation in Jan 2023 , who presented on  2/8/2023 with worsening shortness of breath for the prior 2 days, pt had multiple sick contacts the week prior. On arrival patient was urgently intubated due to acute hypoxic/hypercapnic respiratory failure.  He was started on aggressive pulmonary measures and admitted to ICU. In the emergency room a viral swab was negative for influenza, RSV, and COVID.  Procalcitonin was less than 0.5 though chest x-ray revealed hazy interstitial opacities favoring multifocal pneumonia.      He was extubated on 2/19 to rescue BiPAP but he refused it and required reintubation. He was successfully extubated on 2/11/2023 and has been on nocturnal BiPAP post extubation. Extended viral panel was positive for human metapneumovirus by PCR.  He has since been transferred to the medical floor     Interval Problem Update  Pt seen and examined, he appears comfortable, although remains anxious about his medical situation   - saturating well on 4L oxymask, noted to have desats down low 80s with PT evaluation   - continue nebs, inhaler therapy, IS, prednisone   - PT/OT recommending home with , will order, order SNF referral as well given concern for desats with minimal activity and ambulation was not attempted due to his oxygen.   - BP mildly elevated but improving. Continue current regiment   - bicarb uptoday, not on diuretics. Monitor   - pt highly anxious, continue ativan prn, start low dose  buspar and monitor response   - lidocaine patches for pain ordered     I have discussed this patient's plan of care and discharge plan at IDT rounds today with Case Management, Nursing, Nursing leadership, and other members of the IDT team.    Consultants/Specialty  critical care and palliative care    Code Status  Full Code    Disposition  Patient is not medically cleared for discharge.   Anticipate discharge to to home with close outpatient follow-up.  I have placed the appropriate orders for post-discharge needs.    Review of Systems  Review of Systems   Constitutional:  Positive for malaise/fatigue. Negative for chills and fever.   HENT:  Negative for sore throat.    Eyes:  Negative for blurred vision.   Respiratory:  Positive for shortness of breath. Negative for sputum production and wheezing.    Cardiovascular:  Negative for chest pain.   Gastrointestinal:  Negative for nausea and vomiting.   Genitourinary:  Negative for dysuria.   Musculoskeletal:  Positive for back pain and joint pain.   Neurological:  Positive for weakness. Negative for dizziness.   Psychiatric/Behavioral:  Positive for depression. Negative for substance abuse. The patient is nervous/anxious.       Physical Exam  Temp:  [36.8 °C (98.2 °F)-37.2 °C (98.9 °F)] 37.2 °C (98.9 °F)  Pulse:  [64-85] 68  Resp:  [17-19] 19  BP: (112-142)/(54-74) 135/74  SpO2:  [86 %-99 %] 99 %    Physical Exam  Vitals and nursing note reviewed.   Constitutional:       Appearance: He is ill-appearing. He is not toxic-appearing.      Comments: Frail appearing male   HENT:      Head: Normocephalic and atraumatic.      Mouth/Throat:      Mouth: Mucous membranes are moist.      Pharynx: Oropharynx is clear.   Eyes:      Extraocular Movements: Extraocular movements intact.      Conjunctiva/sclera: Conjunctivae normal.   Cardiovascular:      Rate and Rhythm: Normal rate and regular rhythm.      Heart sounds: Normal heart sounds.   Pulmonary:      Effort: No respiratory  distress.      Breath sounds: Wheezing present. No rhonchi.      Comments: Diminished In bases, mild expiratory wheezing   Abdominal:      General: Bowel sounds are normal.      Palpations: Abdomen is soft.   Musculoskeletal:         General: Normal range of motion.      Cervical back: Neck supple.      Right lower leg: No edema.      Left lower leg: No edema.   Skin:     General: Skin is warm and dry.   Neurological:      General: No focal deficit present.      Mental Status: He is alert and oriented to person, place, and time. Mental status is at baseline.   Psychiatric:         Mood and Affect: Mood normal.         Thought Content: Thought content normal.         Judgment: Judgment normal.      Comments: Anxious        Fluids    Intake/Output Summary (Last 24 hours) at 2/15/2023 1656  Last data filed at 2/15/2023 0900  Gross per 24 hour   Intake 240 ml   Output 250 ml   Net -10 ml       Laboratory  Recent Labs     02/13/23  0344   WBC 6.4   RBC 3.56*   HEMOGLOBIN 9.8*   HEMATOCRIT 31.5*   MCV 88.5   MCH 27.5   MCHC 31.1*   RDW 44.7   PLATELETCT 144*   MPV 10.9     Recent Labs     02/13/23  0344 02/15/23  0250   SODIUM 136 137   POTASSIUM 4.4 4.4   CHLORIDE 97 95*   CO2 30 38*   GLUCOSE 124* 91   BUN 50* 40*   CREATININE 1.17 1.13   CALCIUM 8.5 9.0                   Imaging  DX-CHEST-PORTABLE (1 VIEW)   Final Result         1.  Pulmonary edema and/or infiltrates are identified, which appear somewhat increased since the prior exam.   2.  Small bilateral pleural effusions, increased since prior.   3.  Atherosclerosis      DX-CHEST-PORTABLE (1 VIEW)   Final Result         1.  Pulmonary edema and/or infiltrates are identified, which are somewhat decreased since the prior exam.   2.  Trace bilateral pleural effusions   3.  Atherosclerosis      DX-ABDOMEN FOR TUBE PLACEMENT   Final Result      Enteric tube has been placed and the tip projects over the stomach.      EC-ECHOCARDIOGRAM COMPLETE W/O CONT   Final Result       DX-CHEST-PORTABLE (1 VIEW)   Final Result      1.  Interval placement of an endotracheal tube which terminates in satisfactory position at the level of the aortic arch.   2.  Similar appearance of patchy bilateral interstitial opacities which could be related to edema and/or infection.      DX-CHEST-PORTABLE (1 VIEW)   Final Result         1.  Pulmonary edema and/or infiltrates are identified, which appear somewhat increased since the prior exam.   2.  Trace bilateral pleural effusions   3.  Atherosclerosis      US-EXTREMITY VENOUS LOWER BILAT   Final Result      DX-CHEST-PORTABLE (1 VIEW)   Final Result         1.  Right pulmonary infiltrate, overall infiltrates are decreased since prior study.   2.  Trace bilateral pleural effusions   3.  Atherosclerosis      DX-CHEST-PORTABLE (1 VIEW)   Final Result         1.  Pulmonary edema and/or infiltrates.   2.  Trace bilateral pleural effusions   3.  Atherosclerosis      DX-ABDOMEN FOR TUBE PLACEMENT   Final Result         1.  Moderate stool in the colon suggests changes of constipation, otherwise nonspecific bowel gas pattern in the upper abdomen   2.  Nasogastric tube tip terminates overlying the expected location of the gastric fundus.   3.  Pulmonary edema and/or infiltrates.   4.  Trace bilateral pleural effusions           Assessment/Plan  * Chronic obstructive pulmonary disease with acute exacerbation (HCC)- (present on admission)  Assessment & Plan  Severe acute on chronic respiratory failure secondary to COPD exacerbation.  Was admitted to the intensive care unit on mechanical ventilation and IV Solu-Medrol  This appears to be prompted by human metapneumovirus and antibiotics are not indicated  He will be placed on a prednisone taper  Pulmonology following   He is a non-smoker  Symbicort as well as DuoNeb's as needed  Currently at baseline but desats to low 80's with minimal exertion   Palliative following       Anxiety- (present on admission)  Assessment &  Plan  Hx of moderate/sevre anxiety and panic attacks   - on zoloft at baseline   - will add Buspar 5 mg BID and titrate up to help with symptoms   - ativan prn, dc atarax     Anemia- (present on admission)  Assessment & Plan  Upon review of the records his hemoglobin has fluctuated though now is lower than previous at 9.8.  This is appropriate to follow-up as an outpatient    Acute renal failure with tubular necrosis (HCC)- (present on admission)  Assessment & Plan  This has improved    Acute pulmonary edema (HCC)- (present on admission)  Assessment & Plan  Treated with IV Lasix  Appears resolved, pt close to baseline O2     Hypertension  Assessment & Plan  He has been started on metoprolol  Low dose lisinopril started 2/14, 2.5 mg, adjust as needed     Pulmonary nodule- (present on admission)  Assessment & Plan  Appropriate to follow-up outpatient    Ankylosing spondylitis (HCC)- (present on admission)  Assessment & Plan  Continue oxycodone for pain.    Elevated troponin- (present on admission)  Assessment & Plan  Troponin went up to 208 consistent with stress response due to respiratory failure  Echo reveals an EF 50% without wall motion abnormalities    Acute hypoxemic respiratory failure (HCC)- (present on admission)  Assessment & Plan  Acute on chronic respiratory failure.  His baseline oxygen has been between 4 and 5 L.  He required endotracheal intubation  Since stabilized back to home oxygen   Treatment as above         VTE prophylaxis: Xarelto 10 mg daily as prophylaxis    I have performed a physical exam and reviewed and updated ROS and Plan today (2/15/2023). In review of yesterday's note (2/14/2023), there are no changes except as documented above.

## 2023-02-16 NOTE — PROGRESS NOTES
RT came up and set up BIPAP for pt. Pt disconnected the tubing from mask and asked for help. Tubing was on floor and pt was desatting in the 70's and continued all the way down to 69. Pt replaced on Simple mask by nursing at 5 liters and recovered within 2 minutes to 96%.   Notified of incident and no new orders received.  Will continue to monitor.

## 2023-02-16 NOTE — CARE PLAN
Problem: Knowledge Deficit - COPD  Goal: Patient/significant other demonstrates understanding of disease process, utilization of the Action Plan, medications and discharge instruction  Description: Target End Date:  1-3 days or as soon as patient condition allows    Document in Patient Education    1.  Discuss the importance of medical follow-up care, periodic chest x-rays, sputum cultures  2.  Review worsening signs and symptoms of COPD flare and exacerbation and its management  3.  Discuss respiratory medications, side effects, adverse reactions  4.  Demonstrate technique for using a metered-dose inhaler (MDI) and utilization of a spacer  5.  Review the COPD Action Plan  6.  Instruct and reinforce the rationale for breathing exercises, coughing effectively, and general conditioning exercises  7.  Stress importance of oral care and dental hygiene  8.  Discuss the importance of avoiding people with active respiratory infections and need for routine influenza and pneumococcal vaccinations  9.  Discuss factors that may trigger condition and encourage patient/significant other to explore ways to control these factors in and around the home and work setting  10. Review the harmful effects of smoking and advise cessation of smoking  11. Provide information about activity limitations and alternating activities with rest periods to prevent fatigue  12. Instruct asthmatic patient of use of peak flow meter, as appropriate  13. Review oxygen requirements and dosage, safe use of oxygen, and refer to the supplier as indicated  14. Educate patient/family/caregiver on the use of Nasal Intermittent Positive Pressure Ventilation (NIPPV) and possible adverse reactions  Outcome: Progressing   The patient is Stable - Low risk of patient condition declining or worsening    Shift Goals  Clinical Goals: BP mnagement, anxiety reduction  Patient Goals: sleep, anxiety  Family Goals: SHELLY    Progress made toward(s) clinical / shift goals:   Maintain skin integrity, increase mobility. Maintain optimum breathing. Manage pain.        Problem: Pain - Standard  Goal: Alleviation of pain or a reduction in pain to the patient’s comfort goal  Description: Target End Date:  Prior to discharge or change in level of care    Document on Vitals flowsheet    1.  Document pain using the appropriate pain scale per order or unit policy  2.  Educate and implement non-pharmacologic comfort measures (i.e. relaxation, distraction, massage, cold/heat therapy, etc.)  3.  Pain management medications as ordered  4.  Reassess pain after pain med administration per policy  5.  If opiods administered assess patient's response to pain medication is appropriate per POSS sedation scale  6.  Follow pain management plan developed in collaboration with patient and interdisciplinary team (including palliative care or pain specialists if applicable)  2/15/2023 1803 by Bala Lafleur, R.N.  Outcome: Progressing  2/15/2023 1215 by Bala Lafleur, R.N.  Outcome: Not Met

## 2023-02-16 NOTE — PROGRESS NOTES
Pt c/o pain to his buttocks from sitting in same position, Pt refuses repositioning by CNA and RN. PT denies us to place a pillow under his bottom.  Pt will no allow us to move his table and reposition him.  He already has a waffle mattress in place but him not moving is causing more discomfort and injury. Pt educated and verbalizes understanding but still refuses to be compliant.

## 2023-02-16 NOTE — PROGRESS NOTES
CHW Nikolas called the pt to see how he is today. Pt states he is not feeling better and does not have a appetite and thinking of food makes him want to get sick. This CHW encouraged the pt to call this CHW if he needs anything.

## 2023-02-16 NOTE — CARE PLAN
The patient is Stable - Low risk of patient condition declining or worsening    Shift Goals  Clinical Goals: BP Management,anxiety reduction  Patient Goals: sleep, anxiety  Family Goals: SHELLY    Progress made toward(s) clinical / shift goals:    Problem: Knowledge Deficit - COPD  Goal: Patient/significant other demonstrates understanding of disease process, utilization of the Action Plan, medications and discharge instruction  Outcome: Progressing     Problem: Risk for Infection - COPD  Goal: Patient will remain free from signs and symptoms of infection  Outcome: Progressing     Problem: Ineffective Airway Clearance  Goal: Patient will maintain patent airway with clear/clearing breath sounds  Outcome: Progressing     Problem: Risk for Aspiration  Goal: Patient's risk for aspiration will be absent or decrease  Outcome: Progressing     Problem: Knowledge Deficit - Standard  Goal: Patient and family/care givers will demonstrate understanding of plan of care, disease process/condition, diagnostic tests and medications  Outcome: Progressing     Problem: Skin Integrity  Goal: Skin integrity is maintained or improved  Outcome: Progressing     Problem: Pain - Standard  Goal: Alleviation of pain or a reduction in pain to the patient’s comfort goal  Outcome: Progressing     Problem: Fall Risk  Goal: Patient will remain free from falls  Outcome: Progressing       Patient is not progressing towards the following goals:      Problem: Nutrition - Advanced  Goal: Patient will display progressive weight gain toward goal have adequate food and fluid intake  Outcome: Not Progressing     Problem: Impaired Gas Exchange  Goal: Patient will demonstrate improved ventilation and adequate oxygenation and participate in treatment regimen within the level of ability/situation.  Outcome: Not Progressing     Problem: Self Care  Goal: Patient will have the ability to perform ADLs independently or with assistance (bathe, groom, dress, toilet and  feed)  Outcome: Not Progressing

## 2023-02-17 ENCOUNTER — APPOINTMENT (OUTPATIENT)
Dept: SLEEP MEDICINE | Facility: MEDICAL CENTER | Age: 68
End: 2023-02-17
Payer: COMMERCIAL

## 2023-02-17 ENCOUNTER — APPOINTMENT (OUTPATIENT)
Dept: RADIOLOGY | Facility: MEDICAL CENTER | Age: 68
DRG: 208 | End: 2023-02-17
Attending: STUDENT IN AN ORGANIZED HEALTH CARE EDUCATION/TRAINING PROGRAM
Payer: COMMERCIAL

## 2023-02-17 PROCEDURE — 700111 HCHG RX REV CODE 636 W/ 250 OVERRIDE (IP): Performed by: STUDENT IN AN ORGANIZED HEALTH CARE EDUCATION/TRAINING PROGRAM

## 2023-02-17 PROCEDURE — 92526 ORAL FUNCTION THERAPY: CPT

## 2023-02-17 PROCEDURE — A9270 NON-COVERED ITEM OR SERVICE: HCPCS | Performed by: INTERNAL MEDICINE

## 2023-02-17 PROCEDURE — A9270 NON-COVERED ITEM OR SERVICE: HCPCS | Performed by: HOSPITALIST

## 2023-02-17 PROCEDURE — 97535 SELF CARE MNGMENT TRAINING: CPT | Mod: CO

## 2023-02-17 PROCEDURE — 97530 THERAPEUTIC ACTIVITIES: CPT | Mod: CQ

## 2023-02-17 PROCEDURE — 700102 HCHG RX REV CODE 250 W/ 637 OVERRIDE(OP): Performed by: HOSPITALIST

## 2023-02-17 PROCEDURE — 700102 HCHG RX REV CODE 250 W/ 637 OVERRIDE(OP): Performed by: INTERNAL MEDICINE

## 2023-02-17 PROCEDURE — 94760 N-INVAS EAR/PLS OXIMETRY 1: CPT

## 2023-02-17 PROCEDURE — 700101 HCHG RX REV CODE 250: Performed by: STUDENT IN AN ORGANIZED HEALTH CARE EDUCATION/TRAINING PROGRAM

## 2023-02-17 PROCEDURE — 700102 HCHG RX REV CODE 250 W/ 637 OVERRIDE(OP): Performed by: STUDENT IN AN ORGANIZED HEALTH CARE EDUCATION/TRAINING PROGRAM

## 2023-02-17 PROCEDURE — A9270 NON-COVERED ITEM OR SERVICE: HCPCS | Performed by: STUDENT IN AN ORGANIZED HEALTH CARE EDUCATION/TRAINING PROGRAM

## 2023-02-17 PROCEDURE — 700101 HCHG RX REV CODE 250: Performed by: INTERNAL MEDICINE

## 2023-02-17 PROCEDURE — 99232 SBSQ HOSP IP/OBS MODERATE 35: CPT | Performed by: STUDENT IN AN ORGANIZED HEALTH CARE EDUCATION/TRAINING PROGRAM

## 2023-02-17 PROCEDURE — 770006 HCHG ROOM/CARE - MED/SURG/GYN SEMI*

## 2023-02-17 RX ORDER — BISACODYL 10 MG
10 SUPPOSITORY, RECTAL RECTAL
Status: DISCONTINUED | OUTPATIENT
Start: 2023-02-17 | End: 2023-02-22 | Stop reason: HOSPADM

## 2023-02-17 RX ORDER — POLYETHYLENE GLYCOL 3350 17 G/17G
1 POWDER, FOR SOLUTION ORAL DAILY
Status: DISCONTINUED | OUTPATIENT
Start: 2023-02-17 | End: 2023-02-22 | Stop reason: HOSPADM

## 2023-02-17 RX ORDER — MORPHINE SULFATE 4 MG/ML
2 INJECTION INTRAVENOUS ONCE
Status: COMPLETED | OUTPATIENT
Start: 2023-02-18 | End: 2023-02-18

## 2023-02-17 RX ORDER — AMOXICILLIN 250 MG
2 CAPSULE ORAL 2 TIMES DAILY
Status: DISCONTINUED | OUTPATIENT
Start: 2023-02-17 | End: 2023-02-22 | Stop reason: HOSPADM

## 2023-02-17 RX ADMIN — PREDNISONE 20 MG: 20 TABLET ORAL at 08:41

## 2023-02-17 RX ADMIN — ONDANSETRON 4 MG: 2 INJECTION INTRAMUSCULAR; INTRAVENOUS at 00:18

## 2023-02-17 RX ADMIN — MORPHINE SULFATE 4 MG: 4 INJECTION INTRAVENOUS at 08:42

## 2023-02-17 RX ADMIN — LISINOPRIL 2.5 MG: 5 TABLET ORAL at 04:29

## 2023-02-17 RX ADMIN — ONDANSETRON 4 MG: 2 INJECTION INTRAMUSCULAR; INTRAVENOUS at 08:42

## 2023-02-17 RX ADMIN — LIDOCAINE 1 PATCH: 50 PATCH TOPICAL at 17:00

## 2023-02-17 RX ADMIN — ONDANSETRON 4 MG: 2 INJECTION INTRAMUSCULAR; INTRAVENOUS at 16:54

## 2023-02-17 RX ADMIN — ALPRAZOLAM 0.5 MG: 0.5 TABLET ORAL at 07:27

## 2023-02-17 RX ADMIN — ONDANSETRON 4 MG: 2 INJECTION INTRAMUSCULAR; INTRAVENOUS at 12:29

## 2023-02-17 RX ADMIN — ONDANSETRON 4 MG: 2 INJECTION INTRAMUSCULAR; INTRAVENOUS at 21:03

## 2023-02-17 RX ADMIN — BUDESONIDE AND FORMOTEROL FUMARATE DIHYDRATE 2 PUFF: 160; 4.5 AEROSOL RESPIRATORY (INHALATION) at 12:28

## 2023-02-17 RX ADMIN — BUSPIRONE HYDROCHLORIDE 5 MG: 10 TABLET ORAL at 21:04

## 2023-02-17 RX ADMIN — ALPRAZOLAM 0.5 MG: 0.5 TABLET ORAL at 16:53

## 2023-02-17 RX ADMIN — MORPHINE SULFATE 4 MG: 4 INJECTION INTRAVENOUS at 00:20

## 2023-02-17 RX ADMIN — BUSPIRONE HYDROCHLORIDE 5 MG: 10 TABLET ORAL at 08:41

## 2023-02-17 RX ADMIN — MORPHINE SULFATE 4 MG: 4 INJECTION INTRAVENOUS at 04:27

## 2023-02-17 RX ADMIN — METOPROLOL TARTRATE 75 MG: 50 TABLET, FILM COATED ORAL at 04:29

## 2023-02-17 RX ADMIN — ALPRAZOLAM 0.5 MG: 0.5 TABLET ORAL at 22:28

## 2023-02-17 RX ADMIN — MORPHINE SULFATE 4 MG: 4 INJECTION INTRAVENOUS at 12:28

## 2023-02-17 RX ADMIN — LABETALOL HYDROCHLORIDE 20 MG: 5 INJECTION, SOLUTION INTRAVENOUS at 04:27

## 2023-02-17 RX ADMIN — SERTRALINE 25 MG: 50 TABLET, FILM COATED ORAL at 08:42

## 2023-02-17 RX ADMIN — POLYETHYLENE GLYCOL 3350 1 PACKET: 17 POWDER, FOR SOLUTION ORAL at 08:42

## 2023-02-17 RX ADMIN — ASPIRIN 81 MG 81 MG: 81 TABLET ORAL at 08:41

## 2023-02-17 RX ADMIN — MORPHINE SULFATE 4 MG: 4 INJECTION INTRAVENOUS at 21:04

## 2023-02-17 RX ADMIN — MORPHINE SULFATE 4 MG: 4 INJECTION INTRAVENOUS at 16:54

## 2023-02-17 RX ADMIN — METOPROLOL TARTRATE 75 MG: 50 TABLET, FILM COATED ORAL at 16:53

## 2023-02-17 RX ADMIN — ONDANSETRON 4 MG: 2 INJECTION INTRAMUSCULAR; INTRAVENOUS at 04:28

## 2023-02-17 RX ADMIN — RIVAROXABAN 10 MG: 10 TABLET, FILM COATED ORAL at 16:53

## 2023-02-17 ASSESSMENT — ENCOUNTER SYMPTOMS
SPUTUM PRODUCTION: 0
DIZZINESS: 0
SHORTNESS OF BREATH: 1
WEAKNESS: 1
DEPRESSION: 1
NERVOUS/ANXIOUS: 1
BACK PAIN: 1
CHILLS: 0
BLURRED VISION: 0
CONSTIPATION: 1
SORE THROAT: 0
WHEEZING: 0
NAUSEA: 1
VOMITING: 0
FEVER: 0

## 2023-02-17 ASSESSMENT — COGNITIVE AND FUNCTIONAL STATUS - GENERAL
TOILETING: A LITTLE
TURNING FROM BACK TO SIDE WHILE IN FLAT BAD: A LITTLE
SUGGESTED CMS G CODE MODIFIER DAILY ACTIVITY: CJ
HELP NEEDED FOR BATHING: A LITTLE
DAILY ACTIVITIY SCORE: 20
CLIMB 3 TO 5 STEPS WITH RAILING: A LOT
MOVING FROM LYING ON BACK TO SITTING ON SIDE OF FLAT BED: A LITTLE
STANDING UP FROM CHAIR USING ARMS: A LITTLE
MOVING TO AND FROM BED TO CHAIR: A LITTLE
DRESSING REGULAR LOWER BODY CLOTHING: A LITTLE
SUGGESTED CMS G CODE MODIFIER MOBILITY: CK
WALKING IN HOSPITAL ROOM: A LOT
MOBILITY SCORE: 16
DRESSING REGULAR UPPER BODY CLOTHING: A LITTLE

## 2023-02-17 ASSESSMENT — PAIN DESCRIPTION - PAIN TYPE
TYPE: ACUTE PAIN

## 2023-02-17 ASSESSMENT — GAIT ASSESSMENTS: GAIT LEVEL OF ASSIST: REFUSED

## 2023-02-17 ASSESSMENT — LIFESTYLE VARIABLES: SUBSTANCE_ABUSE: 0

## 2023-02-17 NOTE — CARE PLAN
Problem: Pain - Standard  Goal: Alleviation of pain or a reduction in pain to the patient’s comfort goal  Description: Target End Date:  Prior to discharge or change in level of care    Document on Vitals flowsheet    1.  Document pain using the appropriate pain scale per order or unit policy  2.  Educate and implement non-pharmacologic comfort measures (i.e. relaxation, distraction, massage, cold/heat therapy, etc.)  3.  Pain management medications as ordered  4.  Reassess pain after pain med administration per policy  5.  If opiods administered assess patient's response to pain medication is appropriate per POSS sedation scale  6.  Follow pain management plan developed in collaboration with patient and interdisciplinary team (including palliative care or pain specialists if applicable)  Outcome: Not Met   The patient is Stable - Low risk of patient condition declining or worsening    Shift Goals  Clinical Goals: BP management, anxiety reduction  Patient Goals: lseep, anxiety  Family Goals: SHELLY    Progress made toward(s) clinical / shift goals:  Pt c/o shoulder/BLE/coccyx pain, c/o N PRN zofran 4 mg inj and PRN Morphine 4 mg administered.  Pt refuses to be repositioned, pt educated on the importance of reposition; pt verbalized understanding.           Problem: Pain - Standard  Goal: Alleviation of pain or a reduction in pain to the patient’s comfort goal  Description: Target End Date:  Prior to discharge or change in level of care    Document on Vitals flowsheet    1.  Document pain using the appropriate pain scale per order or unit policy  2.  Educate and implement non-pharmacologic comfort measures (i.e. relaxation, distraction, massage, cold/heat therapy, etc.)  3.  Pain management medications as ordered  4.  Reassess pain after pain med administration per policy  5.  If opiods administered assess patient's response to pain medication is appropriate per POSS sedation scale  6.  Follow pain management plan  developed in collaboration with patient and interdisciplinary team (including palliative care or pain specialists if applicable)  Outcome: Not Met

## 2023-02-17 NOTE — PALLIATIVE CARE
"Palliative Care follow-up  Met with pt and wife at bedside. Pt c/o pain Nausea and not wanting to eat as well as urinary retention. He denies having a BM and feels \"full\" spoke with pt and his wife at length about the benefits of hospice care. Pt was in agreement with hospice and choose [facility] with wife's agreement. Would like to go home and focus on his comfort. POLST completed with pt and signed and placed on pt's communication board. DNR/DNI comfort polst     Updated: [facility] hospice liaison and MD and RN    Plan: DPA sent choice form for hospice and POLST will be scanned into pt's chart. Full code changed to DNR/DNI    Thank you for allowing Palliative Care to support this patient and family. Contact [phone] for additional assistance, change in patient status, or with any questions/concerns.    "

## 2023-02-17 NOTE — CARE PLAN
The patient is Stable - Low risk of patient condition declining or worsening    Shift Goals  Clinical Goals: BP mgmt, anxiety, pain control  Patient Goals: sleep, anxiety  Family Goals: SHELLY    Progress made toward(s) clinical / shift goals:    Problem: Knowledge Deficit - COPD  Goal: Patient/significant other demonstrates understanding of disease process, utilization of the Action Plan, medications and discharge instruction  Outcome: Progressing     Problem: Risk for Infection - COPD  Goal: Patient will remain free from signs and symptoms of infection  Outcome: Progressing     Problem: Ineffective Airway Clearance  Goal: Patient will maintain patent airway with clear/clearing breath sounds  Outcome: Progressing     Problem: Impaired Gas Exchange  Goal: Patient will demonstrate improved ventilation and adequate oxygenation and participate in treatment regimen within the level of ability/situation.  Outcome: Progressing     Problem: Self Care  Goal: Patient will have the ability to perform ADLs independently or with assistance (bathe, groom, dress, toilet and feed)  Outcome: Progressing     Problem: Knowledge Deficit - Standard  Goal: Patient and family/care givers will demonstrate understanding of plan of care, disease process/condition, diagnostic tests and medications  Outcome: Progressing     Problem: Pain - Standard  Goal: Alleviation of pain or a reduction in pain to the patient’s comfort goal  Outcome: Progressing     Problem: Fall Risk  Goal: Patient will remain free from falls  Outcome: Progressing       Patient is not progressing towards the following goals:      Problem: Nutrition - Advanced  Goal: Patient will display progressive weight gain toward goal have adequate food and fluid intake  Outcome: Not Progressing     Problem: Risk for Aspiration  Goal: Patient's risk for aspiration will be absent or decrease  Outcome: Not Progressing

## 2023-02-17 NOTE — THERAPY
Speech Language Pathology   Clinical Swallow Evaluation     Patient Name: Nikolas Bob  AGE:  67 y.o., SEX:  male  Medical Record #: [id number]  Today's Date: 2/16/2023     Precautions  Precautions: Fall Risk    Assessment    HPI: 67 y.o. male admitted for COPD exacerbation, acute on chronic respiratory failure, anxiety, anemia.       PMHx: COPD (3L supplemental O2 baseline), HTN, Ankylosing Spondylitis, Kyphoscoliosis, colectomy for diverticulitis     No previous hx of SLP services at [facility].    CXR 2/12:  1.  Pulmonary edema and/or infiltrates are identified, which appear somewhat increased since the prior exam.  2.  Small bilateral pleural effusions, increased since prior.  3.  Atherosclerosis      Level of Consciousness: Alert, Awake  Affect/Behavior: Cooperative  Follows Directives: Yes  Orientation: Oriented x 4  Hearing: Functional hearing  Vision: Functional vision      Prior Living Situation & Level of Function:  Patient lives with spouse. Pt reports hx of acid reflux w/o medical management.       Oral Mechanism Evaluation  Facial Symmetry: Equal  Facial Sensation: Equal  Labial Observations: WFL  Lingual Observations: Midline  Dentition: Edentulous, Full dentures  Comments:      Voice  Quality: WFL  Resonance: WFL  Intensity: Appropriate  Cough: WFL  Comments:      Current Method of Nutrition   Oral diet (RG7/TN0)           Assessment  Positioning: Rashid's (60-90 degrees)  Bolus Administration: Patient  Oxygen Requirements:  5 L OxyMask  Factor(s) Affecting Performance: None    Swallowing Trials  Thin Liquid (TN0): WFL  Liquidised (LQ3): Impaired  Regular (RG7): Impaired      Comments:  Adequate oral bolus acceptance/containment, complete AP transfer; mildly prolonged mastication with dry solids. Intermittent throat clear immediately following thin liquids via cup and straw sips - concerning for airway invasion. Trials of liquidized and dry solids completed with two swallows each - concerning for pharyngeal  "inefficiency. Patient requesting use of liquid wash with trials of liquidized and dry solids, reporting globus sensation. Episode of hawking x1 with trilas of dry solids w/pt expectorating majority of bolus. Discussed aspiration risk factors and concern for airway invasion. Patient interested in further assessment of swallow function via MBSS to objectively assess swallow function. Pt agreeable to study if able to remain on current diet pending dx study.          Clinical Impressions  Patient presents with clinical indicators of oropharyngeal and suspect esophageal dysphagia. Instrumental swallow study is recommended to objectively assess swallow function and further POC.          Recommendations  1.  Regular solids and thin liquids  2.  Instrumentation: MBSS  3.  Swallowing Instructions & Precautions:   Supervision: Distant supervision - check on patient 2-3 times per meal  Positioning: Fully upright and midline during oral intake, Remain upright for 30-45 minutes after oral intake  Medication: As tolerated  Strategies: Small bites/sips, Alternate bites and sips, Slow rate of intake  Oral Care: BID      Plan    Recommend Speech Therapy 3 times per week until therapy goals are met for the following treatments:  Dysphagia Training and Patient / Family / Caregiver Education.            Objective       02/16/23 4330   Patient / Family Goals   Patient / Family Goal #1 \"food gets stuck in my throat\"   Short Term Goals   Short Term Goal # 1 Patient will participate in a dx swallow study to objectively assess swallow function       "

## 2023-02-17 NOTE — PROGRESS NOTES
[facility] Medicine Daily Progress Note    Date of Service  2/17/2023    Chief Complaint  Nikolas Bob is a 67 y.o. male admitted 2/8/2023 with acute on chronic respiratory failure due to COPD exacerbation     Hospital Course  67 y.o. male with a past medical history of ankylosing spondylitis, kyphoscoliosis, pulmonary hypertension, cor pulmonale, right hilar lung nodule, chronic back pain and history of severe anxiety as well as oxygen dependent COPD most recently on 4 to 5 L after  recent admission for COPD exacerbation in Jan 2023 , who presented on  2/8/2023 with worsening shortness of breath for the prior 2 days, pt had multiple sick contacts the week prior. On arrival patient was urgently intubated due to acute hypoxic/hypercapnic respiratory failure.  He was started on aggressive pulmonary measures and admitted to ICU. In the emergency room a viral swab was negative for influenza, RSV, and COVID.  Procalcitonin was less than 0.5 though chest x-ray revealed hazy interstitial opacities favoring multifocal pneumonia.      He was extubated on 2/19 to rescue BiPAP but he refused it and required reintubation. He was successfully extubated on 2/11/2023 and has been on nocturnal BiPAP post extubation. Extended viral panel was positive for human metapneumovirus by PCR.  He has since been transferred to the medical floor     Interval Problem Update  Pt seen and examined, no new or acute issues, states he is feeling much better today with medications changes yesterday, appears more comfortable   - seen by speech, MBS ordered however pt declined today, was able to tolerate diet with speech today without complaints, continue reg diet   - KUB with signs of constipation, no obstruction, aggressive Bowel regiment   - palliative following, pt and wife do not want to make decision on hospice company today, CM will readdress tomorrow   - continue nebs, inhaler therapy, IS, prednisone   - continue supportive care with pain  control, anxiety and air hunger     I have discussed this patient's plan of care and discharge plan at IDT rounds today with Case Management, Nursing, Nursing leadership, and other members of the IDT team.    Consultants/Specialty  critical care and palliative care    Code Status  DNAR/DNI    Disposition  Patient is medically cleared for discharge.   Anticipate discharge to to hospice.  I have placed the appropriate orders for post-discharge needs.    Review of Systems  Review of Systems   Constitutional:  Positive for malaise/fatigue. Negative for chills and fever.   HENT:  Negative for sore throat.    Eyes:  Negative for blurred vision.   Respiratory:  Positive for shortness of breath. Negative for sputum production and wheezing.    Cardiovascular:  Negative for chest pain.   Gastrointestinal:  Positive for constipation and nausea. Negative for vomiting.   Genitourinary:  Negative for dysuria.   Musculoskeletal:  Positive for back pain and joint pain.   Neurological:  Positive for weakness. Negative for dizziness.   Psychiatric/Behavioral:  Positive for depression. Negative for substance abuse. The patient is nervous/anxious.       Physical Exam  Temp:  [36.1 °C (97 °F)-36.4 °C (97.5 °F)] 36.1 °C (97 °F)  Pulse:  [60-98] 60  Resp:  [16-20] 18  BP: (121-165)/(56-80) 151/73  SpO2:  [96 %-100 %] 100 %    Physical Exam  Vitals and nursing note reviewed.   Constitutional:       Appearance: He is ill-appearing. He is not toxic-appearing.      Comments: Frail appearing male   HENT:      Head: Normocephalic and atraumatic.      Mouth/Throat:      Mouth: Mucous membranes are moist.      Pharynx: Oropharynx is clear.   Eyes:      Extraocular Movements: Extraocular movements intact.      Conjunctiva/sclera: Conjunctivae normal.   Cardiovascular:      Rate and Rhythm: Normal rate and regular rhythm.      Heart sounds: Normal heart sounds.   Pulmonary:      Effort: No respiratory distress.      Breath sounds: Wheezing present.  No rhonchi.      Comments: Diminished In bases, mild expiratory wheezing   Abdominal:      General: Bowel sounds are normal. There is no distension.      Palpations: Abdomen is soft.      Tenderness: There is no abdominal tenderness. There is no guarding or rebound.   Musculoskeletal:         General: Normal range of motion.      Cervical back: Neck supple.      Right lower leg: No edema.      Left lower leg: No edema.   Skin:     General: Skin is warm and dry.   Neurological:      General: No focal deficit present.      Mental Status: He is alert and oriented to person, place, and time. Mental status is at baseline.   Psychiatric:         Mood and Affect: Mood normal.         Thought Content: Thought content normal.         Judgment: Judgment normal.      Comments: Anxious        Fluids    Intake/Output Summary (Last 24 hours) at 2/17/2023 1459  Last data filed at 2/17/2023 0600  Gross per 24 hour   Intake --   Output 1275 ml   Net -1275 ml         Laboratory        Recent Labs     02/15/23  0250 02/16/23  0234   SODIUM 137 139   POTASSIUM 4.4 4.4   CHLORIDE 95* 95*   CO2 38* 36*   GLUCOSE 91 95   BUN 40* 30*   CREATININE 1.13 1.02   CALCIUM 9.0 9.1                     Imaging  [id number] VIEW   Final Result         No specific finding to suggest small bowel obstruction.      Moderate amount of stool in the rectum.      DX-CHEST-PORTABLE (1 VIEW)   Final Result         1.  Pulmonary edema and/or infiltrates are identified, which appear somewhat increased since the prior exam.   2.  Small bilateral pleural effusions, increased since prior.   3.  Atherosclerosis      DX-CHEST-PORTABLE (1 VIEW)   Final Result         1.  Pulmonary edema and/or infiltrates are identified, which are somewhat decreased since the prior exam.   2.  Trace bilateral pleural effusions   3.  Atherosclerosis      DX-ABDOMEN FOR TUBE PLACEMENT   Final Result      Enteric tube has been placed and the tip projects over the stomach.       EC-ECHOCARDIOGRAM COMPLETE W/O CONT   Final Result      DX-CHEST-PORTABLE (1 VIEW)   Final Result      1.  Interval placement of an endotracheal tube which terminates in satisfactory position at the level of the aortic arch.   2.  Similar appearance of patchy bilateral interstitial opacities which could be related to edema and/or infection.      DX-CHEST-PORTABLE (1 VIEW)   Final Result         1.  Pulmonary edema and/or infiltrates are identified, which appear somewhat increased since the prior exam.   2.  Trace bilateral pleural effusions   3.  Atherosclerosis      US-EXTREMITY VENOUS LOWER BILAT   Final Result      DX-CHEST-PORTABLE (1 VIEW)   Final Result         1.  Right pulmonary infiltrate, overall infiltrates are decreased since prior study.   2.  Trace bilateral pleural effusions   3.  Atherosclerosis      DX-CHEST-PORTABLE (1 VIEW)   Final Result         1.  Pulmonary edema and/or infiltrates.   2.  Trace bilateral pleural effusions   3.  Atherosclerosis      DX-ABDOMEN FOR TUBE PLACEMENT   Final Result         1.  Moderate stool in the colon suggests changes of constipation, otherwise nonspecific bowel gas pattern in the upper abdomen   2.  Nasogastric tube tip terminates overlying the expected location of the gastric fundus.   3.  Pulmonary edema and/or infiltrates.   4.  Trace bilateral pleural effusions             Assessment/Plan  * Chronic obstructive pulmonary disease with acute exacerbation (HCC)- (present on admission)  Assessment & Plan  Severe acute on chronic respiratory failure secondary to COPD exacerbation.  Was admitted to the intensive care unit on mechanical ventilation and IV Solu-Medrol  This appears to be prompted by human metapneumovirus and antibiotics are not indicated  He will be placed on a prednisone taper  Pulmonology following   He is a non-smoker  Symbicort as well as DuoNeb's as needed  Currently at baseline but desats to low 80's with minimal exertion   Palliative  following   Planning on DC home with hospice     Anxiety- (present on admission)  Assessment & Plan  Hx of moderate/sevre anxiety and panic attacks   - on zoloft at baseline   - will add Buspar 5 mg BID and titrate up to help with symptoms   - ativan prn, dc atarax     Anemia- (present on admission)  Assessment & Plan  Upon review of the records his hemoglobin has fluctuated though now is lower than previous at 9.8.  This is appropriate to follow-up as an outpatient    Acute renal failure with tubular necrosis (HCC)- (present on admission)  Assessment & Plan  This has improved    Acute pulmonary edema (HCC)- (present on admission)  Assessment & Plan  Treated with IV Lasix  Appears resolved, pt close to baseline O2     Hypertension  Assessment & Plan  He has been started on metoprolol  Low dose lisinopril started 2/14, 2.5 mg, adjust as needed     Swelling of lower extremity  Assessment & Plan  Minimal   Holding lasix     Pulmonary nodule- (present on admission)  Assessment & Plan  Appropriate to follow-up outpatient    Ankylosing spondylitis (HCC)- (present on admission)  Assessment & Plan  Continue oxycodone for pain.    Elevated troponin- (present on admission)  Assessment & Plan  Troponin went up to 208 consistent with stress response due to respiratory failure  Echo reveals an EF 50% without wall motion abnormalities    Acute hypoxemic respiratory failure (HCC)- (present on admission)  Assessment & Plan  Acute on chronic respiratory failure.  His baseline oxygen has been between 4 and 5 L.  He required endotracheal intubation  Since stabilized back to home oxygen   Treatment as above         VTE prophylaxis: Xarelto 10 mg daily as prophylaxis    I have performed a physical exam and reviewed and updated ROS and Plan today (2/17/2023). In review of yesterday's note (2/16/2023), there are no changes except as documented above.

## 2023-02-17 NOTE — PROGRESS NOTES
[facility] Medicine Daily Progress Note    Date of Service  2/16/2023    Chief Complaint  Nikolas Bob is a 67 y.o. male admitted 2/8/2023 with acute on chronic respiratory failure due to COPD exacerbation     Hospital Course  67 y.o. male with a past medical history of ankylosing spondylitis, kyphoscoliosis, pulmonary hypertension, cor pulmonale, right hilar lung nodule, chronic back pain and history of severe anxiety as well as oxygen dependent COPD most recently on 4 to 5 L after  recent admission for COPD exacerbation in Jan 2023 , who presented on  2/8/2023 with worsening shortness of breath for the prior 2 days, pt had multiple sick contacts the week prior. On arrival patient was urgently intubated due to acute hypoxic/hypercapnic respiratory failure.  He was started on aggressive pulmonary measures and admitted to ICU. In the emergency room a viral swab was negative for influenza, RSV, and COVID.  Procalcitonin was less than 0.5 though chest x-ray revealed hazy interstitial opacities favoring multifocal pneumonia.      He was extubated on 2/19 to rescue BiPAP but he refused it and required reintubation. He was successfully extubated on 2/11/2023 and has been on nocturnal BiPAP post extubation. Extended viral panel was positive for human metapneumovirus by PCR.  He has since been transferred to the medical floor     Interval Problem Update  Pt seen and examined, anxious this AM, feeling like he cant get a deep breath in after tyring to eat, stating feels like he cant swallow. Feeling nausea as well and having fullness in his abdomen, states he feels like he cant empty his bladder   - bladder scan not showing urinary retention   - no BM since admit per pt, will check KUB, given use of narcotics, recommend aggressive bowel protocol   - palliative following, greatly appreciate their help code status changed to reflect goals of care conversation. POLST completed. Plan for discharge home with hospice   - continue  supportive care, respiratory care   - speech consulted, MBS ordered, pt would like to proceed with this   - continue nebs, inhaler therapy, IS, prednisone   - bicarb elevated but stable likely metabolic compensation for respiratory acidosis   - pt highly anxious, continue ativan prn, started low dose buspar, will consider increasing and monitor response   - lidocaine patches for pain ordered     I have discussed this patient's plan of care and discharge plan at IDT rounds today with Case Management, Nursing, Nursing leadership, and other members of the IDT team.    Consultants/Specialty  critical care and palliative care    Code Status  DNAR/DNI    Disposition  Patient is medically cleared for discharge.   Anticipate discharge to to hospice.  I have placed the appropriate orders for post-discharge needs.    Review of Systems  Review of Systems   Constitutional:  Positive for malaise/fatigue. Negative for chills and fever.   HENT:  Negative for sore throat.    Eyes:  Negative for blurred vision.   Respiratory:  Positive for shortness of breath. Negative for sputum production and wheezing.    Cardiovascular:  Negative for chest pain.   Gastrointestinal:  Positive for constipation and nausea. Negative for vomiting.   Genitourinary:  Negative for dysuria.   Musculoskeletal:  Positive for back pain and joint pain.   Neurological:  Positive for weakness. Negative for dizziness.   Psychiatric/Behavioral:  Positive for depression. Negative for substance abuse. The patient is nervous/anxious.       Physical Exam  Temp:  [36.1 °C (97 °F)-36.6 °C (97.9 °F)] 36.1 °C (97 °F)  Pulse:  [61-98] 88  Resp:  [17-20] 18  BP: (133-154)/(64-80) 154/80  SpO2:  [69 %-100 %] 96 %    Physical Exam  Vitals and nursing note reviewed.   Constitutional:       Appearance: He is ill-appearing. He is not toxic-appearing.      Comments: Frail appearing male   HENT:      Head: Normocephalic and atraumatic.      Mouth/Throat:      Mouth: Mucous  membranes are moist.      Pharynx: Oropharynx is clear.   Eyes:      Extraocular Movements: Extraocular movements intact.      Conjunctiva/sclera: Conjunctivae normal.   Cardiovascular:      Rate and Rhythm: Normal rate and regular rhythm.      Heart sounds: Normal heart sounds.   Pulmonary:      Effort: No respiratory distress.      Breath sounds: Wheezing present. No rhonchi.      Comments: Diminished In bases, mild expiratory wheezing   Abdominal:      General: Bowel sounds are normal. There is no distension.      Palpations: Abdomen is soft.      Tenderness: There is no abdominal tenderness. There is no guarding or rebound.   Musculoskeletal:         General: Normal range of motion.      Cervical back: Neck supple.      Right lower leg: No edema.      Left lower leg: No edema.   Skin:     General: Skin is warm and dry.   Neurological:      General: No focal deficit present.      Mental Status: He is alert and oriented to person, place, and time. Mental status is at baseline.   Psychiatric:         Mood and Affect: Mood normal.         Thought Content: Thought content normal.         Judgment: Judgment normal.      Comments: Anxious        Fluids    Intake/Output Summary (Last 24 hours) at 2/16/2023 1907  Last data filed at 2/16/2023 1745  Gross per 24 hour   Intake 240 ml   Output 500 ml   Net -260 ml       Laboratory        Recent Labs     02/15/23  0250 02/16/23  0234   SODIUM 137 139   POTASSIUM 4.4 4.4   CHLORIDE 95* 95*   CO2 38* 36*   GLUCOSE 91 95   BUN 40* 30*   CREATININE 1.13 1.02   CALCIUM 9.0 9.1                   Imaging  [id number] VIEW   Final Result         No specific finding to suggest small bowel obstruction.      Moderate amount of stool in the rectum.      DX-CHEST-PORTABLE (1 VIEW)   Final Result         1.  Pulmonary edema and/or infiltrates are identified, which appear somewhat increased since the prior exam.   2.  Small bilateral pleural effusions, increased since prior.   3.   Atherosclerosis      DX-CHEST-PORTABLE (1 VIEW)   Final Result         1.  Pulmonary edema and/or infiltrates are identified, which are somewhat decreased since the prior exam.   2.  Trace bilateral pleural effusions   3.  Atherosclerosis      DX-ABDOMEN FOR TUBE PLACEMENT   Final Result      Enteric tube has been placed and the tip projects over the stomach.      EC-ECHOCARDIOGRAM COMPLETE W/O CONT   Final Result      DX-CHEST-PORTABLE (1 VIEW)   Final Result      1.  Interval placement of an endotracheal tube which terminates in satisfactory position at the level of the aortic arch.   2.  Similar appearance of patchy bilateral interstitial opacities which could be related to edema and/or infection.      DX-CHEST-PORTABLE (1 VIEW)   Final Result         1.  Pulmonary edema and/or infiltrates are identified, which appear somewhat increased since the prior exam.   2.  Trace bilateral pleural effusions   3.  Atherosclerosis      US-EXTREMITY VENOUS LOWER BILAT   Final Result      DX-CHEST-PORTABLE (1 VIEW)   Final Result         1.  Right pulmonary infiltrate, overall infiltrates are decreased since prior study.   2.  Trace bilateral pleural effusions   3.  Atherosclerosis      DX-CHEST-PORTABLE (1 VIEW)   Final Result         1.  Pulmonary edema and/or infiltrates.   2.  Trace bilateral pleural effusions   3.  Atherosclerosis      DX-ABDOMEN FOR TUBE PLACEMENT   Final Result         1.  Moderate stool in the colon suggests changes of constipation, otherwise nonspecific bowel gas pattern in the upper abdomen   2.  Nasogastric tube tip terminates overlying the expected location of the gastric fundus.   3.  Pulmonary edema and/or infiltrates.   4.  Trace bilateral pleural effusions      DX-ESOPHAGUS - [id number]    (Results Pending)        Assessment/Plan  * Chronic obstructive pulmonary disease with acute exacerbation (HCC)- (present on admission)  Assessment & Plan  Severe acute on chronic respiratory failure  secondary to COPD exacerbation.  Was admitted to the intensive care unit on mechanical ventilation and IV Solu-Medrol  This appears to be prompted by human metapneumovirus and antibiotics are not indicated  He will be placed on a prednisone taper  Pulmonology following   He is a non-smoker  Symbicort as well as DuoNeb's as needed  Currently at baseline but desats to low 80's with minimal exertion   Palliative following   Planning on DC home with hospice     Anxiety- (present on admission)  Assessment & Plan  Hx of moderate/sevre anxiety and panic attacks   - on zoloft at baseline   - will add Buspar 5 mg BID and titrate up to help with symptoms   - ativan prn, dc atarax     Anemia- (present on admission)  Assessment & Plan  Upon review of the records his hemoglobin has fluctuated though now is lower than previous at 9.8.  This is appropriate to follow-up as an outpatient    Acute renal failure with tubular necrosis (HCC)- (present on admission)  Assessment & Plan  This has improved    Acute pulmonary edema (HCC)- (present on admission)  Assessment & Plan  Treated with IV Lasix  Appears resolved, pt close to baseline O2     Hypertension  Assessment & Plan  He has been started on metoprolol  Low dose lisinopril started 2/14, 2.5 mg, adjust as needed     Swelling of lower extremity  Assessment & Plan  Minimal   Holding lasix     Pulmonary nodule- (present on admission)  Assessment & Plan  Appropriate to follow-up outpatient    Ankylosing spondylitis (HCC)- (present on admission)  Assessment & Plan  Continue oxycodone for pain.    Elevated troponin- (present on admission)  Assessment & Plan  Troponin went up to 208 consistent with stress response due to respiratory failure  Echo reveals an EF 50% without wall motion abnormalities    Acute hypoxemic respiratory failure (HCC)- (present on admission)  Assessment & Plan  Acute on chronic respiratory failure.  His baseline oxygen has been between 4 and 5 L.  He required  endotracheal intubation  Since stabilized back to home oxygen   Treatment as above         VTE prophylaxis: Xarelto 10 mg daily as prophylaxis    I have performed a physical exam and reviewed and updated ROS and Plan today (2/16/2023). In review of yesterday's note (2/15/2023), there are no changes except as documented above.

## 2023-02-17 NOTE — THERAPY
"Speech Language Pathology  Daily Treatment     Patient Name: Nikolas Bob  Age:  67 y.o., Sex:  male  Medical Record #: [id number]  Today's Date: 2/17/2023     Precautions  Precautions: Fall Risk, Swallow Precautions ( See Comments)    Assessment    Pt with less than 20% of bfast meal eaten and declining solids with SLP. Pt declining MBS with educ provided regarding CSE results and reasoning for MBS recommendations. Pt denying globus sensation during bfast meal. Pt stating his preference is to not complete an MBS but would allow SLP to assess toleration for solids. Pt in agreement for soup that he ordered with SLP reattempting to see 2 additional times this AM. Pt in agreement for sips of thin water only when pt seen for the 3rd time this AM. No s/s of difficulty.       Plan  Cont Reg/TNO as tolerated. See during meal. Pt declining MBS.   Continue current treatment plan.    Discharge Recommendations: Other (TBD pending dx study)       02/17/23 1059   Speech / Dysarthria   Comments WNL   Voice   Comments WNL   Dysphagia    Dysphagia X   Positioning / Behavior Modification Modulate Rate or Bite Size;Other (see Comments)  (sitting at 90 degrees)   Diet / Liquid Recommendation Regular (7);Thin (0)   Nutritional Liquid Intake Rating Scale Non thickened beverages   Nutritional Food Intake Rating Scale Total oral diet with no restrictions   Skilled Intervention Compensatory Strategies;Verbal Cueing   Recommended Route of Medication Administration   Medication Administration  Other (See Comments)  (as tolerated.)   Patient / Family Goals   Patient / Family Goal #1 \"food gets stuck in my throat\"   Goal #1 Outcome Goal not met   Short Term Goals   Short Term Goal # 1 Patient will participate in a dx swallow study to objectively assess swallow function   Goal Outcome # 1 Goal not met   Short Term Goal # 2 Pt will consume RG7/TNO without s/s of difficulty during oral intake.   Goal Outcome # 2  Other (see comments)  (pt only in " agreement to take sips of thin water x3 visits by SLP)

## 2023-02-17 NOTE — DISCHARGE PLANNING
Case Management Discharge Planning    Admission Date: 2/8/2023  GMLOS: 5  ALOS: 9    6-Clicks ADL Score: 20  6-Clicks Mobility Score: 15  PT and/or OT Eval ordered: NA  Post-acute Referrals Ordered: NA  Post-acute Choice Obtained: NA  Has referral(s) been sent to post-acute provider:  KAMERON      Anticipated Discharge Dispo: Discharge Disposition: D/T to hospice home (50)    DME Needed: No    Action(s) Taken: Updated Provider/Nurse on Discharge Plan    Escalations Completed: None    Medically Clear: Yes    Next Steps: RNCM participated in IDR and conducted a chart review.  Per Dr. Arreola, patient is medically ready to discharge home with [facility].  RNCM reached out to Jennifer at [phone] who stated she will meet with family/patient today.     Update @ 4997 Jennifer met with patient at bedside.  Patient unable to make firm decision until wife is in agreement.  Wife will connect with Jennifer by phone today/tomorrow.  Jennifer is at [phone].  She will reach out to the RNCM/SWCM by phone on Saturday 2/18/23 if patient is going to discharge.  Jennifer will coordinate DME.        Case management will continue to follow for discharge planning needs.      Barriers to Discharge: None

## 2023-02-18 PROCEDURE — 94640 AIRWAY INHALATION TREATMENT: CPT

## 2023-02-18 PROCEDURE — A9270 NON-COVERED ITEM OR SERVICE: HCPCS | Performed by: STUDENT IN AN ORGANIZED HEALTH CARE EDUCATION/TRAINING PROGRAM

## 2023-02-18 PROCEDURE — A9270 NON-COVERED ITEM OR SERVICE: HCPCS | Performed by: INTERNAL MEDICINE

## 2023-02-18 PROCEDURE — 700102 HCHG RX REV CODE 250 W/ 637 OVERRIDE(OP): Performed by: HOSPITALIST

## 2023-02-18 PROCEDURE — 700111 HCHG RX REV CODE 636 W/ 250 OVERRIDE (IP)

## 2023-02-18 PROCEDURE — 700101 HCHG RX REV CODE 250: Performed by: STUDENT IN AN ORGANIZED HEALTH CARE EDUCATION/TRAINING PROGRAM

## 2023-02-18 PROCEDURE — 700102 HCHG RX REV CODE 250 W/ 637 OVERRIDE(OP): Performed by: INTERNAL MEDICINE

## 2023-02-18 PROCEDURE — 94760 N-INVAS EAR/PLS OXIMETRY 1: CPT

## 2023-02-18 PROCEDURE — 99232 SBSQ HOSP IP/OBS MODERATE 35: CPT | Performed by: STUDENT IN AN ORGANIZED HEALTH CARE EDUCATION/TRAINING PROGRAM

## 2023-02-18 PROCEDURE — 700102 HCHG RX REV CODE 250 W/ 637 OVERRIDE(OP): Performed by: STUDENT IN AN ORGANIZED HEALTH CARE EDUCATION/TRAINING PROGRAM

## 2023-02-18 PROCEDURE — 700111 HCHG RX REV CODE 636 W/ 250 OVERRIDE (IP): Performed by: STUDENT IN AN ORGANIZED HEALTH CARE EDUCATION/TRAINING PROGRAM

## 2023-02-18 PROCEDURE — 94669 MECHANICAL CHEST WALL OSCILL: CPT

## 2023-02-18 PROCEDURE — 770006 HCHG ROOM/CARE - MED/SURG/GYN SEMI*

## 2023-02-18 PROCEDURE — A9270 NON-COVERED ITEM OR SERVICE: HCPCS | Performed by: HOSPITALIST

## 2023-02-18 RX ADMIN — LIDOCAINE 1 PATCH: 50 PATCH TOPICAL at 15:25

## 2023-02-18 RX ADMIN — LISINOPRIL 2.5 MG: 5 TABLET ORAL at 05:35

## 2023-02-18 RX ADMIN — METOPROLOL TARTRATE 75 MG: 50 TABLET, FILM COATED ORAL at 05:35

## 2023-02-18 RX ADMIN — MORPHINE SULFATE 4 MG: 4 INJECTION INTRAVENOUS at 05:35

## 2023-02-18 RX ADMIN — MORPHINE SULFATE 2 MG: 4 INJECTION INTRAVENOUS at 00:05

## 2023-02-18 RX ADMIN — SENNOSIDES AND DOCUSATE SODIUM 2 TABLET: 50; 8.6 TABLET ORAL at 05:34

## 2023-02-18 RX ADMIN — METOPROLOL TARTRATE 75 MG: 50 TABLET, FILM COATED ORAL at 18:04

## 2023-02-18 RX ADMIN — PREDNISONE 20 MG: 20 TABLET ORAL at 07:57

## 2023-02-18 RX ADMIN — SERTRALINE 25 MG: 50 TABLET, FILM COATED ORAL at 07:57

## 2023-02-18 RX ADMIN — BUDESONIDE AND FORMOTEROL FUMARATE DIHYDRATE 2 PUFF: 160; 4.5 AEROSOL RESPIRATORY (INHALATION) at 11:18

## 2023-02-18 RX ADMIN — ONDANSETRON 4 MG: 2 INJECTION INTRAMUSCULAR; INTRAVENOUS at 01:07

## 2023-02-18 RX ADMIN — MORPHINE SULFATE 4 MG: 4 INJECTION INTRAVENOUS at 09:37

## 2023-02-18 RX ADMIN — ONDANSETRON 4 MG: 2 INJECTION INTRAMUSCULAR; INTRAVENOUS at 05:35

## 2023-02-18 RX ADMIN — RIVAROXABAN 10 MG: 10 TABLET, FILM COATED ORAL at 18:09

## 2023-02-18 RX ADMIN — ALPRAZOLAM 0.5 MG: 0.5 TABLET ORAL at 21:02

## 2023-02-18 RX ADMIN — BUSPIRONE HYDROCHLORIDE 5 MG: 10 TABLET ORAL at 21:02

## 2023-02-18 RX ADMIN — MORPHINE SULFATE 4 MG: 4 INJECTION INTRAVENOUS at 13:58

## 2023-02-18 RX ADMIN — ONDANSETRON 4 MG: 2 INJECTION INTRAMUSCULAR; INTRAVENOUS at 18:04

## 2023-02-18 RX ADMIN — POLYETHYLENE GLYCOL 3350 1 PACKET: 17 POWDER, FOR SOLUTION ORAL at 06:00

## 2023-02-18 RX ADMIN — ONDANSETRON 4 MG: 2 INJECTION INTRAMUSCULAR; INTRAVENOUS at 13:57

## 2023-02-18 RX ADMIN — LEVALBUTEROL 1.25 MG: 1.25 SOLUTION RESPIRATORY (INHALATION) at 19:08

## 2023-02-18 RX ADMIN — ASPIRIN 81 MG 81 MG: 81 TABLET ORAL at 07:56

## 2023-02-18 RX ADMIN — BUSPIRONE HYDROCHLORIDE 5 MG: 10 TABLET ORAL at 07:56

## 2023-02-18 RX ADMIN — ONDANSETRON 4 MG: 2 INJECTION INTRAMUSCULAR; INTRAVENOUS at 09:36

## 2023-02-18 RX ADMIN — MORPHINE SULFATE 4 MG: 4 INJECTION INTRAVENOUS at 18:11

## 2023-02-18 RX ADMIN — MORPHINE SULFATE 4 MG: 4 INJECTION INTRAVENOUS at 01:07

## 2023-02-18 ASSESSMENT — LIFESTYLE VARIABLES: SUBSTANCE_ABUSE: 0

## 2023-02-18 ASSESSMENT — ENCOUNTER SYMPTOMS
DEPRESSION: 1
BACK PAIN: 1
WEAKNESS: 1
NERVOUS/ANXIOUS: 1
BLURRED VISION: 0
CHILLS: 0
FEVER: 0
SORE THROAT: 0
DIZZINESS: 0
SHORTNESS OF BREATH: 1
SPUTUM PRODUCTION: 0
WHEEZING: 0
VOMITING: 0
NAUSEA: 1
CONSTIPATION: 1

## 2023-02-18 ASSESSMENT — PAIN DESCRIPTION - PAIN TYPE
TYPE: ACUTE PAIN

## 2023-02-18 NOTE — THERAPY
Occupational Therapy  Daily Treatment     Patient Name: Nikolas Bob  Age:  67 y.o., Sex:  male  Medical Record #: [id number]  Today's Date: 2/17/2023       Precautions: Fall Risk, Swallow Precautions ( See Comments)    Assessment    Pt seen for OT tx. Continues to be limited by decreased activity tolerance and pain impacting ability to complete ADLs and ADL transfers independently. Required extra time to initiate OOB activity d/t being hyperverbose. Psychosocial intervention addressed as pt was expressing concerns about pain management and wanting to be comfortable. Pt tangential w/ conversation but receptive to education. Supv supine > sit. Pt only wanting to do EOB d/t feeling anxious about anticipation of pain. Will continue to benefit from OT services while in house.     Plan    O.T. Treatment Plan: Continue Current Treatment Plan    DC Equipment Recommendations: Unable to determine at this time  Discharge Recommendations: Other - (per chart pt may d/c home w/ hospice. Should he not d/c w/ hospice recommend home w/ home health)       02/17/23 1641   Balance   Sitting Balance (Static) Fair   Sitting Balance (Dynamic) Fair   Weight Shift Sitting Fair   Bed Mobility    Supine to Sit Supervised   Sit to Supine Supervised   Activities of Daily Living   Grooming Supervision;Seated   Upper Body Dressing Minimal Assist   Lower Body Dressing Minimal Assist   Toileting Supervision   Short Term Goals   Short Term Goal # 1 Pt will complete ADL transfers with supervision   Goal Outcome # 1 Goal not met   Short Term Goal # 2 Pt will complete LB dressing with supervision   Goal Outcome # 2 Goal not met   Short Term Goal # 3 Pt will complete standing G/H with supervision   Goal Outcome # 3 Goal not met   Anticipated Discharge Equipment and Recommendations   DC Equipment Recommendations Unable to determine at this time   Discharge Recommendations Other -  (per chart pt may d/c home w/ hospice. Should he not d/c w/ hospice  recommend home w/ home health)

## 2023-02-18 NOTE — CARE PLAN
The patient is Stable - Low risk of patient condition declining or worsening    Shift Goals  Clinical Goals: Anxiety  Patient Goals: Pain and anxiety  Family Goals: SHELLY    Progress made toward(s) clinical / shift goals:    Problem: Knowledge Deficit - Standard  Goal: Patient and family/care givers will demonstrate understanding of plan of care, disease process/condition, diagnostic tests and medications  Outcome: Progressing  Note: Pt Alert and oriented. Understands the need of the hospital stay.       Problem: Pain - Standard  Goal: Alleviation of pain or a reduction in pain to the patient’s comfort goal  Outcome: Progressing  Flowsheets  Taken 2/17/2023 1654 by Germaine Nguyen RLupeNLupe  Pain Rating Scale (NPRS): 7  Taken 2/16/2023 0700 by Bala Lafleur RLupeNLupe  Non Verbal Scale: Sleeping  Taken 2/12/2023 1000 by Chuck Beasley R.NLupe  Critical-Care Pain Observation Score: 0  Note: PRN pain medication used for patient for pain 9/10 to 4/10 pain      Problem: Fall Risk  Goal: Patient will remain free from falls  Outcome: Progressing  Note: No falls this shift, bed in lowest position and locked, non slip socks on and bed alarm on.          Patient is not progressing towards the following goals:

## 2023-02-18 NOTE — DISCHARGE PLANNING
Care Transition Team Discharge Planning      SW reviewed medical record and spoke with Jennifer from [facility].  Patient's spouse has not been in contact with Jennifer yet. Patient reported to Jennifer that spouse would probably not call her due to being nervious and confused about what hospice will mean for patient.  SW met with patient. [id number].  He continues to be on board with home hospice but has not signed admission paperwork because he wants spouse to be in agreement also.  Patient is currently DNR.  SW educated patient on facts about hospice and write down some points to discuss with spouse.  Spouse on her way to hospice today.  SW encouraged patient to have spouse talk with Jennifer.  SW left voice mail for Jennifer with above updates and update given to unit rn.  Will continue to assist with d/c needs.  Anticipated Discharge Information  Barriers to d/c:  No hospice admission paperwork signed yet.       Discharge Plan: Home with hospice.

## 2023-02-18 NOTE — PROGRESS NOTES
[facility] Medicine Daily Progress Note    Date of Service  2/18/2023    Chief Complaint  Nikolas Bob is a 67 y.o. male admitted 2/8/2023 with acute on chronic respiratory failure due to COPD exacerbation     Hospital Course  67 y.o. male with a past medical history of ankylosing spondylitis, kyphoscoliosis, pulmonary hypertension, cor pulmonale, right hilar lung nodule, chronic back pain and history of severe anxiety as well as oxygen dependent COPD most recently on 4 to 5 L after  recent admission for COPD exacerbation in Jan 2023 , who presented on  2/8/2023 with worsening shortness of breath for the prior 2 days, pt had multiple sick contacts the week prior. On arrival patient was urgently intubated due to acute hypoxic/hypercapnic respiratory failure.  He was started on aggressive pulmonary measures and admitted to ICU. In the emergency room a viral swab was negative for influenza, RSV, and COVID.  Procalcitonin was less than 0.5 though chest x-ray revealed hazy interstitial opacities favoring multifocal pneumonia.      He was extubated on 2/19 to rescue BiPAP but he refused it and required reintubation. He was successfully extubated on 2/11/2023 and has been on nocturnal BiPAP post extubation. Extended viral panel was positive for human metapneumovirus by PCR.  He has since been transferred to the medical floor     Interval Problem Update  Pt seen and examined, patient is doing well today however he notes a very terrible night.  States that he was very anxious and his pain medications were not given appropraitely (leaking IV and not going in x 2 and did not feel he was being treated well overnight). Doing much better today and happy with care today.   - IV dressing soiled, discussed that we will have changed.   - pending hospice form completion, he will not sign without wife. Also does not feel ready to go yet. Discussed starting oral morphine to get him ready for discharge, he is very anxious about changes  given his rough night an would like to rest before making any new change.   - palliative following, pending hospice set up completion   - continue nebs, inhaler therapy, IS, prednisone   - continue supportive care with pain control, anxiety and air hunger     I have discussed this patient's plan of care and discharge plan at IDT rounds today with Case Management, Nursing, Nursing leadership, and other members of the IDT team.    Consultants/Specialty  critical care and palliative care    Code Status  DNAR/DNI    Disposition  Patient is medically cleared for discharge.   Anticipate discharge to to hospice.  I have placed the appropriate orders for post-discharge needs.    Review of Systems  Review of Systems   Constitutional:  Positive for malaise/fatigue. Negative for chills and fever.   HENT:  Negative for sore throat.    Eyes:  Negative for blurred vision.   Respiratory:  Positive for shortness of breath. Negative for sputum production and wheezing.    Cardiovascular:  Negative for chest pain.   Gastrointestinal:  Positive for constipation and nausea. Negative for vomiting.   Genitourinary:  Negative for dysuria.   Musculoskeletal:  Positive for back pain and joint pain.   Neurological:  Positive for weakness. Negative for dizziness.   Psychiatric/Behavioral:  Positive for depression. Negative for substance abuse. The patient is nervous/anxious.       Physical Exam  Temp:  [36.3 °C (97.3 °F)-36.6 °C (97.9 °F)] 36.4 °C (97.6 °F)  Pulse:  [64-71] 64  Resp:  [16-22] 16  BP: ()/(39-69) 110/55  SpO2:  [96 %-100 %] 100 %    Physical Exam  Vitals and nursing note reviewed.   Constitutional:       Appearance: He is ill-appearing. He is not toxic-appearing.      Comments: Frail appearing male   HENT:      Head: Normocephalic and atraumatic.      Mouth/Throat:      Mouth: Mucous membranes are moist.      Pharynx: Oropharynx is clear.   Eyes:      Extraocular Movements: Extraocular movements intact.       Conjunctiva/sclera: Conjunctivae normal.   Cardiovascular:      Rate and Rhythm: Normal rate and regular rhythm.      Heart sounds: Normal heart sounds.   Pulmonary:      Effort: No respiratory distress.      Breath sounds: Wheezing present. No rhonchi.      Comments: Diminished In bases, mild expiratory wheezing   Abdominal:      General: Bowel sounds are normal. There is no distension.      Palpations: Abdomen is soft.      Tenderness: There is no abdominal tenderness. There is no guarding or rebound.   Musculoskeletal:         General: Normal range of motion.      Cervical back: Neck supple.      Right lower leg: No edema.      Left lower leg: No edema.   Skin:     General: Skin is warm and dry.   Neurological:      General: No focal deficit present.      Mental Status: He is alert and oriented to person, place, and time. Mental status is at baseline.   Psychiatric:         Mood and Affect: Mood normal.         Thought Content: Thought content normal.         Judgment: Judgment normal.      Comments: Anxious        Fluids    Intake/Output Summary (Last 24 hours) at 2/18/2023 1447  Last data filed at 2/18/2023 0937  Gross per 24 hour   Intake 2000 ml   Output 400 ml   Net 1600 ml         Laboratory        Recent Labs     02/16/23  0234   SODIUM 139   POTASSIUM 4.4   CHLORIDE 95*   CO2 36*   GLUCOSE 95   BUN 30*   CREATININE 1.02   CALCIUM 9.1                     Imaging  [id number] VIEW   Final Result         No specific finding to suggest small bowel obstruction.      Moderate amount of stool in the rectum.      DX-CHEST-PORTABLE (1 VIEW)   Final Result         1.  Pulmonary edema and/or infiltrates are identified, which appear somewhat increased since the prior exam.   2.  Small bilateral pleural effusions, increased since prior.   3.  Atherosclerosis      DX-CHEST-PORTABLE (1 VIEW)   Final Result         1.  Pulmonary edema and/or infiltrates are identified, which are somewhat decreased since the prior exam.    2.  Trace bilateral pleural effusions   3.  Atherosclerosis      DX-ABDOMEN FOR TUBE PLACEMENT   Final Result      Enteric tube has been placed and the tip projects over the stomach.      EC-ECHOCARDIOGRAM COMPLETE W/O CONT   Final Result      DX-CHEST-PORTABLE (1 VIEW)   Final Result      1.  Interval placement of an endotracheal tube which terminates in satisfactory position at the level of the aortic arch.   2.  Similar appearance of patchy bilateral interstitial opacities which could be related to edema and/or infection.      DX-CHEST-PORTABLE (1 VIEW)   Final Result         1.  Pulmonary edema and/or infiltrates are identified, which appear somewhat increased since the prior exam.   2.  Trace bilateral pleural effusions   3.  Atherosclerosis      US-EXTREMITY VENOUS LOWER BILAT   Final Result      DX-CHEST-PORTABLE (1 VIEW)   Final Result         1.  Right pulmonary infiltrate, overall infiltrates are decreased since prior study.   2.  Trace bilateral pleural effusions   3.  Atherosclerosis      DX-CHEST-PORTABLE (1 VIEW)   Final Result         1.  Pulmonary edema and/or infiltrates.   2.  Trace bilateral pleural effusions   3.  Atherosclerosis      DX-ABDOMEN FOR TUBE PLACEMENT   Final Result         1.  Moderate stool in the colon suggests changes of constipation, otherwise nonspecific bowel gas pattern in the upper abdomen   2.  Nasogastric tube tip terminates overlying the expected location of the gastric fundus.   3.  Pulmonary edema and/or infiltrates.   4.  Trace bilateral pleural effusions             Assessment/Plan  * Chronic obstructive pulmonary disease with acute exacerbation (HCC)- (present on admission)  Assessment & Plan  Severe acute on chronic respiratory failure secondary to COPD exacerbation.  Was admitted to the intensive care unit on mechanical ventilation and IV Solu-Medrol  This appears to be prompted by human metapneumovirus and antibiotics are not indicated  He will be placed on a  prednisone taper  Pulmonology following   He is a non-smoker  Symbicort as well as DuoNeb's as needed  Currently at baseline but desats to low 80's with minimal exertion   Palliative following   Planning on DC home with hospice     Anxiety- (present on admission)  Assessment & Plan  Hx of moderate/sevre anxiety and panic attacks   - on zoloft at baseline   - will add Buspar 5 mg BID and titrate up to help with symptoms   - ativan prn, dc atarax     Anemia- (present on admission)  Assessment & Plan  Upon review of the records his hemoglobin has fluctuated though now is lower than previous at 9.8.  This is appropriate to follow-up as an outpatient    Acute renal failure with tubular necrosis (HCC)- (present on admission)  Assessment & Plan  This has improved    Acute pulmonary edema (HCC)- (present on admission)  Assessment & Plan  Treated with IV Lasix  Appears resolved, pt close to baseline O2     Hypertension  Assessment & Plan  He has been started on metoprolol  Low dose lisinopril started 2/14, 2.5 mg, adjust as needed     Swelling of lower extremity  Assessment & Plan  Minimal   Holding lasix     Pulmonary nodule- (present on admission)  Assessment & Plan  Appropriate to follow-up outpatient    Ankylosing spondylitis (HCC)- (present on admission)  Assessment & Plan  Continue oxycodone for pain.    Elevated troponin- (present on admission)  Assessment & Plan  Troponin went up to 208 consistent with stress response due to respiratory failure  Echo reveals an EF 50% without wall motion abnormalities    Acute hypoxemic respiratory failure (HCC)- (present on admission)  Assessment & Plan  Acute on chronic respiratory failure.  His baseline oxygen has been between 4 and 5 L.  He required endotracheal intubation  Since stabilized back to home oxygen   Treatment as above         VTE prophylaxis: Xarelto 10 mg daily as prophylaxis    I have performed a physical exam and reviewed and updated ROS and Plan today  (2/18/2023). In review of yesterday's note (2/17/2023), there are no changes except as documented above.

## 2023-02-18 NOTE — CARE PLAN
10:58 PM pt very anxious and tearful. Pt states pain meds are not working. Notified on call provider fahad gonzalez of pt situation. Provider states they will review pt case.    The patient is Unstable - High likelihood or risk of patient condition declining or worsening    Shift Goals  Clinical Goals: Anxiety  Patient Goals: Pain and anxiety  Family Goals: SHELLY    Progress made toward(s) clinical / shift goals:  y.   Problem: Knowledge Deficit - COPD  Goal: Patient/significant other demonstrates understanding of disease process, utilization of the Action Plan, medications and discharge instruction  Outcome: Progressing     Problem: Risk for Aspiration  Goal: Patient's risk for aspiration will be absent or decrease  Outcome: Progressing     Problem: Impaired Gas Exchange  Goal: Patient will demonstrate improved ventilation and adequate oxygenation and participate in treatment regimen within the level of ability/situation.  Outcome: Progressing     Problem: Ineffective Airway Clearance  Goal: Patient will maintain patent airway with clear/clearing breath sounds  Outcome: Progressing       Patient is not progressing towards the following goals:

## 2023-02-18 NOTE — THERAPY
Physical Therapy   Daily Treatment     Patient Name: Nikolas Bob  Age:  67 y.o., Sex:  male  Medical Record #: [id number]  Today's Date: 2/17/2023     Precautions  Precautions: Fall Risk;Swallow Precautions ( See Comments)    Assessment    Pt greeted and seen for PT treatment. Pt demo'd high anxiety around pain and very focused on goals for comfort. Pt was able to perform bed mobility w/ HOB highly elevated and SPV. Pt was able to stand for 5 min w/ CGA. Pt performed seated stretches EOB and marching in place. Pt currently limited by decreased activity tolerance which negatively impacts functional mobility. Pt will continue to benefit from skilled PT to address deficits.      Plan    Physical Therapy Treatment Plan  Physical Therapy Treatment Plan: Continue Current Treatment Plan    DC Equipment Recommendations: Unable to determine at this time  Discharge Recommendations: Recommend home health for continued physical therapy services       02/17/23 1130   Cognition    Comments anxious around pain, very focused on wanting comfort   Balance   Sitting Balance (Static) Fair   Sitting Balance (Dynamic) Fair   Standing Balance (Static) Fair -   Standing Balance (Dynamic) Fair -   Weight Shift Sitting Fair   Weight Shift Standing Fair   Skilled Intervention Verbal Cuing   Comments no AD   Bed Mobility    Supine to Sit Supervised   Sit to Supine Supervised   Scooting Supervised   Skilled Intervention Verbal Cuing   Comments HOB highly elevated. Pt performed LE stretching sitting EOB   Gait Analysis   Gait Level Of Assist Refused   Comments pt did take some steps in place   Functional Mobility   Sit to Stand Contact Guard Assist   Mobility bed mobility, STS x1, marching in place, stretching LE on EOB   Skilled Intervention Verbal Cuing   Comments good tolerance for standing   Short Term Goals    Short Term Goal # 1 Pt will perform sit<>stand and stand pivot transfers with supervision w/in 6 visits.   Goal Outcome # 1  Progressing as expected   Short Term Goal # 2 Pt will ambulate 75' with supervision while maintaining O2 > 88% with supervision within 6 visits.   Goal Outcome # 2 Goal not met   Supervising Physical Therapist (PTA Treatments Only)   Supervising Physical Therapist Bonnie Deleon

## 2023-02-19 PROCEDURE — 770006 HCHG ROOM/CARE - MED/SURG/GYN SEMI*

## 2023-02-19 PROCEDURE — 700101 HCHG RX REV CODE 250: Performed by: STUDENT IN AN ORGANIZED HEALTH CARE EDUCATION/TRAINING PROGRAM

## 2023-02-19 PROCEDURE — 700102 HCHG RX REV CODE 250 W/ 637 OVERRIDE(OP): Performed by: HOSPITALIST

## 2023-02-19 PROCEDURE — 94640 AIRWAY INHALATION TREATMENT: CPT

## 2023-02-19 PROCEDURE — 99233 SBSQ HOSP IP/OBS HIGH 50: CPT | Performed by: STUDENT IN AN ORGANIZED HEALTH CARE EDUCATION/TRAINING PROGRAM

## 2023-02-19 PROCEDURE — 700111 HCHG RX REV CODE 636 W/ 250 OVERRIDE (IP): Performed by: STUDENT IN AN ORGANIZED HEALTH CARE EDUCATION/TRAINING PROGRAM

## 2023-02-19 PROCEDURE — A9270 NON-COVERED ITEM OR SERVICE: HCPCS | Performed by: INTERNAL MEDICINE

## 2023-02-19 PROCEDURE — 700102 HCHG RX REV CODE 250 W/ 637 OVERRIDE(OP): Performed by: INTERNAL MEDICINE

## 2023-02-19 PROCEDURE — 700102 HCHG RX REV CODE 250 W/ 637 OVERRIDE(OP): Performed by: STUDENT IN AN ORGANIZED HEALTH CARE EDUCATION/TRAINING PROGRAM

## 2023-02-19 PROCEDURE — 94760 N-INVAS EAR/PLS OXIMETRY 1: CPT

## 2023-02-19 PROCEDURE — A9270 NON-COVERED ITEM OR SERVICE: HCPCS | Performed by: STUDENT IN AN ORGANIZED HEALTH CARE EDUCATION/TRAINING PROGRAM

## 2023-02-19 PROCEDURE — A9270 NON-COVERED ITEM OR SERVICE: HCPCS | Performed by: HOSPITALIST

## 2023-02-19 RX ORDER — MORPHINE SULFATE 10 MG/5ML
10 SOLUTION ORAL
Status: DISCONTINUED | OUTPATIENT
Start: 2023-02-19 | End: 2023-02-22 | Stop reason: HOSPADM

## 2023-02-19 RX ORDER — MORPHINE SULFATE 10 MG/5ML
5-10 SOLUTION ORAL
Status: DISCONTINUED | OUTPATIENT
Start: 2023-02-19 | End: 2023-02-19

## 2023-02-19 RX ORDER — MORPHINE SULFATE 10 MG/5ML
5 SOLUTION ORAL
Status: DISCONTINUED | OUTPATIENT
Start: 2023-02-19 | End: 2023-02-22 | Stop reason: HOSPADM

## 2023-02-19 RX ORDER — MORPHINE SULFATE 4 MG/ML
4 INJECTION INTRAVENOUS EVERY 4 HOURS PRN
Status: DISCONTINUED | OUTPATIENT
Start: 2023-02-19 | End: 2023-02-22 | Stop reason: HOSPADM

## 2023-02-19 RX ORDER — BUDESONIDE AND FORMOTEROL FUMARATE DIHYDRATE 160; 4.5 UG/1; UG/1
2 AEROSOL RESPIRATORY (INHALATION)
Status: DISCONTINUED | OUTPATIENT
Start: 2023-02-19 | End: 2023-02-22 | Stop reason: HOSPADM

## 2023-02-19 RX ORDER — MORPHINE SULFATE 4 MG/ML
2 INJECTION INTRAVENOUS EVERY 4 HOURS PRN
Status: DISCONTINUED | OUTPATIENT
Start: 2023-02-19 | End: 2023-02-22 | Stop reason: HOSPADM

## 2023-02-19 RX ADMIN — ASPIRIN 81 MG 81 MG: 81 TABLET ORAL at 08:38

## 2023-02-19 RX ADMIN — MORPHINE SULFATE 10 MG: 10 SOLUTION ORAL at 22:56

## 2023-02-19 RX ADMIN — METOPROLOL TARTRATE 75 MG: 50 TABLET, FILM COATED ORAL at 17:41

## 2023-02-19 RX ADMIN — BUDESONIDE AND FORMOTEROL FUMARATE DIHYDRATE 2 PUFF: 160; 4.5 AEROSOL RESPIRATORY (INHALATION) at 12:15

## 2023-02-19 RX ADMIN — BUSPIRONE HYDROCHLORIDE 5 MG: 10 TABLET ORAL at 20:29

## 2023-02-19 RX ADMIN — ONDANSETRON 4 MG: 2 INJECTION INTRAMUSCULAR; INTRAVENOUS at 00:02

## 2023-02-19 RX ADMIN — PREDNISONE 20 MG: 20 TABLET ORAL at 08:37

## 2023-02-19 RX ADMIN — MORPHINE SULFATE 4 MG: 4 INJECTION INTRAVENOUS at 04:14

## 2023-02-19 RX ADMIN — ONDANSETRON 4 MG: 2 INJECTION INTRAMUSCULAR; INTRAVENOUS at 17:46

## 2023-02-19 RX ADMIN — MORPHINE SULFATE 5 MG: 10 SOLUTION ORAL at 12:12

## 2023-02-19 RX ADMIN — BUDESONIDE AND FORMOTEROL FUMARATE DIHYDRATE 2 PUFF: 160; 4.5 AEROSOL RESPIRATORY (INHALATION) at 22:56

## 2023-02-19 RX ADMIN — SERTRALINE 25 MG: 50 TABLET, FILM COATED ORAL at 08:38

## 2023-02-19 RX ADMIN — LEVALBUTEROL 1.25 MG: 1.25 SOLUTION RESPIRATORY (INHALATION) at 07:17

## 2023-02-19 RX ADMIN — LEVALBUTEROL 1.25 MG: 1.25 SOLUTION RESPIRATORY (INHALATION) at 20:39

## 2023-02-19 RX ADMIN — LISINOPRIL 2.5 MG: 5 TABLET ORAL at 04:13

## 2023-02-19 RX ADMIN — BUDESONIDE AND FORMOTEROL FUMARATE DIHYDRATE 2 PUFF: 160; 4.5 AEROSOL RESPIRATORY (INHALATION) at 01:00

## 2023-02-19 RX ADMIN — MORPHINE SULFATE 10 MG: 10 SOLUTION ORAL at 16:42

## 2023-02-19 RX ADMIN — MORPHINE SULFATE 4 MG: 4 INJECTION INTRAVENOUS at 00:02

## 2023-02-19 RX ADMIN — MORPHINE SULFATE 4 MG: 4 INJECTION INTRAVENOUS at 08:44

## 2023-02-19 RX ADMIN — BUSPIRONE HYDROCHLORIDE 5 MG: 10 TABLET ORAL at 08:38

## 2023-02-19 RX ADMIN — ONDANSETRON 4 MG: 2 INJECTION INTRAMUSCULAR; INTRAVENOUS at 08:32

## 2023-02-19 RX ADMIN — MORPHINE SULFATE 5 MG: 10 SOLUTION ORAL at 13:09

## 2023-02-19 RX ADMIN — RIVAROXABAN 10 MG: 10 TABLET, FILM COATED ORAL at 17:41

## 2023-02-19 RX ADMIN — ONDANSETRON 4 MG: 2 INJECTION INTRAMUSCULAR; INTRAVENOUS at 04:13

## 2023-02-19 RX ADMIN — MORPHINE SULFATE 10 MG: 10 SOLUTION ORAL at 20:29

## 2023-02-19 RX ADMIN — METOPROLOL TARTRATE 75 MG: 50 TABLET, FILM COATED ORAL at 04:12

## 2023-02-19 ASSESSMENT — ENCOUNTER SYMPTOMS
BACK PAIN: 1
NAUSEA: 1
BLURRED VISION: 0
FEVER: 0
VOMITING: 0
DEPRESSION: 1
SPUTUM PRODUCTION: 0
SORE THROAT: 0
WHEEZING: 0
CHILLS: 0
NERVOUS/ANXIOUS: 1
WEAKNESS: 1
SHORTNESS OF BREATH: 1
CONSTIPATION: 1
DIZZINESS: 0

## 2023-02-19 ASSESSMENT — PAIN DESCRIPTION - PAIN TYPE
TYPE: ACUTE PAIN;CHRONIC PAIN

## 2023-02-19 ASSESSMENT — LIFESTYLE VARIABLES: SUBSTANCE_ABUSE: 0

## 2023-02-19 NOTE — PROGRESS NOTES
[facility] Medicine Daily Progress Note    Date of Service  2/19/2023    Chief Complaint  Nikolas Bob is a 67 y.o. male admitted 2/8/2023 with acute on chronic respiratory failure due to COPD exacerbation     Hospital Course  67 y.o. male with a past medical history of ankylosing spondylitis, kyphoscoliosis, pulmonary hypertension, cor pulmonale, right hilar lung nodule, chronic back pain and history of severe anxiety as well as oxygen dependent COPD most recently on 4 to 5 L after  recent admission for COPD exacerbation in Jan 2023 , who presented on  2/8/2023 with worsening shortness of breath for the prior 2 days, pt had multiple sick contacts the week prior. On arrival patient was urgently intubated due to acute hypoxic/hypercapnic respiratory failure.  He was started on aggressive pulmonary measures and admitted to ICU. In the emergency room a viral swab was negative for influenza, RSV, and COVID.  Procalcitonin was less than 0.5 though chest x-ray revealed hazy interstitial opacities favoring multifocal pneumonia.      He was extubated on 2/19 to rescue BiPAP but he refused it and required reintubation. He was successfully extubated on 2/11/2023 and has been on nocturnal BiPAP post extubation. Extended viral panel was positive for human metapneumovirus by PCR.  He has since been transferred to the medical floor     Interval Problem Update  Pt seen and examined, no acute issues overnight. Remains anxious overall but current regiment seems to be working.   - in anticipation of discharge home, will start oral morphine solution to replace IV, however pt is very nervous, will keep IV available only if oral dosing is inadequate at treating his symptoms of pain and air hunger/anxiety   - increased congestion and cough, worried he got a virus, grandchildren at home sick again, wants to be tested, viral panel ordered   - patient is  pending home hospice   - palliative following, pending hospice set up completion   -  continue nebs, inhaler therapy, IS, prednisone   - continue supportive care with pain control, anxiety and air hunger     I have discussed this patient's plan of care and discharge plan at IDT rounds today with Case Management, Nursing, Nursing leadership, and other members of the IDT team.    Consultants/Specialty  critical care and palliative care    Code Status  DNAR/DNI    Disposition  Patient is medically cleared for discharge.   Anticipate discharge to to hospice.  I have placed the appropriate orders for post-discharge needs.    Review of Systems  Review of Systems   Constitutional:  Positive for malaise/fatigue. Negative for chills and fever.   HENT:  Negative for sore throat.    Eyes:  Negative for blurred vision.   Respiratory:  Positive for shortness of breath. Negative for sputum production and wheezing.    Cardiovascular:  Negative for chest pain.   Gastrointestinal:  Positive for constipation and nausea. Negative for vomiting.   Genitourinary:  Negative for dysuria.   Musculoskeletal:  Positive for back pain and joint pain.   Neurological:  Positive for weakness. Negative for dizziness.   Psychiatric/Behavioral:  Positive for depression. Negative for substance abuse. The patient is nervous/anxious.       Physical Exam  Temp:  [36 °C (96.8 °F)-37 °C (98.6 °F)] 37 °C (98.6 °F)  Pulse:  [56-85] 85  Resp:  [15-18] 18  BP: (102-154)/(55-68) 154/68  SpO2:  [97 %-100 %] 97 %    Physical Exam  Vitals and nursing note reviewed.   Constitutional:       Appearance: He is ill-appearing. He is not toxic-appearing.      Comments: Frail appearing male   HENT:      Head: Normocephalic and atraumatic.      Mouth/Throat:      Mouth: Mucous membranes are moist.      Pharynx: Oropharynx is clear.   Eyes:      Extraocular Movements: Extraocular movements intact.      Conjunctiva/sclera: Conjunctivae normal.   Cardiovascular:      Rate and Rhythm: Normal rate and regular rhythm.      Heart sounds: Normal heart sounds.    Pulmonary:      Effort: No respiratory distress.      Breath sounds: Wheezing present. No rhonchi.      Comments: Diminished In bases, mild expiratory wheezing   Abdominal:      General: Bowel sounds are normal. There is no distension.      Palpations: Abdomen is soft.      Tenderness: There is no abdominal tenderness. There is no guarding or rebound.   Musculoskeletal:         General: Normal range of motion.      Cervical back: Neck supple.      Right lower leg: No edema.      Left lower leg: No edema.   Skin:     General: Skin is warm and dry.   Neurological:      General: No focal deficit present.      Mental Status: He is alert and oriented to person, place, and time. Mental status is at baseline.   Psychiatric:         Mood and Affect: Mood normal.         Thought Content: Thought content normal.         Judgment: Judgment normal.      Comments: Anxious        Fluids    Intake/Output Summary (Last 24 hours) at 2/19/2023 1518  Last data filed at 2/19/2023 0844  Gross per 24 hour   Intake 240 ml   Output 1300 ml   Net -1060 ml       Laboratory                            Imaging  [id number] VIEW   Final Result         No specific finding to suggest small bowel obstruction.      Moderate amount of stool in the rectum.      DX-CHEST-PORTABLE (1 VIEW)   Final Result         1.  Pulmonary edema and/or infiltrates are identified, which appear somewhat increased since the prior exam.   2.  Small bilateral pleural effusions, increased since prior.   3.  Atherosclerosis      DX-CHEST-PORTABLE (1 VIEW)   Final Result         1.  Pulmonary edema and/or infiltrates are identified, which are somewhat decreased since the prior exam.   2.  Trace bilateral pleural effusions   3.  Atherosclerosis      DX-ABDOMEN FOR TUBE PLACEMENT   Final Result      Enteric tube has been placed and the tip projects over the stomach.      EC-ECHOCARDIOGRAM COMPLETE W/O CONT   Final Result      DX-CHEST-PORTABLE (1 VIEW)   Final Result       1.  Interval placement of an endotracheal tube which terminates in satisfactory position at the level of the aortic arch.   2.  Similar appearance of patchy bilateral interstitial opacities which could be related to edema and/or infection.      DX-CHEST-PORTABLE (1 VIEW)   Final Result         1.  Pulmonary edema and/or infiltrates are identified, which appear somewhat increased since the prior exam.   2.  Trace bilateral pleural effusions   3.  Atherosclerosis      US-EXTREMITY VENOUS LOWER BILAT   Final Result      DX-CHEST-PORTABLE (1 VIEW)   Final Result         1.  Right pulmonary infiltrate, overall infiltrates are decreased since prior study.   2.  Trace bilateral pleural effusions   3.  Atherosclerosis      DX-CHEST-PORTABLE (1 VIEW)   Final Result         1.  Pulmonary edema and/or infiltrates.   2.  Trace bilateral pleural effusions   3.  Atherosclerosis      DX-ABDOMEN FOR TUBE PLACEMENT   Final Result         1.  Moderate stool in the colon suggests changes of constipation, otherwise nonspecific bowel gas pattern in the upper abdomen   2.  Nasogastric tube tip terminates overlying the expected location of the gastric fundus.   3.  Pulmonary edema and/or infiltrates.   4.  Trace bilateral pleural effusions             Assessment/Plan  * Chronic obstructive pulmonary disease with acute exacerbation (HCC)- (present on admission)  Assessment & Plan  Severe acute on chronic respiratory failure secondary to COPD exacerbation.  Was admitted to the intensive care unit on mechanical ventilation and IV Solu-Medrol  This appears to be prompted by human metapneumovirus and antibiotics are not indicated  He will be placed on a prednisone taper  Pulmonology following   He is a non-smoker  Symbicort as well as DuoNeb's as needed  Currently at baseline but desats to low 80's with minimal exertion   Palliative following   Planning on DC home with hospice     Anxiety- (present on admission)  Assessment & Plan  Hx of  moderate/sevre anxiety and panic attacks   - on zoloft at baseline   - will add Buspar 5 mg BID and titrate up to help with symptoms   - ativan prn, dc atarax     Anemia- (present on admission)  Assessment & Plan  Upon review of the records his hemoglobin has fluctuated though now is lower than previous at 9.8.  This is appropriate to follow-up as an outpatient    Acute renal failure with tubular necrosis (HCC)- (present on admission)  Assessment & Plan  This has improved    Acute pulmonary edema (HCC)- (present on admission)  Assessment & Plan  Treated with IV Lasix  Appears resolved, pt close to baseline O2     Hypertension  Assessment & Plan  He has been started on metoprolol  Low dose lisinopril started 2/14, 2.5 mg, adjust as needed     Swelling of lower extremity  Assessment & Plan  Minimal   Holding lasix     Pulmonary nodule- (present on admission)  Assessment & Plan  Appropriate to follow-up outpatient    Ankylosing spondylitis (HCC)- (present on admission)  Assessment & Plan  Continue oxycodone for pain.    Elevated troponin- (present on admission)  Assessment & Plan  Troponin went up to 208 consistent with stress response due to respiratory failure  Echo reveals an EF 50% without wall motion abnormalities    Acute hypoxemic respiratory failure (HCC)- (present on admission)  Assessment & Plan  Acute on chronic respiratory failure.  His baseline oxygen has been between 4 and 5 L.  He required endotracheal intubation  Since stabilized back to home oxygen   Treatment as above         VTE prophylaxis: Xarelto 10 mg daily as prophylaxis    I have performed a physical exam and reviewed and updated ROS and Plan today (2/19/2023). In review of yesterday's note (2/18/2023), there are no changes except as documented above.

## 2023-02-19 NOTE — PROGRESS NOTES
Received bedside report from night shift RN. Patient is A&Ox4. Patient is on 5L oxymask, VSS. Patient complains of pain, he is getting 4 mg iv Zofran and 4 mg of IV Morphine Q4. Patient struggles with anxiety, he has Xanex TID. POC discussed with patient, all questions answered.     Patient complaining of iv dressing being dirty, having old drainage. Dressing changed. No further needs at this time. Bed is in lowest/locked position. Call light and belongings are within reach. Hourly rounding in place.

## 2023-02-20 LAB
B PARAP IS1001 DNA NPH QL NAA+NON-PROBE: NOT DETECTED
B PERT.PT PRMT NPH QL NAA+NON-PROBE: NOT DETECTED
C PNEUM DNA NPH QL NAA+NON-PROBE: NOT DETECTED
FLUAV RNA NPH QL NAA+NON-PROBE: NOT DETECTED
FLUBV RNA NPH QL NAA+NON-PROBE: NOT DETECTED
HADV DNA NPH QL NAA+NON-PROBE: NOT DETECTED
HCOV 229E RNA NPH QL NAA+NON-PROBE: NOT DETECTED
HCOV HKU1 RNA NPH QL NAA+NON-PROBE: NOT DETECTED
HCOV NL63 RNA NPH QL NAA+NON-PROBE: NOT DETECTED
HCOV OC43 RNA NPH QL NAA+NON-PROBE: NOT DETECTED
HMPV RNA NPH QL NAA+NON-PROBE: NOT DETECTED
HPIV1 RNA NPH QL NAA+NON-PROBE: NOT DETECTED
HPIV2 RNA NPH QL NAA+NON-PROBE: NOT DETECTED
HPIV3 RNA NPH QL NAA+NON-PROBE: NOT DETECTED
HPIV4 RNA NPH QL NAA+NON-PROBE: NOT DETECTED
M PNEUMO DNA NPH QL NAA+NON-PROBE: NOT DETECTED
RSV RNA NPH QL NAA+NON-PROBE: NOT DETECTED
RV+EV RNA NPH QL NAA+NON-PROBE: NOT DETECTED
SARS-COV-2 RNA NPH QL NAA+NON-PROBE: NOTDETECTED

## 2023-02-20 PROCEDURE — 700111 HCHG RX REV CODE 636 W/ 250 OVERRIDE (IP): Performed by: STUDENT IN AN ORGANIZED HEALTH CARE EDUCATION/TRAINING PROGRAM

## 2023-02-20 PROCEDURE — 97110 THERAPEUTIC EXERCISES: CPT

## 2023-02-20 PROCEDURE — 94760 N-INVAS EAR/PLS OXIMETRY 1: CPT

## 2023-02-20 PROCEDURE — 87486 CHLMYD PNEUM DNA AMP PROBE: CPT

## 2023-02-20 PROCEDURE — 97535 SELF CARE MNGMENT TRAINING: CPT

## 2023-02-20 PROCEDURE — 700101 HCHG RX REV CODE 250: Performed by: INTERNAL MEDICINE

## 2023-02-20 PROCEDURE — 700102 HCHG RX REV CODE 250 W/ 637 OVERRIDE(OP): Performed by: HOSPITALIST

## 2023-02-20 PROCEDURE — 700102 HCHG RX REV CODE 250 W/ 637 OVERRIDE(OP): Performed by: INTERNAL MEDICINE

## 2023-02-20 PROCEDURE — A9270 NON-COVERED ITEM OR SERVICE: HCPCS | Performed by: INTERNAL MEDICINE

## 2023-02-20 PROCEDURE — A9270 NON-COVERED ITEM OR SERVICE: HCPCS | Performed by: HOSPITALIST

## 2023-02-20 PROCEDURE — 700101 HCHG RX REV CODE 250: Performed by: STUDENT IN AN ORGANIZED HEALTH CARE EDUCATION/TRAINING PROGRAM

## 2023-02-20 PROCEDURE — 770006 HCHG ROOM/CARE - MED/SURG/GYN SEMI*

## 2023-02-20 PROCEDURE — 94640 AIRWAY INHALATION TREATMENT: CPT

## 2023-02-20 PROCEDURE — 700102 HCHG RX REV CODE 250 W/ 637 OVERRIDE(OP): Performed by: STUDENT IN AN ORGANIZED HEALTH CARE EDUCATION/TRAINING PROGRAM

## 2023-02-20 PROCEDURE — 99233 SBSQ HOSP IP/OBS HIGH 50: CPT | Performed by: STUDENT IN AN ORGANIZED HEALTH CARE EDUCATION/TRAINING PROGRAM

## 2023-02-20 PROCEDURE — 87798 DETECT AGENT NOS DNA AMP: CPT | Mod: 91

## 2023-02-20 PROCEDURE — 97530 THERAPEUTIC ACTIVITIES: CPT

## 2023-02-20 PROCEDURE — 87633 RESP VIRUS 12-25 TARGETS: CPT

## 2023-02-20 PROCEDURE — 87581 M.PNEUMON DNA AMP PROBE: CPT

## 2023-02-20 PROCEDURE — A9270 NON-COVERED ITEM OR SERVICE: HCPCS | Performed by: STUDENT IN AN ORGANIZED HEALTH CARE EDUCATION/TRAINING PROGRAM

## 2023-02-20 RX ORDER — LISINOPRIL 2.5 MG/1
2.5 TABLET ORAL DAILY
Qty: 30 TABLET | Refills: 1 | Status: SHIPPED | OUTPATIENT
Start: 2023-02-21 | End: 2023-02-22 | Stop reason: SDUPTHER

## 2023-02-20 RX ORDER — ROFLUMILAST 250 UG/1
250 TABLET ORAL DAILY
Qty: 28 TABLET | Refills: 0 | Status: SHIPPED | OUTPATIENT
Start: 2023-02-20 | End: 2023-02-22 | Stop reason: SDUPTHER

## 2023-02-20 RX ORDER — ACETAMINOPHEN 500 MG
1000 TABLET ORAL EVERY 8 HOURS PRN
Qty: 30 TABLET | Refills: 0 | Status: SHIPPED | OUTPATIENT
Start: 2023-02-20 | End: 2023-02-22 | Stop reason: SDUPTHER

## 2023-02-20 RX ORDER — AMOXICILLIN 250 MG
2 CAPSULE ORAL 2 TIMES DAILY
Qty: 30 TABLET | Refills: 0 | Status: SHIPPED | OUTPATIENT
Start: 2023-02-20 | End: 2023-02-22 | Stop reason: SDUPTHER

## 2023-02-20 RX ORDER — ASPIRIN 81 MG/1
81 TABLET, CHEWABLE ORAL DAILY
Qty: 100 TABLET | Refills: 0 | Status: SHIPPED | OUTPATIENT
Start: 2023-02-21 | End: 2023-02-22 | Stop reason: SDUPTHER

## 2023-02-20 RX ORDER — LIDOCAINE 50 MG/G
1 PATCH TOPICAL EVERY 24 HOURS
Qty: 10 PATCH | Refills: 1 | Status: SHIPPED | OUTPATIENT
Start: 2023-02-20 | End: 2023-02-22 | Stop reason: SDUPTHER

## 2023-02-20 RX ORDER — ALPRAZOLAM 0.5 MG/1
0.5 TABLET ORAL 3 TIMES DAILY PRN
Qty: 15 TABLET | Refills: 0 | Status: SHIPPED | OUTPATIENT
Start: 2023-02-20 | End: 2023-02-22 | Stop reason: SDUPTHER

## 2023-02-20 RX ORDER — BUSPIRONE HYDROCHLORIDE 5 MG/1
5 TABLET ORAL 3 TIMES DAILY
Qty: 90 TABLET | Refills: 1 | Status: SHIPPED | OUTPATIENT
Start: 2023-02-20 | End: 2023-02-22 | Stop reason: SDUPTHER

## 2023-02-20 RX ORDER — METOPROLOL TARTRATE 75 MG/1
75 TABLET, FILM COATED ORAL 2 TIMES DAILY
Qty: 60 TABLET | Refills: 1 | Status: SHIPPED | OUTPATIENT
Start: 2023-02-20 | End: 2023-02-22 | Stop reason: SDUPTHER

## 2023-02-20 RX ORDER — ONDANSETRON 4 MG/1
4 TABLET, ORALLY DISINTEGRATING ORAL EVERY 6 HOURS PRN
Qty: 10 TABLET | Refills: 0 | Status: SHIPPED | OUTPATIENT
Start: 2023-02-20 | End: 2023-02-22 | Stop reason: SDUPTHER

## 2023-02-20 RX ORDER — MORPHINE SULFATE 10 MG/5ML
5-10 SOLUTION ORAL
Qty: 120 ML | Refills: 0 | Status: SHIPPED | OUTPATIENT
Start: 2023-02-20 | End: 2023-02-22 | Stop reason: SDUPTHER

## 2023-02-20 RX ORDER — PREDNISONE 10 MG/1
10 TABLET ORAL DAILY
Qty: 2 TABLET | Refills: 0 | Status: SHIPPED | OUTPATIENT
Start: 2023-02-20 | End: 2023-02-22

## 2023-02-20 RX ADMIN — SERTRALINE 25 MG: 50 TABLET, FILM COATED ORAL at 08:46

## 2023-02-20 RX ADMIN — ONDANSETRON 4 MG: 2 INJECTION INTRAMUSCULAR; INTRAVENOUS at 06:17

## 2023-02-20 RX ADMIN — MORPHINE SULFATE 10 MG: 10 SOLUTION ORAL at 16:28

## 2023-02-20 RX ADMIN — BUDESONIDE AND FORMOTEROL FUMARATE DIHYDRATE 2 PUFF: 160; 4.5 AEROSOL RESPIRATORY (INHALATION) at 22:53

## 2023-02-20 RX ADMIN — BUSPIRONE HYDROCHLORIDE 5 MG: 10 TABLET ORAL at 20:08

## 2023-02-20 RX ADMIN — MORPHINE SULFATE 10 MG: 10 SOLUTION ORAL at 06:24

## 2023-02-20 RX ADMIN — RIVAROXABAN 10 MG: 10 TABLET, FILM COATED ORAL at 17:25

## 2023-02-20 RX ADMIN — ONDANSETRON 4 MG: 2 INJECTION INTRAMUSCULAR; INTRAVENOUS at 13:27

## 2023-02-20 RX ADMIN — SENNOSIDES AND DOCUSATE SODIUM 2 TABLET: 50; 8.6 TABLET ORAL at 17:26

## 2023-02-20 RX ADMIN — SENNOSIDES AND DOCUSATE SODIUM 2 TABLET: 50; 8.6 TABLET ORAL at 06:11

## 2023-02-20 RX ADMIN — MORPHINE SULFATE 10 MG: 10 SOLUTION ORAL at 23:14

## 2023-02-20 RX ADMIN — ASPIRIN 81 MG 81 MG: 81 TABLET ORAL at 08:46

## 2023-02-20 RX ADMIN — BUSPIRONE HYDROCHLORIDE 5 MG: 10 TABLET ORAL at 08:46

## 2023-02-20 RX ADMIN — METOPROLOL TARTRATE 75 MG: 50 TABLET, FILM COATED ORAL at 17:25

## 2023-02-20 RX ADMIN — METOPROLOL TARTRATE 75 MG: 50 TABLET, FILM COATED ORAL at 06:11

## 2023-02-20 RX ADMIN — BUDESONIDE AND FORMOTEROL FUMARATE DIHYDRATE 2 PUFF: 160; 4.5 AEROSOL RESPIRATORY (INHALATION) at 10:06

## 2023-02-20 RX ADMIN — MORPHINE SULFATE 10 MG: 10 SOLUTION ORAL at 20:08

## 2023-02-20 RX ADMIN — MORPHINE SULFATE 10 MG: 10 SOLUTION ORAL at 10:10

## 2023-02-20 RX ADMIN — PREDNISONE 10 MG: 10 TABLET ORAL at 06:11

## 2023-02-20 RX ADMIN — MORPHINE SULFATE 10 MG: 10 SOLUTION ORAL at 13:27

## 2023-02-20 RX ADMIN — MORPHINE SULFATE 10 MG: 10 SOLUTION ORAL at 02:16

## 2023-02-20 RX ADMIN — MORPHINE SULFATE 2 MG: 4 INJECTION INTRAVENOUS at 00:45

## 2023-02-20 RX ADMIN — LEVALBUTEROL 1.25 MG: 1.25 SOLUTION RESPIRATORY (INHALATION) at 13:10

## 2023-02-20 RX ADMIN — LEVALBUTEROL 1.25 MG: 1.25 SOLUTION RESPIRATORY (INHALATION) at 19:03

## 2023-02-20 RX ADMIN — LISINOPRIL 2.5 MG: 5 TABLET ORAL at 06:11

## 2023-02-20 ASSESSMENT — COGNITIVE AND FUNCTIONAL STATUS - GENERAL
SUGGESTED CMS G CODE MODIFIER DAILY ACTIVITY: CJ
TURNING FROM BACK TO SIDE WHILE IN FLAT BAD: A LITTLE
SUGGESTED CMS G CODE MODIFIER MOBILITY: CK
DRESSING REGULAR LOWER BODY CLOTHING: A LITTLE
DRESSING REGULAR UPPER BODY CLOTHING: A LITTLE
TOILETING: A LITTLE
MOVING FROM LYING ON BACK TO SITTING ON SIDE OF FLAT BED: A LITTLE
DAILY ACTIVITIY SCORE: 20
WALKING IN HOSPITAL ROOM: A LOT
HELP NEEDED FOR BATHING: A LITTLE
CLIMB 3 TO 5 STEPS WITH RAILING: A LOT
MOVING TO AND FROM BED TO CHAIR: A LITTLE
STANDING UP FROM CHAIR USING ARMS: A LITTLE
MOBILITY SCORE: 16

## 2023-02-20 ASSESSMENT — ENCOUNTER SYMPTOMS
CHILLS: 0
BACK PAIN: 1
NERVOUS/ANXIOUS: 1
SHORTNESS OF BREATH: 1
DEPRESSION: 1
BLURRED VISION: 0
SORE THROAT: 0
CONSTIPATION: 1
SPUTUM PRODUCTION: 0
WHEEZING: 0
NAUSEA: 1
WEAKNESS: 1
DIZZINESS: 0
FEVER: 0
VOMITING: 0

## 2023-02-20 ASSESSMENT — PAIN DESCRIPTION - PAIN TYPE
TYPE: ACUTE PAIN;CHRONIC PAIN

## 2023-02-20 ASSESSMENT — GAIT ASSESSMENTS: GAIT LEVEL OF ASSIST: REFUSED

## 2023-02-20 ASSESSMENT — LIFESTYLE VARIABLES: SUBSTANCE_ABUSE: 0

## 2023-02-20 NOTE — CARE PLAN
Problem: Bronchoconstriction  Goal: Improve in air movement and diminished wheezing  Description: Target End Date:  2 to 3 days    1.  Implement inhaled treatments  2.  Evaluate and manage medication effects  Outcome: Progressing  Flowsheets  Taken 2/14/2023 0900 by Ed Sanchez, RRT  Bronchodilator Goals/Outcome: Patient at Stable Baseline  Taken 2/13/2023 0216 by En Valencia, RRT  Bronchodilator Indications:   History / Diagnosis   Physical Exam / Hyperinflation / Wheezing (bronchospasm)  Note:     Respiratory Update    Treatment modality: Xopenex  Frequency:BID, H.R.    Pt tolerating current treatments well with no adverse reactions.

## 2023-02-20 NOTE — CARE PLAN
The patient is Stable - Low risk of patient condition declining or worsening    Shift Goals  Clinical Goals: monitor anxiety  Patient Goals: pain mgmt  Family Goals: diogenes    Progress made toward(s) clinical / shift goals:  Patient stated that he did not want to take his PRN medication for anxiety. Patient medicated for pain per MAR.   Problem: Knowledge Deficit - COPD  Goal: Patient/significant other demonstrates understanding of disease process, utilization of the Action Plan, medications and discharge instruction  Outcome: Progressing     Problem: Risk for Infection - COPD  Goal: Patient will remain free from signs and symptoms of infection  Outcome: Progressing     Problem: Nutrition - Advanced  Goal: Patient will display progressive weight gain toward goal have adequate food and fluid intake  Outcome: Progressing     Problem: Ineffective Airway Clearance  Goal: Patient will maintain patent airway with clear/clearing breath sounds  Outcome: Progressing     Problem: Impaired Gas Exchange  Goal: Patient will demonstrate improved ventilation and adequate oxygenation and participate in treatment regimen within the level of ability/situation.  Outcome: Progressing     Problem: Risk for Aspiration  Goal: Patient's risk for aspiration will be absent or decrease  Outcome: Progressing     Problem: Self Care  Goal: Patient will have the ability to perform ADLs independently or with assistance (bathe, groom, dress, toilet and feed)  Outcome: Progressing     Problem: Knowledge Deficit - Standard  Goal: Patient and family/care givers will demonstrate understanding of plan of care, disease process/condition, diagnostic tests and medications  Outcome: Progressing     Problem: Skin Integrity  Goal: Skin integrity is maintained or improved  Outcome: Progressing     Problem: Pain - Standard  Goal: Alleviation of pain or a reduction in pain to the patient’s comfort goal  Outcome: Progressing     Problem: Fall Risk  Goal: Patient  will remain free from falls  Outcome: Progressing       Patient is not progressing towards the following goals:

## 2023-02-20 NOTE — PROGRESS NOTES
"Hospital Medicine Daily Progress Note    Date of Service  2/20/2023    Chief Complaint  Nikolas Bob is a 67 y.o. male admitted 2/8/2023 with acute on chronic respiratory failure due to COPD exacerbation     Hospital Course  67 y.o. male with a past medical history of ankylosing spondylitis, kyphoscoliosis, pulmonary hypertension, cor pulmonale, right hilar lung nodule, chronic back pain and history of severe anxiety as well as oxygen dependent COPD most recently on 4 to 5 L after  recent admission for COPD exacerbation in Jan 2023 , who presented on  2/8/2023 with worsening shortness of breath for the prior 2 days, pt had multiple sick contacts the week prior. On arrival patient was urgently intubated due to acute hypoxic/hypercapnic respiratory failure.  He was started on aggressive pulmonary measures and admitted to ICU. In the emergency room a viral swab was negative for influenza, RSV, and COVID.  Procalcitonin was less than 0.5 though chest x-ray revealed hazy interstitial opacities favoring multifocal pneumonia.      He was extubated on 2/19 to rescue BiPAP but he refused it and required reintubation. He was successfully extubated on 2/11/2023 and has been on nocturnal BiPAP post extubation. Extended viral panel was positive for human metapneumovirus by PCR.  He has since been transferred to the medical floor     Interval Problem Update  Pt seen and examined, no acute issues overnight. Remains anxious overall doing better.  His vitals are stable and he is stable on 4-5L mask.   Patient is very anxious about going home. He does not feel ready. I spent 37 min. Talking to patient at bedside regarding his goals of care and concerns about going home. He has not mobilized well since admission, states he has stood \"a couple of time\" at bedside but has not physically walked and wants to be able to get around his home. I discussed that therapy will continue to see him but that he needs to work with them to improve to " that level. We discussed that if he should not be able to do that his options would be to go to SNF vs Home but that his current medications regiment would be a barrier to SNF, he verbalized understanding. He is also concerned that he is still infected. Repeat viral panel is negative which I discussed with patient, we also discussed that he has completed antibiotics and that staying in the hospital with sick people only places him at higher risk.   Overall patient is very anxious about the future which is understandable, he has had a tumultuous admission with intubation/re-intubation/reintubation etc. And recently lost his son to cancer on hospice just 2 months ago and quite frankly is scared to die.     I also spent 25 minutes on the phone with Gal his wife, and she is supportive of whatever decision he makes. Her concern is that their two grandchildren that live in the home are currently sick with an URI and she is worried  about him coming home and getting very ill again.     After both conversations, plan is to work with PT/OT for several more sessions to get pt as mobile as possible but that this would not be barrier to home, this will give at least some time for children in home to recover and hospice to work with wife on getting appropriate DME in the home. They will need hospital bed and his oxygen order updated. I do feel that patient is cleared for discharge, but may benefit from several more sessions of therapy to improve his function.  I have discussed this with the CM team.       I have discussed this patient's plan of care and discharge plan at IDT rounds today with Case Management, Nursing, Nursing leadership, and other members of the IDT team.    Consultants/Specialty  critical care and palliative care    Code Status  DNAR/DNI    Disposition  Patient is medically cleared for discharge.   Anticipate discharge to to hospice.  I have placed the appropriate orders for post-discharge needs.    Review of  Systems  Review of Systems   Constitutional:  Positive for malaise/fatigue. Negative for chills and fever.   HENT:  Negative for sore throat.    Eyes:  Negative for blurred vision.   Respiratory:  Positive for shortness of breath. Negative for sputum production and wheezing.    Cardiovascular:  Negative for chest pain.   Gastrointestinal:  Positive for constipation and nausea. Negative for vomiting.   Genitourinary:  Negative for dysuria.   Musculoskeletal:  Positive for back pain and joint pain.   Neurological:  Positive for weakness. Negative for dizziness.   Psychiatric/Behavioral:  Positive for depression. Negative for substance abuse. The patient is nervous/anxious.       Physical Exam  Temp:  [36.4 °C (97.5 °F)-36.9 °C (98.5 °F)] 36.9 °C (98.5 °F)  Pulse:  [60-95] 76  Resp:  [16-18] 18  BP: (121-146)/() 126/83  SpO2:  [99 %-100 %] 99 %    Physical Exam  Vitals and nursing note reviewed.   Constitutional:       Appearance: He is ill-appearing. He is not toxic-appearing.      Comments: Frail appearing male   HENT:      Head: Normocephalic and atraumatic.      Mouth/Throat:      Mouth: Mucous membranes are moist.      Pharynx: Oropharynx is clear.   Eyes:      Extraocular Movements: Extraocular movements intact.      Conjunctiva/sclera: Conjunctivae normal.   Cardiovascular:      Rate and Rhythm: Normal rate and regular rhythm.      Heart sounds: Normal heart sounds.   Pulmonary:      Effort: No respiratory distress.      Breath sounds: Wheezing present. No rhonchi.      Comments: Diminished In bases, mild expiratory wheezing   Abdominal:      General: Bowel sounds are normal. There is no distension.      Palpations: Abdomen is soft.      Tenderness: There is no abdominal tenderness. There is no guarding or rebound.   Musculoskeletal:         General: Normal range of motion.      Cervical back: Neck supple.      Right lower leg: No edema.      Left lower leg: No edema.   Skin:     General: Skin is warm and  dry.   Neurological:      General: No focal deficit present.      Mental Status: He is alert and oriented to person, place, and time. Mental status is at baseline.   Psychiatric:         Mood and Affect: Mood normal.         Thought Content: Thought content normal.         Judgment: Judgment normal.      Comments: Anxious        Fluids    Intake/Output Summary (Last 24 hours) at 2/20/2023 1532  Last data filed at 2/20/2023 1400  Gross per 24 hour   Intake 240 ml   Output 2085 ml   Net -1845 ml         Laboratory                            Imaging  [id number] VIEW   Final Result         No specific finding to suggest small bowel obstruction.      Moderate amount of stool in the rectum.      DX-CHEST-PORTABLE (1 VIEW)   Final Result         1.  Pulmonary edema and/or infiltrates are identified, which appear somewhat increased since the prior exam.   2.  Small bilateral pleural effusions, increased since prior.   3.  Atherosclerosis      DX-CHEST-PORTABLE (1 VIEW)   Final Result         1.  Pulmonary edema and/or infiltrates are identified, which are somewhat decreased since the prior exam.   2.  Trace bilateral pleural effusions   3.  Atherosclerosis      DX-ABDOMEN FOR TUBE PLACEMENT   Final Result      Enteric tube has been placed and the tip projects over the stomach.      EC-ECHOCARDIOGRAM COMPLETE W/O CONT   Final Result      DX-CHEST-PORTABLE (1 VIEW)   Final Result      1.  Interval placement of an endotracheal tube which terminates in satisfactory position at the level of the aortic arch.   2.  Similar appearance of patchy bilateral interstitial opacities which could be related to edema and/or infection.      DX-CHEST-PORTABLE (1 VIEW)   Final Result         1.  Pulmonary edema and/or infiltrates are identified, which appear somewhat increased since the prior exam.   2.  Trace bilateral pleural effusions   3.  Atherosclerosis      US-EXTREMITY VENOUS LOWER BILAT   Final Result      DX-CHEST-PORTABLE (1 VIEW)    Final Result         1.  Right pulmonary infiltrate, overall infiltrates are decreased since prior study.   2.  Trace bilateral pleural effusions   3.  Atherosclerosis      DX-CHEST-PORTABLE (1 VIEW)   Final Result         1.  Pulmonary edema and/or infiltrates.   2.  Trace bilateral pleural effusions   3.  Atherosclerosis      DX-ABDOMEN FOR TUBE PLACEMENT   Final Result         1.  Moderate stool in the colon suggests changes of constipation, otherwise nonspecific bowel gas pattern in the upper abdomen   2.  Nasogastric tube tip terminates overlying the expected location of the gastric fundus.   3.  Pulmonary edema and/or infiltrates.   4.  Trace bilateral pleural effusions             Assessment/Plan  * Chronic obstructive pulmonary disease with acute exacerbation (HCC)- (present on admission)  Assessment & Plan  Severe acute on chronic respiratory failure secondary to COPD exacerbation.  Was admitted to the intensive care unit on mechanical ventilation and IV Solu-Medrol  This appears to be prompted by human metapneumovirus and antibiotics are not indicated  He will be placed on a prednisone taper  Pulmonology following   He is a non-smoker  Symbicort as well as DuoNeb's as needed  Currently at baseline but desats to low 80's with minimal exertion   Palliative following   Planning on DC home with hospice     Anxiety- (present on admission)  Assessment & Plan  Hx of moderate/sevre anxiety and panic attacks   - on zoloft at baseline   - will add Buspar 5 mg BID and titrate up to help with symptoms   - ativan prn, dc atarax     Anemia- (present on admission)  Assessment & Plan  Upon review of the records his hemoglobin has fluctuated though now is lower than previous at 9.8.  This is appropriate to follow-up as an outpatient    Acute renal failure with tubular necrosis (HCC)- (present on admission)  Assessment & Plan  This has improved    Acute pulmonary edema (HCC)- (present on admission)  Assessment &  Plan  Treated with IV Lasix  Appears resolved, pt close to baseline O2     Hypertension  Assessment & Plan  He has been started on metoprolol  Low dose lisinopril started 2/14, 2.5 mg, adjust as needed     Swelling of lower extremity  Assessment & Plan  Minimal   Holding lasix     Pulmonary nodule- (present on admission)  Assessment & Plan  Appropriate to follow-up outpatient    Ankylosing spondylitis (HCC)- (present on admission)  Assessment & Plan  Continue oxycodone for pain.    Elevated troponin- (present on admission)  Assessment & Plan  Troponin went up to 208 consistent with stress response due to respiratory failure  Echo reveals an EF 50% without wall motion abnormalities    Acute hypoxemic respiratory failure (HCC)- (present on admission)  Assessment & Plan  Acute on chronic respiratory failure.  His baseline oxygen has been between 4 and 5 L.  He required endotracheal intubation  Since stabilized back to home oxygen   Treatment as above         VTE prophylaxis: Xarelto 10 mg daily as prophylaxis    I have performed a physical exam and reviewed and updated ROS and Plan today (2/20/2023). In review of yesterday's note (2/19/2023), there are no changes except as documented above.

## 2023-02-20 NOTE — PROGRESS NOTES
Pt has been educated to hospital falls prevention policy. They are aware they will be assessed every shift, and with any condition changes, by the nursing staff on their ability to perform their ADL's without need for assistance. Pt understands that based on the number of their score they are given a base score that will assign a level of low, medium, or high risk for falls. This patient has rated a high which requires a bed / chair alarm for their safety to prevent a fall. The patient is alert and oriented, and acknowledges and understands the need for intervention but refuses the application and use of the bed / chair alarm that is required per policy. Pt agrees to use call light and wait for help to arrive to assist them to get up when they need to. Call light is within reach and all other safety measures in place.   Comfort Mei Received bedside report from night shift RN. Patient is A&Ox4. Patient is on 5L oxymask. His O2 saturation has been 97-98%, VSS. Patient complains of pain 7/10, he has morphine suspension 10 mg which he took Q3 to Q4 for me this shift. POC discussed with patient, all questions answered. No further needs at this time. Bed is in lowest/locked position. Call light and belongings are within reach. Hourly rounding in place. Patient refused senna, stool softeners, and then at end of shift wanted them. This nurse deferred him to night shift.

## 2023-02-20 NOTE — DISCHARGE PLANNING
Case Management Discharge Planning    Admission Date: 2/8/2023  GMLOS: 5  ALOS: 12    6-Clicks ADL Score: 20  6-Clicks Mobility Score: 16  PT and/or OT Eval ordered: Yes  Post-acute Referrals Ordered: Yes  Post-acute Choice Obtained: Yes  Has referral(s) been sent to post-acute provider:  Yes      Anticipated Discharge Dispo: Discharge Disposition: D/T to hospice home (50)    DME Needed: Yes    DME Ordered: No    Action(s) Taken: Updated Provider/Nurse on Discharge Plan and OTHER    Escalations Completed: None    Medically Clear: Yes    Next Steps: Rev'd with Dr Arreola, med clear for dc, awaiting hospice plans. Phone call to [facility] nurse Jennifer ([phone]). She states pt's wife has moved furniture around so Jennifer can now order DME. Jennifer to meet with wife at 12:00 today to finalize plans. Likely dc this afternoon or in the morning. Will f/u with cm after meeting with wife.     Addendum at 13:00- CM spoke to Jennifer with [facility] and she spoke to wife extensively and the wife wanted Jennifer to talk to pt, which Jennifer did extensively. Jennifer frequested the MD talk to pt and wife to inform them that pt is ready to dc now and that hospice can meet his needs at home. Pt and wife seem to need to be pushed along as pt states he does not have to leave until he is ready.   Rev'd with Dr Arreola and she will talk to pt.      Barriers to Discharge: Pending Placement and Outpatient referrals pending    Is the patient up for discharge tomorrow: Yes    Is transport arranged for discharge disposition: No

## 2023-02-21 PROBLEM — J96.21 ACUTE ON CHRONIC RESPIRATORY FAILURE WITH HYPOXEMIA (HCC): Status: ACTIVE | Noted: 2023-01-18

## 2023-02-21 PROCEDURE — A9270 NON-COVERED ITEM OR SERVICE: HCPCS | Performed by: STUDENT IN AN ORGANIZED HEALTH CARE EDUCATION/TRAINING PROGRAM

## 2023-02-21 PROCEDURE — 700102 HCHG RX REV CODE 250 W/ 637 OVERRIDE(OP): Performed by: INTERNAL MEDICINE

## 2023-02-21 PROCEDURE — 700101 HCHG RX REV CODE 250: Performed by: INTERNAL MEDICINE

## 2023-02-21 PROCEDURE — 700111 HCHG RX REV CODE 636 W/ 250 OVERRIDE (IP): Performed by: STUDENT IN AN ORGANIZED HEALTH CARE EDUCATION/TRAINING PROGRAM

## 2023-02-21 PROCEDURE — 94640 AIRWAY INHALATION TREATMENT: CPT

## 2023-02-21 PROCEDURE — A9270 NON-COVERED ITEM OR SERVICE: HCPCS | Performed by: INTERNAL MEDICINE

## 2023-02-21 PROCEDURE — 700101 HCHG RX REV CODE 250: Performed by: STUDENT IN AN ORGANIZED HEALTH CARE EDUCATION/TRAINING PROGRAM

## 2023-02-21 PROCEDURE — 700102 HCHG RX REV CODE 250 W/ 637 OVERRIDE(OP): Performed by: STUDENT IN AN ORGANIZED HEALTH CARE EDUCATION/TRAINING PROGRAM

## 2023-02-21 PROCEDURE — 94760 N-INVAS EAR/PLS OXIMETRY 1: CPT

## 2023-02-21 PROCEDURE — 770006 HCHG ROOM/CARE - MED/SURG/GYN SEMI*

## 2023-02-21 PROCEDURE — 700102 HCHG RX REV CODE 250 W/ 637 OVERRIDE(OP): Performed by: HOSPITALIST

## 2023-02-21 PROCEDURE — A9270 NON-COVERED ITEM OR SERVICE: HCPCS | Performed by: HOSPITALIST

## 2023-02-21 PROCEDURE — 99232 SBSQ HOSP IP/OBS MODERATE 35: CPT | Performed by: INTERNAL MEDICINE

## 2023-02-21 PROCEDURE — 92526 ORAL FUNCTION THERAPY: CPT

## 2023-02-21 RX ADMIN — LEVALBUTEROL 1.25 MG: 1.25 SOLUTION RESPIRATORY (INHALATION) at 21:29

## 2023-02-21 RX ADMIN — RIVAROXABAN 10 MG: 10 TABLET, FILM COATED ORAL at 17:20

## 2023-02-21 RX ADMIN — BUDESONIDE AND FORMOTEROL FUMARATE DIHYDRATE 2 PUFF: 160; 4.5 AEROSOL RESPIRATORY (INHALATION) at 13:10

## 2023-02-21 RX ADMIN — MORPHINE SULFATE 10 MG: 10 SOLUTION ORAL at 19:24

## 2023-02-21 RX ADMIN — BUSPIRONE HYDROCHLORIDE 5 MG: 10 TABLET ORAL at 19:23

## 2023-02-21 RX ADMIN — ASPIRIN 81 MG 81 MG: 81 TABLET ORAL at 09:44

## 2023-02-21 RX ADMIN — BUSPIRONE HYDROCHLORIDE 5 MG: 10 TABLET ORAL at 09:44

## 2023-02-21 RX ADMIN — MORPHINE SULFATE 10 MG: 10 SOLUTION ORAL at 11:38

## 2023-02-21 RX ADMIN — LISINOPRIL 2.5 MG: 5 TABLET ORAL at 06:09

## 2023-02-21 RX ADMIN — METOPROLOL TARTRATE 75 MG: 50 TABLET, FILM COATED ORAL at 06:09

## 2023-02-21 RX ADMIN — SENNOSIDES AND DOCUSATE SODIUM 2 TABLET: 50; 8.6 TABLET ORAL at 06:09

## 2023-02-21 RX ADMIN — MORPHINE SULFATE 10 MG: 10 SOLUTION ORAL at 07:37

## 2023-02-21 RX ADMIN — METOPROLOL TARTRATE 75 MG: 50 TABLET, FILM COATED ORAL at 17:21

## 2023-02-21 RX ADMIN — MORPHINE SULFATE 10 MG: 10 SOLUTION ORAL at 16:14

## 2023-02-21 RX ADMIN — ONDANSETRON 4 MG: 2 INJECTION INTRAMUSCULAR; INTRAVENOUS at 07:36

## 2023-02-21 RX ADMIN — LEVALBUTEROL 1.25 MG: 1.25 SOLUTION RESPIRATORY (INHALATION) at 08:52

## 2023-02-21 RX ADMIN — PREDNISONE 10 MG: 10 TABLET ORAL at 06:09

## 2023-02-21 RX ADMIN — POLYETHYLENE GLYCOL 3350 1 PACKET: 17 POWDER, FOR SOLUTION ORAL at 06:09

## 2023-02-21 RX ADMIN — SENNOSIDES AND DOCUSATE SODIUM 2 TABLET: 50; 8.6 TABLET ORAL at 17:23

## 2023-02-21 RX ADMIN — MORPHINE SULFATE 10 MG: 10 SOLUTION ORAL at 23:56

## 2023-02-21 RX ADMIN — BUDESONIDE AND FORMOTEROL FUMARATE DIHYDRATE 2 PUFF: 160; 4.5 AEROSOL RESPIRATORY (INHALATION) at 22:49

## 2023-02-21 RX ADMIN — SERTRALINE 25 MG: 50 TABLET, FILM COATED ORAL at 09:45

## 2023-02-21 ASSESSMENT — PAIN DESCRIPTION - PAIN TYPE
TYPE: ACUTE PAIN;CHRONIC PAIN

## 2023-02-21 ASSESSMENT — PATIENT HEALTH QUESTIONNAIRE - PHQ9
SUM OF ALL RESPONSES TO PHQ9 QUESTIONS 1 AND 2: 0
2. FEELING DOWN, DEPRESSED, IRRITABLE, OR HOPELESS: NOT AT ALL
1. LITTLE INTEREST OR PLEASURE IN DOING THINGS: NOT AT ALL

## 2023-02-21 NOTE — THERAPY
Occupational Therapy  Daily Treatment     Patient Name: Nikolas Bob  Age:  67 y.o., Sex:  male  Medical Record #: [id number]  Today's Date: 2/20/2023     Precautions  Precautions: (P) Fall Risk, Swallow Precautions ( See Comments)    Assessment    Pt seen for follow up OT tx session, pt making several calls upon first attempts, eventually able to see pt progressing well with functional mobility and ADLs as well as showing improved activity tolerance for standing. Pt passed off to PT while standing looking out window for exercises, alerted RN. Will continue to see for skilled therapy while admitted.    Plan    Occupational Therapy Treatment Plan  O.T. Treatment Plan: (P) Continue Current Treatment Plan    DC Equipment Recommendations: (P) Unable to determine at this time  Discharge Recommendations: (P) Recommend home health for continued occupational therapy services    Objective       02/20/23 0165   Precautions   Precautions Fall Risk;Swallow Precautions ( See Comments)   Cognition    Cognition / Consciousness WDL   Balance   Sitting Balance (Static) Fair   Sitting Balance (Dynamic) Fair   Standing Balance (Static) Fair -   Standing Balance (Dynamic) Fair -   Weight Shift Sitting Fair   Weight Shift Standing Fair   Skilled Intervention Verbal Cuing   Comments no AD refused walker   Bed Mobility    Supine to Sit Supervised   Scooting Supervised   Rolling Standby Assist   Skilled Intervention Verbal Cuing;Facilitation   Activities of Daily Living   Grooming Supervision   Upper Body Dressing Supervision   Lower Body Dressing Contact Guard Assist   Toileting Supervision   Skilled Intervention Verbal Cuing;Facilitation   How much help from another person does the patient currently need...   Putting on and taking off regular lower body clothing? 3   Bathing (including washing, rinsing, and drying)? 3   Toileting, which includes using a toilet, bedpan, or urinal? 3   Putting on and taking off regular upper body clothing? 3    Taking care of personal grooming such as brushing teeth? 4   Eating meals? 4   6 Clicks Daily Activity Score 20   Functional Mobility   Sit to Stand Contact Guard Assist   Bed, Chair, Wheelchair Transfer Contact Guard Assist   Transfer Method Stand Step   Mobility bed mobility, standing at bedside holding on to window, standing to pee, passed off to PT   Skilled Intervention Verbal Cuing;Facilitation   Activity Tolerance   Sitting Edge of Bed 5 min   Standing 15 min   Short Term Goals   Short Term Goal # 1 Pt will complete ADL transfers with supervision   Goal Outcome # 1 Progressing as expected   Short Term Goal # 2 Pt will complete LB dressing with supervision   Goal Outcome # 2 Progressing as expected   Short Term Goal # 3 Pt will complete standing G/H with supervision   Goal Outcome # 3 Goal met   Education Group   Education Provided Role of Occupational Therapist   Role of Occupational Therapist Patient Response Patient;Acceptance;Explanation   Occupational Therapy Treatment Plan   O.T. Treatment Plan Continue Current Treatment Plan   Anticipated Discharge Equipment and Recommendations   DC Equipment Recommendations Unable to determine at this time   Discharge Recommendations Recommend home health for continued occupational therapy services   Interdisciplinary Plan of Care Collaboration   IDT Collaboration with  Nursing   Patient Position at End of Therapy Other (Comments)  (Left standing with PT at window)   Collaboration Comments RN updated

## 2023-02-21 NOTE — THERAPY
"Physical Therapy   Daily Treatment     Patient Name: Nikolas Bob  Age:  67 y.o., Sex:  male  Medical Record #: [id number]  Today's Date: 2/20/2023     Precautions  Precautions: (P) Fall Risk;Swallow Precautions ( See Comments)    Assessment    Pt is agreeable to participation in therapy session and tolerates session fairly well; pt reports increased coccyx pain with static standing, requires CGA for all dynamic standing activities, has several small LOB and recovers without assistance from therapist/with use of counter. Pt declines ambulation attempt, up to chair, prefers to stand at counter and complete standing therex with prolonged standing rest breaks between. Pt will benefit from continued PT services in acute setting, recommending HHPT services and FWW upon DC.         Plan    Physical Therapy Treatment Plan  Physical Therapy Treatment Plan: (P) Continue Current Treatment Plan    DC Equipment Recommendations: (P) Unable to determine at this time  Discharge Recommendations: (P) Recommend home health for continued physical therapy services      Subjective    \"I don't know what you guys had in mind to do today\"     Objective       02/20/23 1543   Charge Group   Charges  Yes   PT Therapeutic Activities (Units) 1   PT Therapeutic Exercise (Units) 1   Total Time Spent   PT Total Time Yes   PT Therapeutic Activities Time Spent (Mins) 12   PT Therapeutic Exercise Time Spent (Mins) 16   Precautions   Precautions Fall Risk;Swallow Precautions ( See Comments)   Vitals   O2 (LPM) 4   O2 Delivery Device Oxymask   Pain 0 - 10 Group   Location Coccyx   Therapist Pain Assessment During Activity   Cognition    Cognition / Consciousness WDL   Level of Consciousness Alert   Standing Lower Body Exercises   Standing Lower Body Exercises Yes   Hamstring Curl 2 sets of 10   Marching 1 set of 10   Heel Rise 1 set of 10   Toe Rise 1 set of 10   Other Exercises   (standing HS stretch B)   Balance   Sitting Balance (Static) Fair   Sitting " Balance (Dynamic) Fair   Standing Balance (Static) Fair -   Standing Balance (Dynamic) Fair -   Weight Shift Sitting Fair   Weight Shift Standing Fair   Skilled Intervention Verbal Cuing;Sequencing   Comments HHA on counter, declined FWW   Bed Mobility    Supine to Sit Supervised   Scooting Modified Independent   Gait Analysis   Gait Level Of Assist Refused   Functional Mobility   Sit to Stand Contact Guard Assist   Bed, Chair, Wheelchair Transfer Refused   Mobility bed mobility, sit to stand, prolonged stand at window for urination and standing therex, return to sitting EOB   Skilled Intervention Verbal Cuing;Sequencing   How much difficulty does the patient currently have...   Turning over in bed (including adjusting bedclothes, sheets and blankets)? 3   Sitting down on and standing up from a chair with arms (e.g., wheelchair, bedside commode, etc.) 3   Moving from lying on back to sitting on the side of the bed? 3   How much help from another person does the patient currently need...   Moving to and from a bed to a chair (including a wheelchair)? 3   Need to walk in a hospital room? 2   Climbing 3-5 steps with a railing? 2   6 clicks Mobility Score 16   Activity Tolerance   Sitting in Chair NT   Sitting Edge of Bed 5 min   Standing 20 min   Short Term Goals    Short Term Goal # 1 Pt will perform sit<>stand and stand pivot transfers with supervision w/in 6 visits.   Goal Outcome # 1 Progressing as expected   Short Term Goal # 2 Pt will ambulate 75' with supervision while maintaining O2 > 88% with supervision within 6 visits.   Goal Outcome # 2 Goal not met   Education Group   Education Provided Transfer Status;Exercises - Standing   Exercise - Standing Patient Response Patient;Acceptance;Explanation;Verbal Demonstration;Action Demonstration;Reinforcement Needed;Demonstration   Transfer Status Patient Response Patient;Acceptance;Explanation;Action Demonstration;Verbal Demonstration   Physical Therapy Treatment Plan    Physical Therapy Treatment Plan Continue Current Treatment Plan   Anticipated Discharge Equipment and Recommendations   DC Equipment Recommendations Unable to determine at this time   Discharge Recommendations Recommend home health for continued physical therapy services   Interdisciplinary Plan of Care Collaboration   IDT Collaboration with  Nursing;Occupational Therapist   Patient Position at End of Therapy Seated;Call Light within Reach;Tray Table within Reach;Phone within Reach;Bed Alarm On   Collaboration Comments RN updated   Session Information   Date / Session Number  2/20 -3 (3/3, 2/21)     Bonnie Deleon DPT

## 2023-02-21 NOTE — THERAPY
"Speech Language Pathology  Daily Treatment     Patient Name: Nikolas Bob  Age:  67 y.o., Sex:  male  Medical Record #: [id number]  Today's Date: 2/21/2023     Precautions  Precautions: (P) Fall Risk, Swallow Precautions ( See Comments)    Assessment    The patient was seen on this date for dysphagia therapy with trials of thin liquids. Patient refusing solid textures at this time stating that he is too full and cannot think about food currently. RN reports that the patient consumes medications and foods without any overt s/sx of aspiration. He consumed thin liquids, triggering x3 swallows per sip and does report globus sensation. Patient was educated on compensatory strategies including small bites and sips and utilizing liquid wash to reduce globus. Patient verbalized understanding.     Cont Reg/TNO as tolerated. See during meal. Pt declining MBS.   Continue current treatment plan.    Plan    Continue current treatment plan.    Discharge Recommendations: (P) Anticipate that the patient will have no further speech therapy needs after discharge from the hospital     Objective       02/21/23 0316   Precautions   Precautions Fall Risk;Swallow Precautions ( See Comments)   Vitals   O2 (LPM) 4   O2 Delivery Device Oxymask   Pain 0 - 10 Group   Therapist Pain Assessment Post Activity Pain Same as Prior to Activity;Nurse Notified;0   Patient / Family Goals   Patient / Family Goal #1 \"food gets stuck in my throat\"   Goal #1 Outcome Progressing as expected   Short Term Goals   Short Term Goal # 2 Pt will consume RG7/TNO without s/s of difficulty during oral intake.   Goal Outcome # 2  Progressing as expected   Education Group   Education Provided Dysphagia   Dysphagia Patient Response Patient;Acceptance;Explanation;Reinforcement Needed   Anticipated Discharge Needs   Discharge Recommendations Anticipate that the patient will have no further speech therapy needs after discharge from the hospital   Therapy Recommendations Upon " DC Not Indicated

## 2023-02-21 NOTE — CARE PLAN
Problem: Bronchoconstriction  Goal: Improve in air movement and diminished wheezing  Description: Target End Date:  2 to 3 days    1.  Implement inhaled treatments  2.  Evaluate and manage medication effects  Outcome: Progressing       Respiratory Update    Treatment modality: Xopenex  Frequency:BID    Treatment modality: Symbicort    Frequency: BID     Pt tolerating current treatments well with no adverse reactions.

## 2023-02-21 NOTE — PROGRESS NOTES
Hospital Medicine Daily Progress Note    Date of Service  2/21/2023    Chief Complaint  Nikolas Bob is a 67 y.o. male admitted 2/8/2023 with acute on chronic respiratory failure due to COPD exacerbation     Hospital Course  67 y.o. male with a past medical history of ankylosing spondylitis, kyphoscoliosis, pulmonary hypertension, cor pulmonale, right hilar lung nodule, chronic back pain and history of severe anxiety as well as oxygen dependent COPD most recently on 4 to 5 L after recent admission for COPD exacerbation in Jan 2023 , who presented on  2/8/2023 with worsening shortness of breath for the prior 2 days. Pt had multiple sick contacts the week prior. On arrival patient was urgently intubated due to acute hypoxic/hypercapnic respiratory failure.  He was started on aggressive pulmonary measures and admitted to ICU. Viral panel was negative for influenza, RSV, and COVID.  Extended viral panel was positive for human metapneumovirus by PCR. Procalcitonin was less than 0.5 though chest x-ray revealed hazy interstitial opacities favoring multifocal pneumonia. He was extubated on 2/19 to rescue BiPAP but he refused it and required reintubation. He was successfully extubated on 2/11/2023 and has been on nocturnal BiPAP post extubation.  He has since been transferred to the medical floor. He did well and remained stable at his baseline 4 L of oxygen by nasal cannula, but remained anxious.  Repeat viral panel was negative.  He has completed course of antibiotics.  Patient opted for hospice care at home.  He was then seen by PT/OT for strengthening prior to going home.  DME was arranged.    Interval Problem Update  2/21/2023 - I reviewed the patient's chart. There were no significant overnight events. Remains hemodynamically stable and afebrile. Stable on 4L O2 NC (baseline).  No new labs.      > I have personally seen and examined the patient today.  He states he is emotionally drained.  He states that he is hoping  that he can stay a couple of days while his grandchildren recovers from the URI.  He is worried that the medications that he will take at home has not been settled yet, and discussed with him that that will be taken cared of by home hospice.  Reportedly, hospital bed will be ordered and arriving tomorrow, and wife is moving furniture today in preparation for him going home.      I have discussed this patient's plan of care and discharge plan at IDT rounds today with Case Management, Nursing, Nursing leadership, and other members of the IDT team.    Consultants/Specialty  critical care and palliative care    Code Status  DNAR/DNI    Disposition  Patient is medically cleared for discharge.   Anticipate discharge to to hospice.  I have placed the appropriate orders for post-discharge needs.    Review of Systems  ROS     Pertinent positives/negatives as mentioned above.     A complete review of systems was personally done by me. All other systems were negative.       Physical Exam  Temp:  [36.4 °C (97.5 °F)-36.8 °C (98.2 °F)] 36.8 °C (98.2 °F)  Pulse:  [60-82] 68  Resp:  [18] 18  BP: (128-144)/(58-68) 144/68  SpO2:  [98 %-100 %] 99 %    Physical Exam  Vitals and nursing note reviewed.   Constitutional:       Appearance: He is ill-appearing. He is not toxic-appearing.      Comments: Frail appearing male   HENT:      Head: Normocephalic and atraumatic.      Mouth/Throat:      Mouth: Mucous membranes are moist.      Pharynx: Oropharynx is clear.   Eyes:      Extraocular Movements: Extraocular movements intact.      Conjunctiva/sclera: Conjunctivae normal.   Cardiovascular:      Rate and Rhythm: Normal rate and regular rhythm.      Heart sounds: Normal heart sounds.   Pulmonary:      Effort: No respiratory distress.      Breath sounds: No wheezing or rhonchi.      Comments: Diminished air entry B/L bases, otherwise clear to auscultation  Abdominal:      General: Bowel sounds are normal. There is no distension.       Palpations: Abdomen is soft.      Tenderness: There is no abdominal tenderness. There is no guarding or rebound.   Musculoskeletal:         General: Normal range of motion.      Cervical back: Neck supple.      Right lower leg: No edema.      Left lower leg: No edema.   Skin:     General: Skin is warm and dry.   Neurological:      General: No focal deficit present.      Mental Status: He is alert and oriented to person, place, and time. Mental status is at baseline.   Psychiatric:         Mood and Affect: Mood normal.         Thought Content: Thought content normal.         Judgment: Judgment normal.      Comments: Anxious      I have performed the physical examination today 2/21/2023.  In review of yesterday's note, there are no new changes except as documented above.      Fluids    Intake/Output Summary (Last 24 hours) at 2/21/2023 1324  Last data filed at 2/21/2023 1200  Gross per 24 hour   Intake --   Output 1175 ml   Net -1175 ml       Laboratory                            Imaging  LO-BVTINOQ-3 VIEW   Final Result         No specific finding to suggest small bowel obstruction.      Moderate amount of stool in the rectum.      DX-CHEST-PORTABLE (1 VIEW)   Final Result         1.  Pulmonary edema and/or infiltrates are identified, which appear somewhat increased since the prior exam.   2.  Small bilateral pleural effusions, increased since prior.   3.  Atherosclerosis      DX-CHEST-PORTABLE (1 VIEW)   Final Result         1.  Pulmonary edema and/or infiltrates are identified, which are somewhat decreased since the prior exam.   2.  Trace bilateral pleural effusions   3.  Atherosclerosis      DX-ABDOMEN FOR TUBE PLACEMENT   Final Result      Enteric tube has been placed and the tip projects over the stomach.      EC-ECHOCARDIOGRAM COMPLETE W/O CONT   Final Result      DX-CHEST-PORTABLE (1 VIEW)   Final Result      1.  Interval placement of an endotracheal tube which terminates in satisfactory position at the level  of the aortic arch.   2.  Similar appearance of patchy bilateral interstitial opacities which could be related to edema and/or infection.      DX-CHEST-PORTABLE (1 VIEW)   Final Result         1.  Pulmonary edema and/or infiltrates are identified, which appear somewhat increased since the prior exam.   2.  Trace bilateral pleural effusions   3.  Atherosclerosis      US-EXTREMITY VENOUS LOWER BILAT   Final Result      DX-CHEST-PORTABLE (1 VIEW)   Final Result         1.  Right pulmonary infiltrate, overall infiltrates are decreased since prior study.   2.  Trace bilateral pleural effusions   3.  Atherosclerosis      DX-CHEST-PORTABLE (1 VIEW)   Final Result         1.  Pulmonary edema and/or infiltrates.   2.  Trace bilateral pleural effusions   3.  Atherosclerosis      DX-ABDOMEN FOR TUBE PLACEMENT   Final Result         1.  Moderate stool in the colon suggests changes of constipation, otherwise nonspecific bowel gas pattern in the upper abdomen   2.  Nasogastric tube tip terminates overlying the expected location of the gastric fundus.   3.  Pulmonary edema and/or infiltrates.   4.  Trace bilateral pleural effusions             Assessment/Plan  * Chronic obstructive pulmonary disease with acute exacerbation (HCC)- (present on admission)  Assessment & Plan  -Severe acute on chronic respiratory failure secondary to COPD exacerbation.  Respiratory failure needing intubation in the ICU. This appears to be caused by human metapneumovirus infection.    -Improved.  Stable at baseline home oxygen requirement (4 to 5 L by nasal cannula).  Currently, anxiety causing majority of symptoms.    -Continue short burst prednisone course.  -Continue Symbicort, and as needed DuoNeb.  Continue respiratory support with RT protocol.  -Planning on DC home with hospice.    Acute on chronic respiratory failure with hypoxemia (HCC)- (present on admission)  Assessment & Plan  - Due to COPD exacerbation.  - Required intubation in the ICU, so  far doing well postextubation and remained stable at baseline 4 to 5 L of supplemental oxygen.  -Continue bronchodilators, RT support, supplemental oxygen.    Anxiety- (present on admission)  Assessment & Plan  -Hx of moderate/severe anxiety and panic attacks.  Remains anxious.  -Continue PRN Xanax, BuSpar, and Zoloft.    Acute renal failure with tubular necrosis (HCC)- (present on admission)  Assessment & Plan  - Resolved.  Continue to monitor.    Acute pulmonary edema (HCC)- (present on admission)  Assessment & Plan  -Treated with IV Lasix.  -Resolved.  Patient is at his baseline supplemental oxygen.    Swelling of lower extremity  Assessment & Plan  -Minimal. Holding lasix.    Elevated troponin- (present on admission)  Assessment & Plan  - Likely demand ischemia. Echo reveals an EF 50% without wall motion abnormalities.  No ACS.    Anemia- (present on admission)  Assessment & Plan  -Upon review of the records his hemoglobin has fluctuated.   -Needs continued outpatient follow-up.    Hypertension- (present on admission)  Assessment & Plan  - Good to fair control.  Continue metoprolol and lisinopril.  Monitor blood pressure trend closely with as needed IV hydralazine, Vasotec and labetalol for significant hypertension.    Pulmonary nodule- (present on admission)  Assessment & Plan  -Appropriate to follow-up outpatient    Ankylosing spondylitis (HCC)- (present on admission)  Assessment & Plan  -Continue oxycodone for pain.         VTE prophylaxis: Xarelto 10 mg daily as prophylaxis    I have performed a physical exam and reviewed and updated ROS and Plan today (2/21/2023). In review of yesterday's note (2/20/2023), there are no changes except as documented above.

## 2023-02-21 NOTE — DISCHARGE PLANNING
Case Management Discharge Planning    Admission Date: 2/8/2023  GMLOS: 5  ALOS: 13    6-Clicks ADL Score: 20  6-Clicks Mobility Score: 16  PT and/or OT Eval ordered: Yes  Post-acute Referrals Ordered: Yes  Post-acute Choice Obtained: Yes  Has referral(s) been sent to post-acute provider:  Yes      Anticipated Discharge Dispo: Discharge Disposition: D/T to hospice home (50)    DME Needed: Yes    DME Ordered: Yes    Action(s) Taken: Updated Provider/Nurse on Discharge Plan, Patient Conference, DC Assessment Complete (See below), Referral(s) sent, Acceptance Received, and Transport Arranged     Escalations Completed: None    Medically Clear: No    Next Steps: Rev'd with Dr Boggs pt is med cleared for dc.   Pt meeting completed with pt, BSN Claudette, Hospice nurse Jennifer, myself-CM, and wife on the phone.   Wife to move furniture today.  Jennifer to order hospital bed tomorrow morning.   [employer] wheelchair van ride arranged for 13:00 tomorrow.  [facility] hospice nurse to see pt tomorrow at 14:00.     Barriers to Discharge: Medical clearance and Pending Placement    Is the patient up for discharge tomorrow: Yes    Is transport arranged for discharge disposition: Yes

## 2023-02-21 NOTE — CARE PLAN
The patient is Stable - Low risk of patient condition declining or worsening    Shift Goals  Clinical Goals: pain mgmt  Patient Goals: pain mgmt  Family Goals: diogenes    Progress made toward(s) clinical / shift goals:    Problem: Knowledge Deficit - COPD  Goal: Patient/significant other demonstrates understanding of disease process, utilization of the Action Plan, medications and discharge instruction  Outcome: Progressing     Problem: Knowledge Deficit - Standard  Goal: Patient and family/care givers will demonstrate understanding of plan of care, disease process/condition, diagnostic tests and medications  Outcome: Progressing     Problem: Pain - Standard  Goal: Alleviation of pain or a reduction in pain to the patient’s comfort goal  Outcome: Progressing     Problem: Fall Risk  Goal: Patient will remain free from falls  Outcome: Progressing       Patient is educated on plan of care during course of hospital stay. Patient will remain free from falls during this shift. Pt aware and educated on use of call light for assistance. Pain will remain within patient's comfort zone. Bedside table and call light are within reach.

## 2023-02-21 NOTE — CARE PLAN
Problem: Bronchoconstriction  Goal: Improve in air movement and diminished wheezing  Description: Target End Date:  2 to 3 days    1.  Implement inhaled treatments  2.  Evaluate and manage medication effects  Outcome: Progressing   Xopenex BID

## 2023-02-21 NOTE — PROGRESS NOTES
Received bedside report from night shift RN. Patient is A&Ox4, with severe anxiety. Patient is on 5L oxymask, VSS. Patient complains of 7/10 pain. He is getting 10 mg po morphine suspension. POC discussed with patient, all questions answered. No further needs at this time. Bed is in lowest/locked position. Call light and belongings are within reach. Hourly rounding in place. Voiding in urinal. Patient has not had a bm since 2/18. Stool softeners given.

## 2023-02-21 NOTE — DISCHARGE PLANNING
DC Transport Scheduled    Received request at: 2/21/2023 at 1000    Transport Company Scheduled:  [employer]    Scheduled Date: 2/21/2023  Scheduled Time: 1300    Destination: Home at [address] [facility] [place]    Notified care team of scheduled transport via Voalte.     If there are any changes needed to the DC transportation scheduled, please contact [facility] at ext. [phone] between the hours of 4801-4031 Mon-Fri. If outside those hours, contact the ED Case Manager at ext. [phone].

## 2023-02-22 ENCOUNTER — PHARMACY VISIT (OUTPATIENT)
Dept: PHARMACY | Facility: MEDICAL CENTER | Age: 68
End: 2023-02-22
Payer: COMMERCIAL

## 2023-02-22 VITALS
TEMPERATURE: 97.6 F | OXYGEN SATURATION: 100 % | WEIGHT: 145.28 LBS | SYSTOLIC BLOOD PRESSURE: 139 MMHG | DIASTOLIC BLOOD PRESSURE: 63 MMHG | BODY MASS INDEX: 21.52 KG/M2 | HEIGHT: 69 IN | RESPIRATION RATE: 18 BRPM | HEART RATE: 75 BPM

## 2023-02-22 PROCEDURE — 700101 HCHG RX REV CODE 250: Performed by: STUDENT IN AN ORGANIZED HEALTH CARE EDUCATION/TRAINING PROGRAM

## 2023-02-22 PROCEDURE — 700111 HCHG RX REV CODE 636 W/ 250 OVERRIDE (IP): Performed by: STUDENT IN AN ORGANIZED HEALTH CARE EDUCATION/TRAINING PROGRAM

## 2023-02-22 PROCEDURE — A9270 NON-COVERED ITEM OR SERVICE: HCPCS | Performed by: INTERNAL MEDICINE

## 2023-02-22 PROCEDURE — RXMED WILLOW AMBULATORY MEDICATION CHARGE: Performed by: INTERNAL MEDICINE

## 2023-02-22 PROCEDURE — 700102 HCHG RX REV CODE 250 W/ 637 OVERRIDE(OP): Performed by: STUDENT IN AN ORGANIZED HEALTH CARE EDUCATION/TRAINING PROGRAM

## 2023-02-22 PROCEDURE — 700102 HCHG RX REV CODE 250 W/ 637 OVERRIDE(OP): Performed by: INTERNAL MEDICINE

## 2023-02-22 PROCEDURE — A9270 NON-COVERED ITEM OR SERVICE: HCPCS | Performed by: STUDENT IN AN ORGANIZED HEALTH CARE EDUCATION/TRAINING PROGRAM

## 2023-02-22 PROCEDURE — 99239 HOSP IP/OBS DSCHRG MGMT >30: CPT | Performed by: INTERNAL MEDICINE

## 2023-02-22 RX ORDER — LISINOPRIL 2.5 MG/1
2.5 TABLET ORAL DAILY
Qty: 30 TABLET | Refills: 1 | Status: SHIPPED | OUTPATIENT
Start: 2023-02-22

## 2023-02-22 RX ORDER — ROFLUMILAST 250 UG/1
250 TABLET ORAL DAILY
Qty: 28 TABLET | Refills: 0 | Status: SHIPPED | OUTPATIENT
Start: 2023-02-22

## 2023-02-22 RX ORDER — ONDANSETRON 4 MG/1
4 TABLET, ORALLY DISINTEGRATING ORAL EVERY 6 HOURS PRN
Qty: 10 TABLET | Refills: 0 | Status: SHIPPED | OUTPATIENT
Start: 2023-02-22

## 2023-02-22 RX ORDER — ASPIRIN 81 MG/1
81 TABLET, CHEWABLE ORAL DAILY
Qty: 100 TABLET | Refills: 0 | Status: SHIPPED | OUTPATIENT
Start: 2023-02-22

## 2023-02-22 RX ORDER — ACETAMINOPHEN 500 MG
1000 TABLET ORAL EVERY 8 HOURS PRN
Qty: 15 TABLET | Refills: 0 | Status: SHIPPED | OUTPATIENT
Start: 2023-02-22 | End: 2023-02-27

## 2023-02-22 RX ORDER — BUSPIRONE HYDROCHLORIDE 5 MG/1
5 TABLET ORAL 3 TIMES DAILY
Qty: 15 TABLET | Refills: 0 | Status: SHIPPED | OUTPATIENT
Start: 2023-02-22 | End: 2023-02-27

## 2023-02-22 RX ORDER — MORPHINE SULFATE 10 MG/5ML
5-10 SOLUTION ORAL
Qty: 120 ML | Refills: 0 | Status: SHIPPED | OUTPATIENT
Start: 2023-02-22 | End: 2023-02-27

## 2023-02-22 RX ORDER — ALPRAZOLAM 0.5 MG/1
0.5 TABLET ORAL 3 TIMES DAILY PRN
Qty: 15 TABLET | Refills: 0 | Status: SHIPPED | OUTPATIENT
Start: 2023-02-22 | End: 2023-02-27

## 2023-02-22 RX ORDER — LIDOCAINE 50 MG/G
1 PATCH TOPICAL EVERY 24 HOURS
Qty: 5 PATCH | Refills: 0 | Status: SHIPPED | OUTPATIENT
Start: 2023-02-22 | End: 2023-02-27

## 2023-02-22 RX ORDER — AMOXICILLIN 250 MG
2 CAPSULE ORAL 2 TIMES DAILY
Qty: 30 TABLET | Refills: 0 | Status: SHIPPED | OUTPATIENT
Start: 2023-02-22

## 2023-02-22 RX ORDER — METOPROLOL TARTRATE 75 MG/1
75 TABLET, FILM COATED ORAL 2 TIMES DAILY
Qty: 60 TABLET | Refills: 1 | Status: SHIPPED | OUTPATIENT
Start: 2023-02-22

## 2023-02-22 RX ORDER — ROFLUMILAST 250 UG/1
250 TABLET ORAL DAILY
Qty: 28 TABLET | Refills: 0 | Status: SHIPPED | OUTPATIENT
Start: 2023-02-22 | End: 2023-02-22 | Stop reason: SDUPTHER

## 2023-02-22 RX ADMIN — MORPHINE SULFATE 10 MG: 10 SOLUTION ORAL at 09:56

## 2023-02-22 RX ADMIN — MORPHINE SULFATE 10 MG: 10 SOLUTION ORAL at 12:29

## 2023-02-22 RX ADMIN — MORPHINE SULFATE 10 MG: 10 SOLUTION ORAL at 05:41

## 2023-02-22 RX ADMIN — LEVALBUTEROL 1.25 MG: 1.25 SOLUTION RESPIRATORY (INHALATION) at 12:13

## 2023-02-22 RX ADMIN — LISINOPRIL 2.5 MG: 5 TABLET ORAL at 05:36

## 2023-02-22 RX ADMIN — ASPIRIN 81 MG 81 MG: 81 TABLET ORAL at 08:45

## 2023-02-22 RX ADMIN — BUSPIRONE HYDROCHLORIDE 5 MG: 10 TABLET ORAL at 08:45

## 2023-02-22 RX ADMIN — PREDNISONE 10 MG: 10 TABLET ORAL at 05:36

## 2023-02-22 RX ADMIN — POLYETHYLENE GLYCOL 3350 1 PACKET: 17 POWDER, FOR SOLUTION ORAL at 05:35

## 2023-02-22 RX ADMIN — SENNOSIDES AND DOCUSATE SODIUM 2 TABLET: 50; 8.6 TABLET ORAL at 05:36

## 2023-02-22 NOTE — CARE PLAN
The patient is Stable - Low risk of patient condition declining or worsening    Shift Goals  Clinical Goals: home hospice set-up, pain mgmt, comfort  Patient Goals: pain mgmt, comfort  Family Goals: diogenes    Progress made toward(s) clinical / shift goals:    Problem: Risk for Infection - COPD  Goal: Patient will remain free from signs and symptoms of infection  Outcome: Progressing     Problem: Nutrition - Advanced  Goal: Patient will display progressive weight gain toward goal have adequate food and fluid intake  Outcome: Progressing     Problem: Self Care  Goal: Patient will have the ability to perform ADLs independently or with assistance (bathe, groom, dress, toilet and feed)  Outcome: Progressing     Problem: Knowledge Deficit - Standard  Goal: Patient and family/care givers will demonstrate understanding of plan of care, disease process/condition, diagnostic tests and medications  Outcome: Progressing     Problem: Skin Integrity  Goal: Skin integrity is maintained or improved  Outcome: Progressing     Problem: Pain - Standard  Goal: Alleviation of pain or a reduction in pain to the patient’s comfort goal  Outcome: Progressing     Problem: Fall Risk  Goal: Patient will remain free from falls  Outcome: Progressing       Patient is educated on plan of care during course of hospital stay. Patient will remain free from falls during this shift. Pt aware and educated on use of call light for assistance. Pain will remain within patient's comfort zone. Bedside table and call light are within reach.

## 2023-02-22 NOTE — DISCHARGE PLANNING
Case Management Discharge Planning    Admission Date: 2/8/2023  GMLOS: 5  ALOS: 14    6-Clicks ADL Score: 20  6-Clicks Mobility Score: 16  PT and/or OT Eval ordered: Yes  Post-acute Referrals Ordered: Yes  Post-acute Choice Obtained: Yes  Has referral(s) been sent to post-acute provider:  Yes      Anticipated Discharge Dispo: Discharge Disposition: D/T to hospice home (50)    DME Needed: No    Action(s) Taken: Updated Provider/Nurse on Discharge Plan and OTHER    Escalations Completed: None    Medically Clear: Yes    Next Steps: Pt to dc home 13:00 via [employer] wheelchair van, DNR-polst attached to dc packet. Voalte texted Rocio CARRION to please come to  office to  packet.     Per Jennifer with [facility] to Dr Mccarty-they have a special request- please order 3 days worth of meds trhough meds to beds- they do not want any issues with this pt and his anxiety. Also notified SHEYLA Chan of need for meds to beds.   Pt to dc at 13:00.     Barriers to Discharge: None    Is the patient up for discharge tomorrow: No

## 2023-02-22 NOTE — PROGRESS NOTES
Patient discharging home via [employer] wheelchair patient med to bed received. Reviewed discharge instructions with patient and answered questions. Patient discharging to home hospice.

## 2023-02-22 NOTE — DISCHARGE PLANNING
DC MEDS to BEDS:  Per Jennifer with [facility] they have a special request- please order 3 days worth of meds trhough meds to beds- they do not want any issues with this pt and his anxiety. Notified Dr Lyn and SHEYLA Chan at 10:39 AM of need for meds to beds.   Called Charge NAOMY Zhu at 12:30pm that this CM rec'd notice from [facility] with meds to beds that we are not contracted with [facility] insurance and can CM do approved services for meds. Pharmacy to send meds and pricing to  for approval. Thery need to be filled quickly since pt to dc at 13:00. Audra to wait in pharmacy for them to be filled.   Pt to dc at 13:00 with GMT transport.   Per pharmacy at 12:39 PM-Alprazolam $7.64 Aspirin $7.74 Buspiron $7.64 Lidocaine $10.62 Lisinopril $7.77 Metoprolol $13.50 Morphine $9.80 Zofran $7.86 Senna $7.92, Tylenol 7.77. Total $88.26 faxed to meds to beds.

## 2023-02-22 NOTE — PROGRESS NOTES
Received bedside report from night shift RN. Patient is A&Ox4. Patient is on 5L on oxymask, VSS, bp tends to be elevated. Patient is receiving 10 mg morphine suspension for 7/10 pain. POC discussed with patient, all questions answered. Patient is going home on hospice tomorrow at 1300. Hospice wasn't able to deliver furniture until 2/22. No further needs at this time. Bed is in lowest/locked position. Call light and belongings are within reach. Hourly rounding in place.

## 2023-02-22 NOTE — DISCHARGE SUMMARY
Discharge Summary    CHIEF COMPLAINT ON ADMISSION  Chief Complaint   Patient presents with    Shortness of Breath       Reason for Admission  COPD (chronic obstructive pulmonar*     Admission Date  2/8/2023    CODE STATUS  DNAR/DNI    HPI & HOSPITAL COURSE  67 y.o. male with a past medical history of ankylosing spondylitis, kyphoscoliosis, pulmonary hypertension, cor pulmonale, right hilar lung nodule, chronic back pain and history of severe anxiety as well as oxygen dependent COPD most recently on 4 to 5 L after recent admission for COPD exacerbation in Jan 2023 , who presented on  2/8/2023 with worsening shortness of breath for the prior 2 days. Pt had multiple sick contacts the week prior. On arrival patient was urgently intubated due to acute hypoxic/hypercapnic respiratory failure.  He was started on aggressive pulmonary measures and admitted to ICU. Viral panel was negative for influenza, RSV, and COVID.  Extended viral panel was positive for human metapneumovirus by PCR. Procalcitonin was less than 0.5 though chest x-ray revealed hazy interstitial opacities favoring multifocal pneumonia. He was extubated on 2/19 to rescue BiPAP but he refused it and required reintubation. He was successfully extubated on 2/11/2023 and has been on nocturnal BiPAP post extubation.  He has since been transferred to the medical floor. He did well and remained stable at his baseline 4 L of oxygen by nasal cannula, but remained anxious.  Repeat viral panel was negative.  He has completed course of antibiotics and steroids.  Patient opted for hospice care at home.  Home DME was arranged. Home hospice has been arranged.    He remained hemodynamically stable and afebrile, and as mentioned he remained stable on his baseline home oxygen supplement (4 L). I have personally seen and examined the patient on the day of discharge. With his clinical improvement, he was deemed ready to discharge from the hospital as he did not have any further  hospitalization needs. Patient felt comfortable going home. The discharge plan was discussed with the patient, with which he was agreeable to.     Therefore, he is discharged in good and stable condition to hospice.    The patient met 2-midnight criteria for an inpatient stay at the time of discharge.    Discharge Date  2/22/2023      FOLLOW UP ITEMS POST DISCHARGE  -Home hospice will admit him to their service, and optimize medications for for continued comfort measures.  - counseled to seek immediate medical attention, or return to the ED for recurrent or worsening symptoms.      DISCHARGE DIAGNOSES  Principal Problem:    Chronic obstructive pulmonary disease with acute exacerbation (HCC) POA: Yes  Active Problems:    Acute on chronic respiratory failure with hypoxemia (HCC) POA: Yes    Elevated troponin POA: Yes    Swelling of lower extremity POA: Yes    Acute pulmonary edema (HCC) POA: Yes    Acute renal failure with tubular necrosis (HCC) POA: Yes    Anxiety POA: Yes    Ankylosing spondylitis (HCC) POA: Yes    Pulmonary nodule POA: Yes    Hypertension POA: Yes    Medically noncompliant POA: Yes    Anemia POA: Yes  Resolved Problems:    * No resolved hospital problems. *      FOLLOW UP  Future Appointments   Date Time Provider Department Center   3/13/2023  1:40 PM Michael Green M.D. [facility]   3/14/2023  1:00 PM Lakshmi King P.A.-C[facility] None     [facility]  [address]  [place] [address]  [phone]          MEDICATIONS ON DISCHARGE     Medication List        START taking these medications        Instructions   acetaminophen 500 MG Tabs  Commonly known as: TYLENOL   Take 2 Tablets by mouth every 8 hours as needed for Moderate Pain or Mild Pain.  Dose: 1,000 mg     ALPRAZolam 0.5 MG Tabs  Commonly known as: XANAX   Take 1 Tablet by mouth 3 times a day as needed for Anxiety for up to 5 days.  Dose: 0.5 mg     aspirin 81 MG Chew chewable tablet  Commonly  known as: ASA   Chew 1 Tablet every day.  Dose: 81 mg     busPIRone 5 MG tablet  Commonly known as: BUSPAR   Take 1 Tablet by mouth 3 times a day.  Dose: 5 mg     lidocaine 5 % Ptch  Commonly known as: LIDODERM   Place 1 Patch on the skin every 24 hours.  Dose: 1 Patch     lisinopril 2.5 MG Tabs  Commonly known as: PRINIVIL   Take 1 Tablet by mouth every day.  Dose: 2.5 mg     Metoprolol Tartrate 75 MG Tabs   Take 75 mg by mouth 2 times a day.  Dose: 75 mg     morphine 10 MG/5ML Soln   Take 2.5-5 mL by mouth every 2 hours as needed (severe pain/air hunger) for up to 5 days.  Dose: 5-10 mg     ondansetron 4 MG Tbdp  Commonly known as: ZOFRAN ODT   Take 1 Tablet by mouth every 6 hours as needed for Nausea/Vomiting.  Dose: 4 mg     Roflumilast 250 MCG Tabs   Take 250 mcg by mouth every day.  Dose: 250 mcg     senna-docusate 8.6-50 MG Tabs  Commonly known as: PERICOLACE or SENOKOT S   Take 2 Tablets by mouth 2 times a day.  Dose: 2 Tablet            CONTINUE taking these medications        Instructions   ammonium lactate 12 % Lotn  Commonly known as: LAC-HYDRIN   Apply 1 Application topically 2 times a day.  Dose: 1 Application     levalbuterol 45 MCG/ACT inhaler  Commonly known as: XOPENEX HFA   Inhale 1-2 Puffs every four hours as needed for Shortness of Breath.  Dose: 1-2 Puff     Multivitamin Adult Chew   Chew 1 Tablet every evening.  Dose: 1 Tablet     sertraline 25 MG tablet  Commonly known as: Zoloft   Take 25 mg by mouth every day.  Dose: 25 mg     Spiriva Respimat 2.5 mcg/Act Aers  Generic drug: tiotropium   Inhale 2 Inhalation every day. Assemble and prime.  Dose: 5 mcg     Stiolto Respimat 2.5-2.5 MCG/ACT Aers  Generic drug: Tiotropium Bromide-Olodaterol   Inhale 2 Puffs every day.  Dose: 2 Puff     Symbicort 160-4.5 MCG/ACT Aero  Generic drug: budesonide-formoterol   Inhale 2 Puffs 2 times a day.  Dose: 2 Puff            STOP taking these medications      azithromycin 250 MG Tabs  Commonly known as:  ZITHROMAX     furosemide 40 MG Tabs  Commonly known as: LASIX     hydrOXYzine HCl 25 MG Tabs  Commonly known as: ATARAX     predniSONE 10 MG Tabs  Commonly known as: DELTASONE              Allergies  No Known Allergies    DIET  Orders Placed This Encounter   Procedures    Diet Order Diet: Regular     Standing Status:   Standing     Number of Occurrences:   1     Order Specific Question:   Diet:     Answer:   Regular [1]       ACTIVITY  As tolerated.  Weight bearing as tolerated    CONSULTATIONS  Critical Care    PROCEDURES  As above    LABORATORY  Lab Results   Component Value Date    SODIUM 139 02/16/2023    POTASSIUM 4.4 02/16/2023    CHLORIDE 95 (L) 02/16/2023    CO2 36 (H) 02/16/2023    GLUCOSE 95 02/16/2023    BUN 30 (H) 02/16/2023    CREATININE 1.02 02/16/2023        Lab Results   Component Value Date    WBC 6.4 02/13/2023    HEMOGLOBIN 9.8 (L) 02/13/2023    HEMATOCRIT 31.5 (L) 02/13/2023    PLATELETCT 144 (L) 02/13/2023        Total time of the discharge process = 40 minutes.

## 2023-02-23 ENCOUNTER — PATIENT OUTREACH (OUTPATIENT)
Dept: HEALTH INFORMATION MANAGEMENT | Facility: OTHER | Age: 68
End: 2023-02-23
Payer: COMMERCIAL

## 2023-02-23 NOTE — PROGRESS NOTES
CHW Nikolas called the pt to see how he wasa doing after his discharge the Day before. Pt stated he was not ready to be discharged home but that he was home and dealing with it. Pt advised this CHW that he was placed on hospice. This CHW tried to discuss this with the pt when the pt said he was getting ready to eat and would call back. This CHW discussed the pt with Jane Amor RN and discussed discharging the pt from my case load because of being placed on hospice. Thus CHW will discuss this with the pt when he returns my call.

## 2023-02-28 ENCOUNTER — PATIENT OUTREACH (OUTPATIENT)
Dept: HEALTH INFORMATION MANAGEMENT | Facility: OTHER | Age: 68
End: 2023-02-28
Payer: COMMERCIAL

## 2023-02-28 NOTE — PROGRESS NOTES
CHW Nikolas called the pt to check in for there last time with the pt. Pt was put on Hospice when he left the Hospital this last time and this CHW will no longer follow.

## 2023-03-02 DIAGNOSIS — I27.20 PULMONARY HYPERTENSION (HCC): ICD-10-CM

## 2023-03-02 DIAGNOSIS — R60.0 BILATERAL LOWER EXTREMITY EDEMA: ICD-10-CM

## 2023-03-02 RX ORDER — FUROSEMIDE 40 MG/1
40 TABLET ORAL DAILY
Qty: 90 TABLET | Refills: 3 | Status: SHIPPED | OUTPATIENT
Start: 2023-03-02

## 2023-03-08 ENCOUNTER — PATIENT OUTREACH (OUTPATIENT)
Dept: HEALTH INFORMATION MANAGEMENT | Facility: OTHER | Age: 68
End: 2023-03-08
Payer: MEDICARE

## 2023-03-08 DIAGNOSIS — I27.20 PULMONARY HYPERTENSION (HCC): ICD-10-CM

## 2023-03-08 DIAGNOSIS — M79.673 PAIN OF FOOT, UNSPECIFIED LATERALITY: ICD-10-CM

## 2023-03-08 DIAGNOSIS — J44.9 CHRONIC OBSTRUCTIVE PULMONARY DISEASE, UNSPECIFIED COPD TYPE (HCC): ICD-10-CM

## 2023-03-10 NOTE — PROGRESS NOTES
Per chart review patient's chart marked for merge with second MRN [id number]. In chart review, Richard has been referred to Hospice Care. Three attempts made to reach Richard by phone with no answer. RN Care Coordinator placed phone call to [facility] and confirmed admission and current patient status with [facility]. Patient closed from Personal Care Management Program as plan of care provided by [facility].

## 2023-03-10 NOTE — PROGRESS NOTES
Priscilla, Richard's wife returned call. She confirms that Richard is under the care of [facility]. She states that this going well for Richard. She called to ask if Dr. Green could order a podiatry referral for Richard. [facility] is not able to order this for them. Will follow up with PCP. Priscilla states that there have been issue with merging his medical record and issue with MyChart. Richard placed under general care management to continue to follow.

## 2023-03-13 ENCOUNTER — TELEPHONE (OUTPATIENT)
Dept: MEDICAL GROUP | Facility: MEDICAL CENTER | Age: 68
End: 2023-03-13

## 2023-03-13 ENCOUNTER — TELEPHONE (OUTPATIENT)
Dept: MEDICAL GROUP | Facility: MEDICAL CENTER | Age: 68
End: 2023-03-13
Payer: MEDICARE

## 2023-03-13 DIAGNOSIS — M79.673 PAIN OF FOOT, UNSPECIFIED LATERALITY: ICD-10-CM

## 2023-03-13 NOTE — TELEPHONE ENCOUNTER
Not sure if insurance will cover podiatry referral if the patient in hospice.  However referral placed

## 2023-03-13 NOTE — TELEPHONE ENCOUNTER
Patients wife, Kimi Oneill left a voicemail in regards to the patient. He us  currently in hospice and would like a referral for podiatry. Please advise.

## 2023-03-14 ENCOUNTER — APPOINTMENT (OUTPATIENT)
Dept: SLEEP MEDICINE | Facility: MEDICAL CENTER | Age: 68
End: 2023-03-14
Attending: PHYSICIAN ASSISTANT
Payer: COMMERCIAL

## 2023-03-15 ENCOUNTER — TELEPHONE (OUTPATIENT)
Dept: SLEEP MEDICINE | Facility: MEDICAL CENTER | Age: 68
End: 2023-03-15
Payer: COMMERCIAL

## 2023-04-28 ENCOUNTER — PATIENT OUTREACH (OUTPATIENT)
Dept: HEALTH INFORMATION MANAGEMENT | Facility: OTHER | Age: 68
End: 2023-04-28
Payer: MEDICARE

## 2023-04-28 NOTE — PROGRESS NOTES
Spoke with Richard, he states he and his wife Kimi would like to gather their thoughts and make a list of things he would like to discuss. He asked for a call back on 5/3 @ 2:00.

## 2023-05-05 ENCOUNTER — TELEPHONE (OUTPATIENT)
Dept: HEALTH INFORMATION MANAGEMENT | Facility: OTHER | Age: 68
End: 2023-05-05

## 2023-05-11 NOTE — PROGRESS NOTES
Spoke with Richard, he states he had not been able to develop a list of question. Spoke with Richard at length regarding the Personal/General Care Management Program vs. Hospice Home care program. Richard is currently a under the care of Hospice. Given he is under the care of Hospice, his plan of care is deferred to them and Personal/General Care Management Program will be closed. RN Care Coordinator's Contact number given if Richard has any further questions. Encouraged his wife to call she has any additional questions as well.